# Patient Record
Sex: MALE | Race: WHITE | Employment: OTHER | URBAN - METROPOLITAN AREA
[De-identification: names, ages, dates, MRNs, and addresses within clinical notes are randomized per-mention and may not be internally consistent; named-entity substitution may affect disease eponyms.]

---

## 2017-01-03 ENCOUNTER — TELEPHONE (OUTPATIENT)
Dept: INTERNAL MEDICINE CLINIC | Age: 82
End: 2017-01-03

## 2017-01-03 PROBLEM — F41.9 ANXIETY DISORDER: Status: ACTIVE | Noted: 2017-01-03

## 2017-01-04 ENCOUNTER — TELEPHONE (OUTPATIENT)
Dept: PSYCHIATRY | Age: 82
End: 2017-01-04

## 2017-01-06 ENCOUNTER — TELEPHONE (OUTPATIENT)
Dept: INTERNAL MEDICINE CLINIC | Age: 82
End: 2017-01-06

## 2017-01-06 RX ORDER — QUETIAPINE FUMARATE 25 MG/1
12.5 TABLET, FILM COATED ORAL NIGHTLY
Qty: 15 TABLET | Refills: 0 | Status: ON HOLD | OUTPATIENT
Start: 2017-01-06 | End: 2017-01-18 | Stop reason: HOSPADM

## 2017-01-16 ENCOUNTER — TELEPHONE (OUTPATIENT)
Dept: INTERNAL MEDICINE CLINIC | Age: 82
End: 2017-01-16

## 2017-01-17 ENCOUNTER — TELEPHONE (OUTPATIENT)
Dept: INTERNAL MEDICINE CLINIC | Age: 82
End: 2017-01-17

## 2017-01-18 PROBLEM — R12 HEART BURN: Status: ACTIVE | Noted: 2017-01-18

## 2017-01-20 ENCOUNTER — CARE COORDINATION (OUTPATIENT)
Dept: CASE MANAGEMENT | Age: 82
End: 2017-01-20

## 2017-01-21 ENCOUNTER — CARE COORDINATION (OUTPATIENT)
Dept: CASE MANAGEMENT | Age: 82
End: 2017-01-21

## 2017-01-22 ENCOUNTER — CARE COORDINATION (OUTPATIENT)
Dept: CASE MANAGEMENT | Age: 82
End: 2017-01-22

## 2017-01-27 ENCOUNTER — OFFICE VISIT (OUTPATIENT)
Dept: INTERNAL MEDICINE CLINIC | Age: 82
End: 2017-01-27

## 2017-01-27 ENCOUNTER — CARE COORDINATION (OUTPATIENT)
Dept: CASE MANAGEMENT | Age: 82
End: 2017-01-27

## 2017-01-27 VITALS
DIASTOLIC BLOOD PRESSURE: 70 MMHG | SYSTOLIC BLOOD PRESSURE: 122 MMHG | BODY MASS INDEX: 18.66 KG/M2 | HEIGHT: 72 IN | WEIGHT: 137.8 LBS | HEART RATE: 64 BPM

## 2017-01-27 DIAGNOSIS — R53.1 GENERAL WEAKNESS: ICD-10-CM

## 2017-01-27 DIAGNOSIS — Z09 HOSPITAL DISCHARGE FOLLOW-UP: Primary | ICD-10-CM

## 2017-01-27 DIAGNOSIS — I25.10 CORONARY ARTERY DISEASE INVOLVING NATIVE CORONARY ARTERY OF NATIVE HEART WITHOUT ANGINA PECTORIS: Chronic | ICD-10-CM

## 2017-01-27 DIAGNOSIS — R07.89 CHEST PAIN, NON-CARDIAC: ICD-10-CM

## 2017-01-27 DIAGNOSIS — F31.81 BIPOLAR 2 DISORDER (HCC): ICD-10-CM

## 2017-01-27 RX ORDER — CALCIUM CARBONATE 300MG(750)
1 TABLET,CHEWABLE ORAL DAILY
COMMUNITY
End: 2022-10-24

## 2017-01-27 RX ORDER — SENNOSIDES 8.6 MG
650 CAPSULE ORAL EVERY 8 HOURS PRN
Status: ON HOLD | COMMUNITY
End: 2019-04-09 | Stop reason: HOSPADM

## 2017-01-27 RX ORDER — AMOXICILLIN 500 MG/1
CAPSULE ORAL
COMMUNITY
Start: 2017-01-25 | End: 2017-02-01

## 2017-02-03 ENCOUNTER — OFFICE VISIT (OUTPATIENT)
Dept: PSYCHIATRY | Age: 82
End: 2017-02-03

## 2017-02-03 VITALS
HEIGHT: 66 IN | SYSTOLIC BLOOD PRESSURE: 138 MMHG | HEART RATE: 58 BPM | BODY MASS INDEX: 22.28 KG/M2 | DIASTOLIC BLOOD PRESSURE: 64 MMHG | WEIGHT: 138.6 LBS

## 2017-02-03 DIAGNOSIS — F31.70 BIPOLAR AFFECTIVE DISORDER IN REMISSION (HCC): Primary | ICD-10-CM

## 2017-02-03 PROCEDURE — G8598 ASA/ANTIPLAT THER USED: HCPCS | Performed by: PSYCHIATRY & NEUROLOGY

## 2017-02-03 PROCEDURE — 99214 OFFICE O/P EST MOD 30 MIN: CPT | Performed by: PSYCHIATRY & NEUROLOGY

## 2017-02-03 PROCEDURE — G8419 CALC BMI OUT NRM PARAM NOF/U: HCPCS | Performed by: PSYCHIATRY & NEUROLOGY

## 2017-02-03 PROCEDURE — 1123F ACP DISCUSS/DSCN MKR DOCD: CPT | Performed by: PSYCHIATRY & NEUROLOGY

## 2017-02-03 PROCEDURE — G8427 DOCREV CUR MEDS BY ELIG CLIN: HCPCS | Performed by: PSYCHIATRY & NEUROLOGY

## 2017-02-03 PROCEDURE — 1036F TOBACCO NON-USER: CPT | Performed by: PSYCHIATRY & NEUROLOGY

## 2017-02-03 PROCEDURE — G8484 FLU IMMUNIZE NO ADMIN: HCPCS | Performed by: PSYCHIATRY & NEUROLOGY

## 2017-02-03 PROCEDURE — 4040F PNEUMOC VAC/ADMIN/RCVD: CPT | Performed by: PSYCHIATRY & NEUROLOGY

## 2017-02-03 RX ORDER — LORAZEPAM 0.5 MG/1
0.25 TABLET ORAL 2 TIMES DAILY
Qty: 30 TABLET | Refills: 1 | Status: SHIPPED | OUTPATIENT
Start: 2017-02-03 | End: 2017-03-21 | Stop reason: SDUPTHER

## 2017-02-06 ENCOUNTER — TELEPHONE (OUTPATIENT)
Dept: INTERNAL MEDICINE CLINIC | Age: 82
End: 2017-02-06

## 2017-02-07 ENCOUNTER — TELEPHONE (OUTPATIENT)
Dept: INTERNAL MEDICINE CLINIC | Age: 82
End: 2017-02-07

## 2017-02-09 ENCOUNTER — TELEPHONE (OUTPATIENT)
Dept: INTERNAL MEDICINE CLINIC | Age: 82
End: 2017-02-09

## 2017-02-14 ENCOUNTER — TELEPHONE (OUTPATIENT)
Dept: INTERNAL MEDICINE CLINIC | Age: 82
End: 2017-02-14

## 2017-02-17 PROCEDURE — 99495 TRANSJ CARE MGMT MOD F2F 14D: CPT | Performed by: NURSE PRACTITIONER

## 2017-02-21 RX ORDER — ISOSORBIDE MONONITRATE 30 MG/1
30 TABLET, EXTENDED RELEASE ORAL DAILY
Qty: 90 TABLET | Refills: 1 | Status: SHIPPED | OUTPATIENT
Start: 2017-02-21 | End: 2017-05-17 | Stop reason: DRUGHIGH

## 2017-02-27 ENCOUNTER — TELEPHONE (OUTPATIENT)
Dept: INTERNAL MEDICINE CLINIC | Age: 82
End: 2017-02-27

## 2017-03-10 ENCOUNTER — TELEPHONE (OUTPATIENT)
Dept: INTERNAL MEDICINE CLINIC | Age: 82
End: 2017-03-10

## 2017-03-14 ENCOUNTER — OFFICE VISIT (OUTPATIENT)
Dept: INTERNAL MEDICINE CLINIC | Age: 82
End: 2017-03-14

## 2017-03-14 VITALS
HEIGHT: 63 IN | BODY MASS INDEX: 23.92 KG/M2 | WEIGHT: 135 LBS | DIASTOLIC BLOOD PRESSURE: 78 MMHG | SYSTOLIC BLOOD PRESSURE: 132 MMHG | HEART RATE: 64 BPM

## 2017-03-14 DIAGNOSIS — G24.01 TARDIVE DYSKINESIA: ICD-10-CM

## 2017-03-14 DIAGNOSIS — F31.10 BIPOLAR AFFECTIVE DISORDER, CURRENT EPISODE MANIC, CURRENT EPISODE SEVERITY UNSPECIFIED (HCC): ICD-10-CM

## 2017-03-14 DIAGNOSIS — I10 ESSENTIAL HYPERTENSION: Chronic | ICD-10-CM

## 2017-03-14 DIAGNOSIS — K59.04 CHRONIC IDIOPATHIC CONSTIPATION: Primary | ICD-10-CM

## 2017-03-14 DIAGNOSIS — G25.81 RLS (RESTLESS LEGS SYNDROME): ICD-10-CM

## 2017-03-14 PROCEDURE — 1123F ACP DISCUSS/DSCN MKR DOCD: CPT | Performed by: NURSE PRACTITIONER

## 2017-03-14 PROCEDURE — G8420 CALC BMI NORM PARAMETERS: HCPCS | Performed by: NURSE PRACTITIONER

## 2017-03-14 PROCEDURE — G8484 FLU IMMUNIZE NO ADMIN: HCPCS | Performed by: NURSE PRACTITIONER

## 2017-03-14 PROCEDURE — G8427 DOCREV CUR MEDS BY ELIG CLIN: HCPCS | Performed by: NURSE PRACTITIONER

## 2017-03-14 PROCEDURE — 1036F TOBACCO NON-USER: CPT | Performed by: NURSE PRACTITIONER

## 2017-03-14 PROCEDURE — G8598 ASA/ANTIPLAT THER USED: HCPCS | Performed by: NURSE PRACTITIONER

## 2017-03-14 PROCEDURE — 4040F PNEUMOC VAC/ADMIN/RCVD: CPT | Performed by: NURSE PRACTITIONER

## 2017-03-14 PROCEDURE — 99214 OFFICE O/P EST MOD 30 MIN: CPT | Performed by: NURSE PRACTITIONER

## 2017-03-17 ENCOUNTER — TELEPHONE (OUTPATIENT)
Dept: INTERNAL MEDICINE CLINIC | Age: 82
End: 2017-03-17

## 2017-03-21 ENCOUNTER — TELEPHONE (OUTPATIENT)
Dept: INTERNAL MEDICINE CLINIC | Age: 82
End: 2017-03-21

## 2017-03-21 DIAGNOSIS — K21.9 GASTROESOPHAGEAL REFLUX DISEASE WITHOUT ESOPHAGITIS: ICD-10-CM

## 2017-03-21 RX ORDER — LORAZEPAM 0.5 MG/1
TABLET ORAL
Qty: 45 TABLET | Refills: 1 | Status: SHIPPED | OUTPATIENT
Start: 2017-03-21 | End: 2017-04-17 | Stop reason: SDUPTHER

## 2017-03-21 RX ORDER — PANTOPRAZOLE SODIUM 40 MG/1
40 TABLET, DELAYED RELEASE ORAL DAILY
Qty: 30 TABLET | Refills: 3 | Status: SHIPPED | OUTPATIENT
Start: 2017-03-21 | End: 2017-05-23 | Stop reason: SDUPTHER

## 2017-04-07 RX ORDER — CLOPIDOGREL BISULFATE 75 MG/1
75 TABLET ORAL DAILY
Qty: 90 TABLET | Refills: 1 | Status: SHIPPED | OUTPATIENT
Start: 2017-04-07 | End: 2017-10-23 | Stop reason: SDUPTHER

## 2017-04-10 RX ORDER — PRAVASTATIN SODIUM 20 MG
TABLET ORAL
Qty: 90 TABLET | Refills: 0 | Status: SHIPPED | OUTPATIENT
Start: 2017-04-10 | End: 2017-04-12 | Stop reason: SDUPTHER

## 2017-04-12 ENCOUNTER — TELEPHONE (OUTPATIENT)
Dept: CARDIOLOGY CLINIC | Age: 82
End: 2017-04-12

## 2017-04-12 RX ORDER — PRAVASTATIN SODIUM 20 MG
TABLET ORAL
Qty: 90 TABLET | Refills: 3 | Status: SHIPPED | OUTPATIENT
Start: 2017-04-12 | End: 2017-11-13 | Stop reason: SDUPTHER

## 2017-04-17 ENCOUNTER — OFFICE VISIT (OUTPATIENT)
Dept: PSYCHIATRY | Age: 82
End: 2017-04-17

## 2017-04-17 VITALS
WEIGHT: 134.6 LBS | HEART RATE: 58 BPM | SYSTOLIC BLOOD PRESSURE: 140 MMHG | DIASTOLIC BLOOD PRESSURE: 64 MMHG | HEIGHT: 63 IN | BODY MASS INDEX: 23.85 KG/M2

## 2017-04-17 DIAGNOSIS — G24.01 TARDIVE DYSKINESIA: ICD-10-CM

## 2017-04-17 DIAGNOSIS — F31.74 BIPOLAR DISORDER, IN FULL REMISSION, MOST RECENT EPISODE MANIC (HCC): Primary | ICD-10-CM

## 2017-04-17 DIAGNOSIS — F19.980 SUBSTANCE OR MEDICATION-INDUCED ANXIETY DISORDER (HCC): ICD-10-CM

## 2017-04-17 PROBLEM — F31.70 BIPOLAR DISORDER IN FULL REMISSION (HCC): Status: ACTIVE | Noted: 2017-03-14

## 2017-04-17 PROCEDURE — 1036F TOBACCO NON-USER: CPT | Performed by: PSYCHIATRY & NEUROLOGY

## 2017-04-17 PROCEDURE — G8420 CALC BMI NORM PARAMETERS: HCPCS | Performed by: PSYCHIATRY & NEUROLOGY

## 2017-04-17 PROCEDURE — 1123F ACP DISCUSS/DSCN MKR DOCD: CPT | Performed by: PSYCHIATRY & NEUROLOGY

## 2017-04-17 PROCEDURE — G8427 DOCREV CUR MEDS BY ELIG CLIN: HCPCS | Performed by: PSYCHIATRY & NEUROLOGY

## 2017-04-17 PROCEDURE — 99213 OFFICE O/P EST LOW 20 MIN: CPT | Performed by: PSYCHIATRY & NEUROLOGY

## 2017-04-17 PROCEDURE — 4040F PNEUMOC VAC/ADMIN/RCVD: CPT | Performed by: PSYCHIATRY & NEUROLOGY

## 2017-04-17 PROCEDURE — G8598 ASA/ANTIPLAT THER USED: HCPCS | Performed by: PSYCHIATRY & NEUROLOGY

## 2017-04-17 RX ORDER — LORAZEPAM 0.5 MG/1
TABLET ORAL
Qty: 90 TABLET | Refills: 0 | Status: SHIPPED | OUTPATIENT
Start: 2017-04-17 | End: 2017-06-23 | Stop reason: SDUPTHER

## 2017-05-17 ENCOUNTER — OFFICE VISIT (OUTPATIENT)
Dept: CARDIOLOGY CLINIC | Age: 82
End: 2017-05-17

## 2017-05-17 VITALS
HEIGHT: 66 IN | HEART RATE: 68 BPM | DIASTOLIC BLOOD PRESSURE: 70 MMHG | BODY MASS INDEX: 21.5 KG/M2 | SYSTOLIC BLOOD PRESSURE: 120 MMHG | WEIGHT: 133.75 LBS

## 2017-05-17 DIAGNOSIS — I77.9 CAROTID ARTERY DISEASE, UNSPECIFIED LATERALITY (HCC): Chronic | ICD-10-CM

## 2017-05-17 DIAGNOSIS — I25.10 CORONARY ARTERY DISEASE INVOLVING NATIVE CORONARY ARTERY OF NATIVE HEART WITHOUT ANGINA PECTORIS: Chronic | ICD-10-CM

## 2017-05-17 DIAGNOSIS — I10 ESSENTIAL HYPERTENSION: Primary | Chronic | ICD-10-CM

## 2017-05-17 PROCEDURE — 1123F ACP DISCUSS/DSCN MKR DOCD: CPT | Performed by: INTERNAL MEDICINE

## 2017-05-17 PROCEDURE — G8598 ASA/ANTIPLAT THER USED: HCPCS | Performed by: INTERNAL MEDICINE

## 2017-05-17 PROCEDURE — 99214 OFFICE O/P EST MOD 30 MIN: CPT | Performed by: INTERNAL MEDICINE

## 2017-05-17 PROCEDURE — G8419 CALC BMI OUT NRM PARAM NOF/U: HCPCS | Performed by: INTERNAL MEDICINE

## 2017-05-17 PROCEDURE — G8427 DOCREV CUR MEDS BY ELIG CLIN: HCPCS | Performed by: INTERNAL MEDICINE

## 2017-05-17 PROCEDURE — 4040F PNEUMOC VAC/ADMIN/RCVD: CPT | Performed by: INTERNAL MEDICINE

## 2017-05-17 PROCEDURE — 1036F TOBACCO NON-USER: CPT | Performed by: INTERNAL MEDICINE

## 2017-05-17 RX ORDER — ISOSORBIDE DINITRATE 10 MG/1
10 TABLET ORAL DAILY
Qty: 90 TABLET | Refills: 3
Start: 2017-05-17 | End: 2017-09-11 | Stop reason: SDUPTHER

## 2017-05-22 DIAGNOSIS — K21.9 GASTROESOPHAGEAL REFLUX DISEASE WITHOUT ESOPHAGITIS: ICD-10-CM

## 2017-05-23 RX ORDER — PANTOPRAZOLE SODIUM 40 MG/1
TABLET, DELAYED RELEASE ORAL
Qty: 90 TABLET | Refills: 3 | OUTPATIENT
Start: 2017-05-23

## 2017-05-23 RX ORDER — PANTOPRAZOLE SODIUM 40 MG/1
40 TABLET, DELAYED RELEASE ORAL DAILY
Qty: 90 TABLET | Refills: 0 | Status: SHIPPED | OUTPATIENT
Start: 2017-05-23 | End: 2017-07-26 | Stop reason: SDUPTHER

## 2017-05-30 ENCOUNTER — TELEPHONE (OUTPATIENT)
Dept: INTERNAL MEDICINE CLINIC | Age: 82
End: 2017-05-30

## 2017-06-13 ENCOUNTER — OFFICE VISIT (OUTPATIENT)
Dept: INTERNAL MEDICINE CLINIC | Age: 82
End: 2017-06-13

## 2017-06-13 VITALS
HEIGHT: 66 IN | DIASTOLIC BLOOD PRESSURE: 62 MMHG | HEART RATE: 60 BPM | WEIGHT: 135 LBS | BODY MASS INDEX: 21.69 KG/M2 | SYSTOLIC BLOOD PRESSURE: 128 MMHG

## 2017-06-13 DIAGNOSIS — I10 ESSENTIAL HYPERTENSION: Chronic | ICD-10-CM

## 2017-06-13 DIAGNOSIS — G24.01 TARDIVE DYSKINESIA: ICD-10-CM

## 2017-06-13 DIAGNOSIS — G25.81 RLS (RESTLESS LEGS SYNDROME): ICD-10-CM

## 2017-06-13 DIAGNOSIS — F31.70 BIPOLAR DISORDER IN FULL REMISSION, MOST RECENT EPISODE UNSPECIFIED TYPE (HCC): ICD-10-CM

## 2017-06-13 DIAGNOSIS — R35.1 NOCTURIA ASSOCIATED WITH BENIGN PROSTATIC HYPERTROPHY: Primary | ICD-10-CM

## 2017-06-13 DIAGNOSIS — K59.04 CHRONIC IDIOPATHIC CONSTIPATION: ICD-10-CM

## 2017-06-13 DIAGNOSIS — N40.1 NOCTURIA ASSOCIATED WITH BENIGN PROSTATIC HYPERTROPHY: Primary | ICD-10-CM

## 2017-06-13 PROCEDURE — G8427 DOCREV CUR MEDS BY ELIG CLIN: HCPCS | Performed by: NURSE PRACTITIONER

## 2017-06-13 PROCEDURE — 1036F TOBACCO NON-USER: CPT | Performed by: NURSE PRACTITIONER

## 2017-06-13 PROCEDURE — G8420 CALC BMI NORM PARAMETERS: HCPCS | Performed by: NURSE PRACTITIONER

## 2017-06-13 PROCEDURE — G8598 ASA/ANTIPLAT THER USED: HCPCS | Performed by: NURSE PRACTITIONER

## 2017-06-13 PROCEDURE — 4040F PNEUMOC VAC/ADMIN/RCVD: CPT | Performed by: NURSE PRACTITIONER

## 2017-06-13 PROCEDURE — 1123F ACP DISCUSS/DSCN MKR DOCD: CPT | Performed by: NURSE PRACTITIONER

## 2017-06-13 PROCEDURE — 99214 OFFICE O/P EST MOD 30 MIN: CPT | Performed by: NURSE PRACTITIONER

## 2017-06-13 ASSESSMENT — ENCOUNTER SYMPTOMS
DIARRHEA: 1
ABDOMINAL PAIN: 0
CONSTIPATION: 1
RESPIRATORY NEGATIVE: 1

## 2017-06-14 LAB
BILIRUBIN URINE: NEGATIVE
BLOOD, URINE: NEGATIVE
CLARITY: CLEAR
COLOR: YELLOW
GLUCOSE URINE: NEGATIVE MG/DL
KETONES, URINE: NEGATIVE MG/DL
LEUKOCYTE ESTERASE, URINE: NEGATIVE
MICROSCOPIC EXAMINATION: NORMAL
NITRITE, URINE: NEGATIVE
PH UA: 5.5
PROTEIN UA: NEGATIVE MG/DL
SPECIFIC GRAVITY UA: 1.03
URINE REFLEX TO CULTURE: NORMAL
URINE TYPE: NORMAL
UROBILINOGEN, URINE: 0.2 E.U./DL

## 2017-06-19 ENCOUNTER — TELEPHONE (OUTPATIENT)
Dept: INTERNAL MEDICINE CLINIC | Age: 82
End: 2017-06-19

## 2017-06-23 ENCOUNTER — OFFICE VISIT (OUTPATIENT)
Dept: PSYCHIATRY | Age: 82
End: 2017-06-23

## 2017-06-23 VITALS
HEART RATE: 60 BPM | DIASTOLIC BLOOD PRESSURE: 70 MMHG | BODY MASS INDEX: 21.38 KG/M2 | SYSTOLIC BLOOD PRESSURE: 118 MMHG | HEIGHT: 66 IN | WEIGHT: 133 LBS

## 2017-06-23 DIAGNOSIS — F31.70 BIPOLAR DISORDER IN FULL REMISSION, MOST RECENT EPISODE UNSPECIFIED TYPE (HCC): Primary | ICD-10-CM

## 2017-06-23 DIAGNOSIS — G31.84 MINOR NEUROCOGNITIVE DISORDER: ICD-10-CM

## 2017-06-23 DIAGNOSIS — G24.01 TARDIVE DYSKINESIA: ICD-10-CM

## 2017-06-23 PROCEDURE — 4040F PNEUMOC VAC/ADMIN/RCVD: CPT | Performed by: PSYCHIATRY & NEUROLOGY

## 2017-06-23 PROCEDURE — 99214 OFFICE O/P EST MOD 30 MIN: CPT | Performed by: PSYCHIATRY & NEUROLOGY

## 2017-06-23 PROCEDURE — G8420 CALC BMI NORM PARAMETERS: HCPCS | Performed by: PSYCHIATRY & NEUROLOGY

## 2017-06-23 PROCEDURE — G8427 DOCREV CUR MEDS BY ELIG CLIN: HCPCS | Performed by: PSYCHIATRY & NEUROLOGY

## 2017-06-23 PROCEDURE — G8598 ASA/ANTIPLAT THER USED: HCPCS | Performed by: PSYCHIATRY & NEUROLOGY

## 2017-06-23 PROCEDURE — 1036F TOBACCO NON-USER: CPT | Performed by: PSYCHIATRY & NEUROLOGY

## 2017-06-23 PROCEDURE — 1123F ACP DISCUSS/DSCN MKR DOCD: CPT | Performed by: PSYCHIATRY & NEUROLOGY

## 2017-06-23 RX ORDER — LORAZEPAM 0.5 MG/1
TABLET ORAL
Qty: 60 TABLET | Refills: 0 | Status: SHIPPED | OUTPATIENT
Start: 2017-06-23 | End: 2017-09-06 | Stop reason: SDUPTHER

## 2017-07-17 ENCOUNTER — TELEPHONE (OUTPATIENT)
Dept: INTERNAL MEDICINE CLINIC | Age: 82
End: 2017-07-17

## 2017-07-26 DIAGNOSIS — K21.9 GASTROESOPHAGEAL REFLUX DISEASE WITHOUT ESOPHAGITIS: ICD-10-CM

## 2017-07-26 RX ORDER — PANTOPRAZOLE SODIUM 40 MG/1
TABLET, DELAYED RELEASE ORAL
Qty: 90 TABLET | Refills: 0 | Status: SHIPPED | OUTPATIENT
Start: 2017-07-26 | End: 2017-09-27 | Stop reason: SDUPTHER

## 2017-08-16 RX ORDER — LEVOTHYROXINE SODIUM 0.03 MG/1
TABLET ORAL
Qty: 90 TABLET | Refills: 3 | Status: SHIPPED | OUTPATIENT
Start: 2017-08-16 | End: 2018-12-03 | Stop reason: SDUPTHER

## 2017-08-24 DIAGNOSIS — E78.5 HYPERLIPIDEMIA, UNSPECIFIED HYPERLIPIDEMIA TYPE: ICD-10-CM

## 2017-08-24 DIAGNOSIS — I10 ESSENTIAL HYPERTENSION: ICD-10-CM

## 2017-08-24 DIAGNOSIS — I77.9 CAROTID ARTERY DISEASE, UNSPECIFIED LATERALITY (HCC): ICD-10-CM

## 2017-08-29 RX ORDER — AMLODIPINE BESYLATE 5 MG/1
TABLET ORAL
Qty: 90 TABLET | Refills: 3 | Status: SHIPPED | OUTPATIENT
Start: 2017-08-29 | End: 2017-11-13 | Stop reason: SDUPTHER

## 2017-09-06 ENCOUNTER — TELEPHONE (OUTPATIENT)
Dept: INTERNAL MEDICINE CLINIC | Age: 82
End: 2017-09-06

## 2017-09-06 ENCOUNTER — OFFICE VISIT (OUTPATIENT)
Dept: PSYCHIATRY | Age: 82
End: 2017-09-06

## 2017-09-06 VITALS
WEIGHT: 136.2 LBS | SYSTOLIC BLOOD PRESSURE: 110 MMHG | BODY MASS INDEX: 21.98 KG/M2 | DIASTOLIC BLOOD PRESSURE: 60 MMHG | HEART RATE: 60 BPM

## 2017-09-06 DIAGNOSIS — F31.70 BIPOLAR DISORDER IN FULL REMISSION, MOST RECENT EPISODE UNSPECIFIED TYPE (HCC): Primary | ICD-10-CM

## 2017-09-06 DIAGNOSIS — G24.01 TARDIVE DYSKINESIA: ICD-10-CM

## 2017-09-06 DIAGNOSIS — F41.9 ANXIETY DISORDER, UNSPECIFIED TYPE: ICD-10-CM

## 2017-09-06 PROCEDURE — G8420 CALC BMI NORM PARAMETERS: HCPCS | Performed by: PSYCHIATRY & NEUROLOGY

## 2017-09-06 PROCEDURE — 1123F ACP DISCUSS/DSCN MKR DOCD: CPT | Performed by: PSYCHIATRY & NEUROLOGY

## 2017-09-06 PROCEDURE — G8598 ASA/ANTIPLAT THER USED: HCPCS | Performed by: PSYCHIATRY & NEUROLOGY

## 2017-09-06 PROCEDURE — 99214 OFFICE O/P EST MOD 30 MIN: CPT | Performed by: PSYCHIATRY & NEUROLOGY

## 2017-09-06 PROCEDURE — 4040F PNEUMOC VAC/ADMIN/RCVD: CPT | Performed by: PSYCHIATRY & NEUROLOGY

## 2017-09-06 PROCEDURE — 1036F TOBACCO NON-USER: CPT | Performed by: PSYCHIATRY & NEUROLOGY

## 2017-09-06 PROCEDURE — G8427 DOCREV CUR MEDS BY ELIG CLIN: HCPCS | Performed by: PSYCHIATRY & NEUROLOGY

## 2017-09-06 RX ORDER — LORAZEPAM 0.5 MG/1
TABLET ORAL
Qty: 135 TABLET | Refills: 0 | Status: SHIPPED | OUTPATIENT
Start: 2017-09-06 | End: 2017-10-19 | Stop reason: DRUGHIGH

## 2017-09-06 RX ORDER — LORAZEPAM 0.5 MG/1
TABLET ORAL
Qty: 45 TABLET | Refills: 0 | Status: SHIPPED | OUTPATIENT
Start: 2017-09-06 | End: 2017-10-19 | Stop reason: ALTCHOICE

## 2017-09-11 ENCOUNTER — OFFICE VISIT (OUTPATIENT)
Dept: INTERNAL MEDICINE CLINIC | Age: 82
End: 2017-09-11

## 2017-09-11 ENCOUNTER — TELEPHONE (OUTPATIENT)
Dept: INTERNAL MEDICINE CLINIC | Age: 82
End: 2017-09-11

## 2017-09-11 VITALS
HEART RATE: 80 BPM | SYSTOLIC BLOOD PRESSURE: 114 MMHG | HEIGHT: 66 IN | DIASTOLIC BLOOD PRESSURE: 66 MMHG | WEIGHT: 135 LBS | BODY MASS INDEX: 21.69 KG/M2

## 2017-09-11 DIAGNOSIS — G25.81 RLS (RESTLESS LEGS SYNDROME): ICD-10-CM

## 2017-09-11 DIAGNOSIS — I10 ESSENTIAL HYPERTENSION: Chronic | ICD-10-CM

## 2017-09-11 DIAGNOSIS — I25.10 CORONARY ARTERY DISEASE INVOLVING NATIVE CORONARY ARTERY OF NATIVE HEART WITHOUT ANGINA PECTORIS: Chronic | ICD-10-CM

## 2017-09-11 DIAGNOSIS — K59.04 CHRONIC IDIOPATHIC CONSTIPATION: Primary | ICD-10-CM

## 2017-09-11 DIAGNOSIS — Z23 NEED FOR INFLUENZA VACCINATION: ICD-10-CM

## 2017-09-11 PROCEDURE — 1123F ACP DISCUSS/DSCN MKR DOCD: CPT | Performed by: NURSE PRACTITIONER

## 2017-09-11 PROCEDURE — G8420 CALC BMI NORM PARAMETERS: HCPCS | Performed by: NURSE PRACTITIONER

## 2017-09-11 PROCEDURE — 99214 OFFICE O/P EST MOD 30 MIN: CPT | Performed by: NURSE PRACTITIONER

## 2017-09-11 PROCEDURE — G0008 ADMIN INFLUENZA VIRUS VAC: HCPCS | Performed by: NURSE PRACTITIONER

## 2017-09-11 PROCEDURE — 4040F PNEUMOC VAC/ADMIN/RCVD: CPT | Performed by: NURSE PRACTITIONER

## 2017-09-11 PROCEDURE — G8598 ASA/ANTIPLAT THER USED: HCPCS | Performed by: NURSE PRACTITIONER

## 2017-09-11 PROCEDURE — 90662 IIV NO PRSV INCREASED AG IM: CPT | Performed by: NURSE PRACTITIONER

## 2017-09-11 PROCEDURE — G8427 DOCREV CUR MEDS BY ELIG CLIN: HCPCS | Performed by: NURSE PRACTITIONER

## 2017-09-11 PROCEDURE — 1036F TOBACCO NON-USER: CPT | Performed by: NURSE PRACTITIONER

## 2017-09-11 RX ORDER — ISOSORBIDE DINITRATE 10 MG/1
10 TABLET ORAL DAILY
Qty: 14 TABLET | Refills: 0 | Status: SHIPPED | OUTPATIENT
Start: 2017-09-11 | End: 2017-11-13

## 2017-09-11 RX ORDER — ISOSORBIDE DINITRATE 10 MG/1
10 TABLET ORAL DAILY
Qty: 90 TABLET | Refills: 3 | Status: SHIPPED | OUTPATIENT
Start: 2017-09-11 | End: 2017-11-13

## 2017-09-27 ENCOUNTER — TELEPHONE (OUTPATIENT)
Dept: INTERNAL MEDICINE CLINIC | Age: 82
End: 2017-09-27

## 2017-09-27 DIAGNOSIS — K21.9 GASTROESOPHAGEAL REFLUX DISEASE WITHOUT ESOPHAGITIS: ICD-10-CM

## 2017-09-28 RX ORDER — PANTOPRAZOLE SODIUM 40 MG/1
TABLET, DELAYED RELEASE ORAL
Qty: 90 TABLET | Refills: 1 | Status: SHIPPED | OUTPATIENT
Start: 2017-09-28 | End: 2018-04-18 | Stop reason: SDUPTHER

## 2017-10-05 ENCOUNTER — TELEPHONE (OUTPATIENT)
Dept: INTERNAL MEDICINE CLINIC | Age: 82
End: 2017-10-05

## 2017-10-05 RX ORDER — LORAZEPAM 0.5 MG/1
TABLET ORAL
Qty: 6 TABLET | Refills: 0 | Status: SHIPPED | OUTPATIENT
Start: 2017-10-05 | End: 2017-10-10 | Stop reason: SDUPTHER

## 2017-10-09 ENCOUNTER — TELEPHONE (OUTPATIENT)
Dept: CARDIOLOGY CLINIC | Age: 82
End: 2017-10-09

## 2017-10-09 NOTE — TELEPHONE ENCOUNTER
Called and would like to speak to EMMA Larson RN regarding   isosorbide dinitrate (ISORDIL) 10 MG tablet. Feels it is causing upset stomach and other issues. Please call to adv.   Thank you [facility]

## 2017-10-10 ENCOUNTER — OFFICE VISIT (OUTPATIENT)
Dept: INTERNAL MEDICINE CLINIC | Age: 82
End: 2017-10-10

## 2017-10-10 VITALS
DIASTOLIC BLOOD PRESSURE: 74 MMHG | HEIGHT: 66 IN | WEIGHT: 134 LBS | SYSTOLIC BLOOD PRESSURE: 122 MMHG | HEART RATE: 80 BPM | BODY MASS INDEX: 21.53 KG/M2

## 2017-10-10 DIAGNOSIS — R35.1 NOCTURIA: Primary | ICD-10-CM

## 2017-10-10 DIAGNOSIS — R10.13 DYSPEPSIA: ICD-10-CM

## 2017-10-10 DIAGNOSIS — R34 DECREASED URINATION: ICD-10-CM

## 2017-10-10 DIAGNOSIS — F41.9 ANXIETY DISORDER, UNSPECIFIED TYPE: ICD-10-CM

## 2017-10-10 LAB
ALBUMIN SERPL-MCNC: 4.2 G/DL (ref 3.4–5)
ANION GAP SERPL CALCULATED.3IONS-SCNC: 10 MMOL/L (ref 3–16)
BILIRUBIN URINE: NEGATIVE
BLOOD, URINE: NEGATIVE
BUN BLDV-MCNC: 14 MG/DL (ref 7–20)
CALCIUM SERPL-MCNC: 9.2 MG/DL (ref 8.3–10.6)
CHLORIDE BLD-SCNC: 97 MMOL/L (ref 99–110)
CLARITY: CLEAR
CO2: 31 MMOL/L (ref 21–32)
COLOR: NORMAL
CREAT SERPL-MCNC: 0.9 MG/DL (ref 0.8–1.3)
GFR AFRICAN AMERICAN: >60
GFR NON-AFRICAN AMERICAN: >60
GLUCOSE BLD-MCNC: 95 MG/DL (ref 70–99)
GLUCOSE URINE: NEGATIVE MG/DL
KETONES, URINE: NEGATIVE MG/DL
LEUKOCYTE ESTERASE, URINE: NEGATIVE
MICROSCOPIC EXAMINATION: NORMAL
NITRITE, URINE: NEGATIVE
PH UA: 6
PHOSPHORUS: 4.1 MG/DL (ref 2.5–4.9)
POTASSIUM SERPL-SCNC: 4.7 MMOL/L (ref 3.5–5.1)
PROTEIN UA: NEGATIVE MG/DL
SODIUM BLD-SCNC: 138 MMOL/L (ref 136–145)
SPECIFIC GRAVITY UA: 1.02
URINE REFLEX TO CULTURE: NORMAL
URINE TYPE: NORMAL
UROBILINOGEN, URINE: 0.2 E.U./DL

## 2017-10-10 PROCEDURE — 4040F PNEUMOC VAC/ADMIN/RCVD: CPT | Performed by: NURSE PRACTITIONER

## 2017-10-10 PROCEDURE — 99214 OFFICE O/P EST MOD 30 MIN: CPT | Performed by: NURSE PRACTITIONER

## 2017-10-10 PROCEDURE — G8420 CALC BMI NORM PARAMETERS: HCPCS | Performed by: NURSE PRACTITIONER

## 2017-10-10 PROCEDURE — G8598 ASA/ANTIPLAT THER USED: HCPCS | Performed by: NURSE PRACTITIONER

## 2017-10-10 PROCEDURE — G8484 FLU IMMUNIZE NO ADMIN: HCPCS | Performed by: NURSE PRACTITIONER

## 2017-10-10 PROCEDURE — 1123F ACP DISCUSS/DSCN MKR DOCD: CPT | Performed by: NURSE PRACTITIONER

## 2017-10-10 PROCEDURE — 1036F TOBACCO NON-USER: CPT | Performed by: NURSE PRACTITIONER

## 2017-10-10 PROCEDURE — G8427 DOCREV CUR MEDS BY ELIG CLIN: HCPCS | Performed by: NURSE PRACTITIONER

## 2017-10-10 RX ORDER — LORAZEPAM 0.5 MG/1
TABLET ORAL
Qty: 6 TABLET | Refills: 0 | Status: SHIPPED | OUTPATIENT
Start: 2017-10-10 | End: 2017-10-19 | Stop reason: ALTCHOICE

## 2017-10-10 RX ORDER — DOXAZOSIN 2 MG/1
4 TABLET ORAL NIGHTLY
Qty: 180 TABLET | Refills: 1 | Status: SHIPPED | OUTPATIENT
Start: 2017-10-10 | End: 2017-10-16 | Stop reason: SDUPTHER

## 2017-10-11 ASSESSMENT — ENCOUNTER SYMPTOMS
SHORTNESS OF BREATH: 0
NAUSEA: 1
CONSTIPATION: 1
DIARRHEA: 1
ABDOMINAL PAIN: 1
BLOOD IN STOOL: 0
ABDOMINAL DISTENTION: 1
VOMITING: 0

## 2017-10-11 NOTE — PROGRESS NOTES
Subjective:      Patient ID: Kacey Singletary is a 80 y.o. male. CC: abdominal concerns, urinary frequency at night    HPI: The patient returns to the office today with ongoing medical concerns. He is accompanied by his daughter. He continues to have a lot of GI upset and concerned about his bowels. He has been seen on numerous occasions for alternating constipation and/or diarrhea. He has been on a number of medications which always seem to cause issues. He is also seeing gastroenterology. A colonoscopy was offered to the patient but he declined which is reasonable given his age. The patient's daughter wonders if isosorbide could be contributing to the patient's GI upset. She has gotten approval from cardiology to discontinue this and I think this is a reasonable thing to try. He also continues to complain of nocturia. We previously discussed this and I offered to increase one of his medications. At that time, he declined. A urinalysis was negative. He is seen urology for the same issues but not recently. Today, he is agreeable with trying a higher dose of the medication though he has many concerns about side effects to medication. He remains a patient of psychiatry and takes a benzodiazepine for anxiety. Unfortunately, there has been an issue with him receiving this via mail order. The medication has shipped but has not yet been received and he will be out of medication today. I have agreed to a short refill to cover until the mail-order arrives.       Current Outpatient Prescriptions   Medication Sig Dispense Refill    doxazosin (CARDURA) 2 MG tablet Take 2 tablets by mouth nightly 180 tablet 1    LORazepam (ATIVAN) 0.5 MG tablet Take one tab po QAM, and 1/2 tab PO QHS 6 tablet 0    pantoprazole (PROTONIX) 40 MG tablet TAKE 1 TABLET EVERY DAY 90 tablet 1    isosorbide dinitrate (ISORDIL) 10 MG tablet Take 1 tablet by mouth daily 90 tablet 3    isosorbide dinitrate (ISORDIL) 10 MG tablet Take 1 tablet by mouth daily 14 tablet 0    LORazepam (ATIVAN) 0.5 MG tablet Take one tab in the morning, and 1/2 tab at night 45 tablet 0    LORazepam (ATIVAN) 0.5 MG tablet Take one tab in the morning and 1/2 tab at night. Do not fill before 10/6/2017. 135 tablet 0    amLODIPine (NORVASC) 5 MG tablet TAKE 1 TABLET EVERY NIGHT 90 tablet 3    levothyroxine (SYNTHROID) 25 MCG tablet TAKE 1 TABLET EVERY DAY 90 tablet 3    Polyethylene Glycol 3350 (MIRALAX PO) Take by mouth      pravastatin (PRAVACHOL) 20 MG tablet TAKE 1 TABLET EVERY DAY  (  NEED  BLOOD  WORK) 90 tablet 3    clopidogrel (PLAVIX) 75 MG tablet Take 1 tablet by mouth daily 90 tablet 1    docusate sodium (COLACE) 100 MG capsule Take 1 capsule by mouth 2 times daily 20 capsule 0    Magnesium 400 MG TABS Take by mouth      Multiple Vitamins-Minerals (EYE VITAMINS PO) Take by mouth      acetaminophen (TYLENOL) 650 MG extended release tablet Take 650 mg by mouth every 8 hours as needed for Pain      gabapentin (NEURONTIN) 300 MG capsule Take 1 capsule by mouth nightly 90 capsule 3    aspirin 81 MG chewable tablet Take 1 tablet by mouth daily 30 tablet 3    finasteride (PROSCAR) 5 MG tablet Take 1 tablet by mouth every evening TAKE 1 TABLET DAILY 90 tablet 3    benazepril (LOTENSIN) 40 MG tablet Take 1 tablet by mouth daily 90 tablet 3    nitroGLYCERIN (NITROSTAT) 0.4 MG SL tablet Place 1 tablet under the tongue every 5 minutes as needed for Chest pain 25 tablet 3     No current facility-administered medications for this visit. Review of Systems   Constitutional: Positive for fatigue. Negative for chills and fever. Respiratory: Negative for shortness of breath. Cardiovascular: Negative for chest pain. Gastrointestinal: Positive for abdominal distention, abdominal pain, constipation, diarrhea and nausea. Negative for blood in stool and vomiting. Genitourinary: Positive for decreased urine volume.  Negative for dysuria and hematuria. Nocturia   Psychiatric/Behavioral: Positive for sleep disturbance. The patient is nervous/anxious. All other systems reviewed and are negative. Objective:   Physical Exam   Constitutional: He is oriented to person, place, and time. He appears well-developed and well-nourished. No distress. HENT:   Head: Normocephalic and atraumatic. Eyes: Conjunctivae are normal. No scleral icterus. Neck: Neck supple. No JVD present. Cardiovascular: Normal rate and regular rhythm. Murmur heard. Pulmonary/Chest: Effort normal. No respiratory distress. Abdominal: Soft. He exhibits no distension. There is no tenderness. There is no guarding. Musculoskeletal: Normal range of motion. He exhibits no edema. Neurological: He is alert and oriented to person, place, and time. Skin: Skin is dry. He is not diaphoretic. Psychiatric: He has a normal mood and affect. His behavior is normal.     /74   Pulse 80   Ht 5' 6\" (1.676 m)   Wt 134 lb (60.8 kg)   BMI 21.63 kg/m²         Assessment/Plan:  rBittney Martinez was seen today for urinary frequency and nausea. Diagnoses and all orders for this visit:    Nocturia  - Chronic problem. He did not contact his urologist  - Increase Cardura. Continue finasteride. Contact urologist if not improving  -     doxazosin (CARDURA) 2 MG tablet; Take 2 tablets by mouth nightly  -     RENAL FUNCTION PANEL    Decreased urination  -     URINE RT REFLEX TO CULTURE  -     RENAL FUNCTION PANEL    Dyspepsia  - Chronic problem. - Family is going to try discontinuing isosorbide at the okay from cardiology. I think this is a reasonable thing to try    Anxiety disorder, unspecified type  - See HPI  -     LORazepam (ATIVAN) 0.5 MG tablet;  Take one tab po QAM, and 1/2 tab PO QHS      Nando Ingram, CNP

## 2017-10-16 ENCOUNTER — TELEPHONE (OUTPATIENT)
Dept: INTERNAL MEDICINE CLINIC | Age: 82
End: 2017-10-16

## 2017-10-16 DIAGNOSIS — R35.1 NOCTURIA: ICD-10-CM

## 2017-10-16 RX ORDER — DOXAZOSIN 2 MG/1
2 TABLET ORAL NIGHTLY
Qty: 90 TABLET | Refills: 1
Start: 2017-10-16 | End: 2018-02-23 | Stop reason: SDUPTHER

## 2017-10-16 NOTE — TELEPHONE ENCOUNTER
Phone call from [place], patient's daughter. She states patient went back to only taking Cardura 2 mg daily instead of 4 mg. Sharone Neighbor had increased this to help with getting up so much to go to the bathroom at night. He started getting dizzy with the extra dose so he stooped taking it.

## 2017-10-17 NOTE — TELEPHONE ENCOUNTER
I'm ok with that change for now. I left a message with Alie Solo indicating that and to notify me before he runs out of the medication.

## 2017-10-18 NOTE — TELEPHONE ENCOUNTER
The patient's daughter Fadumo Esparza called asking that you please send a new 90 day Rx for Ativan 0.5 mg bid to [facility] mail order pharmacy. The last time there was a dose change Nicki did not ship the medication in time. She is worried this will happen again.

## 2017-10-19 ENCOUNTER — TELEPHONE (OUTPATIENT)
Dept: INTERNAL MEDICINE CLINIC | Age: 82
End: 2017-10-19

## 2017-10-19 DIAGNOSIS — K58.2 IRRITABLE BOWEL SYNDROME WITH BOTH CONSTIPATION AND DIARRHEA: Primary | ICD-10-CM

## 2017-10-19 RX ORDER — LORAZEPAM 0.5 MG/1
0.5 TABLET ORAL 2 TIMES DAILY
Qty: 180 TABLET | Refills: 0 | Status: SHIPPED | OUTPATIENT
Start: 2017-10-19 | End: 2017-11-18

## 2017-10-19 NOTE — TELEPHONE ENCOUNTER
Phone call from Raul, patient's daughter. She says that patient calls her on a daily basis to complain of stomach pain and nausea. It subsides and then he can eat. Doesn't appear to be losing any weight. Raul states she hates calling the office all the time but she doesn't know what to do to help him. They say  and he wasn't very helpful. Told them he could do a colonoscopy but he would die on the table. Patient does have an appointment with Dr. Leandro Del Angel but that is not until 12/11/17. Asking for Andre's advice on the issue.

## 2017-10-24 RX ORDER — CLOPIDOGREL BISULFATE 75 MG/1
TABLET ORAL
Qty: 90 TABLET | Refills: 3 | Status: SHIPPED | OUTPATIENT
Start: 2017-10-24 | End: 2018-11-05 | Stop reason: SDUPTHER

## 2017-10-24 RX ORDER — FINASTERIDE 5 MG/1
TABLET, FILM COATED ORAL
Qty: 90 TABLET | Refills: 3 | Status: SHIPPED | OUTPATIENT
Start: 2017-10-24 | End: 2018-10-22 | Stop reason: SDUPTHER

## 2017-11-09 ENCOUNTER — HOSPITAL ENCOUNTER (OUTPATIENT)
Dept: GENERAL RADIOLOGY | Age: 82
Discharge: HOME | End: 2017-11-09
Attending: INTERNAL MEDICINE | Admitting: INTERNAL MEDICINE

## 2017-11-09 DIAGNOSIS — R11.0 NAUSEA: ICD-10-CM

## 2017-11-09 RX ORDER — BENAZEPRIL HYDROCHLORIDE 40 MG/1
TABLET, FILM COATED ORAL
Qty: 90 TABLET | Refills: 1 | Status: SHIPPED | OUTPATIENT
Start: 2017-11-09 | End: 2018-04-04 | Stop reason: SDUPTHER

## 2017-11-13 ENCOUNTER — OFFICE VISIT (OUTPATIENT)
Dept: CARDIOLOGY CLINIC | Age: 82
End: 2017-11-13

## 2017-11-13 VITALS
HEART RATE: 90 BPM | DIASTOLIC BLOOD PRESSURE: 72 MMHG | HEIGHT: 66 IN | SYSTOLIC BLOOD PRESSURE: 122 MMHG | WEIGHT: 135 LBS | BODY MASS INDEX: 21.69 KG/M2

## 2017-11-13 DIAGNOSIS — F51.04 PSYCHOPHYSIOLOGICAL INSOMNIA: ICD-10-CM

## 2017-11-13 DIAGNOSIS — K21.9 GASTROESOPHAGEAL REFLUX DISEASE WITHOUT ESOPHAGITIS: ICD-10-CM

## 2017-11-13 DIAGNOSIS — I25.10 CORONARY ARTERY DISEASE INVOLVING NATIVE CORONARY ARTERY OF NATIVE HEART WITHOUT ANGINA PECTORIS: Primary | Chronic | ICD-10-CM

## 2017-11-13 DIAGNOSIS — E78.5 HYPERLIPIDEMIA, UNSPECIFIED HYPERLIPIDEMIA TYPE: Chronic | ICD-10-CM

## 2017-11-13 DIAGNOSIS — I35.0 MILD AORTIC STENOSIS: ICD-10-CM

## 2017-11-13 DIAGNOSIS — I10 ESSENTIAL HYPERTENSION: Chronic | ICD-10-CM

## 2017-11-13 DIAGNOSIS — I77.9 CAROTID ARTERY DISEASE, UNSPECIFIED LATERALITY (HCC): Chronic | ICD-10-CM

## 2017-11-13 PROCEDURE — 99214 OFFICE O/P EST MOD 30 MIN: CPT | Performed by: INTERNAL MEDICINE

## 2017-11-13 PROCEDURE — 1123F ACP DISCUSS/DSCN MKR DOCD: CPT | Performed by: INTERNAL MEDICINE

## 2017-11-13 PROCEDURE — G8598 ASA/ANTIPLAT THER USED: HCPCS | Performed by: INTERNAL MEDICINE

## 2017-11-13 PROCEDURE — G8427 DOCREV CUR MEDS BY ELIG CLIN: HCPCS | Performed by: INTERNAL MEDICINE

## 2017-11-13 PROCEDURE — 1036F TOBACCO NON-USER: CPT | Performed by: INTERNAL MEDICINE

## 2017-11-13 PROCEDURE — G8484 FLU IMMUNIZE NO ADMIN: HCPCS | Performed by: INTERNAL MEDICINE

## 2017-11-13 PROCEDURE — 4040F PNEUMOC VAC/ADMIN/RCVD: CPT | Performed by: INTERNAL MEDICINE

## 2017-11-13 PROCEDURE — G8420 CALC BMI NORM PARAMETERS: HCPCS | Performed by: INTERNAL MEDICINE

## 2017-11-13 RX ORDER — AMLODIPINE BESYLATE 5 MG/1
5 TABLET ORAL DAILY
Qty: 90 TABLET | Refills: 3 | Status: SHIPPED | OUTPATIENT
Start: 2017-11-13 | End: 2019-01-24 | Stop reason: SDUPTHER

## 2017-11-13 RX ORDER — PRAVASTATIN SODIUM 20 MG
TABLET ORAL
Qty: 90 TABLET | Refills: 3 | Status: SHIPPED | OUTPATIENT
Start: 2017-11-13 | End: 2018-11-07 | Stop reason: SDUPTHER

## 2017-11-13 NOTE — PROGRESS NOTES
tablet 3    LORazepam (ATIVAN) 0.5 MG tablet Take 1 tablet by mouth 2 times daily 180 tablet 0    doxazosin (CARDURA) 2 MG tablet Take 1 tablet by mouth nightly 90 tablet 1    pantoprazole (PROTONIX) 40 MG tablet TAKE 1 TABLET EVERY DAY 90 tablet 1    amLODIPine (NORVASC) 5 MG tablet TAKE 1 TABLET EVERY NIGHT 90 tablet 3    levothyroxine (SYNTHROID) 25 MCG tablet TAKE 1 TABLET EVERY DAY 90 tablet 3    Polyethylene Glycol 3350 (MIRALAX PO) Take by mouth      pravastatin (PRAVACHOL) 20 MG tablet TAKE 1 TABLET EVERY DAY  (  NEED  BLOOD  WORK) 90 tablet 3    Magnesium 400 MG TABS Take by mouth      Multiple Vitamins-Minerals (EYE VITAMINS PO) Take by mouth      acetaminophen (TYLENOL) 650 MG extended release tablet Take 650 mg by mouth every 8 hours as needed for Pain      gabapentin (NEURONTIN) 300 MG capsule Take 1 capsule by mouth nightly 90 capsule 3    aspirin 81 MG chewable tablet Take 1 tablet by mouth daily 30 tablet 3    nitroGLYCERIN (NITROSTAT) 0.4 MG SL tablet Place 1 tablet under the tongue every 5 minutes as needed for Chest pain 25 tablet 3    Linaclotide (LINZESS) 72 MCG CAPS Take 72 mcg by mouth daily 30 capsule 5    isosorbide dinitrate (ISORDIL) 10 MG tablet Take 1 tablet by mouth daily 90 tablet 3    isosorbide dinitrate (ISORDIL) 10 MG tablet Take 1 tablet by mouth daily 14 tablet 0    docusate sodium (COLACE) 100 MG capsule Take 1 capsule by mouth 2 times daily 20 capsule 0     No facility-administered encounter medications on file as of 11/13/2017. [x] Medications and dosages reviewed.     ROS:  [x]Full ROS obtained and negative except as mentioned in HPI      Physical Examination:    Vitals:    11/13/17 1256   BP: 122/72   Pulse: 90        · GENERAL: Elderly male walking with walker in NAD  · NEUROLOGICAL: Alert and oriented  · PSYCH: Calm affect  · SKIN: Warm and dry  · HEENT: Normocephalic, Sclera non-icteric, mucus membranes moist  · NECK: supple, JVP normal  · CAROTID:

## 2017-11-16 ENCOUNTER — HOSPITAL ENCOUNTER (OUTPATIENT)
Dept: CT IMAGING | Age: 82
Discharge: HOME | End: 2017-11-16
Attending: INTERNAL MEDICINE | Admitting: INTERNAL MEDICINE

## 2017-11-16 DIAGNOSIS — R11.0 NAUSEA: ICD-10-CM

## 2017-12-06 ENCOUNTER — OFFICE VISIT (OUTPATIENT)
Dept: PSYCHIATRY | Age: 82
End: 2017-12-06

## 2017-12-06 VITALS
SYSTOLIC BLOOD PRESSURE: 130 MMHG | BODY MASS INDEX: 21.43 KG/M2 | WEIGHT: 132.8 LBS | DIASTOLIC BLOOD PRESSURE: 70 MMHG | HEART RATE: 80 BPM

## 2017-12-06 DIAGNOSIS — F41.9 ANXIETY DISORDER, UNSPECIFIED TYPE: ICD-10-CM

## 2017-12-06 DIAGNOSIS — F51.04 PSYCHOPHYSIOLOGICAL INSOMNIA: ICD-10-CM

## 2017-12-06 DIAGNOSIS — G25.81 RLS (RESTLESS LEGS SYNDROME): ICD-10-CM

## 2017-12-06 DIAGNOSIS — F31.74 BIPOLAR DISORDER, IN FULL REMISSION, MOST RECENT EPISODE MANIC (HCC): Primary | ICD-10-CM

## 2017-12-06 PROCEDURE — 99213 OFFICE O/P EST LOW 20 MIN: CPT | Performed by: PSYCHIATRY & NEUROLOGY

## 2017-12-06 RX ORDER — GABAPENTIN 300 MG/1
300 CAPSULE ORAL NIGHTLY
Qty: 90 CAPSULE | Refills: 2 | Status: SHIPPED | OUTPATIENT
Start: 2017-12-06 | End: 2017-12-21 | Stop reason: SDUPTHER

## 2017-12-06 RX ORDER — LORAZEPAM 0.5 MG/1
0.5 TABLET ORAL 2 TIMES DAILY
Qty: 180 TABLET | Refills: 0 | Status: SHIPPED | OUTPATIENT
Start: 2017-12-06 | End: 2018-05-10 | Stop reason: SDUPTHER

## 2017-12-06 ASSESSMENT — PATIENT HEALTH QUESTIONNAIRE - PHQ9
2. FEELING DOWN, DEPRESSED OR HOPELESS: 0
1. LITTLE INTEREST OR PLEASURE IN DOING THINGS: 0
3. TROUBLE FALLING OR STAYING ASLEEP: 2
7. TROUBLE CONCENTRATING ON THINGS, SUCH AS READING THE NEWSPAPER OR WATCHING TELEVISION: 0
8. MOVING OR SPEAKING SO SLOWLY THAT OTHER PEOPLE COULD HAVE NOTICED. OR THE OPPOSITE, BEING SO FIGETY OR RESTLESS THAT YOU HAVE BEEN MOVING AROUND A LOT MORE THAN USUAL: 0
SUM OF ALL RESPONSES TO PHQ QUESTIONS 1-9: 4
9. THOUGHTS THAT YOU WOULD BE BETTER OFF DEAD, OR OF HURTING YOURSELF: 0
SUM OF ALL RESPONSES TO PHQ9 QUESTIONS 1 & 2: 0
6. FEELING BAD ABOUT YOURSELF - OR THAT YOU ARE A FAILURE OR HAVE LET YOURSELF OR YOUR FAMILY DOWN: 0
5. POOR APPETITE OR OVEREATING: 1
4. FEELING TIRED OR HAVING LITTLE ENERGY: 1
10. IF YOU CHECKED OFF ANY PROBLEMS, HOW DIFFICULT HAVE THESE PROBLEMS MADE IT FOR YOU TO DO YOUR WORK, TAKE CARE OF THINGS AT HOME, OR GET ALONG WITH OTHER PEOPLE: 0

## 2017-12-06 NOTE — PROGRESS NOTES
PSYCHIATRY PROGRESS NOTE    Waqas Soria  10/31/1925  12/6/2017  Face to Face time: 20min  PCP: Rhoda Gaxiola CNP    A: 79 yo M with hx of 1 prior manic episode from citalopram. Had side effects from geodon include TDs on withdrawal. Appears to have some cognitive deficits as noted on his MOCA. Attempts to taper his lorazepam have failed and the benefit has outweighed the risk at this point. He continues to have anxiety surrounding his physical health issues that are excessive. We are limited in how we treat this - higher benzos poses too much risk, we can't use SSRIs or SNRI, and antipsychotics would be too high risk of side effects. 1. Bipolar disorder type I, unspecified, most recent episode manic, induced by citalopram, in remission   2. Tardive dyskinesia s/p discontinuation of ziprasidone, resolved  3. Unspecified anxiety disorder r/o illness anxiety disorder  4. mild neurocognitive disorder  5. CAD, HLD, GERD, mild aortic stenosis    P:   1. Continue ativan 0.5mg BID. 2. I consulted with Dr. Meme Yu regarding the utility of psychotherapy in the setting of some cognitive issues. We agreed that likely may not be of significant benefit at this point and his anxiety surround his physical health may naturally resolve as he continues to follow up with specialists and gets some of his issues resolved. 3. Continue gabapentin 300mg qhs  4. If depression becomes an issue in the future, lamictal may be a safe option. I discussed this with patient and the limitations we have with safe medication options at this point. Follow-up: RTC prn. Will transfer care back to PCP, Angelo Price, at this time. Safety: RF include hx of mood disorder, chronic medical illness, possible cognitive impairment, age, male, .  Pt is low risk for future dangerousness to self or others and safe for continued outpt care.   _______________________________________________________________    CC:   Chief Complaint   Patient presents with    Anxiety     S: Pt has remained on lorazepam 0.5mg BID, still taking gabapentin 300mg qhs. His mood has been ok, however daughter is concerned about some mild depression and ongoing anxiety. He frequently complains of abdominal discomfort, bowel issues like constipation, and urinary issues. There is some legitimacy to his concerns as he was diagnosed with gallstones and some anatomical stomach issue after having an UGI and CT abd. He is pending evaluation by a general surgeon. He is also seeing urology and has been tracking the frquency of his need to use the bathroom at night - will urinate up to 8 times in one night, disrupting his sleep. He appears to obsess over his physical issues. He had a couple episodes of restless legs at night despite taking gabapentin and is hesitant to go off of this as he feels he still needs it. TDs are not visible on exam today, and daughter reports noticing it rarely at this point. No panic attacks. Reports his energy is ok, maybe on the higher side. No manic symptoms. There has not been any changes in cognitive ability, memory, steadiness on feet. He denies any falls or near-falls. ROS: no headaches, vision problems, dysuria, abd pain, chest pain or SOB. Increased urinary frequency at night.      Current Outpatient Prescriptions   Medication Sig Dispense Refill    amLODIPine (NORVASC) 5 MG tablet Take 1 tablet by mouth daily 90 tablet 3    pravastatin (PRAVACHOL) 20 MG tablet TAKE 1 TABLET EVERY DAY 90 tablet 3    benazepril (LOTENSIN) 40 MG tablet TAKE 1 TABLET EVERY DAY 90 tablet 1    clopidogrel (PLAVIX) 75 MG tablet TAKE 1 TABLET EVERY DAY 90 tablet 3    finasteride (PROSCAR) 5 MG tablet TAKE 1 TABLET EVERY DAY IN THE EVENING 90 tablet 3    doxazosin (CARDURA) 2 MG tablet Take 1 tablet by mouth nightly 90 tablet 1    pantoprazole (PROTONIX) 40 MG tablet TAKE 1 TABLET EVERY DAY 90 tablet 1    levothyroxine (SYNTHROID) 25 MCG tablet TAKE 1 TABLET EVERY DAY 90 tablet 3    Polyethylene Glycol 3350 (MIRALAX PO) Take by mouth      Magnesium 400 MG TABS Take by mouth      Multiple Vitamins-Minerals (EYE VITAMINS PO) Take by mouth      acetaminophen (TYLENOL) 650 MG extended release tablet Take 650 mg by mouth every 8 hours as needed for Pain      gabapentin (NEURONTIN) 300 MG capsule Take 1 capsule by mouth nightly 90 capsule 3    aspirin 81 MG chewable tablet Take 1 tablet by mouth daily 30 tablet 3    nitroGLYCERIN (NITROSTAT) 0.4 MG SL tablet Place 1 tablet under the tongue every 5 minutes as needed for Chest pain 25 tablet 3    docusate sodium (COLACE) 100 MG capsule Take 1 capsule by mouth 2 times daily 20 capsule 0     No current facility-administered medications for this visit. O:  Wt Readings from Last 3 Encounters:   12/06/17 132 lb 12.8 oz (60.2 kg)   11/13/17 135 lb (61.2 kg)   10/10/17 134 lb (60.8 kg)     Temp Readings from Last 3 Encounters:   01/28/17 98.2 °F (36.8 °C) (Oral)   01/19/17 98.5 °F (36.9 °C) (Oral)   12/08/16 97.9 °F (36.6 °C) (Oral)     BP Readings from Last 3 Encounters:   12/06/17 130/70   11/13/17 122/72   10/10/17 122/74     Pulse Readings from Last 3 Encounters:   12/06/17 80   11/13/17 90   10/10/17 80       Mental Status Exam:   Appearance    alert, cooperative, well groomed  Motor: Normal strength and tone, No abnormal movements, tics or mannerisms.   Speech    Short responses, spontaneous, normal rate and normal volume  Mood/Affect   Fairly good / affect is mildly constricted, somewhat anxious in appearance  Thought Process    linear and goal directed, some poverty of thought  Thought Content    intact , no suicidal ideation  Associations    logical connections  Attention/Concentration    intact  Memory    recent and remote memory grossly intact  Insight/Judgement    good / Intact    Labs:   No new labs since last visit      Alex Yost MD  Psychiatry

## 2017-12-21 ENCOUNTER — TELEPHONE (OUTPATIENT)
Dept: INTERNAL MEDICINE CLINIC | Age: 82
End: 2017-12-21

## 2017-12-21 DIAGNOSIS — F51.04 PSYCHOPHYSIOLOGICAL INSOMNIA: ICD-10-CM

## 2017-12-21 DIAGNOSIS — G25.81 RLS (RESTLESS LEGS SYNDROME): ICD-10-CM

## 2017-12-21 RX ORDER — GABAPENTIN 300 MG/1
300 CAPSULE ORAL NIGHTLY
Qty: 90 CAPSULE | Refills: 3 | Status: SHIPPED | OUTPATIENT
Start: 2017-12-21 | End: 2019-02-27 | Stop reason: SDUPTHER

## 2017-12-21 NOTE — TELEPHONE ENCOUNTER
Patient's daughter is calling to request a refill on his Gabapentin. Please send 90 day supply to [facility].

## 2018-01-11 ENCOUNTER — OFFICE VISIT (OUTPATIENT)
Dept: INTERNAL MEDICINE CLINIC | Age: 83
End: 2018-01-11

## 2018-01-11 VITALS
BODY MASS INDEX: 21.05 KG/M2 | WEIGHT: 131 LBS | DIASTOLIC BLOOD PRESSURE: 72 MMHG | HEART RATE: 76 BPM | SYSTOLIC BLOOD PRESSURE: 114 MMHG | HEIGHT: 66 IN

## 2018-01-11 DIAGNOSIS — K59.04 CHRONIC IDIOPATHIC CONSTIPATION: ICD-10-CM

## 2018-01-11 DIAGNOSIS — G24.01 TARDIVE DYSKINESIA: ICD-10-CM

## 2018-01-11 DIAGNOSIS — R19.5 CHANGE IN STOOL: Primary | ICD-10-CM

## 2018-01-11 DIAGNOSIS — F41.9 ANXIETY DISORDER, UNSPECIFIED TYPE: ICD-10-CM

## 2018-01-11 DIAGNOSIS — F31.74 BIPOLAR DISORDER, IN FULL REMISSION, MOST RECENT EPISODE MANIC (HCC): ICD-10-CM

## 2018-01-11 DIAGNOSIS — I10 ESSENTIAL HYPERTENSION: Chronic | ICD-10-CM

## 2018-01-11 PROCEDURE — 99214 OFFICE O/P EST MOD 30 MIN: CPT | Performed by: NURSE PRACTITIONER

## 2018-01-11 PROCEDURE — G8427 DOCREV CUR MEDS BY ELIG CLIN: HCPCS | Performed by: NURSE PRACTITIONER

## 2018-01-11 PROCEDURE — G8598 ASA/ANTIPLAT THER USED: HCPCS | Performed by: NURSE PRACTITIONER

## 2018-01-11 PROCEDURE — 4040F PNEUMOC VAC/ADMIN/RCVD: CPT | Performed by: NURSE PRACTITIONER

## 2018-01-11 PROCEDURE — 1036F TOBACCO NON-USER: CPT | Performed by: NURSE PRACTITIONER

## 2018-01-11 PROCEDURE — G8420 CALC BMI NORM PARAMETERS: HCPCS | Performed by: NURSE PRACTITIONER

## 2018-01-11 PROCEDURE — G8484 FLU IMMUNIZE NO ADMIN: HCPCS | Performed by: NURSE PRACTITIONER

## 2018-01-11 PROCEDURE — 1123F ACP DISCUSS/DSCN MKR DOCD: CPT | Performed by: NURSE PRACTITIONER

## 2018-01-11 RX ORDER — TRIAMCINOLONE ACETONIDE 1 MG/G
CREAM TOPICAL
Qty: 45 G | Refills: 1 | Status: SHIPPED | OUTPATIENT
Start: 2018-01-11 | End: 2018-01-11 | Stop reason: SDUPTHER

## 2018-01-11 RX ORDER — TRIAMCINOLONE ACETONIDE 1 MG/G
CREAM TOPICAL
Qty: 45 G | Refills: 1 | Status: ON HOLD | OUTPATIENT
Start: 2018-01-11 | End: 2022-05-20

## 2018-01-14 ASSESSMENT — ENCOUNTER SYMPTOMS
CONSTIPATION: 1
NAUSEA: 1
BLOOD IN STOOL: 0
ABDOMINAL DISTENTION: 1

## 2018-01-14 NOTE — PROGRESS NOTES
capsule Take 1 capsule by mouth 2 times daily 20 capsule 0    Magnesium 400 MG TABS Take by mouth      Multiple Vitamins-Minerals (EYE VITAMINS PO) Take by mouth      acetaminophen (TYLENOL) 650 MG extended release tablet Take 650 mg by mouth every 8 hours as needed for Pain      aspirin 81 MG chewable tablet Take 1 tablet by mouth daily 30 tablet 3    nitroGLYCERIN (NITROSTAT) 0.4 MG SL tablet Place 1 tablet under the tongue every 5 minutes as needed for Chest pain 25 tablet 3     No current facility-administered medications for this visit. Review of Systems   Constitutional: Negative for chills. Cardiovascular: Negative for chest pain. Gastrointestinal: Positive for abdominal distention, constipation and nausea. Negative for blood in stool. Thin stools   Genitourinary: Negative for dysuria and hematuria. Nocturia   Psychiatric/Behavioral: Positive for sleep disturbance. The patient is nervous/anxious. All other systems reviewed and are negative. Objective:   Physical Exam   Constitutional: He is oriented to person, place, and time. He appears well-developed and well-nourished. No distress. HENT:   Head: Normocephalic and atraumatic. Eyes: Conjunctivae are normal. No scleral icterus. Neck: Neck supple. No JVD present. Cardiovascular: Normal rate and regular rhythm. Murmur heard. Pulmonary/Chest: Effort normal. No respiratory distress. Abdominal: Soft. He exhibits no distension. There is no tenderness. There is no guarding. Musculoskeletal: Normal range of motion. He exhibits no edema. Neurological: He is alert and oriented to person, place, and time. Skin: Skin is dry. He is not diaphoretic. Psychiatric: His behavior is normal.   Flat affect     /72   Pulse 76   Ht 5' 6\" (1.676 m)   Wt 131 lb (59.4 kg)   BMI 21.14 kg/m²         Assessment/Plan:  Mikayla Heath was seen today for follow-up and rash.   Diagnoses and all orders for this visit:    Change in

## 2018-02-23 DIAGNOSIS — R35.1 NOCTURIA: ICD-10-CM

## 2018-02-23 RX ORDER — DOXAZOSIN 2 MG/1
TABLET ORAL
Qty: 180 TABLET | Refills: 1 | Status: SHIPPED | OUTPATIENT
Start: 2018-02-23 | End: 2019-03-28

## 2018-03-15 ENCOUNTER — TELEPHONE (OUTPATIENT)
Dept: RHEUMATOLOGY | Age: 83
End: 2018-03-15

## 2018-03-15 NOTE — TELEPHONE ENCOUNTER
Risk of Advil would be that he is already taking aspirin and Plavix which are blood thinners. If he uses Advil I would make it very sparingly. Tylenol would be safer. He does not have to stop MiraLAX if he is taking Linzess. However, if he begins to develop loose bowels, he may want to cut back or stop the MiraLAX then.

## 2018-04-05 RX ORDER — BENAZEPRIL HYDROCHLORIDE 40 MG/1
TABLET, FILM COATED ORAL
Qty: 90 TABLET | Refills: 1 | Status: SHIPPED | OUTPATIENT
Start: 2018-04-05 | End: 2018-12-28 | Stop reason: SDUPTHER

## 2018-04-18 DIAGNOSIS — K21.9 GASTROESOPHAGEAL REFLUX DISEASE WITHOUT ESOPHAGITIS: ICD-10-CM

## 2018-04-19 RX ORDER — PANTOPRAZOLE SODIUM 40 MG/1
TABLET, DELAYED RELEASE ORAL
Qty: 90 TABLET | Refills: 1 | Status: SHIPPED | OUTPATIENT
Start: 2018-04-19 | End: 2018-12-28 | Stop reason: SDUPTHER

## 2018-05-10 ENCOUNTER — OFFICE VISIT (OUTPATIENT)
Dept: INTERNAL MEDICINE CLINIC | Age: 83
End: 2018-05-10

## 2018-05-10 VITALS
BODY MASS INDEX: 20.41 KG/M2 | WEIGHT: 127 LBS | DIASTOLIC BLOOD PRESSURE: 84 MMHG | HEIGHT: 66 IN | HEART RATE: 84 BPM | SYSTOLIC BLOOD PRESSURE: 122 MMHG

## 2018-05-10 DIAGNOSIS — G24.01 TARDIVE DYSKINESIA: ICD-10-CM

## 2018-05-10 DIAGNOSIS — R19.4 CHANGE IN STOOL HABITS: Primary | ICD-10-CM

## 2018-05-10 DIAGNOSIS — F41.9 ANXIETY DISORDER, UNSPECIFIED TYPE: ICD-10-CM

## 2018-05-10 DIAGNOSIS — K59.04 CHRONIC IDIOPATHIC CONSTIPATION: ICD-10-CM

## 2018-05-10 DIAGNOSIS — I10 ESSENTIAL HYPERTENSION: Chronic | ICD-10-CM

## 2018-05-10 DIAGNOSIS — F51.04 PSYCHOPHYSIOLOGICAL INSOMNIA: ICD-10-CM

## 2018-05-10 DIAGNOSIS — F31.9 BIPOLAR 1 DISORDER (HCC): ICD-10-CM

## 2018-05-10 DIAGNOSIS — I77.9 BILATERAL CAROTID ARTERY DISEASE (HCC): Chronic | ICD-10-CM

## 2018-05-10 PROCEDURE — 1036F TOBACCO NON-USER: CPT | Performed by: NURSE PRACTITIONER

## 2018-05-10 PROCEDURE — 4040F PNEUMOC VAC/ADMIN/RCVD: CPT | Performed by: NURSE PRACTITIONER

## 2018-05-10 PROCEDURE — G8598 ASA/ANTIPLAT THER USED: HCPCS | Performed by: NURSE PRACTITIONER

## 2018-05-10 PROCEDURE — G8427 DOCREV CUR MEDS BY ELIG CLIN: HCPCS | Performed by: NURSE PRACTITIONER

## 2018-05-10 PROCEDURE — 99214 OFFICE O/P EST MOD 30 MIN: CPT | Performed by: NURSE PRACTITIONER

## 2018-05-10 PROCEDURE — 1123F ACP DISCUSS/DSCN MKR DOCD: CPT | Performed by: NURSE PRACTITIONER

## 2018-05-10 PROCEDURE — G8420 CALC BMI NORM PARAMETERS: HCPCS | Performed by: NURSE PRACTITIONER

## 2018-05-10 RX ORDER — LAMOTRIGINE 25 MG/1
25 TABLET ORAL DAILY
Qty: 30 TABLET | Refills: 5 | Status: SHIPPED | OUTPATIENT
Start: 2018-05-10 | End: 2018-08-13 | Stop reason: SDUPTHER

## 2018-05-10 RX ORDER — LORAZEPAM 0.5 MG/1
0.5 TABLET ORAL 2 TIMES DAILY
Qty: 180 TABLET | Refills: 0 | Status: SHIPPED | OUTPATIENT
Start: 2018-05-10 | End: 2018-08-30 | Stop reason: SDUPTHER

## 2018-05-13 ASSESSMENT — ENCOUNTER SYMPTOMS
NAUSEA: 1
CONSTIPATION: 1
ABDOMINAL PAIN: 1
ABDOMINAL DISTENTION: 1
BLOOD IN STOOL: 0
DIARRHEA: 1
SHORTNESS OF BREATH: 0

## 2018-05-14 LAB
6-ACETYLMORPHINE: NOT DETECTED
7-AMINOCLONAZEPAM: NOT DETECTED
ALPHA-OH-ALPRAZOLAM: NOT DETECTED
ALPRAZOLAM: NOT DETECTED
AMPHETAMINE: NOT DETECTED
BARBITURATES: NOT DETECTED
BENZOYLECGONINE: NOT DETECTED
BUPRENORPHINE: NOT DETECTED
CARISOPRODOL: NOT DETECTED
CLONAZEPAM: NOT DETECTED
CODEINE: NOT DETECTED
CREATININE URINE: 134.4 MG/DL (ref 20–400)
DIAZEPAM: NOT DETECTED
DRUGS EXPECTED: NORMAL
EER PAIN MGT DRUG PANEL, HIGH RES/EMIT U: NORMAL
ETHYL GLUCURONIDE: NOT DETECTED
FENTANYL: NOT DETECTED
HYDROCODONE: NOT DETECTED
HYDROMORPHONE: NOT DETECTED
LORAZEPAM: PRESENT
MARIJUANA METABOLITE: NOT DETECTED
MDA: NOT DETECTED
MDEA: NOT DETECTED
MDMA URINE: NOT DETECTED
MEPERIDINE: NOT DETECTED
METHADONE: NOT DETECTED
METHAMPHETAMINE: NOT DETECTED
METHYLPHENIDATE: NOT DETECTED
MIDAZOLAM: NOT DETECTED
MORPHINE: NOT DETECTED
NORBUPRENORPHINE, FREE: NOT DETECTED
NORDIAZEPAM: NOT DETECTED
NORFENTANYL: NOT DETECTED
NORHYDROCODONE, URINE: NOT DETECTED
NOROXYCODONE: NOT DETECTED
NOROXYMORPHONE, URINE: NOT DETECTED
OXAZEPAM: NOT DETECTED
OXYCODONE: NOT DETECTED
OXYMORPHONE: NOT DETECTED
PAIN MANAGEMENT DRUG PANEL: NORMAL
PAIN MANAGEMENT DRUG PANEL: NORMAL
PCP: NOT DETECTED
PHENTERMINE: NOT DETECTED
PROPOXYPHENE: NOT DETECTED
TAPENTADOL, URINE: NOT DETECTED
TAPENTADOL-O-SULFATE, URINE: NOT DETECTED
TEMAZEPAM: NOT DETECTED
TRAMADOL: NOT DETECTED
ZOLPIDEM: NOT DETECTED

## 2018-05-16 ENCOUNTER — TELEPHONE (OUTPATIENT)
Dept: RHEUMATOLOGY | Age: 83
End: 2018-05-16

## 2018-06-15 ENCOUNTER — TELEPHONE (OUTPATIENT)
Dept: INTERNAL MEDICINE CLINIC | Age: 83
End: 2018-06-15

## 2018-07-10 ENCOUNTER — OFFICE VISIT (OUTPATIENT)
Dept: INTERNAL MEDICINE CLINIC | Age: 83
End: 2018-07-10

## 2018-07-10 VITALS
DIASTOLIC BLOOD PRESSURE: 76 MMHG | WEIGHT: 125 LBS | SYSTOLIC BLOOD PRESSURE: 122 MMHG | HEART RATE: 76 BPM | BODY MASS INDEX: 20.09 KG/M2 | HEIGHT: 66 IN

## 2018-07-10 DIAGNOSIS — F41.9 ANXIETY DISORDER, UNSPECIFIED TYPE: ICD-10-CM

## 2018-07-10 DIAGNOSIS — F31.9 BIPOLAR 1 DISORDER (HCC): ICD-10-CM

## 2018-07-10 DIAGNOSIS — K59.04 CHRONIC IDIOPATHIC CONSTIPATION: Primary | ICD-10-CM

## 2018-07-10 DIAGNOSIS — F51.04 PSYCHOPHYSIOLOGICAL INSOMNIA: ICD-10-CM

## 2018-07-10 DIAGNOSIS — I10 ESSENTIAL HYPERTENSION: Chronic | ICD-10-CM

## 2018-07-10 PROCEDURE — 99213 OFFICE O/P EST LOW 20 MIN: CPT | Performed by: NURSE PRACTITIONER

## 2018-07-10 PROCEDURE — 1123F ACP DISCUSS/DSCN MKR DOCD: CPT | Performed by: NURSE PRACTITIONER

## 2018-07-10 PROCEDURE — G8598 ASA/ANTIPLAT THER USED: HCPCS | Performed by: NURSE PRACTITIONER

## 2018-07-10 PROCEDURE — 1101F PT FALLS ASSESS-DOCD LE1/YR: CPT | Performed by: NURSE PRACTITIONER

## 2018-07-10 PROCEDURE — 1036F TOBACCO NON-USER: CPT | Performed by: NURSE PRACTITIONER

## 2018-07-10 PROCEDURE — 4040F PNEUMOC VAC/ADMIN/RCVD: CPT | Performed by: NURSE PRACTITIONER

## 2018-07-10 PROCEDURE — G8420 CALC BMI NORM PARAMETERS: HCPCS | Performed by: NURSE PRACTITIONER

## 2018-07-10 PROCEDURE — G8427 DOCREV CUR MEDS BY ELIG CLIN: HCPCS | Performed by: NURSE PRACTITIONER

## 2018-07-10 RX ORDER — AMOXICILLIN 500 MG/1
CAPSULE ORAL
Refills: 0 | COMMUNITY
Start: 2018-06-26 | End: 2018-10-18

## 2018-07-13 ENCOUNTER — TELEPHONE (OUTPATIENT)
Dept: CARDIOLOGY CLINIC | Age: 83
End: 2018-07-13

## 2018-07-13 ASSESSMENT — ENCOUNTER SYMPTOMS
DIARRHEA: 1
NAUSEA: 1
SHORTNESS OF BREATH: 0
CONSTIPATION: 1
ABDOMINAL PAIN: 1
BLOOD IN STOOL: 0
ABDOMINAL DISTENTION: 1

## 2018-07-13 NOTE — PROGRESS NOTES
MG tablet TAKE 2 TABLETS BY MOUTH NIGHTLY 180 tablet 1    triamcinolone (KENALOG) 0.1 % cream Apply to affected areas twice daily 45 g 1    gabapentin (NEURONTIN) 300 MG capsule Take 1 capsule by mouth nightly 90 capsule 3    amLODIPine (NORVASC) 5 MG tablet Take 1 tablet by mouth daily 90 tablet 3    pravastatin (PRAVACHOL) 20 MG tablet TAKE 1 TABLET EVERY DAY 90 tablet 3    clopidogrel (PLAVIX) 75 MG tablet TAKE 1 TABLET EVERY DAY 90 tablet 3    finasteride (PROSCAR) 5 MG tablet TAKE 1 TABLET EVERY DAY IN THE EVENING 90 tablet 3    levothyroxine (SYNTHROID) 25 MCG tablet TAKE 1 TABLET EVERY DAY 90 tablet 3    Polyethylene Glycol 3350 (MIRALAX PO) Take by mouth      docusate sodium (COLACE) 100 MG capsule Take 1 capsule by mouth 2 times daily 20 capsule 0    Magnesium 400 MG TABS Take by mouth      Multiple Vitamins-Minerals (EYE VITAMINS PO) Take by mouth      acetaminophen (TYLENOL) 650 MG extended release tablet Take 650 mg by mouth every 8 hours as needed for Pain      aspirin 81 MG chewable tablet Take 1 tablet by mouth daily 30 tablet 3    nitroGLYCERIN (NITROSTAT) 0.4 MG SL tablet Place 1 tablet under the tongue every 5 minutes as needed for Chest pain 25 tablet 3    amoxicillin (AMOXIL) 500 MG capsule TAKE ONE CAPSULE BY MOUTH 4 TIMES A DAY  0     No current facility-administered medications for this visit. Review of Systems   Constitutional: Negative for chills and unexpected weight change. Respiratory: Negative for shortness of breath. Cardiovascular: Negative for chest pain. Gastrointestinal: Positive for abdominal distention, abdominal pain, constipation, diarrhea and nausea. Negative for blood in stool. Genitourinary: Negative for dysuria and hematuria. Psychiatric/Behavioral: Positive for sleep disturbance. The patient is nervous/anxious. All other systems reviewed and are negative.       Objective:   Physical Exam   Constitutional: He is oriented to person, place,

## 2018-07-13 NOTE — TELEPHONE ENCOUNTER
Needs to have some teeth pulled and needs to know how long he needs to be off his blood thinner please call next week

## 2018-08-03 ENCOUNTER — TELEPHONE (OUTPATIENT)
Dept: INTERNAL MEDICINE CLINIC | Age: 83
End: 2018-08-03

## 2018-08-13 ENCOUNTER — TELEPHONE (OUTPATIENT)
Dept: INTERNAL MEDICINE CLINIC | Age: 83
End: 2018-08-13

## 2018-08-13 DIAGNOSIS — F41.9 ANXIETY DISORDER, UNSPECIFIED TYPE: ICD-10-CM

## 2018-08-13 DIAGNOSIS — F31.9 BIPOLAR 1 DISORDER (HCC): ICD-10-CM

## 2018-08-13 RX ORDER — LAMOTRIGINE 25 MG/1
25 TABLET ORAL DAILY
Qty: 90 TABLET | Refills: 3 | Status: SHIPPED | OUTPATIENT
Start: 2018-08-13 | End: 2018-10-18 | Stop reason: SDUPTHER

## 2018-08-30 ENCOUNTER — TELEPHONE (OUTPATIENT)
Dept: INTERNAL MEDICINE CLINIC | Age: 83
End: 2018-08-30

## 2018-08-30 DIAGNOSIS — F41.9 ANXIETY DISORDER, UNSPECIFIED TYPE: ICD-10-CM

## 2018-08-30 RX ORDER — LORAZEPAM 0.5 MG/1
0.5 TABLET ORAL 2 TIMES DAILY
Qty: 180 TABLET | Refills: 0 | Status: SHIPPED | OUTPATIENT
Start: 2018-08-30 | End: 2018-11-28

## 2018-08-30 NOTE — TELEPHONE ENCOUNTER
Pt Daughter call to get refill on LORazepam (ATIVAN) 0.5 MG tablet    RX pended !! She request call back if any question.

## 2018-10-18 ENCOUNTER — OFFICE VISIT (OUTPATIENT)
Dept: INTERNAL MEDICINE CLINIC | Age: 83
End: 2018-10-18
Payer: MEDICARE

## 2018-10-18 VITALS
BODY MASS INDEX: 19.77 KG/M2 | WEIGHT: 123 LBS | HEIGHT: 66 IN | SYSTOLIC BLOOD PRESSURE: 134 MMHG | DIASTOLIC BLOOD PRESSURE: 80 MMHG | HEART RATE: 88 BPM

## 2018-10-18 DIAGNOSIS — I10 ESSENTIAL HYPERTENSION: Chronic | ICD-10-CM

## 2018-10-18 DIAGNOSIS — R63.4 WEIGHT LOSS: ICD-10-CM

## 2018-10-18 DIAGNOSIS — F31.9 BIPOLAR 1 DISORDER (HCC): ICD-10-CM

## 2018-10-18 DIAGNOSIS — F41.9 ANXIETY DISORDER, UNSPECIFIED TYPE: ICD-10-CM

## 2018-10-18 DIAGNOSIS — E78.5 HYPERLIPIDEMIA, UNSPECIFIED HYPERLIPIDEMIA TYPE: Chronic | ICD-10-CM

## 2018-10-18 DIAGNOSIS — R10.13 DYSPEPSIA AND DISORDER OF FUNCTION OF STOMACH: Primary | ICD-10-CM

## 2018-10-18 DIAGNOSIS — R63.0 DECREASED APPETITE: ICD-10-CM

## 2018-10-18 DIAGNOSIS — K31.9 DYSPEPSIA AND DISORDER OF FUNCTION OF STOMACH: Primary | ICD-10-CM

## 2018-10-18 DIAGNOSIS — Z23 NEED FOR INFLUENZA VACCINATION: ICD-10-CM

## 2018-10-18 PROCEDURE — G0008 ADMIN INFLUENZA VIRUS VAC: HCPCS | Performed by: NURSE PRACTITIONER

## 2018-10-18 PROCEDURE — 1101F PT FALLS ASSESS-DOCD LE1/YR: CPT | Performed by: NURSE PRACTITIONER

## 2018-10-18 PROCEDURE — 4040F PNEUMOC VAC/ADMIN/RCVD: CPT | Performed by: NURSE PRACTITIONER

## 2018-10-18 PROCEDURE — 99214 OFFICE O/P EST MOD 30 MIN: CPT | Performed by: NURSE PRACTITIONER

## 2018-10-18 PROCEDURE — G8482 FLU IMMUNIZE ORDER/ADMIN: HCPCS | Performed by: NURSE PRACTITIONER

## 2018-10-18 PROCEDURE — 1123F ACP DISCUSS/DSCN MKR DOCD: CPT | Performed by: NURSE PRACTITIONER

## 2018-10-18 PROCEDURE — G8427 DOCREV CUR MEDS BY ELIG CLIN: HCPCS | Performed by: NURSE PRACTITIONER

## 2018-10-18 PROCEDURE — G8420 CALC BMI NORM PARAMETERS: HCPCS | Performed by: NURSE PRACTITIONER

## 2018-10-18 PROCEDURE — 1036F TOBACCO NON-USER: CPT | Performed by: NURSE PRACTITIONER

## 2018-10-18 PROCEDURE — G8598 ASA/ANTIPLAT THER USED: HCPCS | Performed by: NURSE PRACTITIONER

## 2018-10-18 PROCEDURE — 90662 IIV NO PRSV INCREASED AG IM: CPT | Performed by: NURSE PRACTITIONER

## 2018-10-18 RX ORDER — LAMOTRIGINE 25 MG/1
50 TABLET ORAL DAILY
Qty: 180 TABLET | Refills: 3 | Status: SHIPPED | OUTPATIENT
Start: 2018-10-18 | End: 2018-12-13 | Stop reason: SDUPTHER

## 2018-10-18 NOTE — PROGRESS NOTES
unspecified type  -     lamoTRIgine (LAMICTAL) 25 MG tablet; Take 2 tablets by mouth daily  -     TSH without Reflex    Essential hypertension  - Normotensive  - he has met JNC standards.  - Continue current regimen.   -     Comprehensive Metabolic Panel    Hyperlipidemia, unspecified hyperlipidemia type  -     Comprehensive Metabolic Panel    Decreased appetite  -     CBC WITH AUTO DIFFERENTIAL    Weight loss  -     CBC WITH AUTO DIFFERENTIAL    Need for influenza vaccination  -     INFLUENZA, HIGH DOSE, 65 YRS +, IM, PF, PREFILL SYR, 0.5ML (FLUZONE HD)      Frank Code, APRN - CNP

## 2018-10-19 ASSESSMENT — ENCOUNTER SYMPTOMS
BLOOD IN STOOL: 0
NAUSEA: 1
DIARRHEA: 1
ABDOMINAL DISTENTION: 1
ABDOMINAL PAIN: 1
VOMITING: 0
CONSTIPATION: 1
RESPIRATORY NEGATIVE: 1

## 2018-10-22 ENCOUNTER — TELEPHONE (OUTPATIENT)
Dept: INTERNAL MEDICINE CLINIC | Age: 83
End: 2018-10-22

## 2018-10-22 RX ORDER — FINASTERIDE 5 MG/1
TABLET, FILM COATED ORAL
Qty: 90 TABLET | Refills: 3 | Status: CANCELLED | OUTPATIENT
Start: 2018-10-22

## 2018-10-22 RX ORDER — FINASTERIDE 5 MG/1
5 TABLET, FILM COATED ORAL NIGHTLY
Qty: 90 TABLET | Refills: 3 | Status: SHIPPED | OUTPATIENT
Start: 2018-10-22 | End: 2019-11-07 | Stop reason: SDUPTHER

## 2018-10-22 NOTE — TELEPHONE ENCOUNTER
Peri Drew call for refill on finasteride (PROSCAR) 5 MG tablet. RX PENDED !!    she request call back if any question.

## 2018-11-01 ENCOUNTER — TELEPHONE (OUTPATIENT)
Dept: INTERNAL MEDICINE CLINIC | Age: 83
End: 2018-11-01

## 2018-11-05 ENCOUNTER — TELEPHONE (OUTPATIENT)
Dept: INTERNAL MEDICINE CLINIC | Age: 83
End: 2018-11-05

## 2018-11-05 DIAGNOSIS — I77.9 BILATERAL CAROTID ARTERY DISEASE, UNSPECIFIED TYPE (HCC): Chronic | ICD-10-CM

## 2018-11-05 DIAGNOSIS — I25.10 CORONARY ARTERY DISEASE INVOLVING NATIVE CORONARY ARTERY OF NATIVE HEART WITHOUT ANGINA PECTORIS: Primary | Chronic | ICD-10-CM

## 2018-11-05 RX ORDER — CLOPIDOGREL BISULFATE 75 MG/1
75 TABLET ORAL DAILY
Qty: 90 TABLET | Refills: 3 | Status: ON HOLD | OUTPATIENT
Start: 2018-11-05 | End: 2019-04-09 | Stop reason: HOSPADM

## 2018-11-07 ENCOUNTER — OFFICE VISIT (OUTPATIENT)
Dept: CARDIOLOGY CLINIC | Age: 83
End: 2018-11-07
Payer: MEDICARE

## 2018-11-07 VITALS
HEART RATE: 68 BPM | SYSTOLIC BLOOD PRESSURE: 104 MMHG | WEIGHT: 124 LBS | HEIGHT: 66 IN | DIASTOLIC BLOOD PRESSURE: 60 MMHG | BODY MASS INDEX: 19.93 KG/M2

## 2018-11-07 DIAGNOSIS — I10 ESSENTIAL HYPERTENSION: ICD-10-CM

## 2018-11-07 DIAGNOSIS — R00.1 BRADYCARDIA: ICD-10-CM

## 2018-11-07 DIAGNOSIS — I25.10 CORONARY ARTERY DISEASE INVOLVING NATIVE CORONARY ARTERY OF NATIVE HEART WITHOUT ANGINA PECTORIS: Primary | ICD-10-CM

## 2018-11-07 DIAGNOSIS — I77.9 BILATERAL CAROTID ARTERY DISEASE, UNSPECIFIED TYPE (HCC): Chronic | ICD-10-CM

## 2018-11-07 DIAGNOSIS — E78.5 HYPERLIPIDEMIA, UNSPECIFIED HYPERLIPIDEMIA TYPE: ICD-10-CM

## 2018-11-07 PROCEDURE — G8598 ASA/ANTIPLAT THER USED: HCPCS | Performed by: INTERNAL MEDICINE

## 2018-11-07 PROCEDURE — 1101F PT FALLS ASSESS-DOCD LE1/YR: CPT | Performed by: INTERNAL MEDICINE

## 2018-11-07 PROCEDURE — G8482 FLU IMMUNIZE ORDER/ADMIN: HCPCS | Performed by: INTERNAL MEDICINE

## 2018-11-07 PROCEDURE — 99214 OFFICE O/P EST MOD 30 MIN: CPT | Performed by: INTERNAL MEDICINE

## 2018-11-07 PROCEDURE — 1123F ACP DISCUSS/DSCN MKR DOCD: CPT | Performed by: INTERNAL MEDICINE

## 2018-11-07 PROCEDURE — 1036F TOBACCO NON-USER: CPT | Performed by: INTERNAL MEDICINE

## 2018-11-07 PROCEDURE — 4040F PNEUMOC VAC/ADMIN/RCVD: CPT | Performed by: INTERNAL MEDICINE

## 2018-11-07 PROCEDURE — G8427 DOCREV CUR MEDS BY ELIG CLIN: HCPCS | Performed by: INTERNAL MEDICINE

## 2018-11-07 PROCEDURE — G8420 CALC BMI NORM PARAMETERS: HCPCS | Performed by: INTERNAL MEDICINE

## 2018-11-07 RX ORDER — PRAVASTATIN SODIUM 20 MG
TABLET ORAL
Qty: 90 TABLET | Refills: 3 | Status: SHIPPED | OUTPATIENT
Start: 2018-11-07 | End: 2019-08-08 | Stop reason: SDUPTHER

## 2018-11-07 NOTE — COMMUNICATION BODY
[address]  Cardiac Consult     Referring Provider:  MARIS Morgan - CNP     Chief Complaint   Patient presents with    Coronary Artery Disease     No cardiac complaints    Hypertension    Hyperlipidemia        History of Present Illness:  79 y/o male with bipolar formally followed by Dr. Carlos A Aguilar seen in f/u for CAD, HTN, hyperlipidemia and bradycardia. He underwent stenting of the LCX and RCA in 2005. Last stress myoview 12/2016 was normal. He has had bradycardia resulting in discontinuation of his beta blocker. He is doing well. Lives at [facility]. Walks there but does not go out much. No issues with dyspnea or chest pain. No lightheadedness or dizziness. SBP usually in 120-130 range. Past Medical History:   has a past medical history of Arthritis; Blepharitis of both eyes; CAD (coronary artery disease); GERD (gastroesophageal reflux disease); Gout; Hyperlipidemia; Hypertension; Macular degeneration; NSTEMI (non-ST elevated myocardial infarction) ([address] Prostate enlargement; Psychiatric problem; and Tachycardia. Surgical History:   has a past surgical history that includes Coronary angioplasty with stent (2005); skin biopsy; Kidney stone surgery (1980); Coronary artery bypass graft; Cataract removal (1993); TURP (2003); Skin cancer destruction (2006); Colonoscopy; eye surgery; and Cardiac surgery. Social History:   reports that he has never smoked. He has never used smokeless tobacco. He reports that he does not drink alcohol or use drugs. Family History:  family history includes Heart Disease in his brother and mother. Allergies:  Zyprexa [olanzapine];  Celexa [citalopram hydrobromide]; and Citalopram     Home Medications:  Outpatient Encounter Prescriptions as of 11/7/2018   Medication Sig Dispense Refill    clopidogrel (PLAVIX) 75 MG tablet Take 1 tablet by mouth daily 90 tablet 3    finasteride (PROSCAR) 5 MG tablet Take 1 tablet by mouth nightly 90 tablet 3   

## 2018-11-28 ENCOUNTER — TELEPHONE (OUTPATIENT)
Dept: INTERNAL MEDICINE CLINIC | Age: 83
End: 2018-11-28

## 2018-11-29 DIAGNOSIS — F41.9 ANXIETY DISORDER, UNSPECIFIED TYPE: ICD-10-CM

## 2018-11-29 RX ORDER — LORAZEPAM 0.5 MG/1
0.5 TABLET ORAL 2 TIMES DAILY
Qty: 180 TABLET | Refills: 0 | Status: SHIPPED | OUTPATIENT
Start: 2018-12-04 | End: 2019-02-14 | Stop reason: SDUPTHER

## 2018-12-03 ENCOUNTER — TELEPHONE (OUTPATIENT)
Dept: INTERNAL MEDICINE CLINIC | Age: 83
End: 2018-12-03

## 2018-12-03 DIAGNOSIS — E03.9 HYPOTHYROIDISM, UNSPECIFIED TYPE: Primary | ICD-10-CM

## 2018-12-03 RX ORDER — LEVOTHYROXINE SODIUM 0.03 MG/1
25 TABLET ORAL DAILY
Qty: 90 TABLET | Refills: 3 | Status: SHIPPED | OUTPATIENT
Start: 2018-12-03 | End: 2019-09-24 | Stop reason: SDUPTHER

## 2018-12-13 ENCOUNTER — TELEPHONE (OUTPATIENT)
Dept: INTERNAL MEDICINE CLINIC | Age: 83
End: 2018-12-13

## 2018-12-13 DIAGNOSIS — F41.9 ANXIETY DISORDER, UNSPECIFIED TYPE: ICD-10-CM

## 2018-12-13 DIAGNOSIS — F31.9 BIPOLAR 1 DISORDER (HCC): ICD-10-CM

## 2018-12-13 RX ORDER — LAMOTRIGINE 25 MG/1
TABLET ORAL
Qty: 270 TABLET | Refills: 3 | Status: SHIPPED | OUTPATIENT
Start: 2018-12-13 | End: 2019-02-21 | Stop reason: SDUPTHER

## 2018-12-28 DIAGNOSIS — K21.9 GASTROESOPHAGEAL REFLUX DISEASE WITHOUT ESOPHAGITIS: ICD-10-CM

## 2018-12-28 RX ORDER — PANTOPRAZOLE SODIUM 40 MG/1
40 TABLET, DELAYED RELEASE ORAL DAILY
Qty: 90 TABLET | Refills: 1 | Status: SHIPPED | OUTPATIENT
Start: 2018-12-28 | End: 2019-05-01 | Stop reason: SDUPTHER

## 2018-12-28 RX ORDER — BENAZEPRIL HYDROCHLORIDE 40 MG/1
40 TABLET, FILM COATED ORAL DAILY
Qty: 90 TABLET | Refills: 1 | Status: SHIPPED | OUTPATIENT
Start: 2018-12-28 | End: 2019-03-28

## 2019-01-24 ENCOUNTER — TELEPHONE (OUTPATIENT)
Dept: CARDIOLOGY CLINIC | Age: 84
End: 2019-01-24

## 2019-01-24 DIAGNOSIS — E78.5 HYPERLIPIDEMIA, UNSPECIFIED HYPERLIPIDEMIA TYPE: Chronic | ICD-10-CM

## 2019-01-24 DIAGNOSIS — I77.9 CAROTID ARTERY DISEASE, UNSPECIFIED LATERALITY (HCC): Chronic | ICD-10-CM

## 2019-01-24 DIAGNOSIS — I10 ESSENTIAL HYPERTENSION: Chronic | ICD-10-CM

## 2019-01-24 RX ORDER — AMLODIPINE BESYLATE 5 MG/1
5 TABLET ORAL DAILY
Qty: 90 TABLET | Refills: 3 | Status: SHIPPED | OUTPATIENT
Start: 2019-01-24 | End: 2019-08-08 | Stop reason: SDUPTHER

## 2019-02-04 ENCOUNTER — TELEPHONE (OUTPATIENT)
Dept: INTERNAL MEDICINE CLINIC | Age: 84
End: 2019-02-04

## 2019-02-04 DIAGNOSIS — R10.13 DYSPEPSIA AND DISORDER OF FUNCTION OF STOMACH: Primary | ICD-10-CM

## 2019-02-04 DIAGNOSIS — K31.9 DYSPEPSIA AND DISORDER OF FUNCTION OF STOMACH: Primary | ICD-10-CM

## 2019-02-04 DIAGNOSIS — R63.4 WEIGHT LOSS: ICD-10-CM

## 2019-02-04 DIAGNOSIS — G89.29 CHRONIC ABDOMINAL PAIN: ICD-10-CM

## 2019-02-04 DIAGNOSIS — K52.9 CHRONIC DIARRHEA: ICD-10-CM

## 2019-02-04 DIAGNOSIS — R63.0 DECREASED APPETITE: ICD-10-CM

## 2019-02-04 DIAGNOSIS — R10.9 CHRONIC ABDOMINAL PAIN: ICD-10-CM

## 2019-02-04 DIAGNOSIS — K59.04 CHRONIC IDIOPATHIC CONSTIPATION: ICD-10-CM

## 2019-02-14 ENCOUNTER — OFFICE VISIT (OUTPATIENT)
Dept: INTERNAL MEDICINE CLINIC | Age: 84
End: 2019-02-14
Payer: MEDICARE

## 2019-02-14 VITALS
HEART RATE: 76 BPM | WEIGHT: 125 LBS | HEIGHT: 66 IN | SYSTOLIC BLOOD PRESSURE: 122 MMHG | DIASTOLIC BLOOD PRESSURE: 72 MMHG | BODY MASS INDEX: 20.09 KG/M2

## 2019-02-14 DIAGNOSIS — I77.9 BILATERAL CAROTID ARTERY DISEASE, UNSPECIFIED TYPE (HCC): Chronic | ICD-10-CM

## 2019-02-14 DIAGNOSIS — F41.9 ANXIETY DISORDER, UNSPECIFIED TYPE: ICD-10-CM

## 2019-02-14 DIAGNOSIS — G24.01 TARDIVE DYSKINESIA: ICD-10-CM

## 2019-02-14 DIAGNOSIS — F31.9 BIPOLAR 1 DISORDER (HCC): Primary | ICD-10-CM

## 2019-02-14 DIAGNOSIS — F51.04 PSYCHOPHYSIOLOGICAL INSOMNIA: ICD-10-CM

## 2019-02-14 DIAGNOSIS — K59.09 CHRONIC CONSTIPATION: ICD-10-CM

## 2019-02-14 DIAGNOSIS — I10 ESSENTIAL HYPERTENSION: Chronic | ICD-10-CM

## 2019-02-14 PROCEDURE — 1036F TOBACCO NON-USER: CPT | Performed by: NURSE PRACTITIONER

## 2019-02-14 PROCEDURE — 1123F ACP DISCUSS/DSCN MKR DOCD: CPT | Performed by: NURSE PRACTITIONER

## 2019-02-14 PROCEDURE — G8598 ASA/ANTIPLAT THER USED: HCPCS | Performed by: NURSE PRACTITIONER

## 2019-02-14 PROCEDURE — G8427 DOCREV CUR MEDS BY ELIG CLIN: HCPCS | Performed by: NURSE PRACTITIONER

## 2019-02-14 PROCEDURE — G8482 FLU IMMUNIZE ORDER/ADMIN: HCPCS | Performed by: NURSE PRACTITIONER

## 2019-02-14 PROCEDURE — 99214 OFFICE O/P EST MOD 30 MIN: CPT | Performed by: NURSE PRACTITIONER

## 2019-02-14 PROCEDURE — 1101F PT FALLS ASSESS-DOCD LE1/YR: CPT | Performed by: NURSE PRACTITIONER

## 2019-02-14 PROCEDURE — G8420 CALC BMI NORM PARAMETERS: HCPCS | Performed by: NURSE PRACTITIONER

## 2019-02-14 PROCEDURE — 4040F PNEUMOC VAC/ADMIN/RCVD: CPT | Performed by: NURSE PRACTITIONER

## 2019-02-14 RX ORDER — LORAZEPAM 0.5 MG/1
0.5 TABLET ORAL 2 TIMES DAILY
Qty: 180 TABLET | Refills: 0 | Status: SHIPPED | OUTPATIENT
Start: 2019-03-04 | End: 2019-02-21 | Stop reason: SDUPTHER

## 2019-02-14 RX ORDER — CHOLECALCIFEROL (VITAMIN D3) 125 MCG
5 CAPSULE ORAL DAILY PRN
COMMUNITY

## 2019-02-17 ASSESSMENT — ENCOUNTER SYMPTOMS
CONSTIPATION: 1
ABDOMINAL DISTENTION: 1
DIARRHEA: 1
SHORTNESS OF BREATH: 0
ABDOMINAL PAIN: 1
BLOOD IN STOOL: 0

## 2019-02-21 DIAGNOSIS — F31.9 BIPOLAR 1 DISORDER (HCC): ICD-10-CM

## 2019-02-21 DIAGNOSIS — F41.9 ANXIETY DISORDER, UNSPECIFIED TYPE: ICD-10-CM

## 2019-02-21 RX ORDER — LAMOTRIGINE 25 MG/1
TABLET ORAL
Qty: 450 TABLET | Refills: 3 | Status: SHIPPED | OUTPATIENT
Start: 2019-02-21 | End: 2019-04-08 | Stop reason: DRUGHIGH

## 2019-02-21 RX ORDER — LORAZEPAM 0.5 MG/1
0.5 TABLET ORAL 2 TIMES DAILY
Qty: 180 TABLET | Refills: 0 | Status: SHIPPED | OUTPATIENT
Start: 2019-03-04 | End: 2019-04-29 | Stop reason: DRUGHIGH

## 2019-02-27 DIAGNOSIS — G25.81 RLS (RESTLESS LEGS SYNDROME): ICD-10-CM

## 2019-02-27 DIAGNOSIS — F51.04 PSYCHOPHYSIOLOGICAL INSOMNIA: ICD-10-CM

## 2019-02-28 RX ORDER — GABAPENTIN 300 MG/1
300 CAPSULE ORAL NIGHTLY
Qty: 90 CAPSULE | Refills: 3 | Status: SHIPPED | OUTPATIENT
Start: 2019-02-28 | End: 2020-02-20

## 2019-03-15 ENCOUNTER — OFFICE VISIT (OUTPATIENT)
Dept: INTERNAL MEDICINE CLINIC | Age: 84
End: 2019-03-15
Payer: MEDICARE

## 2019-03-15 ENCOUNTER — TELEPHONE (OUTPATIENT)
Dept: INTERNAL MEDICINE CLINIC | Age: 84
End: 2019-03-15

## 2019-03-15 VITALS
WEIGHT: 127 LBS | SYSTOLIC BLOOD PRESSURE: 124 MMHG | HEART RATE: 84 BPM | DIASTOLIC BLOOD PRESSURE: 78 MMHG | TEMPERATURE: 98 F | BODY MASS INDEX: 20.5 KG/M2

## 2019-03-15 DIAGNOSIS — R10.32 LLQ PAIN: Primary | ICD-10-CM

## 2019-03-15 PROCEDURE — 99213 OFFICE O/P EST LOW 20 MIN: CPT | Performed by: NURSE PRACTITIONER

## 2019-03-15 PROCEDURE — G8482 FLU IMMUNIZE ORDER/ADMIN: HCPCS | Performed by: NURSE PRACTITIONER

## 2019-03-15 PROCEDURE — G8420 CALC BMI NORM PARAMETERS: HCPCS | Performed by: NURSE PRACTITIONER

## 2019-03-15 PROCEDURE — G8598 ASA/ANTIPLAT THER USED: HCPCS | Performed by: NURSE PRACTITIONER

## 2019-03-15 PROCEDURE — 1123F ACP DISCUSS/DSCN MKR DOCD: CPT | Performed by: NURSE PRACTITIONER

## 2019-03-15 PROCEDURE — 1036F TOBACCO NON-USER: CPT | Performed by: NURSE PRACTITIONER

## 2019-03-15 PROCEDURE — G8428 CUR MEDS NOT DOCUMENT: HCPCS | Performed by: NURSE PRACTITIONER

## 2019-03-15 PROCEDURE — 4040F PNEUMOC VAC/ADMIN/RCVD: CPT | Performed by: NURSE PRACTITIONER

## 2019-03-15 PROCEDURE — 1101F PT FALLS ASSESS-DOCD LE1/YR: CPT | Performed by: NURSE PRACTITIONER

## 2019-03-15 ASSESSMENT — ENCOUNTER SYMPTOMS
SHORTNESS OF BREATH: 0
SORE THROAT: 0
ABDOMINAL PAIN: 1
CONSTIPATION: 1
SINUS PAIN: 0
COUGH: 0
NAUSEA: 0
BLOOD IN STOOL: 0
WHEEZING: 0
DIARRHEA: 0
VOMITING: 0

## 2019-03-26 ENCOUNTER — TELEPHONE (OUTPATIENT)
Dept: CARDIOLOGY CLINIC | Age: 84
End: 2019-03-26

## 2019-03-27 ENCOUNTER — TELEPHONE (OUTPATIENT)
Dept: INTERNAL MEDICINE CLINIC | Age: 84
End: 2019-03-27

## 2019-03-27 ENCOUNTER — TELEPHONE (OUTPATIENT)
Dept: CARDIOLOGY CLINIC | Age: 84
End: 2019-03-27

## 2019-03-28 ENCOUNTER — TELEPHONE (OUTPATIENT)
Dept: INTERNAL MEDICINE CLINIC | Age: 84
End: 2019-03-28

## 2019-03-28 ENCOUNTER — OFFICE VISIT (OUTPATIENT)
Dept: CARDIOLOGY CLINIC | Age: 84
End: 2019-03-28
Payer: MEDICARE

## 2019-03-28 VITALS
BODY MASS INDEX: 20.09 KG/M2 | WEIGHT: 125 LBS | OXYGEN SATURATION: 96 % | SYSTOLIC BLOOD PRESSURE: 122 MMHG | HEART RATE: 70 BPM | HEIGHT: 66 IN | DIASTOLIC BLOOD PRESSURE: 72 MMHG

## 2019-03-28 DIAGNOSIS — I25.10 CORONARY ARTERY DISEASE INVOLVING NATIVE CORONARY ARTERY OF NATIVE HEART WITHOUT ANGINA PECTORIS: Primary | ICD-10-CM

## 2019-03-28 DIAGNOSIS — I10 ESSENTIAL HYPERTENSION: ICD-10-CM

## 2019-03-28 DIAGNOSIS — I48.0 PAROXYSMAL ATRIAL FIBRILLATION (HCC): ICD-10-CM

## 2019-03-28 PROCEDURE — G8420 CALC BMI NORM PARAMETERS: HCPCS | Performed by: NURSE PRACTITIONER

## 2019-03-28 PROCEDURE — G8482 FLU IMMUNIZE ORDER/ADMIN: HCPCS | Performed by: NURSE PRACTITIONER

## 2019-03-28 PROCEDURE — 93000 ELECTROCARDIOGRAM COMPLETE: CPT | Performed by: NURSE PRACTITIONER

## 2019-03-28 PROCEDURE — G8427 DOCREV CUR MEDS BY ELIG CLIN: HCPCS | Performed by: NURSE PRACTITIONER

## 2019-03-28 PROCEDURE — 1123F ACP DISCUSS/DSCN MKR DOCD: CPT | Performed by: NURSE PRACTITIONER

## 2019-03-28 PROCEDURE — G8598 ASA/ANTIPLAT THER USED: HCPCS | Performed by: NURSE PRACTITIONER

## 2019-03-28 PROCEDURE — 4040F PNEUMOC VAC/ADMIN/RCVD: CPT | Performed by: NURSE PRACTITIONER

## 2019-03-28 PROCEDURE — 99214 OFFICE O/P EST MOD 30 MIN: CPT | Performed by: NURSE PRACTITIONER

## 2019-03-28 PROCEDURE — 1036F TOBACCO NON-USER: CPT | Performed by: NURSE PRACTITIONER

## 2019-03-28 RX ORDER — BENAZEPRIL HYDROCHLORIDE 40 MG/1
20 TABLET, FILM COATED ORAL DAILY
Qty: 90 TABLET | Refills: 1
Start: 2019-03-28 | End: 2019-04-05

## 2019-03-28 RX ORDER — DOXAZOSIN 2 MG/1
2 TABLET ORAL NIGHTLY
COMMUNITY
End: 2019-07-01 | Stop reason: CLARIF

## 2019-03-28 RX ORDER — NITROGLYCERIN 0.4 MG/1
0.4 TABLET SUBLINGUAL EVERY 5 MIN PRN
Qty: 25 TABLET | Refills: 3 | Status: SHIPPED | OUTPATIENT
Start: 2019-03-28

## 2019-03-29 ENCOUNTER — TELEPHONE (OUTPATIENT)
Dept: CARDIOLOGY CLINIC | Age: 84
End: 2019-03-29

## 2019-03-29 NOTE — TELEPHONE ENCOUNTER
Daughter has questions about pt's lopressor and EKG and asking to speak to NPTS. Please call to discuss.

## 2019-04-02 ENCOUNTER — TELEPHONE (OUTPATIENT)
Dept: CARDIOLOGY CLINIC | Age: 84
End: 2019-04-02

## 2019-04-02 NOTE — TELEPHONE ENCOUNTER
Spoke to [employer]. She knew about the PAC\"s but not the a-fib. He had to use the NTG once, while on the monitor. It was mailed back yesterday. He is still having on and off chest pain, not sure if it may be GI related. Has gall stones, and a twisted stomach. Will see the GI doctor this week. He is too old for surgery. [employer] may need a call.

## 2019-04-05 ENCOUNTER — TELEPHONE (OUTPATIENT)
Dept: CARDIOLOGY CLINIC | Age: 84
End: 2019-04-05

## 2019-04-05 RX ORDER — BENAZEPRIL HYDROCHLORIDE 40 MG/1
40 TABLET, FILM COATED ORAL DAILY
Qty: 90 TABLET | Refills: 1 | Status: SHIPPED | OUTPATIENT
Start: 2019-04-05 | End: 2019-05-01 | Stop reason: SDUPTHER

## 2019-04-05 NOTE — TELEPHONE ENCOUNTER
Results of  reviewed, no dizziness or lightheadedness, but did not stop BB, will stop BB and go back on benazapril 40 mg and OV with EP next week, to ER if any symptoms

## 2019-04-08 ENCOUNTER — TELEPHONE (OUTPATIENT)
Dept: CARDIOLOGY CLINIC | Age: 84
End: 2019-04-08

## 2019-04-08 ENCOUNTER — APPOINTMENT (OUTPATIENT)
Dept: GENERAL RADIOLOGY | Age: 84
End: 2019-04-08
Payer: MEDICARE

## 2019-04-08 ENCOUNTER — HOSPITAL ENCOUNTER (OUTPATIENT)
Age: 84
Setting detail: OBSERVATION
Discharge: OTHER INSTITUTION | End: 2019-04-10
Attending: EMERGENCY MEDICINE | Admitting: INTERNAL MEDICINE
Payer: MEDICARE

## 2019-04-08 DIAGNOSIS — R07.9 CHEST PAIN, UNSPECIFIED TYPE: Primary | ICD-10-CM

## 2019-04-08 PROBLEM — E44.0 MODERATE PROTEIN-CALORIE MALNUTRITION (HCC): Status: ACTIVE | Noted: 2019-04-08

## 2019-04-08 PROBLEM — R13.10 DYSPHAGIA: Status: ACTIVE | Noted: 2019-04-08

## 2019-04-08 LAB
A/G RATIO: 1.6 (ref 1.1–2.2)
ALBUMIN SERPL-MCNC: 4.4 G/DL (ref 3.4–5)
ALP BLD-CCNC: 94 U/L (ref 40–129)
ALT SERPL-CCNC: 12 U/L (ref 10–40)
ANION GAP SERPL CALCULATED.3IONS-SCNC: 12 MMOL/L (ref 3–16)
APTT: 30.5 SEC (ref 26–36)
AST SERPL-CCNC: 20 U/L (ref 15–37)
BASOPHILS ABSOLUTE: 0.1 K/UL (ref 0–0.2)
BASOPHILS RELATIVE PERCENT: 1.3 %
BILIRUB SERPL-MCNC: 0.8 MG/DL (ref 0–1)
BILIRUBIN URINE: NEGATIVE
BLOOD, URINE: NEGATIVE
BUN BLDV-MCNC: 13 MG/DL (ref 7–20)
CALCIUM SERPL-MCNC: 9.3 MG/DL (ref 8.3–10.6)
CHLORIDE BLD-SCNC: 104 MMOL/L (ref 99–110)
CLARITY: ABNORMAL
CO2: 29 MMOL/L (ref 21–32)
COLOR: YELLOW
CREAT SERPL-MCNC: 0.9 MG/DL (ref 0.8–1.3)
EKG ATRIAL RATE: 500 BPM
EKG DIAGNOSIS: NORMAL
EKG Q-T INTERVAL: 348 MS
EKG QRS DURATION: 88 MS
EKG QTC CALCULATION (BAZETT): 432 MS
EKG R AXIS: 3 DEGREES
EKG T AXIS: 109 DEGREES
EKG VENTRICULAR RATE: 93 BPM
EOSINOPHILS ABSOLUTE: 0.3 K/UL (ref 0–0.6)
EOSINOPHILS RELATIVE PERCENT: 5.7 %
EPITHELIAL CELLS, UA: 0 /HPF (ref 0–5)
GFR AFRICAN AMERICAN: >60
GFR NON-AFRICAN AMERICAN: >60
GLOBULIN: 2.7 G/DL
GLUCOSE BLD-MCNC: 91 MG/DL (ref 70–99)
GLUCOSE URINE: NEGATIVE MG/DL
HCT VFR BLD CALC: 38.5 % (ref 40.5–52.5)
HEMOGLOBIN: 13 G/DL (ref 13.5–17.5)
HYALINE CASTS: 0 /LPF (ref 0–8)
INR BLD: 0.98 (ref 0.86–1.14)
KETONES, URINE: NEGATIVE MG/DL
LEUKOCYTE ESTERASE, URINE: NEGATIVE
LYMPHOCYTES ABSOLUTE: 2.1 K/UL (ref 1–5.1)
LYMPHOCYTES RELATIVE PERCENT: 44.9 %
MCH RBC QN AUTO: 32.9 PG (ref 26–34)
MCHC RBC AUTO-ENTMCNC: 33.8 G/DL (ref 31–36)
MCV RBC AUTO: 97.3 FL (ref 80–100)
MICROSCOPIC EXAMINATION: YES
MONOCYTES ABSOLUTE: 0.3 K/UL (ref 0–1.3)
MONOCYTES RELATIVE PERCENT: 6.5 %
NEUTROPHILS ABSOLUTE: 2 K/UL (ref 1.7–7.7)
NEUTROPHILS RELATIVE PERCENT: 41.6 %
NITRITE, URINE: NEGATIVE
PDW BLD-RTO: 13.2 % (ref 12.4–15.4)
PH UA: 8.5 (ref 5–8)
PLATELET # BLD: 136 K/UL (ref 135–450)
PMV BLD AUTO: 8.6 FL (ref 5–10.5)
POTASSIUM SERPL-SCNC: 4 MMOL/L (ref 3.5–5.1)
PROTEIN UA: NEGATIVE MG/DL
PROTHROMBIN TIME: 11.2 SEC (ref 9.8–13)
RBC # BLD: 3.96 M/UL (ref 4.2–5.9)
RBC UA: 1 /HPF (ref 0–4)
SODIUM BLD-SCNC: 145 MMOL/L (ref 136–145)
SPECIFIC GRAVITY UA: 1.01 (ref 1–1.03)
TOTAL PROTEIN: 7.1 G/DL (ref 6.4–8.2)
TROPONIN: 0.02 NG/ML
URINE REFLEX TO CULTURE: ABNORMAL
URINE TYPE: ABNORMAL
UROBILINOGEN, URINE: 0.2 E.U./DL
WBC # BLD: 4.8 K/UL (ref 4–11)
WBC UA: 0 /HPF (ref 0–5)

## 2019-04-08 PROCEDURE — G0378 HOSPITAL OBSERVATION PER HR: HCPCS

## 2019-04-08 PROCEDURE — 84484 ASSAY OF TROPONIN QUANT: CPT

## 2019-04-08 PROCEDURE — 96374 THER/PROPH/DIAG INJ IV PUSH: CPT

## 2019-04-08 PROCEDURE — 93010 ELECTROCARDIOGRAM REPORT: CPT | Performed by: INTERNAL MEDICINE

## 2019-04-08 PROCEDURE — 92610 EVALUATE SWALLOWING FUNCTION: CPT

## 2019-04-08 PROCEDURE — 97530 THERAPEUTIC ACTIVITIES: CPT

## 2019-04-08 PROCEDURE — 85610 PROTHROMBIN TIME: CPT

## 2019-04-08 PROCEDURE — 92526 ORAL FUNCTION THERAPY: CPT

## 2019-04-08 PROCEDURE — 6370000000 HC RX 637 (ALT 250 FOR IP): Performed by: INTERNAL MEDICINE

## 2019-04-08 PROCEDURE — 6370000000 HC RX 637 (ALT 250 FOR IP): Performed by: NURSE PRACTITIONER

## 2019-04-08 PROCEDURE — 71045 X-RAY EXAM CHEST 1 VIEW: CPT

## 2019-04-08 PROCEDURE — 85025 COMPLETE CBC W/AUTO DIFF WBC: CPT

## 2019-04-08 PROCEDURE — 99223 1ST HOSP IP/OBS HIGH 75: CPT | Performed by: INTERNAL MEDICINE

## 2019-04-08 PROCEDURE — 36415 COLL VENOUS BLD VENIPUNCTURE: CPT

## 2019-04-08 PROCEDURE — 99285 EMERGENCY DEPT VISIT HI MDM: CPT

## 2019-04-08 PROCEDURE — 93005 ELECTROCARDIOGRAM TRACING: CPT | Performed by: EMERGENCY MEDICINE

## 2019-04-08 PROCEDURE — 81001 URINALYSIS AUTO W/SCOPE: CPT

## 2019-04-08 PROCEDURE — 6370000000 HC RX 637 (ALT 250 FOR IP): Performed by: EMERGENCY MEDICINE

## 2019-04-08 PROCEDURE — 2580000003 HC RX 258: Performed by: INTERNAL MEDICINE

## 2019-04-08 PROCEDURE — 93005 ELECTROCARDIOGRAM TRACING: CPT | Performed by: INTERNAL MEDICINE

## 2019-04-08 PROCEDURE — 97161 PT EVAL LOW COMPLEX 20 MIN: CPT

## 2019-04-08 PROCEDURE — 85730 THROMBOPLASTIN TIME PARTIAL: CPT

## 2019-04-08 PROCEDURE — 80053 COMPREHEN METABOLIC PANEL: CPT

## 2019-04-08 PROCEDURE — 2500000003 HC RX 250 WO HCPCS: Performed by: EMERGENCY MEDICINE

## 2019-04-08 RX ORDER — GABAPENTIN 300 MG/1
300 CAPSULE ORAL NIGHTLY
Status: DISCONTINUED | OUTPATIENT
Start: 2019-04-08 | End: 2019-04-10 | Stop reason: HOSPADM

## 2019-04-08 RX ORDER — ASPIRIN 81 MG/1
81 TABLET, CHEWABLE ORAL DAILY
Status: DISCONTINUED | OUTPATIENT
Start: 2019-04-08 | End: 2019-04-10 | Stop reason: HOSPADM

## 2019-04-08 RX ORDER — CLOPIDOGREL BISULFATE 75 MG/1
75 TABLET ORAL DAILY
Status: DISCONTINUED | OUTPATIENT
Start: 2019-04-08 | End: 2019-04-08

## 2019-04-08 RX ORDER — NITROGLYCERIN 0.4 MG/1
0.4 TABLET SUBLINGUAL EVERY 5 MIN PRN
Status: DISCONTINUED | OUTPATIENT
Start: 2019-04-08 | End: 2019-04-10 | Stop reason: HOSPADM

## 2019-04-08 RX ORDER — LAMOTRIGINE 25 MG/1
50 TABLET ORAL 2 TIMES DAILY
COMMUNITY
End: 2019-04-23

## 2019-04-08 RX ORDER — NITROGLYCERIN 0.4 MG/1
0.4 TABLET SUBLINGUAL EVERY 5 MIN PRN
Status: DISCONTINUED | OUTPATIENT
Start: 2019-04-08 | End: 2019-04-08

## 2019-04-08 RX ORDER — OMEGA-3S/DHA/EPA/FISH OIL/D3 1150-1000
1 LIQUID (ML) ORAL DAILY
Status: DISCONTINUED | OUTPATIENT
Start: 2019-04-08 | End: 2019-04-08

## 2019-04-08 RX ORDER — LEVOTHYROXINE SODIUM 0.03 MG/1
25 TABLET ORAL DAILY
Status: DISCONTINUED | OUTPATIENT
Start: 2019-04-09 | End: 2019-04-10 | Stop reason: HOSPADM

## 2019-04-08 RX ORDER — POLYETHYLENE GLYCOL 3350 17 G/17G
17 POWDER, FOR SOLUTION ORAL DAILY
Status: DISCONTINUED | OUTPATIENT
Start: 2019-04-08 | End: 2019-04-10 | Stop reason: HOSPADM

## 2019-04-08 RX ORDER — ACETAMINOPHEN 325 MG/1
650 TABLET ORAL EVERY 4 HOURS PRN
Status: DISCONTINUED | OUTPATIENT
Start: 2019-04-08 | End: 2019-04-08 | Stop reason: SDUPTHER

## 2019-04-08 RX ORDER — PRAVASTATIN SODIUM 20 MG
20 TABLET ORAL DAILY
Status: DISCONTINUED | OUTPATIENT
Start: 2019-04-08 | End: 2019-04-10 | Stop reason: HOSPADM

## 2019-04-08 RX ORDER — AMLODIPINE BESYLATE 5 MG/1
5 TABLET ORAL DAILY
Status: DISCONTINUED | OUTPATIENT
Start: 2019-04-08 | End: 2019-04-10 | Stop reason: HOSPADM

## 2019-04-08 RX ORDER — LISINOPRIL 20 MG/1
40 TABLET ORAL DAILY
Status: DISCONTINUED | OUTPATIENT
Start: 2019-04-08 | End: 2019-04-10 | Stop reason: HOSPADM

## 2019-04-08 RX ORDER — SODIUM CHLORIDE 9 MG/ML
INJECTION, SOLUTION INTRAVENOUS CONTINUOUS
Status: DISCONTINUED | OUTPATIENT
Start: 2019-04-08 | End: 2019-04-08

## 2019-04-08 RX ORDER — FINASTERIDE 5 MG/1
5 TABLET, FILM COATED ORAL NIGHTLY
Status: DISCONTINUED | OUTPATIENT
Start: 2019-04-08 | End: 2019-04-10 | Stop reason: HOSPADM

## 2019-04-08 RX ORDER — ACETAMINOPHEN 325 MG/1
650 TABLET ORAL EVERY 8 HOURS PRN
Status: DISCONTINUED | OUTPATIENT
Start: 2019-04-08 | End: 2019-04-10 | Stop reason: HOSPADM

## 2019-04-08 RX ORDER — LORAZEPAM 0.5 MG/1
0.5 TABLET ORAL 2 TIMES DAILY
Status: DISCONTINUED | OUTPATIENT
Start: 2019-04-08 | End: 2019-04-10 | Stop reason: HOSPADM

## 2019-04-08 RX ORDER — ONDANSETRON 2 MG/ML
4 INJECTION INTRAMUSCULAR; INTRAVENOUS EVERY 6 HOURS PRN
Status: DISCONTINUED | OUTPATIENT
Start: 2019-04-08 | End: 2019-04-10 | Stop reason: HOSPADM

## 2019-04-08 RX ORDER — SODIUM CHLORIDE 0.9 % (FLUSH) 0.9 %
10 SYRINGE (ML) INJECTION PRN
Status: DISCONTINUED | OUTPATIENT
Start: 2019-04-08 | End: 2019-04-10 | Stop reason: HOSPADM

## 2019-04-08 RX ORDER — DOXAZOSIN MESYLATE 1 MG/1
2 TABLET ORAL NIGHTLY
Status: DISCONTINUED | OUTPATIENT
Start: 2019-04-08 | End: 2019-04-10 | Stop reason: HOSPADM

## 2019-04-08 RX ORDER — LAMOTRIGINE 25 MG/1
50 TABLET ORAL 2 TIMES DAILY
Status: DISCONTINUED | OUTPATIENT
Start: 2019-04-08 | End: 2019-04-10 | Stop reason: HOSPADM

## 2019-04-08 RX ORDER — POLYETHYLENE GLYCOL 3350 17 G/17G
17 POWDER, FOR SOLUTION ORAL DAILY PRN
COMMUNITY

## 2019-04-08 RX ORDER — OMEGA-3S/DHA/EPA/FISH OIL/D3 1150-1000
1 LIQUID (ML) ORAL DAILY
COMMUNITY
End: 2020-09-22

## 2019-04-08 RX ORDER — PANTOPRAZOLE SODIUM 40 MG/1
40 TABLET, DELAYED RELEASE ORAL DAILY
Status: DISCONTINUED | OUTPATIENT
Start: 2019-04-08 | End: 2019-04-10 | Stop reason: HOSPADM

## 2019-04-08 RX ORDER — SODIUM CHLORIDE 0.9 % (FLUSH) 0.9 %
10 SYRINGE (ML) INJECTION EVERY 12 HOURS SCHEDULED
Status: DISCONTINUED | OUTPATIENT
Start: 2019-04-08 | End: 2019-04-10 | Stop reason: HOSPADM

## 2019-04-08 RX ORDER — LANOLIN ALCOHOL/MO/W.PET/CERES
3 CREAM (GRAM) TOPICAL DAILY PRN
Status: DISCONTINUED | OUTPATIENT
Start: 2019-04-08 | End: 2019-04-10 | Stop reason: HOSPADM

## 2019-04-08 RX ADMIN — APIXABAN 2.5 MG: 5 TABLET, FILM COATED ORAL at 12:17

## 2019-04-08 RX ADMIN — DOXAZOSIN 2 MG: 1 TABLET ORAL at 20:36

## 2019-04-08 RX ADMIN — LORAZEPAM 0.5 MG: 0.5 TABLET ORAL at 09:37

## 2019-04-08 RX ADMIN — GABAPENTIN 300 MG: 300 CAPSULE ORAL at 20:35

## 2019-04-08 RX ADMIN — APIXABAN 2.5 MG: 5 TABLET, FILM COATED ORAL at 20:36

## 2019-04-08 RX ADMIN — Medication 10 ML: at 12:09

## 2019-04-08 RX ADMIN — Medication 400 MG: at 12:21

## 2019-04-08 RX ADMIN — SODIUM CHLORIDE: 9 INJECTION, SOLUTION INTRAVENOUS at 12:09

## 2019-04-08 RX ADMIN — NITROGLYCERIN 0.4 MG: 0.4 TABLET, ORALLY DISINTEGRATING SUBLINGUAL at 09:34

## 2019-04-08 RX ADMIN — PANTOPRAZOLE SODIUM 40 MG: 40 TABLET, DELAYED RELEASE ORAL at 12:18

## 2019-04-08 RX ADMIN — LAMOTRIGINE 50 MG: 25 TABLET ORAL at 20:35

## 2019-04-08 RX ADMIN — ASPIRIN 81 MG 81 MG: 81 TABLET ORAL at 12:17

## 2019-04-08 RX ADMIN — FAMOTIDINE 20 MG: 10 INJECTION, SOLUTION INTRAVENOUS at 05:14

## 2019-04-08 RX ADMIN — FINASTERIDE 5 MG: 5 TABLET, FILM COATED ORAL at 20:36

## 2019-04-08 RX ADMIN — LISINOPRIL 40 MG: 20 TABLET ORAL at 12:18

## 2019-04-08 RX ADMIN — Medication 10 ML: at 20:35

## 2019-04-08 RX ADMIN — PRAVASTATIN SODIUM 20 MG: 20 TABLET ORAL at 20:41

## 2019-04-08 RX ADMIN — AMLODIPINE BESYLATE 5 MG: 5 TABLET ORAL at 20:39

## 2019-04-08 RX ADMIN — MELATONIN TAB 3 MG 3 MG: 3 TAB at 20:36

## 2019-04-08 RX ADMIN — LORAZEPAM 0.5 MG: 0.5 TABLET ORAL at 20:36

## 2019-04-08 RX ADMIN — NITROGLYCERIN 0.4 MG: 0.4 TABLET, ORALLY DISINTEGRATING SUBLINGUAL at 05:14

## 2019-04-08 ASSESSMENT — PAIN SCALES - GENERAL
PAINLEVEL_OUTOF10: 3
PAINLEVEL_OUTOF10: 0
PAINLEVEL_OUTOF10: 0
PAINLEVEL_OUTOF10: 6
PAINLEVEL_OUTOF10: 0

## 2019-04-08 ASSESSMENT — PAIN DESCRIPTION - ONSET: ONSET: GRADUAL

## 2019-04-08 ASSESSMENT — PAIN DESCRIPTION - ORIENTATION: ORIENTATION: MID

## 2019-04-08 ASSESSMENT — PAIN DESCRIPTION - FREQUENCY: FREQUENCY: CONTINUOUS

## 2019-04-08 ASSESSMENT — PAIN DESCRIPTION - PAIN TYPE: TYPE: ACUTE PAIN

## 2019-04-08 ASSESSMENT — PAIN - FUNCTIONAL ASSESSMENT: PAIN_FUNCTIONAL_ASSESSMENT: ACTIVITIES ARE NOT PREVENTED

## 2019-04-08 ASSESSMENT — PAIN DESCRIPTION - DESCRIPTORS: DESCRIPTORS: BURNING

## 2019-04-08 ASSESSMENT — PAIN DESCRIPTION - LOCATION: LOCATION: CHEST

## 2019-04-08 NOTE — PROGRESS NOTES
Physical Therapy    Facility/Department: [address]  Initial Assessment/Discharge    NAME: Owen Aguiar  : 10/31/1925  MRN: [id number]    Date of Service: 2019    Discharge Recommendations:   Owen Aguiar scored a 23/24 on the AM-PAC short mobility form. At this time, no further PT is recommended upon discharge due to proximity to baseline status. Recommend patient returns to prior setting with prior services. Assessment   Assessment: Patient presenting close to baseline status. No PT needs at this time. Prognosis: Good  Decision Making: Low Complexity  Patient Education: Patient educated on utilization of AD when getting up to use the restroom at night; PT eval and POC  Barriers to Learning: None identified. REQUIRES PT FOLLOW UP: No  Activity Tolerance  Activity Tolerance: Patient Tolerated treatment well       Patient Diagnosis(es): The encounter diagnosis was Chest pain, unspecified type. has a past medical history of Arthritis, Blepharitis of both eyes, CAD (coronary artery disease), GERD (gastroesophageal reflux disease), Gout, Hyperlipidemia, Hypertension, Macular degeneration, NSTEMI (non-ST elevated myocardial infarction) [facility]), Prostate enlargement, Psychiatric problem, and Tachycardia. has a past surgical history that includes Coronary angioplasty with stent (); skin biopsy; Kidney stone surgery (); Coronary artery bypass graft; Cataract removal (); TURP (); Skin cancer destruction (); Colonoscopy; eye surgery; and Cardiac surgery.     Restrictions     Vision/Hearing  Vision: Impaired(macular degeneration )  Vision Exceptions: Wears glasses for reading  Hearing: Exceptions to [facility]     Subjective  General  Chart Reviewed: Yes  Patient assessed for rehabilitation services?: Yes  Response To Previous Treatment: Not applicable  Family / Caregiver Present: Yes  Follows Commands: Within Functional Limits  General Comment  Comments: Patient found upright in bed with two daughters in room   Subjective  Subjective: Patient agreeable to PT eval   Pain Screening  Patient Currently in Pain: No  Vital Signs  Patient Currently in Pain: No       Orientation  Orientation  Overall Orientation Status: Within Functional Limits  Social/Functional History  Social/Functional History  Lives With: Alone(Patient lives in a one bedroom apartment at [facility]. Patient lives in assisted living and has a kithenette. )  Type of Home: Apartment  Home Layout: One level  Home Access: Level entry  Bathroom Shower/Tub: Walk-in shower  Bathroom Toilet: Handicap height  Bathroom Equipment: Grab bars in shower, Grab bars around toilet, Shower chair  Bathroom Accessibility: Accessible  Home Equipment: Roll About  ADL Assistance: Needs assistance(Patient has help to wash his back but does most of bathing with supervision)  [address]: Needs assistance(Patient has laundry service and has cleaning once per week )  Homemaking Responsibilities: No  Ambulation Assistance: Independent(Patient usually walks to the cafe and back - patient estimates this is a distance of 300 ft. Patient does not use his walker when he gets up to go to the bathroom at night. )  Transfer Assistance: Independent  Active : No  Occupation: Retired  Type of occupation:  and    Additional Comments: No falls in the past six months. Patient stays in his room often because he does not feel well due to GERD. Patient is able to dine in cafeteria but has been choosing to dine in his room. Per daughter, patient walks better with his shoes but does not have his shoes in the hospital.    Objective     Observation/Palpation  Posture: Good  Observation: patient presented with flat affect.      AROM RLE (degrees)  RLE AROM: WFL  AROM LLE (degrees)  LLE AROM : WFL  Strength RLE  Strength RLE: WNL(globally 5/5)  Strength LLE  Strength LLE: WNL(globally 5/5)     Sensation  Overall Sensation

## 2019-04-08 NOTE — PROGRESS NOTES
Patient admitted to room 3366 from ED. Patient oriented to room, call light, bed rails, phone, lights and bathroom. Patient instructed about the schedule of the day including: vital sign frequency, lab draws, possible tests, frequency of MD and staff rounds, including RN/MD rounding together at bedside, daily weights, and I &O's. Patient instructed about prescribed diet, how to use 8MENU, and television. bed alarm in place, patient aware of placement and reason. Telemetry box 3366 in place, patient aware of placement and reason. Bed locked, in lowest position, side rails up 2/4, call light within reach. Will continue to monitor.

## 2019-04-08 NOTE — PROGRESS NOTES
Speech Language Pathology  CLINICAL BEDSIDE SWALLOWING EVALUATION  Speech Therapy Department    Patient Name:  Godfrey Guaman  :  10/31/1925  Pain level:Pt denies pain at this time  Medical Diagnosis:   Chest pain [R07.9]  Chest pain [R07.9]     HPI:Bartolome Rocha is a 80 y.o. male who presented with history of CAD, pAfib (new onset recently), GERD, HTN, hyperlipidemia, insomnia, chronic constipation, bipolar 1 disorder, PAD who came to ER with complaints of CP. CP described as substernal, began last night at 11 PM.  Associated with SOB. Describing burning sensation, no pressure. No improvement after NTG. Spontaneously abated. No radiation to arm or neck. Patient was seen by Cardiology NP last week and started on Metoprolol. Daughter was told if patient had CP, dizziness or any other issues to be brought to ER. In ED, patient notes dizziness with standing. Has ongoing dysphagia and post prandial abdominal pain being investigated by GI as outpatient. No fevers, chills, NS, cough, hemoptysis, melena or hematochezia. No nausea or vomiting. Things sometimes get stuck swallowing. Otherwise complete ROS is negative unless listed above. Speech Therapy/Clinical Swallow Evaluation order received. Pt is currently alert and verbally responsive. Pt reports no overt swallowing dysfunction but does report a longstanding history of esophageal dysmotility     Treatment Diagnosis: Minimal Oropharyngeal Dysphagia    Impressions: Pt demonstrates adequate oral motor strength and ROM. Clinical symptoms of premature bolus loss to pharynx with mild delayed swallow initiation, mild reduced laryngeal excursion and no overt signs of aspiration noted. Progressive symptoms of delayed pharyngeal clearing noted over successive po trials. Education regarding aspiration risk and precautions as well as reflux precautions due to noted esophageal dysphagia explained to the Pt and his family who verbalized understanding.   No further dysphagia treatment intervention is indicated at this time. Dietary Recommendations: continue regular texture diet with thin liquids/meds with water or with applesauce as tolerated    Strategies: 90 degree positioning with all p.o. Intake and remain upright for 30 min following meals; Reflux precautions; small bites/sips; reduce rate of intake; Pt advised to consume liquids prior to solid textures in order to minimize reflux to pharyngeal space with liquid backflow at end of meal    Oral motor Exam:  Dentition: adequate  Labial/Facial: mild reduced ROM  Lingual: mild reduced ROM  Voice:hoarse; raspy    Oral Phase:   Apparent premature bolus loss to pharynx    Pharyngeal Phase:  Mild delayed swallow initiation  Mild decreased laryngeal elevation via palpation   Throat clearing (delayed) at end of study only     Patient/Family Education:Education, results and recommendations given to the Pt and nurse, who verbalized understanding    Timed Code Treatment:0 min    Total Treatment Time:25 min    Discharge Recommendations: No further Speech Therapy for Speech/Dysphagia treatment at discharge.     [facility] [id number]

## 2019-04-08 NOTE — ED NOTES
Bedside report given to [facility]. Pt. transported to floor.      Blue Triplett, KARL  04/08/19 0894

## 2019-04-08 NOTE — CONSULTS
[facility]   Electrophysiology Consultation   Date: 4/8/2019  Reason for Consultation: Chest pain, Atrial fibrillation   Consult Requesting Physician: Marcine Fleischer, MD     Chief Complaint   Patient presents with    Chest Pain     pt in by [facility] from [facility], complaints of CP since approx 11pm, cardiac hx, also hx of acid reflux, ems gave 1 nitro, no relief     CC: Chest pain   HPI: Srinivasa Salguero is a 80 y.o.  past medical history significant for coronary artery disease status post CABG years ago, status post PTCA in 2005. Hypertension, hyperlipidemia who has presented to the hospital with chest pressure. Patient has long history of GERD and is on PPI. However he has noticed intermittent chest discomfort, substernal, no radiation, and not associated with shortness of breath or activity. His chest discomfort is mostly at night. He was seen in cardiology office recently and ECG found atrial fibrillation with controlled ventricular rate. He did not have an ECG since January 2017. Therefore the duration of A. fib was not known. Patient denies having any palpitation, or dizziness. He did not feel that his heart rate was regular. He was given metoprolol in office and a Holter was placed. He was supposed to be seen in office today but continued having intermittent chest discomfort and came to the hospital.     He has extensive history of coronary artery disease in the past.  His last Myoview in 2016 reported with no ischemia.   He has history of bradycardia prior to diagnosis of atrial fibrillation and his beta blocker had been stopped in the past.      Past Medical History:   Diagnosis Date    Arthritis     Blepharitis of both eyes     CAD (coronary artery disease)     stents 2005, x3    GERD (gastroesophageal reflux disease)     Gout     Hyperlipidemia     Hypertension     Macular degeneration     NSTEMI (non-ST elevated myocardial infarction) (HCC)     Prostate enlargement  Psychiatric problem     anxiety; bipolar disorder    Tachycardia         Past Surgical History:   Procedure Laterality Date    CARDIAC SURGERY      CATARACT REMOVAL  1993    X5    COLONOSCOPY      CORONARY ANGIOPLASTY WITH STENT PLACEMENT  2005      X3    CORONARY ARTERY BYPASS GRAFT      1986, x4, deajung (Elías)    EYE SURGERY      KIDNEY STONE SURGERY  1980    SKIN BIOPSY      melanoma, basal    SKIN CANCER DESTRUCTION  2006    Melanoma  ()    TURP         Allergies   Allergen Reactions    Zyprexa [Olanzapine]      Pacing.  Citalopram Anxiety     сергей       Social History:  Reviewed. reports that he has never smoked. He has never used smokeless tobacco. He reports that he does not drink alcohol or use drugs. Family History:  Reviewed. family history includes Heart Disease in his brother and mother. Review of System:  All other systems reviewed except for that noted above. Pertinent negatives and positives are:       · General: negative for fever, chills   · Ophthalmic ROS: negative for - eye pain or loss of vision  · ENT ROS: negative for - headaches, sore throat   · Respiratory: negative for - cough, sputum  · Cardiovascular: Reviewed in HPI  · Gastrointestinal: negative for - abdominal pain, diarrhea, N/V  · Hematology: negative for - bleeding, blood clots, bruising or jaundice  · Genito-Urinary:  negative for - Dysuria or incontinence  · Musculoskeletal: negative for - Joint swelling, muscle pain  · Neurological: negative for - confusion, dizziness, headaches   · Psychiatric: No anxiety, no depression. · Dermatological: negative for - rash    Physical Examination:  Vitals:    19 1115   BP: (!) 153/76   Pulse: 90   Resp: 18   Temp: 97.6 °F (36.4 °C)   SpO2: 95%      No intake/output data recorded.    Wt Readings from Last 3 Encounters:   19 120 lb (54.4 kg)   19 125 lb (56.7 kg)   03/15/19 127 lb (57.6 kg)     Temp  Av.4 °F (36.3 °C) Min: 97.1 °F (36.2 °C)  Max: 97.6 °F (36.4 °C)  Pulse  Av.8  Min: 70  Max: 100  BP  Min: 90/54  Max: 154/80  SpO2  Av.5 %  Min: 95 %  Max: 98 %    Intake/Output Summary (Last 24 hours) at 2019 1215  Last data filed at 2019 1131  Gross per 24 hour   Intake 0 ml   Output --   Net 0 ml       · Telemetry: Atrial fibrillation   · Constitutional: Oriented. No distress. · Head: Normocephalic and atraumatic. · Mouth/Throat: Oropharynx is clear and moist.   · Eyes: Conjunctivae normal. EOM are normal.   · Neck: Neck supple. No rigidity. · Cardiovascular: Normal rate, Irregular rhythm, C4&W7.  + systolic murmur   · Pulmonary/Chest: Bilateral respiratory sounds. No rhonchi. · Abdominal: Soft. Bowel sounds present. No distension, No tenderness. · Musculoskeletal: No tenderness. No edema    · Lymphadenopathy: Has no cervical adenopathy. · Neurological: Alert and oriented. Cranial nerve appears intact, No Gross deficit   · Skin: Skin is warm and dry. No rash noted. · Psychiatric: Has a normal behavior     Labs, diagnostic and imaging results reviewed. Reviewed. Recent Labs     19  0419      K 4.0      CO2 29   BUN 13   CREATININE 0.9     Recent Labs     19  0419   WBC 4.8   HGB 13.0*   HCT 38.5*   MCV 97.3        Lab Results   Component Value Date    TROPONINI 0.02 2019     Lab Results   Component Value Date     2014     Lab Results   Component Value Date    PROTIME 11.2 2019    PROTIME 10.9 2017    PROTIME 11.5 2016    INR 0.98 2019    INR 0.96 2017    INR 1.01 2016     Lab Results   Component Value Date    CHOL 85 2016    HDL 39 2016    HDL 32 2012    TRIG 38 2016       ECG: Atrial fibrillation, PVCs. Last Echo:Dec '15:  Summary   --Normal left ventricle size and systolic function with an estimated   ejection fraction of 55%.  No regional wall motion abnormalities are seen.  Santo Laundry is mild concentric left ventricular hypertrophy.  Santo Laundry is reversal of E/A inflow velocities across the mitral valve   suggesting impaired left ventricular relaxation.   --The aortic valve is severely thickened/calcified with decreased leaflet   mobility consistent with aortic stenosis. The aortic valve has a maximum   pressure gradient of 28 mmHg and a mean pressure gradient of 16 mmHg. This   is c/w mild aortic stenosis. No evidence of aortic valve regurgitation.   LA 5 cm     Last Stress Test: Dec '16:  Summary   There is normal isotope uptake at stress and rest. There is no evidence of   myocardial ischemia or scar.   Minimal fixed diaphragm attenuation.   Normal LV function.   Overall findings represent a low risk scan.     Scheduled Meds:   amLODIPine  5 mg Oral Daily    aspirin  81 mg Oral Daily    lisinopril  40 mg Oral Daily    doxazosin  2 mg Oral Nightly    finasteride  5 mg Oral Nightly    gabapentin  300 mg Oral Nightly    [START ON 4/9/2019] levothyroxine  25 mcg Oral Daily    LORazepam  0.5 mg Oral BID    magnesium oxide  400 mg Oral Daily    pantoprazole  40 mg Oral Daily    polyethylene glycol  17 g Oral Daily    pravastatin  20 mg Oral Daily    sodium chloride flush  10 mL Intravenous 2 times per day    apixaban  2.5 mg Oral BID     Continuous Infusions:   sodium chloride 50 mL/hr at 04/08/19 1209     PRN Meds:.acetaminophen, melatonin, nitroGLYCERIN, sodium chloride flush, magnesium hydroxide, ondansetron     Patient Active Problem List    Diagnosis Date Noted    Bilateral carotid artery disease ([facility] [address] 07/09/2013     Priority: High    Hyperlipidemia 08/11/2011     Priority: High    Essential hypertension 06/10/2011     Priority: High    Coronary artery disease 06/10/2011     Priority: High    Bradycardia 12/10/2011     Priority: Medium    Dysphagia 04/08/2019    Moderate protein-calorie malnutrition ([facility] [address] 04/08/2019    Persistent atrial fibrillation ([facility] [address]     Paroxysmal atrial fibrillation (HCC) 03/28/2019    Bipolar 1 disorder ([facility] [address] 05/10/2018    Chronic idiopathic constipation 09/11/2017    Tardive dyskinesia 04/17/2017    RLS (restless legs syndrome) 03/14/2017    Heart burn 01/18/2017    Anxiety disorder 01/03/2017    Psychophysiological insomnia 11/02/2016    Mild aortic stenosis 12/27/2015    Chest pain 06/22/2012    GERD (gastroesophageal reflux disease) 06/22/2012      Active Hospital Problems    Diagnosis Date Noted    Bilateral carotid artery disease ([facility] [address] [I77.9] 07/09/2013     Priority: High    Hyperlipidemia [E78.5] 08/11/2011     Priority: High    Essential hypertension [I10] 06/10/2011     Priority: High    Coronary artery disease [I25.10] 06/10/2011     Priority: High    Dysphagia [R13.10] 04/08/2019    Moderate protein-calorie malnutrition ([facility] [address] [E44.0] 04/08/2019    Persistent atrial fibrillation (HCC) [I48.1]     Paroxysmal atrial fibrillation ([facility] [address] [I48.0] 03/28/2019    Bipolar 1 disorder ([facility] [address] [F31.9] 05/10/2018    Chronic idiopathic constipation [K59.04] 09/11/2017    Anxiety disorder [F41.9] 01/03/2017    Psychophysiological insomnia [F51.04] 11/02/2016    Mild aortic stenosis [I35.0] 12/27/2015    GERD (gastroesophageal reflux disease) [K21.9] 06/22/2012    Chest pain [R07.9] 06/22/2012       Assessment:   - Persistent atrial fibrillation, new diagnosis  - Coronary artery artery disease  - Bradycardia seen by Holter monitoring   - Aortic stenosis  - HTN  - HLD  - GERD     Plan:  - Atrial fibrillation has been diagnosed recently in the past few weeks. Duration is unknown. Last ECG prior to diagnosis was in January 2017. Patient was not aware of his irregular heart rhythm. - Rates are acceptable. - He received beta blocker therapy for 3 days during Holter monitoring chest been stopped since Friday.   - Patient has high [id number] score (HTN, Age x2, Vascular disease) of at least 4 and requires anticoagulation to prevent thromboembolic events. Discussed anticoagulation to decrease the risk of thromboembolic events including stroke, benefits and alternatives was discussed with patient. Risk of bleeding was discussed. Different anticoagulants including warfarin, Xarelto, Eliquis was discussed. Annual risk of stroke:   Score 4 = 4% per year    D/c plavix. Eliquis 2.5 bid. Treatment options including cardioversion, antiarrhythmic medications, rhythm control vs rate control were discussed with patient and his two daghters. He is not a candidate for ablation due to multiple comorbidities and advanced age. Recommend MOE/cardioversion. Discussed in details with him and his daughters. Patient is hesitant to proceed with it at this time. We'll start anticoagulation and if he decides to proceed with MOE cardioversion will be arranged during this hospitalization. Otherwise cardioversion can be done as an outpatient. - Holter monitoring reviewed. Patient has been in atrial fibrillation with heart rate as high as 120s. He's had multiple episodes of short positives up to 3 seconds, appears asymptomatic. No syncope, dizziness or lightheadedness. He was on beta blocker therapy at the time which has been held since Friday. Discussed the need for pacemaker implantation if he develops symptomatic bradycardia or has tachybradycardia syndrome. - Echo ordered. - Chest pain is atypical.  Patient has extensive history of coronary artery disease. Discussed stress tests with patient and family members however they would like to wait till they decide about cardioversion. Last interventional cardiology to assess for evaluation of coronary artery disease. He is had a stress test in 2016 which was normal.     s/p CABG ; s/p PTCA Rt PDA and PLB '05   ASA and stain. BB stopped d/t bradycardia      Aortic stenosis:    severely calcified on echo     Echo ordered.      Thank you for allowing me to participate in the care of Black Rinku     NOTE: This report was transcribed using voice recognition software. Every effort was made to ensure accuracy, however, inadvertent computerized transcription errors may be present.      Ashutosh Henry MD, MPH  [place] [id number]   Office: [phone]

## 2019-04-08 NOTE — TELEPHONE ENCOUNTER
Pt is in the ER and will be admitted today. Pt had an appt for today to see RMM. Will pt be seen by RMM? Please call to advise.

## 2019-04-08 NOTE — ED PROVIDER NOTES
[address] [facility]  EMERGENCY DEPARTMENTENCOUNTER      Pt Name: Fantasma Aguiar  MRN: [id number]  Mairagfjasmina 10/31/1925  Date ofevaluation: 4/8/2019  Provider: Albaro Robertson,     CHIEF COMPLAINT       Chief Complaint   Patient presents with    Chest Pain     pt in by [place] ems from [facility], complaints of CP since approx 11pm, cardiac hx, also hx of acid reflux, ems gave 1 nitro, no relief         HISTORY OF PRESENT ILLNESS   (Location/Symptom, Timing/Onset,Context/Setting, Quality, Duration, Modifying Factors, Severity)  Note limiting factors. HPI    Fantasma Aguiar is a 80 y.o. male who presents to the emergency department  With a chief complaint of chest pain that started around 11 PM tonight. It has been constantly present with associated mild shortness of breath . he denies any nausea or vomiting. no diaphoresis. he describes the chest pain as a burning sensation in his chest.   6 out of 10 in intensity. He was given 1 nitro by EMS on route here with no improvement. He takes an aspirin a day at home. He's had a recent history  Of new onset atrial fibrillation on 3/28/19. he's been having some intermittent chest pains for the last 2 weeks. Per family he had a recent  heart monitor placed with abnormal pauses in his heart  Beat.  he is actually scheduled to see  Cardiac electrophysiologist Dr. Reid Mojica  today at 12:15.      review old records show his last stress test was on 12/ 8 / 16 was normal.    Nursing Notes were reviewed. REVIEW OF SYSTEMS    (2-9 systems for level 4, 10 or more for level 5)     Review of Systems  General:Denies fever, headache  ENT: no runny nose, sore throat   Respiratory:no cough, chest congestion   Cardiovascular: no  palpitations  GI: no abdominal pain,  nausea, vomiting, constipation or diarrhea  Neurological:  No weakness, numbness no speech or memory problems. Remainder of systems reviewed and negative.   Nursing notes reviewed and Attends meetings of clubs or organizations: None     Relationship status: None    Intimate partner violence:     Fear of current or ex partner: None     Emotionally abused: None     Physically abused: None     Forced sexual activity: None   Other Topics Concern    None   Social History Narrative    Lived in [facility] before eventually moving to [place] in [facility] [place]. Lives with his 62 yo son in [facility]. Has two daughters who are supportive. Bev Armas [phone] and Heidi Lopesert [phone])     Two prior marriages. Flying in airplanes used to be a hobby, has given this up. SCREENINGS             PHYSICAL EXAM    (up to 7 for level 4, 8 or more for level 5)     ED Triage Vitals [04/08/19 0416]   BP Temp Temp src Pulse Resp SpO2 Height Weight   (!) 151/74 97.1 °F (36.2 °C) -- 96 16 95 % 5' 6\" (1.676 m) 125 lb (56.7 kg)       Physical Exam  GENERAL: Patient appeared well-developed, well-nourished, vital signs reviewed. MENTAL STATUS: Patient is awake and alert   HEAD AND FACE :Head is normal cephalic. Face normal appearance  EYES: eyes lids and conjunctiva appear normal. Pupils reactive  ENT :ears and nose appear normal externally. CV: Heart ir regular rate and rhythm with II/IV  systolic murmur,No JVD . No carotid bruits. No pedal edema. Pulses equal bilaterally. LUNGS: Lungs  Moderate decreased breath sounds without wheezing, rales, or rhonchi. Normal respiratory effort. CHEST WALL: normal appearance. normal motion  ABDOMEN: Abdomen is soft to palpation. epigastric tenderness to palpation. No guarding rigidity or rebound. Bowel sounds are present in all 4 quadrants No masses palpable. No CV tenderness present. No Bruits present.   EXTREMITIES: Extremities moves all 4 Extremities without any weakness  There is no calf tenderness or lower extremity edema  PSYCHIATRIC:mood and affect normal    NEUROLOGIC: no weakness or numbness noted,  no meningeal signs  This is a

## 2019-04-08 NOTE — H&P
HOSPITALISTS HISTORY AND PHYSICAL    4/8/2019 9:27 AM    Patient Information:  Malorie Ray is a 80 y.o. male [id number]  PCP:  MARIS Plasencia CNP (Tel: [phone] )    Chief complaint:    Chief Complaint   Patient presents with    Chest Pain     pt in by [place] ems from [facility], complaints of CP since approx 11pm, cardiac hx, also hx of acid reflux, ems gave 1 nitro, no relief       History of Present Illness:  Crystal Us is a 80 y.o. male who presented with history of CAD, pAfib (new onset recently), GERD, HTN, hyperlipidemia, insomnia, chronic constipation, bipolar 1 disorder, PAD who came to ER with complaints of CP. CP described as substernal, began last night at 11 PM.  Associated with SOB. Describing burning sensation, no pressure. No improvement after NTG. Spontaneously abated. No radiation to arm or neck. Patient was seen by Cardiology NP last week and started on Metoprolol. Daughter was told if patient had CP, dizziness or any other issues to be brought to ER. In ED, patient notes dizziness with standing. Has ongoing dysphagia and post prandial abdominal pain being investigated by GI as outpatient. No fevers, chills, NS, cough, hemoptysis, melena or hematochezia. No nausea or vomiting. Things sometimes get stuck swallowing. Otherwise complete ROS is negative unless listed above. REVIEW OF SYSTEMS:   Pertinent positives as noted in HPI. All other systems were reviewed and are negative. Past Medical History:   has a past medical history of Arthritis, Blepharitis of both eyes, CAD (coronary artery disease), GERD (gastroesophageal reflux disease), Gout, Hyperlipidemia, Hypertension, Macular degeneration, NSTEMI (non-ST elevated myocardial infarction) [facility]), Prostate enlargement, Psychiatric problem, and Tachycardia.      Past Surgical History:   has a past surgical history that includes Coronary angioplasty with stent (2005); skin biopsy; Kidney stone surgery (1980); Coronary artery bypass graft; Cataract removal (1993); TURP (2003); Skin cancer destruction (2006); Colonoscopy; eye surgery; and Cardiac surgery. Medications:  No current facility-administered medications on file prior to encounter. Current Outpatient Medications on File Prior to Encounter   Medication Sig Dispense Refill    lamoTRIgine (LAMICTAL) 25 MG tablet Take 50 mg by mouth 2 times daily      Omega-3 Fat Ac-Cholecalciferol (DRY EYE OMEGA BENEFITS/VIT D-3) 452-393 MG-UNIT CAPS Take 1 capsule by mouth daily      polyethylene glycol (GLYCOLAX) powder Take 17 g by mouth daily as needed      benazepril (LOTENSIN) 40 MG tablet Take 1 tablet by mouth daily 90 tablet 1    doxazosin (CARDURA) 2 MG tablet Take 2 mg by mouth nightly      nitroGLYCERIN (NITROSTAT) 0.4 MG SL tablet Place 1 tablet under the tongue every 5 minutes as needed for Chest pain 25 tablet 3    gabapentin (NEURONTIN) 300 MG capsule Take 1 capsule by mouth nightly for 360 days. . 90 capsule 3    LORazepam (ATIVAN) 0.5 MG tablet Take 1 tablet by mouth 2 times daily for 90 days. . 180 tablet 0    melatonin 5 MG TABS tablet Take 5 mg by mouth daily as needed (Insomnia)      amLODIPine (NORVASC) 5 MG tablet Take 1 tablet by mouth daily 90 tablet 3    pantoprazole (PROTONIX) 40 MG tablet Take 1 tablet by mouth daily 90 tablet 1    levothyroxine (SYNTHROID) 25 MCG tablet Take 1 tablet by mouth Daily 90 tablet 3    pravastatin (PRAVACHOL) 20 MG tablet TAKE 1 TABLET EVERY DAY 90 tablet 3    clopidogrel (PLAVIX) 75 MG tablet Take 1 tablet by mouth daily 90 tablet 3    finasteride (PROSCAR) 5 MG tablet Take 1 tablet by mouth nightly 90 tablet 3    triamcinolone (KENALOG) 0.1 % cream Apply to affected areas twice daily 45 g 1    Magnesium 400 MG TABS Take 1 tablet by mouth daily       acetaminophen (TYLENOL) 650 MG extended release tablet Take 650 mg by mouth every 8 hours as needed for Pain      aspirin 81 MG chewable tablet Take 1 tablet by mouth daily 30 tablet 3       Allergies: Allergies   Allergen Reactions    Zyprexa [Olanzapine]      Pacing.  Citalopram Anxiety     сергей        Social History:  Patient Lives alone, uses cane and walker   reports that he has never smoked. He has never used smokeless tobacco. He reports that he does not drink alcohol or use drugs. Family History:  family history includes Heart Disease in his brother and mother. Physical Exam:  BP (!) 151/90   Pulse 87   Temp 97.1 °F (36.2 °C)   Resp 18   Ht 5' 6\" (1.676 m)   Wt 125 lb (56.7 kg)   SpO2 97%   BMI 20.18 kg/m²     General appearance:  Appears chronically ill, emaciated, pleasant, awake, NAD, anxious  Eyes: Sclera clear, pupils equal  ENT: Moist mucus membranes, no thrush. Trachea midline. Bitemporal wasting  Cardiovascular: Regular rhythm, normal S1, S2. No murmur, gallop, rub. No edema in lower extremities  Respiratory: Clear to auscultation bilaterally, no wheeze, good inspiratory effort  Gastrointestinal: Abdomen soft, non-tender, not distended, normal bowel sounds  Musculoskeletal: No cyanosis in digits, neck supple  Neurology: Grossly intact. Alert and oriented in time, place and person. No speech or motor deficits  Psychiatry: Anxious affect.  Not agitated  Skin: Warm, dry, normal turgor, no rash  Brisk capillary refill, peripheral pulses palpable   Labs:  CBC:   Lab Results   Component Value Date    WBC 4.8 04/08/2019    RBC 3.96 04/08/2019    HGB 13.0 04/08/2019    HCT 38.5 04/08/2019    MCV 97.3 04/08/2019    MCH 32.9 04/08/2019    MCHC 33.8 04/08/2019    RDW 13.2 04/08/2019     04/08/2019    MPV 8.6 04/08/2019     BMP:    Lab Results   Component Value Date     04/08/2019    K 4.0 04/08/2019     04/08/2019    CO2 29 04/08/2019    BUN 13 04/08/2019    CREATININE 0.9 04/08/2019    CALCIUM 9.3 04/08/2019 GFRAA >60 04/08/2019    GFRAA >60 08/10/2012    LABGLOM >60 04/08/2019    GLUCOSE 91 04/08/2019     XR CHEST PORTABLE   Final Result   Stable appearance of the chest without evidence of acute cardiopulmonary   disease. Problem List  Principal Problem:    Chest pain  Active Problems:    Essential hypertension    Coronary artery disease    Hyperlipidemia    Bilateral carotid artery disease (HCC)    GERD (gastroesophageal reflux disease)    Mild aortic stenosis    Psychophysiological insomnia    Anxiety disorder    Chronic idiopathic constipation    Bipolar 1 disorder (HCC)    Paroxysmal atrial fibrillation (HCC)    Dysphagia    Moderate protein-calorie malnutrition (HCC)  Resolved Problems:    * No resolved hospital problems. *        Assessment/Plan:   1. Serial troponin  2. Check Echo given CP and pAfib  3. Cardiology consult for CP and Afib  4. Telemetry  5. NTG PRN  6. Cont ASA, Plavix and Pravachol. Defer anticoagulation to Cardiology  7. Check TSH  8. PT/OT/SLP eval for dispo      DVT prophylaxis Lovenox  Code status Full  Diet NPO  IV access Peripheral  Nelson Catheter No    Place in Observatiom. I anticipate hospitalization spanning less than two midnights for investigation and treatment of the above medically necessary diagnoses. Discussed with patient and daughters. Home when ok with Cardiology.     Deborah Mcadams MD    4/8/2019 9:27 AM

## 2019-04-08 NOTE — PROGRESS NOTES
At shift change, Patient and family concerned that home medication lamictal has not been ordered. Requests message be sent to physician for lamictal request and to d/c ivf. Message sent to Night NP via perfect serve per patient/family request x3. Patient is alert/oriented, no signs/symptoms distress, call light in reach. Family at bedside.

## 2019-04-08 NOTE — ED NOTES
Pharmacy Medication History Note      List of current medications patient is taking is complete. Source of information: patient, family    Changes made to medication list:  Medications flagged for removal (include reason, ex. noncompliance):  N/A    Medications removed (include reason, ex. therapy complete or physician discontinued):  Lamotrigine- dose adjustment    Medications added/doses adjusted:  Lamotrigine 25mg- 2tab BID  Bro-eye- QD    Other notes (ex. Recent course of antibiotics, Coumadin dosing):  Denies use of other OTC or herbal medications. Last dose times updated.    Renetta Ching [facility]                                                    '

## 2019-04-08 NOTE — PLAN OF CARE
Problem: Falls - Risk of:  Goal: Will remain free from falls  Description  Will remain free from falls  Outcome: Ongoing  Bed alarm on and call light within reach. bed locked and in lowest position. Pt has nonslip socks on. Family at bedside for assistance. PT/OT eval- pt at baseline of long distance use of walker. Pt calls oput appropriately and dangles/sits up slow to prevent dizziness. Problem: Pain:  Goal: Pain level will decrease  Description  Pain level will decrease  Outcome: Ongoing  Pt c/o of CP- relieved with nitro although cause hypotension. Has not c/o CP since arrival to the floor.      Problem: Pain:  Goal: Control of acute pain  Description  Control of acute pain  Outcome: Ongoing

## 2019-04-08 NOTE — PROGRESS NOTES
Advanced Care Planning Note. Purpose of Encounter: Advanced care planning in light of CAD  Parties In Attendance: Patient, daughters  Decisional Capacity: Yes  Subjective: Patient with CP  Objective: Cr 0.9  Goals of Care Determination: Patient wants full support (CPR, vent, surgery, HD, no trach, no PEG)  Plan:  Echo, serial trop, tele, Cardio consult  Code Status: Full code   Time spent on Advanced care Plannin minutes  Advanced Care Planning Documents: Completed advanced directives on chart, daughter is the POA.     Magdi Nicholson MD  2019 9:33 AM

## 2019-04-08 NOTE — PROGRESS NOTES
This RN was called because pt was having CP. Pt was resting comfortably in bed, rating 3/10 mid sternal non radiation pain described as a burning pain. Pt refused nitro PRN, stating \"I think the ativan helped more. \" STAT EKG ordered-pt remains in afib. Will continue to monitor.

## 2019-04-08 NOTE — ED NOTES
Pt. Daughter calling RN to room, pt. States that he is having CP again. Given 1 nitro per W.O. See WALLACE Hsu RN  04/08/19 9816

## 2019-04-09 LAB
ANION GAP SERPL CALCULATED.3IONS-SCNC: 10 MMOL/L (ref 3–16)
BASOPHILS ABSOLUTE: 0 K/UL (ref 0–0.2)
BASOPHILS RELATIVE PERCENT: 1.2 %
BUN BLDV-MCNC: 14 MG/DL (ref 7–20)
CALCIUM SERPL-MCNC: 8.7 MG/DL (ref 8.3–10.6)
CHLORIDE BLD-SCNC: 106 MMOL/L (ref 99–110)
CO2: 28 MMOL/L (ref 21–32)
CREAT SERPL-MCNC: 0.8 MG/DL (ref 0.8–1.3)
EKG ATRIAL RATE: 79 BPM
EKG DIAGNOSIS: NORMAL
EKG Q-T INTERVAL: 340 MS
EKG QRS DURATION: 90 MS
EKG QTC CALCULATION (BAZETT): 420 MS
EKG R AXIS: 0 DEGREES
EKG T AXIS: 122 DEGREES
EKG VENTRICULAR RATE: 92 BPM
EOSINOPHILS ABSOLUTE: 0.2 K/UL (ref 0–0.6)
EOSINOPHILS RELATIVE PERCENT: 5.4 %
GFR AFRICAN AMERICAN: >60
GFR NON-AFRICAN AMERICAN: >60
GLUCOSE BLD-MCNC: 84 MG/DL (ref 70–99)
HCT VFR BLD CALC: 35 % (ref 40.5–52.5)
HEMOGLOBIN: 12 G/DL (ref 13.5–17.5)
LV EF: 55 %
LVEF MODALITY: NORMAL
LYMPHOCYTES ABSOLUTE: 1.1 K/UL (ref 1–5.1)
LYMPHOCYTES RELATIVE PERCENT: 25.6 %
MCH RBC QN AUTO: 33.4 PG (ref 26–34)
MCHC RBC AUTO-ENTMCNC: 34.4 G/DL (ref 31–36)
MCV RBC AUTO: 97.1 FL (ref 80–100)
MONOCYTES ABSOLUTE: 0.4 K/UL (ref 0–1.3)
MONOCYTES RELATIVE PERCENT: 10.5 %
NEUTROPHILS ABSOLUTE: 2.4 K/UL (ref 1.7–7.7)
NEUTROPHILS RELATIVE PERCENT: 57.3 %
PDW BLD-RTO: 13 % (ref 12.4–15.4)
PLATELET # BLD: 125 K/UL (ref 135–450)
PMV BLD AUTO: 8.4 FL (ref 5–10.5)
POTASSIUM REFLEX MAGNESIUM: 3.9 MMOL/L (ref 3.5–5.1)
RBC # BLD: 3.6 M/UL (ref 4.2–5.9)
SODIUM BLD-SCNC: 144 MMOL/L (ref 136–145)
WBC # BLD: 4.2 K/UL (ref 4–11)

## 2019-04-09 PROCEDURE — 6370000000 HC RX 637 (ALT 250 FOR IP): Performed by: NURSE PRACTITIONER

## 2019-04-09 PROCEDURE — 93010 ELECTROCARDIOGRAM REPORT: CPT | Performed by: INTERNAL MEDICINE

## 2019-04-09 PROCEDURE — 6370000000 HC RX 637 (ALT 250 FOR IP): Performed by: INTERNAL MEDICINE

## 2019-04-09 PROCEDURE — 36415 COLL VENOUS BLD VENIPUNCTURE: CPT

## 2019-04-09 PROCEDURE — 80048 BASIC METABOLIC PNL TOTAL CA: CPT

## 2019-04-09 PROCEDURE — 2580000003 HC RX 258: Performed by: INTERNAL MEDICINE

## 2019-04-09 PROCEDURE — 99223 1ST HOSP IP/OBS HIGH 75: CPT | Performed by: INTERNAL MEDICINE

## 2019-04-09 PROCEDURE — 99233 SBSQ HOSP IP/OBS HIGH 50: CPT | Performed by: INTERNAL MEDICINE

## 2019-04-09 PROCEDURE — G0378 HOSPITAL OBSERVATION PER HR: HCPCS

## 2019-04-09 PROCEDURE — 93306 TTE W/DOPPLER COMPLETE: CPT

## 2019-04-09 PROCEDURE — 85025 COMPLETE CBC W/AUTO DIFF WBC: CPT

## 2019-04-09 RX ORDER — ACETAMINOPHEN 80 MG
TABLET,CHEWABLE ORAL
Status: COMPLETED
Start: 2019-04-09 | End: 2019-04-09

## 2019-04-09 RX ADMIN — GABAPENTIN 300 MG: 300 CAPSULE ORAL at 20:56

## 2019-04-09 RX ADMIN — ASPIRIN 81 MG 81 MG: 81 TABLET ORAL at 09:28

## 2019-04-09 RX ADMIN — AMLODIPINE BESYLATE 5 MG: 5 TABLET ORAL at 11:53

## 2019-04-09 RX ADMIN — MELATONIN TAB 3 MG 3 MG: 3 TAB at 20:56

## 2019-04-09 RX ADMIN — PRAVASTATIN SODIUM 20 MG: 20 TABLET ORAL at 09:27

## 2019-04-09 RX ADMIN — APIXABAN 2.5 MG: 5 TABLET, FILM COATED ORAL at 20:57

## 2019-04-09 RX ADMIN — LAMOTRIGINE 50 MG: 25 TABLET ORAL at 09:29

## 2019-04-09 RX ADMIN — DOXAZOSIN 2 MG: 1 TABLET ORAL at 20:56

## 2019-04-09 RX ADMIN — Medication 10 ML: at 09:30

## 2019-04-09 RX ADMIN — LISINOPRIL 40 MG: 20 TABLET ORAL at 09:27

## 2019-04-09 RX ADMIN — PANTOPRAZOLE SODIUM 40 MG: 40 TABLET, DELAYED RELEASE ORAL at 09:28

## 2019-04-09 RX ADMIN — LEVOTHYROXINE SODIUM 25 MCG: 25 TABLET ORAL at 06:24

## 2019-04-09 RX ADMIN — LORAZEPAM 0.5 MG: 0.5 TABLET ORAL at 09:28

## 2019-04-09 RX ADMIN — LAMOTRIGINE 50 MG: 25 TABLET ORAL at 20:56

## 2019-04-09 RX ADMIN — LORAZEPAM 0.5 MG: 0.5 TABLET ORAL at 20:56

## 2019-04-09 RX ADMIN — Medication 10 ML: at 20:57

## 2019-04-09 RX ADMIN — Medication 400 MG: at 11:52

## 2019-04-09 RX ADMIN — FINASTERIDE 5 MG: 5 TABLET, FILM COATED ORAL at 20:56

## 2019-04-09 RX ADMIN — Medication: at 11:53

## 2019-04-09 RX ADMIN — APIXABAN 2.5 MG: 5 TABLET, FILM COATED ORAL at 09:28

## 2019-04-09 ASSESSMENT — PAIN SCALES - GENERAL
PAINLEVEL_OUTOF10: 0

## 2019-04-09 NOTE — PROGRESS NOTES
Occupational Therapy    Attempted to see pt this pm, pt's daughter present in room stating \"I don't him to have the evaluation because he's been admitted under observation and a  told me that it would not be covered\". This writer attempted to contact social work but was unable to. Will respect pt's family members at this time-pt has already been discharged from PT d/t functional independence baseline and no further needs were identified.      Thank you,   Kehinde Tang OTR/L, MOT #[id number]

## 2019-04-09 NOTE — PLAN OF CARE
Problem: Falls - Risk of:  Goal: Will remain free from falls  Description  Will remain free from falls  4/9/2019 0134 by Flaca Loera RN  Outcome: Met This Shift  Note:   Patient remains free from falls. Patient instructed to call nurse if assistance is needed. Bed in low position, wheels locked, call light in reach. Tyler Bahena RN    4/8/2019 1548 by Yandel Mojica RN  Outcome: Ongoing     Problem: Falls - Risk of:  Goal: Absence of physical injury  Description  Absence of physical injury  Outcome: Met This Shift     Problem: Falls - Risk of: Intervention: Assess risk factors for falls  Note:   Pt is medium fall risk     Problem: Falls - Risk of:   Intervention: Gait assessment  Note:   Patient able to walk independently with stedy gait

## 2019-04-09 NOTE — PROGRESS NOTES
Shift assessment completed. VSS, nonskids socks on, bed alarm on, call light within reach. Will keep monitoring pt.

## 2019-04-09 NOTE — PROGRESS NOTES
Assessment completed see doc flow sheets. VSS, Patient has no reports of pain or discomfort. Patient encouraged to call nurse if assistance is needed. Call light in reach .  Nimisha Hong, DAMARIN, RN

## 2019-04-09 NOTE — CONSULTS
Clinical pharmacy note:   Pharmacy consulted to run insurance benefits for Eliquis or Xarelto for Adi Foreman is a 80 y.o. male  . COST  · Since the patient chose a prescription plan with a high deductable, his copay for eliquis or xarelto will be $420/month. · The patient may use the free 30day trial coupon card BUT is not eligible to use the monthly co-pay card for subsequent prescriptions.     Reena Montana PharmD

## 2019-04-09 NOTE — CARE COORDINATION
Discharge Planning Assessment  RN/SW discharge planner met with patient/(and family member) to discuss reason for admission, current living situation, and potential needs at the time of discharge    Demographics/Insurance verified Yes    Current type of [address]in[address] [place] [address] Drive, [address] arrangements: Assisted livingAlone(Patient lives in a one bedroom apartment at [facility]. Patient lives in assisted living and has a kithenette. )  Type of Home: Apartment  Home Layout: One level  Home Access: Level entry  Bathroom Shower/Tub: Walk-in shower  Bathroom Toilet: Handicap height  Bathroom Equipment: Grab bars in shower, Grab bars around toilet, Shower chair  Bathroom Accessibility: Zhao Jhaveri: Roll About  PCP: Andrea Stewart  Transportation at the time of discharge: daughter    Tentative discharge plan:Return to [address] [facility]

## 2019-04-09 NOTE — CONSULTS
[address]  Inpatient Consultation / H+P      REASON FOR CONSULT/CHIEF COMPLAINT/HPI     TYPE Interventional Cardiology / Structural Heart Disease   RFC/CC Abdominal pain   HPI Crystal Us is a 80 y. o.presents with intermittent burning in upper abdominal, subxiphoid area. Hx of CAD s/p CABG. Hx of mild AS by last echo. Location Upper abdomen   Quality burning   Severity mild   Duration hours   Timing none   Modifiers Sometimes worse with food     HISTORY/ALLERGIES/ROS       MedHx:  has a past medical history of Arthritis, Blepharitis of both eyes, CAD (coronary artery disease), GERD (gastroesophageal reflux disease), Gout, Hyperlipidemia, Hypertension, Macular degeneration, NSTEMI (non-ST elevated myocardial infarction) [facility]), Prostate enlargement, Psychiatric problem, and Tachycardia. SurgHx:  has a past surgical history that includes Coronary angioplasty with stent (2005); skin biopsy; Kidney stone surgery (1980); Coronary artery bypass graft; Cataract removal (1993); TURP (2003); Skin cancer destruction (2006); Colonoscopy; eye surgery; and Cardiac surgery. SocHx:  reports that he has never smoked. He has never used smokeless tobacco. He reports that he does not drink alcohol or use drugs. FamHx: No evidence for sudden cardiac death or premature CAD. Allergies: Zyprexa [olanzapine] and Citalopram   ROS:   [x]Full ROS obtained and negative except as mentioned in HPI    MEDICATIONS      Prior to Admission medications    Medication Sig Start Date End Date Taking?  Authorizing Provider   apixaban (ELIQUIS) 2.5 MG TABS tablet Take 1 tablet by mouth 2 times daily 4/9/19  Yes Deborah Mcadams MD   lamoTRIgine (LAMICTAL) 25 MG tablet Take 50 mg by mouth 2 times daily   Yes Historical Provider, MD   Omega-3 Fat Ac-Cholecalciferol (DRY EYE OMEGA BENEFITS/VIT D-3) 924-435 MG-UNIT CAPS Take 1 capsule by mouth daily   Yes Historical Provider, MD   polyethylene glycol (GLYCOLAX) powder Take 17 g by mouth daily as needed   Yes Historical Provider, MD   benazepril (LOTENSIN) 40 MG tablet Take 1 tablet by mouth daily 4/5/19  Yes MARIS Cummins CNP   doxazosin (CARDURA) 2 MG tablet Take 2 mg by mouth nightly   Yes Historical Provider, MD   nitroGLYCERIN (NITROSTAT) 0.4 MG SL tablet Place 1 tablet under the tongue every 5 minutes as needed for Chest pain 3/28/19  Yes Synetta Caroga LakeMARIS CNP   gabapentin (NEURONTIN) 300 MG capsule Take 1 capsule by mouth nightly for 360 days. . 2/28/19 2/23/20 Yes MARIS Mcgrath CNP   LORazepam (ATIVAN) 0.5 MG tablet Take 1 tablet by mouth 2 times daily for 90 days. . 3/4/19 6/2/19 Yes MARIS Mcgrath CNP   melatonin 5 MG TABS tablet Take 5 mg by mouth daily as needed (Insomnia)   Yes Historical Provider, MD   amLODIPine (NORVASC) 5 MG tablet Take 1 tablet by mouth daily 1/24/19  Yes Sigrid Donahue MD   pantoprazole (PROTONIX) 40 MG tablet Take 1 tablet by mouth daily 12/28/18  Yes MARIS Mcgrath CNP   levothyroxine (SYNTHROID) 25 MCG tablet Take 1 tablet by mouth Daily 12/3/18  Yes MARIS Mcgrath CNP   pravastatin (PRAVACHOL) 20 MG tablet TAKE 1 TABLET EVERY DAY 11/7/18  Yes Sigrid Donahue MD   finasteride (PROSCAR) 5 MG tablet Take 1 tablet by mouth nightly 10/22/18  Yes MARIS Mcgrath CNP   triamcinolone (KENALOG) 0.1 % cream Apply to affected areas twice daily 1/11/18  Yes MARIS Mcgrath CNP   Magnesium 400 MG TABS Take 1 tablet by mouth daily    Yes Historical Provider, MD   aspirin 81 MG chewable tablet Take 1 tablet by mouth daily 11/3/16  Yes Lyla Do MD     PHYSICAL EXAM        Vitals:    04/09/19 1150   BP: 119/68   Pulse: 75   Resp: 18   Temp: 98 °F (36.7 °C)   SpO2: 97%    Weight: 126 lb 11.2 oz (57.5 kg)     Gen Alert, coop, no distress Heart  RRR, no MRG, nl apical impulse   Head NC, AT, no abnorm Abd  Soft, NT, +BS, no mass, no OM   Eyes PERRLA, conj/corn clear Ext  Ext nl, AT, no C/C/E Nose Nares nl, no drain, NT Pulse 2+ and symmetric   Throat Lips, mucosa, tongue nl Skin Color/text/turg nl, no rash/lesions   Neck S/S, TM, NT, no bruit/JVD Psych Nl mood and affect   Lung CTA-B, unlabored, no DTP Lymph   No cervical or axillary LA   Ch wall NT, no deform Neuro  Nl gross M/S exam     LABS     CBC:   Lab Results   Component Value Date    WBC 4.2 04/09/2019    RBC 3.60 04/09/2019    HGB 12.0 04/09/2019    HCT 35.0 04/09/2019    MCV 97.1 04/09/2019    RDW 13.0 04/09/2019     04/09/2019     CMP:  Lab Results   Component Value Date     04/09/2019    K 3.9 04/09/2019     04/09/2019    CO2 28 04/09/2019    BUN 14 04/09/2019    CREATININE 0.8 04/09/2019    GFRAA >60 04/09/2019    GFRAA >60 08/10/2012    AGRATIO 1.6 04/08/2019    LABGLOM >60 04/09/2019    GLUCOSE 84 04/09/2019    PROT 7.1 04/08/2019    PROT 7.5 08/10/2012    CALCIUM 8.7 04/09/2019    BILITOT 0.8 04/08/2019    ALKPHOS 94 04/08/2019    AST 20 04/08/2019    ALT 12 04/08/2019     PT/INR:  No results found for: PTINR  Lab Results   Component Value Date    TROPONINI 0.02 (H) 04/08/2019     ASSESSMENT AND PLAN     ~CAD   Date EF Results   Sx   Atypical   Hx   CABG   MPI 12/16 NL Normal perfusion   TTE 12/15 55% Mild AS MG 16   Plan   Atypical symptoms, trivial trop at 0.02, EKG without acute changes  Patient prefers conservative tx without stress or angiograms  Continue current meds   ~Afib   Date Detail   Hx  Recent diagnosis   Type  persistent   R/R  Afib, controlled   R/RM  Reviewed   CVS  >1   AC/AP  Eliquis   Plan  Continue current medical regimen at doses noted above  MOE/CV v rhythm control per EP   ~HTN  Today BP [x] Controlled [] Borderline [] Uncontrolled   Counseling [x] Diet/Salt [x] Exercise [x] Weight    Plan Continue current medications at doses detailed above  ~Hyperchol  Goal LDL [] <100 [x] <70     Counseling [x] Diet [x] Weight [x] Exercise   Plan Continue current doses of meds listed above

## 2019-04-09 NOTE — PROGRESS NOTES
(Temporal)   Resp 18   Ht 5' 6\" (1.676 m)   Wt 126 lb 11.2 oz (57.5 kg)   SpO2 94%   BMI 20.45 kg/m²     Intake/Output Summary (Last 24 hours) at 4/9/2019 1116  Last data filed at 4/8/2019 2024  Gross per 24 hour   Intake 640 ml   Output 875 ml   Net -235 ml    Wt Readings from Last 3 Encounters:   04/09/19 126 lb 11.2 oz (57.5 kg)   03/28/19 125 lb (56.7 kg)   03/15/19 127 lb (57.6 kg)       General appearance:  Appears chronically ill, emaciated, pleasant, awake, NAD, anxious, sitting in bed  Eyes: Sclera clear, pupils equal  ENT: Moist mucus membranes, no thrush. Trachea midline. Bitemporal wasting  Cardiovascular: Irregularly irregular rhythm, normal S1, S2. No murmur, gallop, rub. No edema in lower extremities  Respiratory: Clear to auscultation bilaterally, no wheeze, good inspiratory effort  Gastrointestinal: Abdomen soft, non-tender, not distended, normal bowel sounds  Musculoskeletal: No cyanosis in digits, neck supple  Neurology: Grossly intact. Alert and oriented in time, place and person. No speech or motor deficits  Psychiatry: Anxious affect. Not agitated  Skin: Warm, dry, normal turgor, no rash  Brisk capillary refill, peripheral pulses palpable         Labs and Tests:  CBC:   Recent Labs     04/08/19 0419 04/09/19 0432   WBC 4.8 4.2   HGB 13.0* 12.0*    125*     BMP:  Recent Labs     04/08/19 0419 04/09/19  0432    144   K 4.0 3.9    106   CO2 29 28   BUN 13 14   CREATININE 0.9 0.8   GLUCOSE 91 84     Hepatic: Recent Labs     04/08/19 0419   AST 20   ALT 12   BILITOT 0.8   ALKPHOS 94     XR CHEST PORTABLE   Final Result   Stable appearance of the chest without evidence of acute cardiopulmonary   disease.                Problem List  Principal Problem:    Chest pain  Active Problems:    Essential hypertension    Coronary artery disease    Hyperlipidemia    Bilateral carotid artery disease (HCC)    GERD (gastroesophageal reflux disease)    Mild aortic stenosis Psychophysiological insomnia    Anxiety disorder    Chronic idiopathic constipation    Bipolar 1 disorder (HCC)    Paroxysmal atrial fibrillation (HCC)    Dysphagia    Moderate protein-calorie malnutrition (HCC)    Persistent atrial fibrillation (HCC)  Resolved Problems:    * No resolved hospital problems. *       Assessment & Plan:   1. Cont Eliquis  2. NPO p MN for MOE/CV tomorrow  3. Off Plavix  4. Cardio consult for CP  5. Discharge delayed due to daughter and family choosing to proceed with intervention after eating breakfast    IV Access: Peripheral  Nelson: No  Diet: Diet NPO Effective Now  Diet NPO, After Midnight  Code:Full Code  DVT PPX Eliquis  Disposition Home    Discussed with patient, Dr Jammie Peralta (Cardio), nursing and CM. D/w daughter. Remains in observation. Home tomorrow.       Qian Dennis MD   4/9/2019 11:16 AM

## 2019-04-09 NOTE — PROGRESS NOTES
[facility]   Electrophysiology Progress Note     Admit Date: 4/8/2019     Reason for follow up: Atrial fibrillation     HPI and Interval History: 80 y.o.  past medical history significant for coronary artery disease status post CABG years ago, status post PTCA in 2005. Hypertension, hyperlipidemia who has presented to the hospital with chest pressure. Patient has long history of GERD and is on PPI. However he has noticed intermittent chest discomfort, substernal, no radiation, and not associated with shortness of breath or activity. His chest discomfort is mostly at night. He was seen in cardiology office recently and ECG found atrial fibrillation with controlled ventricular rate. He did not have an ECG since January 2017    Discussed MOE/cardioversion yesterday but patient and his daughter were not interested. He has been seen by IC today. Patient denies having any chest pain, SOB, palpitation. Started on anticoagulation yesterday. Patient seen and examined. Clinical notes reviewed. Telemetry reviewed. No new complaint today. No major events overnight. Denies having chest pain, shortness of breath, dyspnea on exertion, Orthopnea, PND at the time of this visit. Review of System:  All other systems reviewed except for that noted above. Pertinent negatives and positives are:     · General: negative for fever, chills   · Ophthalmic ROS: negative for - eye pain or loss of vision  · ENT ROS: negative for - headaches, sore throat   · Respiratory: negative for - cough, sputum  · Cardiovascular: Reviewed in HPI  · Gastrointestinal: negative for - abdominal pain, diarrhea, N/V  · Hematology: negative for - bleeding, blood clots, bruising or jaundice  · Genito-Urinary:  negative for - Dysuria or incontinence  · Musculoskeletal: negative for - Joint swelling, muscle pain  · Neurological: negative for - confusion, dizziness, headaches   · Psychiatric: No anxiety, no depression.   · Dermatological: negative for - rash      Physical Examination:  Vitals:    19 1150   BP: 119/68   Pulse: 75   Resp: 18   Temp: 98 °F (36.7 °C)   SpO2: 97%      In: 400 [I.V.:400]  Out: 100    Wt Readings from Last 3 Encounters:   19 126 lb 11.2 oz (57.5 kg)   19 125 lb (56.7 kg)   03/15/19 127 lb (57.6 kg)     Temp  Av.5 °F (36.4 °C)  Min: 97 °F (36.1 °C)  Max: 98.3 °F (36.8 °C)  Pulse  Av.7  Min: 75  Max: 95  BP  Min: 108/70  Max: 166/83  SpO2  Av.3 %  Min: 94 %  Max: 97 %    Intake/Output Summary (Last 24 hours) at 2019 1206  Last data filed at 2019  Gross per 24 hour   Intake 640 ml   Output 875 ml   Net -235 ml       · Telemetry: Atrial fibrillation   · Constitutional: Oriented. No distress. · Head: Normocephalic and atraumatic. · Mouth/Throat: Oropharynx is clear and moist.   · Eyes: Conjunctivae normal. EOM are normal.   · Neck: Neck supple. No rigidity. No JVD present. · Cardiovascular: Normal rate, Irregular rhythm, S1&S2. Systolic murmur   · Pulmonary/Chest: Bilateral respiratory sounds. No rhonchi. · Abdominal: Soft. Bowel sounds present. No tenderness. · Musculoskeletal: No tenderness. No edema    · Lymphadenopathy: Has no cervical adenopathy. · Neurological: Alert and oriented. Cranial nerve appears intact, No Gross deficit   · Skin: Skin is warm and dry. No rash noted. · Psychiatric: Has a normal behavior     Labs, diagnostic and imaging results reviewed. Reviewed. Recent Labs     19    144   K 4.0 3.9    106   CO2 29 28   BUN 13 14   CREATININE 0.9 0.8     Recent Labs     19  04119  043   WBC 4.8 4.2   HGB 13.0* 12.0*   HCT 38.5* 35.0*   MCV 97.3 97.1    125*     Lab Results   Component Value Date    TROPONINI 0.02 2019     Estimated Creatinine Clearance: 47 mL/min (based on SCr of 0.8 mg/dL).    Lab Results   Component Value Date     2014     Lab Results   Component Value Date    PROTIME 11.2 04/08/2019    PROTIME 10.9 01/17/2017    PROTIME 11.5 12/07/2016    INR 0.98 04/08/2019    INR 0.96 01/17/2017    INR 1.01 12/07/2016     Lab Results   Component Value Date    CHOL 85 11/03/2016    HDL 39 11/03/2016    HDL 32 02/09/2012    TRIG 38 11/03/2016       Scheduled Meds:   amLODIPine  5 mg Oral Daily    aspirin  81 mg Oral Daily    lisinopril  40 mg Oral Daily    doxazosin  2 mg Oral Nightly    finasteride  5 mg Oral Nightly    gabapentin  300 mg Oral Nightly    levothyroxine  25 mcg Oral Daily    LORazepam  0.5 mg Oral BID    magnesium oxide  400 mg Oral Daily    pantoprazole  40 mg Oral Daily    polyethylene glycol  17 g Oral Daily    pravastatin  20 mg Oral Daily    sodium chloride flush  10 mL Intravenous 2 times per day    apixaban  2.5 mg Oral BID    lamoTRIgine  50 mg Oral BID     Continuous Infusions:  PRN Meds:acetaminophen, melatonin, nitroGLYCERIN, sodium chloride flush, magnesium hydroxide, ondansetron     Patient Active Problem List    Diagnosis Date Noted    Bilateral carotid artery disease (HCC) 07/09/2013     Priority: High    Hyperlipidemia 08/11/2011     Priority: High    Essential hypertension 06/10/2011     Priority: High    Coronary artery disease 06/10/2011     Priority: High    Bradycardia 12/10/2011     Priority: Medium    Dysphagia 04/08/2019    Moderate protein-calorie malnutrition ([address] 04/08/2019    Persistent atrial fibrillation (HCC)     Paroxysmal atrial fibrillation ([facility] 03/28/2019    Bipolar 1 disorder ([facility] 05/10/2018    Chronic idiopathic constipation 09/11/2017    Tardive dyskinesia 04/17/2017    RLS (restless legs syndrome) 03/14/2017    Heart burn 01/18/2017    Anxiety disorder 01/03/2017    Psychophysiological insomnia 11/02/2016    Mild aortic stenosis 12/27/2015    Chest pain 06/22/2012    GERD (gastroesophageal reflux disease) 06/22/2012      Active Hospital Problems    Diagnosis Date Noted    Bilateral proceed with MOE cardioversion. Risks, benefits and alternative of procedure were explained. I have explained that if it alternatives and risks of tea cardioversion in detail with patient and his daughter. All questions answered. Patient understood and would like to proceed.      - Echo pending.      - Chest pain is atypical.  Patient has extensive history of coronary artery disease. s/p CABG ; s/p PTCA Rt PDA and PLB '05              ASA and stain. BB stopped d/t bradycardia        Seen by IC today. No stress test/intervention.      Aortic stenosis:               severely calcified on echo               Echo ordered. Keep NPO after midnight for MOE/Cardioversion tomorrow. NOTE: This report was transcribed using voice recognition software. Every effort was made to ensure accuracy, however, inadvertent computerized transcription errors may be present.      Reid Mojica MD, MPH  [place] [address]   Office: [phone]

## 2019-04-10 ENCOUNTER — TELEPHONE (OUTPATIENT)
Dept: CARDIOLOGY CLINIC | Age: 84
End: 2019-04-10

## 2019-04-10 VITALS
SYSTOLIC BLOOD PRESSURE: 115 MMHG | WEIGHT: 123 LBS | BODY MASS INDEX: 19.77 KG/M2 | RESPIRATION RATE: 16 BRPM | HEIGHT: 66 IN | TEMPERATURE: 98.1 F | HEART RATE: 71 BPM | OXYGEN SATURATION: 95 % | DIASTOLIC BLOOD PRESSURE: 62 MMHG

## 2019-04-10 LAB
LV EF: 55 %
LVEF MODALITY: NORMAL

## 2019-04-10 PROCEDURE — 2580000003 HC RX 258: Performed by: INTERNAL MEDICINE

## 2019-04-10 PROCEDURE — 6370000000 HC RX 637 (ALT 250 FOR IP): Performed by: NURSE PRACTITIONER

## 2019-04-10 PROCEDURE — 93325 DOPPLER ECHO COLOR FLOW MAPG: CPT

## 2019-04-10 PROCEDURE — 92960 CARDIOVERSION ELECTRIC EXT: CPT

## 2019-04-10 PROCEDURE — 6370000000 HC RX 637 (ALT 250 FOR IP): Performed by: INTERNAL MEDICINE

## 2019-04-10 PROCEDURE — 93320 DOPPLER ECHO COMPLETE: CPT

## 2019-04-10 PROCEDURE — G0378 HOSPITAL OBSERVATION PER HR: HCPCS

## 2019-04-10 PROCEDURE — 7100000010 HC PHASE II RECOVERY - FIRST 15 MIN

## 2019-04-10 PROCEDURE — 92960 CARDIOVERSION ELECTRIC EXT: CPT | Performed by: INTERNAL MEDICINE

## 2019-04-10 PROCEDURE — 93312 ECHO TRANSESOPHAGEAL: CPT

## 2019-04-10 PROCEDURE — 2500000003 HC RX 250 WO HCPCS

## 2019-04-10 RX ADMIN — Medication 400 MG: at 10:41

## 2019-04-10 RX ADMIN — PANTOPRAZOLE SODIUM 40 MG: 40 TABLET, DELAYED RELEASE ORAL at 10:41

## 2019-04-10 RX ADMIN — Medication 10 ML: at 10:42

## 2019-04-10 RX ADMIN — AMLODIPINE BESYLATE 5 MG: 5 TABLET ORAL at 10:41

## 2019-04-10 RX ADMIN — LISINOPRIL 40 MG: 20 TABLET ORAL at 10:41

## 2019-04-10 RX ADMIN — LAMOTRIGINE 50 MG: 25 TABLET ORAL at 10:40

## 2019-04-10 RX ADMIN — LEVOTHYROXINE SODIUM 25 MCG: 25 TABLET ORAL at 10:43

## 2019-04-10 RX ADMIN — PRAVASTATIN SODIUM 20 MG: 20 TABLET ORAL at 10:41

## 2019-04-10 RX ADMIN — APIXABAN 2.5 MG: 5 TABLET, FILM COATED ORAL at 10:41

## 2019-04-10 RX ADMIN — ASPIRIN 81 MG 81 MG: 81 TABLET ORAL at 10:41

## 2019-04-10 RX ADMIN — LORAZEPAM 0.5 MG: 0.5 TABLET ORAL at 10:41

## 2019-04-10 ASSESSMENT — PAIN SCALES - GENERAL
PAINLEVEL_OUTOF10: 0
PAINLEVEL_OUTOF10: 0

## 2019-04-10 NOTE — DISCHARGE SUMMARY
Hospital Medicine Discharge Summary    Patient: Fredrick Fregoso     Gender: male  : 10/31/1925   Age: 80 y.o. MRN: [id number]    Admitting Physician: Mónica Laws MD  Discharge Physician: Mónica Laws MD     Code Status: Full Code     Admit Date: 2019   Discharge Date:   4/10/19    Disposition:  Home    Discharge Diagnoses: Active Hospital Problems    Diagnosis Date Noted    Bilateral carotid artery disease ([facility] [I77.9] 2013     Priority: High    Hyperlipidemia [E78.5] 2011     Priority: High    Essential hypertension [I10] 06/10/2011     Priority: High    Coronary artery disease [I25.10] 06/10/2011     Priority: High    Dysphagia [R13.10] 2019    Moderate protein-calorie malnutrition (HCC) [E44.0] 2019    Persistent atrial fibrillation (HCC) [I48.1]     Paroxysmal atrial fibrillation (HCC) [I48.0] 2019    Bipolar 1 disorder ([facility] [F31.9] 05/10/2018    Chronic idiopathic constipation [K59.04] 2017    Anxiety disorder [F41.9] 2017    Psychophysiological insomnia [F51.04] 2016    Mild aortic stenosis [I35.0] 2015    GERD (gastroesophageal reflux disease) [K21.9] 2012    Chest pain [R07.9] 2012       Follow-up appointments:  one week    Outpatient to do list: F/U with PCP and Cardiology    Condition at Discharge:  [facility] [place] [facility]:    80 y.o. male who presented with history of CAD, pAfib (new onset recently), GERD, HTN, hyperlipidemia, insomnia, chronic constipation, bipolar 1 disorder, PAD who came to ER with complaints of CP. Placed in observation. Serial troponin negative. Seen by Cardiology, no need for ischemic work up. Seen by EP, changed Plavix to Eliquis. Underwent MOE/CV on 4/10/19. Will f/u with PCP and Cardio. PT felt did not need PT at home.         Discharge Medications:   Current Discharge Medication List      START taking these medications    Details   apixaban (ELIQUIS) 2.5 MG TABS tablet Take 1 tablet by mouth 2 times daily  Qty: 60 tablet, Refills: 0           Current Discharge Medication List        Current Discharge Medication List      CONTINUE these medications which have NOT CHANGED    Details   lamoTRIgine (LAMICTAL) 25 MG tablet Take 50 mg by mouth 2 times daily      Omega-3 Fat Ac-Cholecalciferol (DRY EYE OMEGA BENEFITS/VIT D-3) 858-938 MG-UNIT CAPS Take 1 capsule by mouth daily      polyethylene glycol (GLYCOLAX) powder Take 17 g by mouth daily as needed      benazepril (LOTENSIN) 40 MG tablet Take 1 tablet by mouth daily  Qty: 90 tablet, Refills: 1      doxazosin (CARDURA) 2 MG tablet Take 2 mg by mouth nightly      nitroGLYCERIN (NITROSTAT) 0.4 MG SL tablet Place 1 tablet under the tongue every 5 minutes as needed for Chest pain  Qty: 25 tablet, Refills: 3      gabapentin (NEURONTIN) 300 MG capsule Take 1 capsule by mouth nightly for 360 days. Eluterio Chute: 90 capsule, Refills: 3    Associated Diagnoses: RLS (restless legs syndrome); Psychophysiological insomnia      LORazepam (ATIVAN) 0.5 MG tablet Take 1 tablet by mouth 2 times daily for 90 days. Eluterio Chute: 180 tablet, Refills: 0    Comments: May be filled 3/4. Associated Diagnoses: Anxiety disorder, unspecified type      melatonin 5 MG TABS tablet Take 5 mg by mouth daily as needed (Insomnia)      amLODIPine (NORVASC) 5 MG tablet Take 1 tablet by mouth daily  Qty: 90 tablet, Refills: 3    Associated Diagnoses: Carotid artery disease, unspecified laterality ([facility]  Hyperlipidemia, unspecified hyperlipidemia type; Essential hypertension      pantoprazole (PROTONIX) 40 MG tablet Take 1 tablet by mouth daily  Qty: 90 tablet, Refills: 1    Associated Diagnoses: Gastroesophageal reflux disease without esophagitis      levothyroxine (SYNTHROID) 25 MCG tablet Take 1 tablet by mouth Daily  Qty: 90 tablet, Refills: 3    Associated Diagnoses: Hypothyroidism, unspecified type      pravastatin (PRAVACHOL) 20 MG tablet TAKE 1 TABLET EVERY DAY  Qty: 90 tablet, Refills: 3      finasteride (PROSCAR) 5 MG tablet Take 1 tablet by mouth nightly  Qty: 90 tablet, Refills: 3      triamcinolone (KENALOG) 0.1 % cream Apply to affected areas twice daily  Qty: 45 g, Refills: 1      Magnesium 400 MG TABS Take 1 tablet by mouth daily       aspirin 81 MG chewable tablet Take 1 tablet by mouth daily  Qty: 30 tablet, Refills: 3           Current Discharge Medication List      STOP taking these medications       clopidogrel (PLAVIX) 75 MG tablet Comments:   Reason for Stopping:         acetaminophen (TYLENOL) 650 MG extended release tablet Comments:   Reason for Stopping:               Discharge Exam:    /62   Pulse 71   Temp 98.1 °F (36.7 °C) (Temporal)   Resp 16   Ht 5' 6\" (1.676 m)   Wt 123 lb (55.8 kg)   SpO2 95%   BMI 19.85 kg/m²   General appearance:  Appears chronically ill, emaciated, pleasant, awake, NAD, anxious, sitting in bed  Eyes: Sclera clear, pupils equal  ENT: Moist mucus membranes, no thrush. Trachea midline.  Bitemporal wasting  Cardiovascular: Irregularly irregular rhythm, normal S1, S2. No murmur, gallop, rub. No edema in lower extremities  Respiratory: Clear to auscultation bilaterally, no wheeze, good inspiratory effort  Gastrointestinal: Abdomen soft, non-tender, not distended, normal bowel sounds  Musculoskeletal: No cyanosis in digits, neck supple  Neurology: Grossly intact. Alert and oriented in time, place and person. No speech or motor deficits  Psychiatry: Anxious affect. Not agitated  Skin: Warm, dry, normal turgor, no rash  Brisk capillary refill, peripheral pulses palpable         Labs:  For convenience and continuity at follow-up the following most recent labs are provided:    Lab Results   Component Value Date    WBC 4.2 04/09/2019    HGB 12.0 04/09/2019    HCT 35.0 04/09/2019    MCV 97.1 04/09/2019     04/09/2019     04/09/2019    K 3.9 04/09/2019     04/09/2019    CO2 28 04/09/2019    BUN 14 04/09/2019    CREATININE 0.8

## 2019-04-10 NOTE — PROGRESS NOTES
Patient discharged at this time. Transported back to assisted living at [facility] by daughter. Taken downstairs in wheelchair by BETTY whitmore. IV and tele removed. Patient and daughter educated on discharge instructions, medications, and follow up appointments. Patient and daughter verbalized understanding. No complications prior to discharge. Patient discharged with all belongings.

## 2019-04-10 NOTE — PROCEDURES
[address]     Electrophysiology Procedure Note       Date of Procedure: 4/10/2019  Patient's Name: Melissa Mcnamara  YOB: 1925   Medical Record Number: [id number]  Procedure Performed by: Eva Bowles MD    Procedures performed:  IV sedation. Trans-esophageal echocardiography  External Electrical cardioversion     Indication of the procedure: Persistent atrial fibrillation     Details of procedure: The patient was brought to the cath lab area in a fasting and non-sedated state. The risks, benefits and alternatives of the procedure were discussed with the patient. The patient opted to proceed with the procedure. Written informed consent was signed and placed in the chart. A timeout protocol was completed to identify the patient and the procedure being performed. IV sedation was provided with IV Versed, Fentanyl initially and MOE was performed which did not show any MASOOD/LA clot/thrombus. Full MOE reports will be dictated. Then we used brevital for sedation and electrical DC cardioversion was perfomred using 200J, synchronized shock. Patient was converted to sinus rhythm. The patient tolerated the procedure well and there were no complications. Conclusion:   Successful external DC cardioversion of atrial fibrillation. Plan:   The patient can be discharged if remains stable. Will continue with medical therapy.

## 2019-04-10 NOTE — PRE SEDATION
Sedation Pre-Procedure Note    Patient Name: Tracey Arreola   YOB: 1925  Room/Bed: J-4801/2613-44  Medical Record Number: [id number]  Date: 4/10/2019   Time: 7:58 AM       Indication:  Cardioversion    Consent: I have discussed with the patient and/or the patient representative the indication, alternatives, and the possible risks and/or complications of the planned procedure and the anesthesia methods. The patient and/or patient representative appear to understand and agree to proceed. Vital Signs:   Vitals:    04/10/19 0456   BP: 101/65   Pulse: 95   Resp: 16   Temp: 98.2 °F (36.8 °C)   SpO2: 93%       Past Medical History:   has a past medical history of Arthritis, Blepharitis of both eyes, CAD (coronary artery disease), GERD (gastroesophageal reflux disease), Gout, Hyperlipidemia, Hypertension, Macular degeneration, NSTEMI (non-ST elevated myocardial infarction) [facility]), Prostate enlargement, Psychiatric problem, and Tachycardia. Past Surgical History:   has a past surgical history that includes Coronary angioplasty with stent (2005); skin biopsy; Kidney stone surgery (1980); Coronary artery bypass graft; Cataract removal (1993); TURP (2003); Skin cancer destruction (2006); Colonoscopy; eye surgery; and Cardiac surgery.     Medications:   Scheduled Meds:    amLODIPine  5 mg Oral Daily    aspirin  81 mg Oral Daily    lisinopril  40 mg Oral Daily    doxazosin  2 mg Oral Nightly    finasteride  5 mg Oral Nightly    gabapentin  300 mg Oral Nightly    levothyroxine  25 mcg Oral Daily    LORazepam  0.5 mg Oral BID    magnesium oxide  400 mg Oral Daily    pantoprazole  40 mg Oral Daily    polyethylene glycol  17 g Oral Daily    pravastatin  20 mg Oral Daily    sodium chloride flush  10 mL Intravenous 2 times per day    apixaban  2.5 mg Oral BID    lamoTRIgine  50 mg Oral BID     Continuous Infusions:   PRN Meds: acetaminophen, melatonin, nitroGLYCERIN, sodium chloride flush, magnesium MARIS Espinoza - CNP   Magnesium 400 MG TABS Take 1 tablet by mouth daily    Yes Historical Provider, MD   aspirin 81 MG chewable tablet Take 1 tablet by mouth daily 11/3/16  Yes Kelsey Oliver MD     Coumadin Use Last 7 Days:  yes - Eliquis    Pre-Sedation Documentation and Exam:   I have personally completed a history, physical exam & review of systems for this patient (see notes).     Mallampati Airway Assessment:  normal    Prior History of Anesthesia Complications:   none    ASA Classification:  Class 3 - A patient with severe systemic disease that limits activity but is not incapacitating    Sedation/ Anesthesia Plan:   intravenous sedation    Medications Planned:   midazolam (Versed) intravenously    Patient is an appropriate candidate for plan of sedation: yes    Electronically signed by Vashti Castillo MD on 4/10/2019 at 7:58 AM

## 2019-04-10 NOTE — PROGRESS NOTES
CLINICAL PHARMACY NOTE: MEDS TO [address] [employer] Patient?: No  Total # of Prescriptions Filled: 1   The following medications were delivered to the patient:  · eliquis 2.5mg  Total # of Interventions Completed: 1  Time Spent (min): 30    Additional Documentation:  Added free trial eliquis card, and informed patient of the $420.66 copay for next fill. Medications delivered- daughter signed.   [employer] [facility]

## 2019-04-10 NOTE — TELEPHONE ENCOUNTER
Pt daughter calling,  Father was released from [facility] and discharge papers state to make an appt with RMM on 4-17-19. She stated she doesn't want him to see a NP. Pls call to advise. Thank you.

## 2019-04-17 ENCOUNTER — OFFICE VISIT (OUTPATIENT)
Dept: CARDIOLOGY CLINIC | Age: 84
End: 2019-04-17
Payer: MEDICARE

## 2019-04-17 VITALS
WEIGHT: 125.5 LBS | SYSTOLIC BLOOD PRESSURE: 100 MMHG | BODY MASS INDEX: 20.17 KG/M2 | HEART RATE: 65 BPM | OXYGEN SATURATION: 95 % | HEIGHT: 66 IN | DIASTOLIC BLOOD PRESSURE: 62 MMHG

## 2019-04-17 DIAGNOSIS — I10 ESSENTIAL HYPERTENSION: ICD-10-CM

## 2019-04-17 DIAGNOSIS — I48.0 PAROXYSMAL ATRIAL FIBRILLATION (HCC): Primary | ICD-10-CM

## 2019-04-17 DIAGNOSIS — I25.10 CORONARY ARTERY DISEASE INVOLVING NATIVE CORONARY ARTERY OF NATIVE HEART WITHOUT ANGINA PECTORIS: ICD-10-CM

## 2019-04-17 PROCEDURE — 93000 ELECTROCARDIOGRAM COMPLETE: CPT | Performed by: NURSE PRACTITIONER

## 2019-04-17 PROCEDURE — 4040F PNEUMOC VAC/ADMIN/RCVD: CPT | Performed by: NURSE PRACTITIONER

## 2019-04-17 PROCEDURE — 1123F ACP DISCUSS/DSCN MKR DOCD: CPT | Performed by: NURSE PRACTITIONER

## 2019-04-17 PROCEDURE — 1036F TOBACCO NON-USER: CPT | Performed by: NURSE PRACTITIONER

## 2019-04-17 PROCEDURE — 99214 OFFICE O/P EST MOD 30 MIN: CPT | Performed by: NURSE PRACTITIONER

## 2019-04-17 PROCEDURE — G8427 DOCREV CUR MEDS BY ELIG CLIN: HCPCS | Performed by: NURSE PRACTITIONER

## 2019-04-17 PROCEDURE — G8598 ASA/ANTIPLAT THER USED: HCPCS | Performed by: NURSE PRACTITIONER

## 2019-04-17 PROCEDURE — G8420 CALC BMI NORM PARAMETERS: HCPCS | Performed by: NURSE PRACTITIONER

## 2019-04-17 NOTE — PROGRESS NOTES
Dispense Refill    apixaban (ELIQUIS) 2.5 MG TABS tablet Take 1 tablet by mouth 2 times daily 60 tablet 0    lamoTRIgine (LAMICTAL) 25 MG tablet Take 50 mg by mouth 2 times daily      Omega-3 Fat Ac-Cholecalciferol (DRY EYE OMEGA BENEFITS/VIT D-3) 853-377 MG-UNIT CAPS Take 1 capsule by mouth daily      polyethylene glycol (GLYCOLAX) powder Take 17 g by mouth daily as needed      benazepril (LOTENSIN) 40 MG tablet Take 1 tablet by mouth daily 90 tablet 1    doxazosin (CARDURA) 2 MG tablet Take 2 mg by mouth nightly      nitroGLYCERIN (NITROSTAT) 0.4 MG SL tablet Place 1 tablet under the tongue every 5 minutes as needed for Chest pain 25 tablet 3    gabapentin (NEURONTIN) 300 MG capsule Take 1 capsule by mouth nightly for 360 days. . 90 capsule 3    LORazepam (ATIVAN) 0.5 MG tablet Take 1 tablet by mouth 2 times daily for 90 days. . 180 tablet 0    melatonin 5 MG TABS tablet Take 5 mg by mouth daily as needed (Insomnia)      amLODIPine (NORVASC) 5 MG tablet Take 1 tablet by mouth daily 90 tablet 3    pantoprazole (PROTONIX) 40 MG tablet Take 1 tablet by mouth daily 90 tablet 1    levothyroxine (SYNTHROID) 25 MCG tablet Take 1 tablet by mouth Daily 90 tablet 3    pravastatin (PRAVACHOL) 20 MG tablet TAKE 1 TABLET EVERY DAY 90 tablet 3    finasteride (PROSCAR) 5 MG tablet Take 1 tablet by mouth nightly 90 tablet 3    triamcinolone (KENALOG) 0.1 % cream Apply to affected areas twice daily 45 g 1    Magnesium 400 MG TABS Take 1 tablet by mouth daily       aspirin 81 MG chewable tablet Take 1 tablet by mouth daily 30 tablet 3     No current facility-administered medications for this visit. REVIEW OF SYSTEMS:   CONSTITUTIONAL: No major weight gain or loss, fatigue, weakness, night sweats or fever. There's been no change in energy level, sleep pattern, or activity level. HEENT: No new vision difficulties or ringing in the ears. RESPIRATORY: No new SOB, PND, orthopnea or cough.    CARDIOVASCULAR: See HPI  GI: No nausea, vomiting, diarrhea, constipation, abdominal pain or changes in bowel habits. : No urinary frequency, urgency, incontinence hematuria or dysuria. SKIN: No cyanosis or skin lesions. MUSCULOSKELETAL: No new muscle or joint pain. NEUROLOGICAL: No syncope or TIA-like symptoms. PSYCHIATRIC: No anxiety, pain, insomnia or depression    Objective:   PHYSICAL EXAM:       Vitals:    04/17/19 0919 04/17/19 0952   BP: (!) 98/36 100/62   Pulse: 65    SpO2: 95%    Weight: 125 lb 8 oz (56.9 kg)    Height: 5' 6\" (1.676 m)         VITALS:  BP (!) 98/36   Pulse 65   Ht 5' 6\" (1.676 m)   Wt 125 lb 8 oz (56.9 kg)   SpO2 95%   BMI 20.26 kg/m²     CONSTITUTIONAL: Cooperative, no apparent distress, and appears well nourished / developed  NEUROLOGIC:  Awake and orientated to person, place and time. PSYCH: Calm affect. SKIN: Warm and dry. HEENT: Sclera non-icteric, normocephalic, neck supple, no elevation of JVP, normal carotid pulses with no bruits and thyroid normal size. LUNGS:  No increased work of breathing and clear to auscultation, no crackles or wheezing. CARDIOVASCULAR:  Regular rate 60 and rhythm with + murmurs, gallops, rubs, or abnormal heart sounds, normal PMI. The apical impulses not displaced. Heart tones are crisp and normal                                                                                            Cervical veins are not engorged                 JVP less than 8 cm H2O                                                                              The carotid upstroke is normal in amplitude and contour without delay or bruit    ABDOMEN:  Normal bowel sounds, non-distended and non-tender to palpation   EXT: No edema, no calf tenderness. Pulses are present bilaterally.     DATA:    Lab Results   Component Value Date    ALT 12 04/08/2019    AST 20 04/08/2019    ALKPHOS 94 04/08/2019    BILITOT 0.8 04/08/2019     Lab Results   Component Value Date CREATININE 0.8 04/09/2019    BUN 14 04/09/2019     04/09/2019    K 3.9 04/09/2019     04/09/2019    CO2 28 04/09/2019     Lab Results   Component Value Date    WBC 4.2 04/09/2019    HGB 12.0 (L) 04/09/2019    HCT 35.0 (L) 04/09/2019    MCV 97.1 04/09/2019     (L) 04/09/2019     No components found for: CHLPL  Lab Results   Component Value Date    TRIG 38 11/03/2016    TRIG 74 07/22/2013    TRIG 63 08/10/2012     Lab Results   Component Value Date    HDL 39 (L) 11/03/2016    HDL 36 (L) 07/22/2013    HDL 38 (L) 08/10/2012     Lab Results   Component Value Date    LDLCALC 38 11/03/2016    LDLCALC 64 07/22/2013    LDLCALC 65 08/10/2012     Lab Results   Component Value Date    LABVLDL 8 11/03/2016    LABVLDL 15 07/22/2013    LABVLDL 13 08/10/2012     [id number] Score for Atrial Fibrillation Stroke Risk   Risk   Factors  Component Value   C CHF No 0   H HTN Yes 1   A2 Age >= 76 Yes,  (80 y.o.) 2   D DM No 0   S2 Prior Stroke/TIA No 0   V Vascular Disease No 1   A Age 74-69 No,  (80 y.o.) 0   Sc Sex male 0    [id number]  Score  4   Score last updated 4/22/19 11:36 AM    Radiology Review:  Pertinent images / reports were reviewed as a part of this visit and reveals the following:    Procedure: 4/10/19:  Successful external DC cardioversion of atrial fibrillation. Last Echo: 4/9/19  Summary  Left ventricle - normal size, thickness and function with EF of 55%   Mitral valve - annular calcification, moderate regurgitation  Biatrial enlargement  Aortic valve - thickened and calcified, mild stenosis with mean gradient of 13mmHg  Tricuspid valve - moderate regurgitation with PASP of 33mmHg    Holter: 3/28/19 : AF / occasional bradycardia   ~min HR 37 avg 65 max 128  ~ventricular ectopic burden 3.3%  ~sustained %  ~15 pauses occurred at > 3 sec, longest 3.94    Holter 1/2016 ventricular ectopic beats HR range  average HR 71      Assessment:      Diagnosis Orders   1.  Paroxysmal atrial fibrillation [facility])   ~s/p DCCV 4/10  ~regular to auscultation   ~LA dilated , mild AS by echo April '19  ~CHADsVASc 4 : DOAC EKG 12 lead   2. Coronary artery disease involving native coronary artery of native heart without angina pectoris   ~stable : s/p CABG and PTCA Rt PDA and PLB '05    ~atypical discomfort relieved by ativan : anxiety vs GERD  ~intolerant to isosorbide d/t low BP ; BB stopped d/t bradycardia    3. Essential hypertension  ~controlled : low normal          Patient  is stable since hospital discharge. Plan:  EKG: sinus rhythm, 0-3 PVCs / min   Consider Ranexa if suspect chest discomfort from angina    F/U in four months     I have addressed the patient's cardiac risk factors and adjusted pharmacologic treatment as needed. In addition, I have reinforced the need for patient directed risk factor modification. Further evaluation will be based upon the patient's clinical course and testing results. All questions and concerns were addressed to the patient/daughters. Alternatives to  treatment were discussed. The patient  currently  is not smoking. The risks related to smoking were reviewed with the patient. Recommend maintaining a smoke-free lifestyle. Antiplatelet therapy / anti-coagulation has been recommended / prescribed for this patient. Education conducted on adverse reactions including bleeding was discussed. The patient verbalizes understanding. Pt is on a BB  Pt is on an ace-i  Pt is on a statin      diet discussed  Exercise program differed    Thank you for allowing to us to participate in the care of Yeison Philip.       [facility], [place]  Documentation of today's visit sent to PCP

## 2019-04-23 ENCOUNTER — OFFICE VISIT (OUTPATIENT)
Dept: INTERNAL MEDICINE CLINIC | Age: 84
End: 2019-04-23
Payer: MEDICARE

## 2019-04-23 VITALS
HEIGHT: 66 IN | HEART RATE: 64 BPM | SYSTOLIC BLOOD PRESSURE: 112 MMHG | BODY MASS INDEX: 20.25 KG/M2 | WEIGHT: 126 LBS | DIASTOLIC BLOOD PRESSURE: 62 MMHG

## 2019-04-23 DIAGNOSIS — Z09 HOSPITAL DISCHARGE FOLLOW-UP: Primary | ICD-10-CM

## 2019-04-23 DIAGNOSIS — K52.9 CHRONIC DIARRHEA: ICD-10-CM

## 2019-04-23 DIAGNOSIS — I25.10 CORONARY ARTERY DISEASE INVOLVING NATIVE CORONARY ARTERY OF NATIVE HEART WITHOUT ANGINA PECTORIS: ICD-10-CM

## 2019-04-23 DIAGNOSIS — K31.9 DYSPEPSIA AND DISORDER OF FUNCTION OF STOMACH: ICD-10-CM

## 2019-04-23 DIAGNOSIS — F41.9 ANXIETY DISORDER, UNSPECIFIED TYPE: ICD-10-CM

## 2019-04-23 DIAGNOSIS — R10.13 DYSPEPSIA AND DISORDER OF FUNCTION OF STOMACH: ICD-10-CM

## 2019-04-23 DIAGNOSIS — F31.9 BIPOLAR 1 DISORDER (HCC): ICD-10-CM

## 2019-04-23 DIAGNOSIS — F51.04 PSYCHOPHYSIOLOGICAL INSOMNIA: ICD-10-CM

## 2019-04-23 DIAGNOSIS — K59.09 CHRONIC CONSTIPATION: ICD-10-CM

## 2019-04-23 PROCEDURE — G8598 ASA/ANTIPLAT THER USED: HCPCS | Performed by: NURSE PRACTITIONER

## 2019-04-23 PROCEDURE — G8428 CUR MEDS NOT DOCUMENT: HCPCS | Performed by: NURSE PRACTITIONER

## 2019-04-23 PROCEDURE — 99214 OFFICE O/P EST MOD 30 MIN: CPT | Performed by: NURSE PRACTITIONER

## 2019-04-23 PROCEDURE — 1036F TOBACCO NON-USER: CPT | Performed by: NURSE PRACTITIONER

## 2019-04-23 PROCEDURE — 1123F ACP DISCUSS/DSCN MKR DOCD: CPT | Performed by: NURSE PRACTITIONER

## 2019-04-23 PROCEDURE — G8420 CALC BMI NORM PARAMETERS: HCPCS | Performed by: NURSE PRACTITIONER

## 2019-04-23 PROCEDURE — 4040F PNEUMOC VAC/ADMIN/RCVD: CPT | Performed by: NURSE PRACTITIONER

## 2019-04-23 RX ORDER — LAMOTRIGINE 25 MG/1
TABLET ORAL
Qty: 450 TABLET | Refills: 1
Start: 2019-04-23 | End: 2019-04-29 | Stop reason: DRUGHIGH

## 2019-04-23 NOTE — PROGRESS NOTES
SUBJECTIVE:    Patient ID: Fredrick Fregoso is a 80 y.o. male. CC: Hospital follow-up, chest pain, CAD,    HPI: The patient present to the office today for hospital follow-up appointment. He is also here for follow-up of chronic medical conditions. He presented to [facility] emergency department with the complaint of chest pain. He was admitted on 4/8/19 and discharged on 4/10/19. Serial troponin studies were negative. He was evaluated by cardiology with no need for further ischemic workup. He was seen by electrophysiology physiology and change from Plavix to Eliquis. He underwent a MOE/cardioversion on 4/10/19. His concerns remain about his bowel and bladder function. Renal function is normal but he worries about amount of urine produced will vary. He has been reassured by medical and family individuals but continues to fixate on this. He continues to complain of abnormal bowel movement. He has intermittent constipation. He has intermittent diarrhea. These issues are usually because he will treat one condition and cause the other. He has been seen by GI on multiple occasions. He has been on a number of medications that he is either not tolerated or have exacerbated symptoms. He is also seen a surgeon about his gallbladder surgery was not recommended at this time. A colonoscopy was offered but he declined. Reglan was suggested though this was not used given his history of severe tardive dyskinesia. He saw another GI provider who suggested stopping fiber so he has done this. Anxiety remains problematic despite increasing Lamictal and continuing benzodiazapine twice daily. He is sometimes requiring 3rd dose of Ativan during the night and continues to contact his daughter during early morning hours with health concerns.       Past Medical History:   Diagnosis Date    Arthritis     Blepharitis of both eyes     CAD (coronary artery disease)     stents 2005, x3    GERD (gastroesophageal reflux disease)     Gout     Hyperlipidemia     Hypertension     Macular degeneration     NSTEMI (non-ST elevated myocardial infarction) (HCC)     Prostate enlargement     Psychiatric problem     anxiety; bipolar disorder    Tachycardia         Current Outpatient Medications   Medication Sig Dispense Refill    apixaban (ELIQUIS) 2.5 MG TABS tablet Take 1 tablet by mouth 2 times daily 60 tablet 0    lamoTRIgine (LAMICTAL) 25 MG tablet Take 50 mg by mouth 2 times daily      Omega-3 Fat Ac-Cholecalciferol (DRY EYE OMEGA BENEFITS/VIT D-3) 863-430 MG-UNIT CAPS Take 1 capsule by mouth daily      polyethylene glycol (GLYCOLAX) powder Take 17 g by mouth daily as needed      benazepril (LOTENSIN) 40 MG tablet Take 1 tablet by mouth daily 90 tablet 1    doxazosin (CARDURA) 2 MG tablet Take 2 mg by mouth nightly      nitroGLYCERIN (NITROSTAT) 0.4 MG SL tablet Place 1 tablet under the tongue every 5 minutes as needed for Chest pain 25 tablet 3    gabapentin (NEURONTIN) 300 MG capsule Take 1 capsule by mouth nightly for 360 days. . 90 capsule 3    LORazepam (ATIVAN) 0.5 MG tablet Take 1 tablet by mouth 2 times daily for 90 days. . 180 tablet 0    melatonin 5 MG TABS tablet Take 5 mg by mouth daily as needed (Insomnia)      amLODIPine (NORVASC) 5 MG tablet Take 1 tablet by mouth daily 90 tablet 3    pantoprazole (PROTONIX) 40 MG tablet Take 1 tablet by mouth daily 90 tablet 1    levothyroxine (SYNTHROID) 25 MCG tablet Take 1 tablet by mouth Daily 90 tablet 3    pravastatin (PRAVACHOL) 20 MG tablet TAKE 1 TABLET EVERY DAY 90 tablet 3    finasteride (PROSCAR) 5 MG tablet Take 1 tablet by mouth nightly 90 tablet 3    triamcinolone (KENALOG) 0.1 % cream Apply to affected areas twice daily 45 g 1    Magnesium 400 MG TABS Take 1 tablet by mouth daily       aspirin 81 MG chewable tablet Take 1 tablet by mouth daily 30 tablet 3     No current facility-administered medications for this visit. additional benzo dosing during the night  - Will increase Lamictal per daughter request but I feel strongly he needs to see psychiatry again  -     Ambulatory referral to Psychiatry    Psychophysiological insomnia  - Chronic, unchanged    Dyspepsia and disorder of function of stomach  Chronic constipation  Chronic diarrhea  - Has seen another GI provider, see HPI  - Advised to quit fiber which he did.     Other orders  -     lamoTRIgine (LAMICTAL) 25 MG tablet; 50 mg in AM and 75 mg in PM        MARIS Jacob - CNP

## 2019-04-24 ENCOUNTER — TELEPHONE (OUTPATIENT)
Dept: CARDIOLOGY CLINIC | Age: 84
End: 2019-04-24

## 2019-04-24 NOTE — TELEPHONE ENCOUNTER
Pt daughter is calling. Pt was told that  could file an appeal for Eliquis to lower price of med. Please call to advise.

## 2019-04-25 ASSESSMENT — ENCOUNTER SYMPTOMS
DIARRHEA: 1
CONSTIPATION: 1
RESPIRATORY NEGATIVE: 1

## 2019-04-26 NOTE — TELEPHONE ENCOUNTER
PA was completed. Patient was denied for Eliquis. Patient's daughter states first fill will be $600 then patient will meet deductible and cost will still be  $279 for 3 months supply after that. Gave denial to Esthela for her to check to see if patient is eligible for the Eliquis 360 program.  Notified patient's daughter that Vera Thierry will be back in office Monday. Patient's daughter verbalized understanding.

## 2019-04-29 ENCOUNTER — OFFICE VISIT (OUTPATIENT)
Dept: PSYCHIATRY | Age: 84
End: 2019-04-29
Payer: MEDICARE

## 2019-04-29 VITALS
BODY MASS INDEX: 20.43 KG/M2 | SYSTOLIC BLOOD PRESSURE: 136 MMHG | HEART RATE: 60 BPM | WEIGHT: 126.6 LBS | DIASTOLIC BLOOD PRESSURE: 82 MMHG

## 2019-04-29 DIAGNOSIS — F31.9 BIPOLAR 1 DISORDER (HCC): Primary | ICD-10-CM

## 2019-04-29 DIAGNOSIS — F41.9 ANXIETY DISORDER, UNSPECIFIED TYPE: ICD-10-CM

## 2019-04-29 DIAGNOSIS — G24.01 TARDIVE DYSKINESIA: ICD-10-CM

## 2019-04-29 DIAGNOSIS — G31.84 MILD NEUROCOGNITIVE DISORDER: ICD-10-CM

## 2019-04-29 PROCEDURE — 99215 OFFICE O/P EST HI 40 MIN: CPT | Performed by: PSYCHIATRY & NEUROLOGY

## 2019-04-29 RX ORDER — LORAZEPAM 0.5 MG/1
0.5 TABLET ORAL 3 TIMES DAILY PRN
Qty: 270 TABLET | Refills: 0 | Status: SHIPPED
Start: 2019-04-29 | End: 2019-04-29 | Stop reason: SDUPTHER

## 2019-04-29 RX ORDER — LORAZEPAM 0.5 MG/1
0.5 TABLET ORAL 3 TIMES DAILY PRN
Qty: 90 TABLET | Refills: 2 | Status: SHIPPED | OUTPATIENT
Start: 2019-04-29 | End: 2019-05-29

## 2019-04-29 RX ORDER — LAMOTRIGINE 150 MG/1
150 TABLET ORAL DAILY
Qty: 90 TABLET | Refills: 0 | Status: SHIPPED | OUTPATIENT
Start: 2019-04-29 | End: 2019-07-01 | Stop reason: SDUPTHER

## 2019-04-29 RX ORDER — LAMOTRIGINE 25 MG/1
TABLET ORAL
Qty: 540 TABLET | Refills: 0 | Status: SHIPPED
Start: 2019-04-29 | End: 2019-04-29 | Stop reason: SDUPTHER

## 2019-04-29 ASSESSMENT — ANXIETY QUESTIONNAIRES
4. TROUBLE RELAXING: 0-NOT AT ALL SURE
6. BECOMING EASILY ANNOYED OR IRRITABLE: 0-NOT AT ALL SURE
2. NOT BEING ABLE TO STOP OR CONTROL WORRYING: 0-NOT AT ALL SURE
GAD7 TOTAL SCORE: 1
3. WORRYING TOO MUCH ABOUT DIFFERENT THINGS: 0-NOT AT ALL SURE
5. BEING SO RESTLESS THAT IT IS HARD TO SIT STILL: 0-NOT AT ALL SURE
7. FEELING AFRAID AS IF SOMETHING AWFUL MIGHT HAPPEN: 0-NOT AT ALL SURE
1. FEELING NERVOUS, ANXIOUS, OR ON EDGE: 1-SEVERAL DAYS

## 2019-04-29 ASSESSMENT — PATIENT HEALTH QUESTIONNAIRE - PHQ9
8. MOVING OR SPEAKING SO SLOWLY THAT OTHER PEOPLE COULD HAVE NOTICED. OR THE OPPOSITE, BEING SO FIGETY OR RESTLESS THAT YOU HAVE BEEN MOVING AROUND A LOT MORE THAN USUAL: 0
3. TROUBLE FALLING OR STAYING ASLEEP: 1
SUM OF ALL RESPONSES TO PHQ QUESTIONS 1-9: 3
9. THOUGHTS THAT YOU WOULD BE BETTER OFF DEAD, OR OF HURTING YOURSELF: 0
1. LITTLE INTEREST OR PLEASURE IN DOING THINGS: 0
6. FEELING BAD ABOUT YOURSELF - OR THAT YOU ARE A FAILURE OR HAVE LET YOURSELF OR YOUR FAMILY DOWN: 0
SUM OF ALL RESPONSES TO PHQ QUESTIONS 1-9: 3
SUM OF ALL RESPONSES TO PHQ9 QUESTIONS 1 & 2: 0
2. FEELING DOWN, DEPRESSED OR HOPELESS: 0
7. TROUBLE CONCENTRATING ON THINGS, SUCH AS READING THE NEWSPAPER OR WATCHING TELEVISION: 0
4. FEELING TIRED OR HAVING LITTLE ENERGY: 1
10. IF YOU CHECKED OFF ANY PROBLEMS, HOW DIFFICULT HAVE THESE PROBLEMS MADE IT FOR YOU TO DO YOUR WORK, TAKE CARE OF THINGS AT HOME, OR GET ALONG WITH OTHER PEOPLE: 0
5. POOR APPETITE OR OVEREATING: 1

## 2019-04-29 NOTE — PROGRESS NOTES
PSYCHIATRY PROGRESS NOTE    Erinn Greenberg  10/31/1925  4/29/2019  Face to Face time:45min  PCP: MARIS Preston - CNP    A: 81 yo M with hx of 1 prior manic episode triggered by citalopram. Had side effects from geodon include TDs on withdrawal. Some residual TDs still exist but mild. Appears to have some cognitive deficits as noted on his MOCA but this has not really changed over the last 1.5 yrs. I suspect the major issue here is anxiety, however, depression is possibility. 1. Bipolar disorder type I, depressed  2. Tardive dyskinesia, mild  3. Unspecified anxiety disorder r/o illness anxiety disorder  4. mild neurocognitive disorder      P:   1. Increase lorazepam to 0.5mg TID prn. Discussed r/b/se in detail    2. Increase lamictal to 75mg BID  3. Continue gabapentin 300mg qhs  4. I discussed lithium as an option to manage possible underlying depression however pt and daughter do do not want to pursue this at this time. >50% of the visit was spent on counseling, education, and care coordination    Follow-up: RTC 8 weeks. Safety: RF include hx of mood disorder, chronic medical illness, possible cognitive impairment, age, male, . Pt is low risk for future dangerousness to self or others and safe for continued outpt care.   _______________________________________________________________    CC:   Chief Complaint   Patient presents with    Anxiety     S: Pt was last seen 12/2017. He presents for follow up as recommendation of PCP d/t ongoing issues with anxiety. Although, as is typical, patient himself does not endorse these problems - he denies anxiety, depression, reports good energy, some problems with sleep but not every night. Reports normal appetite and denies anhedonia. Most of the complaints come from his daughter, Georgie Gaytan, and are corroborated by his PCP and chart notes.      He continues to fixate and complain about variations in his bowel patterns or urinary complaints which can vary between diarrhea and constipation. He seems to obsesses about these issues. Sometimes calls in the middle of the night. Seems these issues escalate when daughter is going out of town. He also has complained of chest pain and recently was admitted and required cardioversion for a.fib. Cardiologist, however, did not feel his chest pain complaints were 2/2 to a.fib. He has negative troponins. His chest pain is noted, per daughter, to improve with lorazepam. He has seen several gastroenterologists. Reglan was recommended at one point but avoided d/t his hx of TDs. There is concern about depression. He spends most of his time to himself watching TV. He rarely socializes with others. It seems he does improve and get benefit from lorazepam when he takes it. He takes 0.5mg BID, but occasionally has taken an extra tab. He has been titrated upward on lamictal up to 50mg qam, 75mg qhs. Per daughter she doesn't feels this has made an impact, however per my discussion with PCP, daughter has reported that with dose increases, she seems to hear less physical complaints from the patient. Assessment today also included geriatric depression scale (short form) screening which was negative for depression (+3), and MOCA testing. ROS: no headaches, vision problems, dysuria, abd pain, chest pain or SOB. Occasionally gets some epigastric discomfort and diarrhea/constipation issues. Current Outpatient Medications   Medication Sig Dispense Refill    lamoTRIgine (LAMICTAL) 25 MG tablet 75 mg in AM and 75 mg in  tablet 0    LORazepam (ATIVAN) 0.5 MG tablet Take 1 tablet by mouth 3 times daily as needed for up to 90 days.  For anxiety 270 tablet 0    apixaban (ELIQUIS) 2.5 MG TABS tablet Take 1 tablet by mouth 2 times daily 60 tablet 0    Omega-3 Fat Ac-Cholecalciferol (DRY EYE OMEGA BENEFITS/VIT D-3) 320-776 MG-UNIT CAPS Take 1 capsule by mouth daily      polyethylene glycol (GLYCOLAX) powder Take 17 g by mouth daily as needed      benazepril (LOTENSIN) 40 MG tablet Take 1 tablet by mouth daily 90 tablet 1    doxazosin (CARDURA) 2 MG tablet Take 2 mg by mouth nightly      nitroGLYCERIN (NITROSTAT) 0.4 MG SL tablet Place 1 tablet under the tongue every 5 minutes as needed for Chest pain 25 tablet 3    gabapentin (NEURONTIN) 300 MG capsule Take 1 capsule by mouth nightly for 360 days. . 90 capsule 3    melatonin 5 MG TABS tablet Take 5 mg by mouth daily as needed (Insomnia)      amLODIPine (NORVASC) 5 MG tablet Take 1 tablet by mouth daily 90 tablet 3    pantoprazole (PROTONIX) 40 MG tablet Take 1 tablet by mouth daily 90 tablet 1    levothyroxine (SYNTHROID) 25 MCG tablet Take 1 tablet by mouth Daily 90 tablet 3    pravastatin (PRAVACHOL) 20 MG tablet TAKE 1 TABLET EVERY DAY 90 tablet 3    finasteride (PROSCAR) 5 MG tablet Take 1 tablet by mouth nightly 90 tablet 3    triamcinolone (KENALOG) 0.1 % cream Apply to affected areas twice daily 45 g 1    Magnesium 400 MG TABS Take 1 tablet by mouth daily       aspirin 81 MG chewable tablet Take 1 tablet by mouth daily 30 tablet 3     No current facility-administered medications for this visit. O:  Wt Readings from Last 3 Encounters:   04/29/19 126 lb 9.6 oz (57.4 kg)   04/23/19 126 lb (57.2 kg)   04/17/19 125 lb 8 oz (56.9 kg)     Temp Readings from Last 3 Encounters:   04/10/19 98.1 °F (36.7 °C) (Temporal)   03/15/19 98 °F (36.7 °C)   01/28/17 98.2 °F (36.8 °C) (Oral)     BP Readings from Last 3 Encounters:   04/29/19 136/82   04/23/19 112/62   04/17/19 100/62     Pulse Readings from Last 3 Encounters:   04/29/19 60   04/23/19 64   04/17/19 65     MOCA 4/29/2019: 21/30 (although he missed 2 of the points for saying it was 5/1 rather than 4/29 - most of the exam was similar to how he did in 2016/2017). GDS 4/29/2019: +3 (negative depression screen)    Mental Status Exam:   Appearance    alert, cooperative, well groomed.  +PMR  Motor: Normal strength and tone, +abnormal movements in mouth/tongue  Speech    Short responses, spontaneous, normal rate and normal volume  Mood/Affect   Good / constricted  Thought Process    linear and goal directed, some poverty of thought  Thought Content    intact , no suicidal ideation  Associations    logical connections  Attention/Concentration    intact  Memory    recent and remote memory grossly intact  Insight/Judgement    good / Intact    Labs:   No new labs since last visit      Tawny Snell MD  Psychiatry

## 2019-04-29 NOTE — TELEPHONE ENCOUNTER
Discussed with Callie Akhtar and she gave me paperwork for the Eliquis 360 program. Notified patient's daughter, Kendrick Garcia,  that patient will need to sign paperwork. Kendrick Garcia states she will come to office and  paperwork, have patient sign and will bring it back to office. Placed paperwork up at  for Kendrick Garcia to .

## 2019-05-01 DIAGNOSIS — K21.9 GASTROESOPHAGEAL REFLUX DISEASE WITHOUT ESOPHAGITIS: ICD-10-CM

## 2019-05-02 RX ORDER — BENAZEPRIL HYDROCHLORIDE 40 MG/1
TABLET, FILM COATED ORAL
Qty: 90 TABLET | Refills: 1 | Status: SHIPPED | OUTPATIENT
Start: 2019-05-02 | End: 2019-09-24 | Stop reason: SDUPTHER

## 2019-05-02 RX ORDER — PANTOPRAZOLE SODIUM 40 MG/1
TABLET, DELAYED RELEASE ORAL
Qty: 90 TABLET | Refills: 1 | Status: SHIPPED | OUTPATIENT
Start: 2019-05-02 | End: 2019-09-24 | Stop reason: SDUPTHER

## 2019-05-10 ENCOUNTER — TELEPHONE (OUTPATIENT)
Dept: CARDIOLOGY CLINIC | Age: 84
End: 2019-05-10

## 2019-05-10 NOTE — TELEPHONE ENCOUNTER
Daughter calling to see if this patient was approved through the [employer] program. Patient only has about 2 weeks worth of med and may need to get samples .  jessica hill call daughter Shena Lindquist

## 2019-05-13 NOTE — TELEPHONE ENCOUNTER
Pt has [facility] so the eliquis 360 says the pt has a deductible. Daughter aware of what [facility] will pay for and needed a new rx sent to [facility].  Med sent

## 2019-05-15 ENCOUNTER — TELEPHONE (OUTPATIENT)
Dept: CARDIOLOGY CLINIC | Age: 84
End: 2019-05-15

## 2019-05-15 NOTE — TELEPHONE ENCOUNTER
Sample requested: eliquis     Strength: 2.5mg  Dosage: twice daily    Patient's call back number:  [phone]

## 2019-06-03 ENCOUNTER — TELEPHONE (OUTPATIENT)
Dept: INTERNAL MEDICINE CLINIC | Age: 84
End: 2019-06-03

## 2019-06-03 DIAGNOSIS — N40.1 BENIGN PROSTATIC HYPERPLASIA WITH LOWER URINARY TRACT SYMPTOMS, SYMPTOM DETAILS UNSPECIFIED: Primary | ICD-10-CM

## 2019-06-12 ENCOUNTER — TELEPHONE (OUTPATIENT)
Dept: INTERNAL MEDICINE CLINIC | Age: 84
End: 2019-06-12

## 2019-06-12 NOTE — TELEPHONE ENCOUNTER
Phone call from patient's daughter, Francisco Javier Martínez. Asking for a refill on Lamictal. Says want ot make sure that script is sent with the right dose. Was not clear in what she meant. Please call and let her know when filled.     [facility]

## 2019-06-13 NOTE — TELEPHONE ENCOUNTER
Discussed with Wilfrid Cordova. Rx is already with [facility]. She will call [facility] to get it filled. If there is an issue, she can call me back.

## 2019-07-01 ENCOUNTER — OFFICE VISIT (OUTPATIENT)
Dept: PSYCHIATRY | Age: 84
End: 2019-07-01
Payer: MEDICARE

## 2019-07-01 VITALS
SYSTOLIC BLOOD PRESSURE: 138 MMHG | WEIGHT: 125.6 LBS | HEIGHT: 66 IN | HEART RATE: 58 BPM | DIASTOLIC BLOOD PRESSURE: 60 MMHG | BODY MASS INDEX: 20.18 KG/M2

## 2019-07-01 DIAGNOSIS — G24.01 TARDIVE DYSKINESIA: ICD-10-CM

## 2019-07-01 DIAGNOSIS — F41.9 ANXIETY DISORDER, UNSPECIFIED TYPE: ICD-10-CM

## 2019-07-01 DIAGNOSIS — F31.9 BIPOLAR 1 DISORDER (HCC): Primary | ICD-10-CM

## 2019-07-01 PROCEDURE — 99214 OFFICE O/P EST MOD 30 MIN: CPT | Performed by: PSYCHIATRY & NEUROLOGY

## 2019-07-01 RX ORDER — LAMOTRIGINE 150 MG/1
150 TABLET ORAL DAILY
Qty: 90 TABLET | Refills: 0 | Status: SHIPPED | OUTPATIENT
Start: 2019-07-01 | End: 2019-10-16 | Stop reason: SDUPTHER

## 2019-07-01 RX ORDER — LORAZEPAM 0.5 MG/1
0.5 TABLET ORAL EVERY 6 HOURS PRN
COMMUNITY
End: 2019-08-07 | Stop reason: SDUPTHER

## 2019-07-01 RX ORDER — TAMSULOSIN HYDROCHLORIDE 0.4 MG/1
0.4 CAPSULE ORAL DAILY
COMMUNITY

## 2019-07-01 NOTE — PROGRESS NOTES
his throat so he refused to take more, went as far as calling the  to notify them. He has gone to [employer] and seems maybe socializing a little more with people around him. Daughter feels maybe a little improvement with lamictal with mood. However, pt denies any depression and doesn't really particularly notice any difference with his medications. Pt missed flying, he is considering contacting friends and maybe taking a ride in an airplane again and seem cheerful when thinking about this prospect. Generally using lorazepam 0.5mg BID. About once every 1-2 weeks may take an extra 0.5mg for anxiety which helps. Has not caused increase in confusion, behavioral changes. He doesn't feel unsteady on his feet with the medication or overly sedated. He takes one at night with gabapentin 300mg and melatonin and reports good sleep at night. Energy is good, doesn't take naps. Denies problems with forgetfulness. Just started the lamictal at 150mg one daily. daugther felt the first day he took the full tab in the AM he looked a little more confused. ROS: no headaches, vision problems, dysuria, abd pain, chest pain or SOB. Occasionally gets some epigastric discomfort and diarrhea d/t use of miralax. +decreased urination     Current Outpatient Medications   Medication Sig Dispense Refill    tamsulosin (FLOMAX) 0.4 MG capsule Take 0.4 mg by mouth daily      LORazepam (ATIVAN) 0.5 MG tablet Take 0.5 mg by mouth every 6 hours as needed for Anxiety. Indications: pt is taking 3 tabs some day as needed, other days 2 times a day.        apixaban (ELIQUIS) 2.5 MG TABS tablet Take 1 tablet by mouth 2 times daily 180 tablet 3    pantoprazole (PROTONIX) 40 MG tablet TAKE 1 TABLET EVERY DAY 90 tablet 1    benazepril (LOTENSIN) 40 MG tablet TAKE 1 TABLET EVERY DAY 90 tablet 1    lamoTRIgine (LAMICTAL) 150 MG tablet Take 1 tablet by mouth daily 90 tablet 0    Omega-3 Fat Ac-Cholecalciferol (DRY EYE OMEGA BENEFITS/VIT D-3) 990-095 MG-UNIT CAPS Take 1 capsule by mouth daily      polyethylene glycol (GLYCOLAX) powder Take 17 g by mouth daily as needed      nitroGLYCERIN (NITROSTAT) 0.4 MG SL tablet Place 1 tablet under the tongue every 5 minutes as needed for Chest pain 25 tablet 3    gabapentin (NEURONTIN) 300 MG capsule Take 1 capsule by mouth nightly for 360 days. . 90 capsule 3    melatonin 5 MG TABS tablet Take 5 mg by mouth daily as needed (Insomnia)      amLODIPine (NORVASC) 5 MG tablet Take 1 tablet by mouth daily 90 tablet 3    levothyroxine (SYNTHROID) 25 MCG tablet Take 1 tablet by mouth Daily 90 tablet 3    pravastatin (PRAVACHOL) 20 MG tablet TAKE 1 TABLET EVERY DAY 90 tablet 3    finasteride (PROSCAR) 5 MG tablet Take 1 tablet by mouth nightly 90 tablet 3    triamcinolone (KENALOG) 0.1 % cream Apply to affected areas twice daily 45 g 1    Magnesium 400 MG TABS Take 1 tablet by mouth daily       aspirin 81 MG chewable tablet Take 1 tablet by mouth daily 30 tablet 3     No current facility-administered medications for this visit. O:  Wt Readings from Last 3 Encounters:   07/01/19 125 lb 9.6 oz (57 kg)   04/29/19 126 lb 9.6 oz (57.4 kg)   04/23/19 126 lb (57.2 kg)     Temp Readings from Last 3 Encounters:   04/10/19 98.1 °F (36.7 °C) (Temporal)   03/15/19 98 °F (36.7 °C)   01/28/17 98.2 °F (36.8 °C) (Oral)     BP Readings from Last 3 Encounters:   07/01/19 138/60   04/29/19 136/82   04/23/19 112/62     Pulse Readings from Last 3 Encounters:   07/01/19 58   04/29/19 60   04/23/19 64     MOCA 4/29/2019: 21/30 (although he missed 2 of the points for saying it was 5/1 rather than 4/29 - most of the exam was similar to how he did in 2016/2017). GDS 4/29/2019: +3 (negative depression screen)    Mental Status Exam:   Appearance    alert, cooperative, well groomed.  +PMR  Motor: Normal strength and tone, +abnormal movements in mouth/tongue c/w tardive dyskinesia  Speech    Short responses, spontaneous, normal

## 2019-07-02 ENCOUNTER — HOSPITAL ENCOUNTER (OUTPATIENT)
Dept: ULTRASOUND IMAGING | Age: 84
Discharge: HOME | End: 2019-07-02
Payer: MEDICARE

## 2019-07-02 DIAGNOSIS — R33.9 BLADDER RETENTION: ICD-10-CM

## 2019-07-02 PROCEDURE — 76770 US EXAM ABDO BACK WALL COMP: CPT

## 2019-08-07 ENCOUNTER — TELEPHONE (OUTPATIENT)
Dept: INTERNAL MEDICINE CLINIC | Age: 84
End: 2019-08-07

## 2019-08-07 DIAGNOSIS — F41.9 ANXIETY DISORDER, UNSPECIFIED TYPE: Primary | ICD-10-CM

## 2019-08-07 RX ORDER — LORAZEPAM 0.5 MG/1
0.5 TABLET ORAL 3 TIMES DAILY PRN
Qty: 90 TABLET | Refills: 1 | Status: SHIPPED | OUTPATIENT
Start: 2019-08-07 | End: 2019-11-05

## 2019-08-08 ENCOUNTER — OFFICE VISIT (OUTPATIENT)
Dept: CARDIOLOGY CLINIC | Age: 84
End: 2019-08-08
Payer: MEDICARE

## 2019-08-08 VITALS
BODY MASS INDEX: 19.98 KG/M2 | DIASTOLIC BLOOD PRESSURE: 60 MMHG | HEART RATE: 64 BPM | HEIGHT: 66 IN | SYSTOLIC BLOOD PRESSURE: 120 MMHG | WEIGHT: 124.3 LBS

## 2019-08-08 DIAGNOSIS — R00.1 BRADYCARDIA: ICD-10-CM

## 2019-08-08 DIAGNOSIS — I25.119 CORONARY ARTERY DISEASE INVOLVING NATIVE CORONARY ARTERY OF NATIVE HEART WITH ANGINA PECTORIS (HCC): Primary | Chronic | ICD-10-CM

## 2019-08-08 DIAGNOSIS — E78.5 HYPERLIPIDEMIA, UNSPECIFIED HYPERLIPIDEMIA TYPE: Chronic | ICD-10-CM

## 2019-08-08 DIAGNOSIS — I10 ESSENTIAL HYPERTENSION: Chronic | ICD-10-CM

## 2019-08-08 PROCEDURE — G8427 DOCREV CUR MEDS BY ELIG CLIN: HCPCS | Performed by: INTERNAL MEDICINE

## 2019-08-08 PROCEDURE — G8598 ASA/ANTIPLAT THER USED: HCPCS | Performed by: INTERNAL MEDICINE

## 2019-08-08 PROCEDURE — 4040F PNEUMOC VAC/ADMIN/RCVD: CPT | Performed by: INTERNAL MEDICINE

## 2019-08-08 PROCEDURE — G8420 CALC BMI NORM PARAMETERS: HCPCS | Performed by: INTERNAL MEDICINE

## 2019-08-08 PROCEDURE — 1036F TOBACCO NON-USER: CPT | Performed by: INTERNAL MEDICINE

## 2019-08-08 PROCEDURE — 93000 ELECTROCARDIOGRAM COMPLETE: CPT | Performed by: INTERNAL MEDICINE

## 2019-08-08 PROCEDURE — 99214 OFFICE O/P EST MOD 30 MIN: CPT | Performed by: INTERNAL MEDICINE

## 2019-08-08 PROCEDURE — 1123F ACP DISCUSS/DSCN MKR DOCD: CPT | Performed by: INTERNAL MEDICINE

## 2019-08-08 RX ORDER — PRAVASTATIN SODIUM 20 MG
TABLET ORAL
Qty: 90 TABLET | Refills: 4 | Status: SHIPPED | OUTPATIENT
Start: 2019-08-08 | End: 2020-07-31

## 2019-08-08 RX ORDER — AMLODIPINE BESYLATE 5 MG/1
5 TABLET ORAL DAILY
Qty: 90 TABLET | Refills: 4 | Status: ON HOLD | OUTPATIENT
Start: 2019-08-08 | End: 2020-06-06 | Stop reason: HOSPADM

## 2019-08-08 NOTE — PROGRESS NOTES
[address]  Cardiac Consult     Referring Provider:  MARIS Toledo - KOTA     Chief Complaint   Patient presents with   Carlie Harris     c/o chest pains couple times a week        History of Present Illness:  80 y.o. male with bipolar formally followed by Dr. Tamera Pelayo seen in f/u for CAD, HTN, hyperlipidemia and bradycardia. He underwent stenting of the LCX and RCA in 2005. Last stress myoview 12/2016 was normal. He has had bradycardia resulting in discontinuation of his beta blocker. He was admitted 4/19 with afib and chest pain. He underwent MOE cardioversion and has not had further afib. His chest pain was felt to be non-cardiac    Today he complains of frequent chest pain. 2-3 times per day. He really cannot give a description. Daughter feels it occurs more at night. Improved by ativan. Sometimes takes antiacids. Past Medical History:   has a past medical history of Arthritis, Blepharitis of both eyes, CAD (coronary artery disease), GERD (gastroesophageal reflux disease), Gout, Hyperlipidemia, Hypertension, Macular degeneration, NSTEMI (non-ST elevated myocardial infarction) [facility]), Prostate enlargement, Psychiatric problem, and Tachycardia. Surgical History:   has a past surgical history that includes Coronary angioplasty with stent (2005); skin biopsy; Kidney stone surgery (1980); Coronary artery bypass graft; Cataract removal (1993); TURP (2003); Skin cancer destruction (2006); Colonoscopy; eye surgery; and Cardiac surgery. Social History:   reports that he has never smoked. He has never used smokeless tobacco. He reports that he does not drink alcohol or use drugs. Family History:  family history includes Heart Disease in his brother and mother.      Allergies:  Zyprexa [olanzapine] and Citalopram     Home Medications:  Outpatient Encounter Medications as of 8/8/2019   Medication Sig Dispense Refill    LORazepam (ATIVAN) 0.5 MG tablet Take 1 tablet by mouth 3 times daily as

## 2019-09-04 ENCOUNTER — OFFICE VISIT (OUTPATIENT)
Dept: CARDIOLOGY CLINIC | Age: 84
End: 2019-09-04
Payer: MEDICARE

## 2019-09-04 VITALS
SYSTOLIC BLOOD PRESSURE: 104 MMHG | HEART RATE: 56 BPM | WEIGHT: 123.8 LBS | DIASTOLIC BLOOD PRESSURE: 46 MMHG | BODY MASS INDEX: 19.89 KG/M2 | HEIGHT: 66 IN

## 2019-09-04 DIAGNOSIS — E78.5 HYPERLIPIDEMIA, UNSPECIFIED HYPERLIPIDEMIA TYPE: Chronic | ICD-10-CM

## 2019-09-04 DIAGNOSIS — I77.9 BILATERAL CAROTID ARTERY DISEASE, UNSPECIFIED TYPE (HCC): Primary | Chronic | ICD-10-CM

## 2019-09-04 DIAGNOSIS — I10 ESSENTIAL HYPERTENSION: Chronic | ICD-10-CM

## 2019-09-04 DIAGNOSIS — R00.1 BRADYCARDIA: ICD-10-CM

## 2019-09-04 PROCEDURE — 99214 OFFICE O/P EST MOD 30 MIN: CPT | Performed by: INTERNAL MEDICINE

## 2019-09-04 PROCEDURE — G8598 ASA/ANTIPLAT THER USED: HCPCS | Performed by: INTERNAL MEDICINE

## 2019-09-04 PROCEDURE — G8427 DOCREV CUR MEDS BY ELIG CLIN: HCPCS | Performed by: INTERNAL MEDICINE

## 2019-09-04 PROCEDURE — G8420 CALC BMI NORM PARAMETERS: HCPCS | Performed by: INTERNAL MEDICINE

## 2019-09-04 PROCEDURE — 1036F TOBACCO NON-USER: CPT | Performed by: INTERNAL MEDICINE

## 2019-09-04 PROCEDURE — 4040F PNEUMOC VAC/ADMIN/RCVD: CPT | Performed by: INTERNAL MEDICINE

## 2019-09-04 PROCEDURE — 1123F ACP DISCUSS/DSCN MKR DOCD: CPT | Performed by: INTERNAL MEDICINE

## 2019-09-04 NOTE — PATIENT INSTRUCTIONS
Trial Prilosec in place of Protonix for heartburn  Follow up is scheduled in November  Follow up in 1 year

## 2019-09-04 NOTE — LETTER
[address] [address]  Phone: [phone]  Fax: [phone]    Franck Sheppard MD        September 4, 2019     Sol Severs, APRN - CNP  [address]    Patient: Benedict Matthews  MR Number: [id number]  YOB: 1925  Date of Visit: 9/4/2019    Dear Dr. Sol Severs:    Azar Pena     Referring Provider:  Sol Severs, APRN - CNP     Chief Complaint   Patient presents with    Follow-up     c/o heart burn and chest pain at night        History of Present Illness:  80 y.o. male with bipolar formally followed by Dr. Umair Damon seen in f/u for CAD, HTN, hyperlipidemia and bradycardia. He underwent stenting of the LCX and RCA in 2005. Last stress myoview 12/2016 was normal. He has had bradycardia resulting in discontinuation of his beta blocker. He was admitted 4/19 with afib and chest pain. He underwent MOE cardioversion and has not had further afib. His chest pain was felt to be non-cardiac    He was seen a few weeks ago. Ranexa was added but he hasn't taken. We discussed stress testing but he and his family have decided against it. He seems better. \"Heart burn\" 2-3 days per week. Takes protonix. Past Medical History:   has a past medical history of Arthritis, Blepharitis of both eyes, CAD (coronary artery disease), GERD (gastroesophageal reflux disease), Gout, Hyperlipidemia, Hypertension, Macular degeneration, NSTEMI (non-ST elevated myocardial infarction) [facility]), Prostate enlargement, Psychiatric problem, and Tachycardia. Surgical History:   has a past surgical history that includes Coronary angioplasty with stent (2005); skin biopsy; Kidney stone surgery (1980); Coronary artery bypass graft; Cataract removal (1993); TURP (2003); Skin cancer destruction (2006); Colonoscopy; eye surgery; and Cardiac surgery.      Social History:

## 2019-09-04 NOTE — PROGRESS NOTES
mouth daily 90 tablet 4    pravastatin (PRAVACHOL) 20 MG tablet TAKE 1 TABLET EVERY DAY 90 tablet 4    LORazepam (ATIVAN) 0.5 MG tablet Take 1 tablet by mouth 3 times daily as needed for Anxiety for up to 90 days. 90 tablet 1    tamsulosin (FLOMAX) 0.4 MG capsule Take 0.4 mg by mouth daily      lamoTRIgine (LAMICTAL) 150 MG tablet Take 1 tablet by mouth daily 90 tablet 0    apixaban (ELIQUIS) 2.5 MG TABS tablet Take 1 tablet by mouth 2 times daily 180 tablet 3    pantoprazole (PROTONIX) 40 MG tablet TAKE 1 TABLET EVERY DAY 90 tablet 1    benazepril (LOTENSIN) 40 MG tablet TAKE 1 TABLET EVERY DAY 90 tablet 1    Omega-3 Fat Ac-Cholecalciferol (DRY EYE OMEGA BENEFITS/VIT D-3) 564-301 MG-UNIT CAPS Take 1 capsule by mouth daily      polyethylene glycol (GLYCOLAX) powder Take 17 g by mouth daily as needed      nitroGLYCERIN (NITROSTAT) 0.4 MG SL tablet Place 1 tablet under the tongue every 5 minutes as needed for Chest pain 25 tablet 3    gabapentin (NEURONTIN) 300 MG capsule Take 1 capsule by mouth nightly for 360 days. . 90 capsule 3    melatonin 5 MG TABS tablet Take 5 mg by mouth daily as needed (Insomnia)      levothyroxine (SYNTHROID) 25 MCG tablet Take 1 tablet by mouth Daily 90 tablet 3    finasteride (PROSCAR) 5 MG tablet Take 1 tablet by mouth nightly 90 tablet 3    triamcinolone (KENALOG) 0.1 % cream Apply to affected areas twice daily 45 g 1    Magnesium 400 MG TABS Take 1 tablet by mouth daily       aspirin 81 MG chewable tablet Take 1 tablet by mouth daily 30 tablet 3     No facility-administered encounter medications on file as of 9/4/2019. [x] Medications and dosages reviewed.     ROS:  [x]Full ROS obtained and negative except as mentioned in HPI      Physical Examination:    Vitals:    09/04/19 1347   BP: (!) 104/46   Pulse: 56      BP (!) 104/46   Pulse 56   Ht 5' 6\" (1.676 m)   Wt 123 lb 12.8 oz (56.2 kg)   BMI 19.98 kg/m²     · GENERAL: Elderly male walking with walker in

## 2019-09-24 DIAGNOSIS — E03.9 HYPOTHYROIDISM, UNSPECIFIED TYPE: ICD-10-CM

## 2019-09-24 DIAGNOSIS — K21.9 GASTROESOPHAGEAL REFLUX DISEASE WITHOUT ESOPHAGITIS: ICD-10-CM

## 2019-09-25 RX ORDER — BENAZEPRIL HYDROCHLORIDE 40 MG/1
TABLET, FILM COATED ORAL
Qty: 90 TABLET | Refills: 3 | Status: ON HOLD | OUTPATIENT
Start: 2019-09-25 | End: 2020-06-06 | Stop reason: HOSPADM

## 2019-09-25 RX ORDER — LEVOTHYROXINE SODIUM 0.03 MG/1
TABLET ORAL
Qty: 90 TABLET | Refills: 3 | Status: SHIPPED | OUTPATIENT
Start: 2019-09-25

## 2019-09-25 RX ORDER — PANTOPRAZOLE SODIUM 40 MG/1
TABLET, DELAYED RELEASE ORAL
Qty: 90 TABLET | Refills: 3 | Status: SHIPPED | OUTPATIENT
Start: 2019-09-25 | End: 2022-10-24

## 2019-10-01 ENCOUNTER — OFFICE VISIT (OUTPATIENT)
Dept: INTERNAL MEDICINE CLINIC | Age: 84
End: 2019-10-01
Payer: MEDICARE

## 2019-10-01 VITALS
BODY MASS INDEX: 19.77 KG/M2 | WEIGHT: 123 LBS | SYSTOLIC BLOOD PRESSURE: 124 MMHG | HEIGHT: 66 IN | HEART RATE: 72 BPM | DIASTOLIC BLOOD PRESSURE: 76 MMHG

## 2019-10-01 DIAGNOSIS — K21.9 GASTROESOPHAGEAL REFLUX DISEASE WITHOUT ESOPHAGITIS: ICD-10-CM

## 2019-10-01 DIAGNOSIS — Z23 NEED FOR INFLUENZA VACCINATION: ICD-10-CM

## 2019-10-01 DIAGNOSIS — Z00.00 ROUTINE GENERAL MEDICAL EXAMINATION AT A HEALTH CARE FACILITY: Primary | ICD-10-CM

## 2019-10-01 DIAGNOSIS — E44.0 MODERATE PROTEIN-CALORIE MALNUTRITION (HCC): ICD-10-CM

## 2019-10-01 DIAGNOSIS — F41.9 ANXIETY DISORDER, UNSPECIFIED TYPE: ICD-10-CM

## 2019-10-01 DIAGNOSIS — F31.9 BIPOLAR 1 DISORDER (HCC): ICD-10-CM

## 2019-10-01 PROCEDURE — G0438 PPPS, INITIAL VISIT: HCPCS | Performed by: NURSE PRACTITIONER

## 2019-10-01 PROCEDURE — 90653 IIV ADJUVANT VACCINE IM: CPT | Performed by: NURSE PRACTITIONER

## 2019-10-01 PROCEDURE — G8482 FLU IMMUNIZE ORDER/ADMIN: HCPCS | Performed by: NURSE PRACTITIONER

## 2019-10-01 PROCEDURE — 4040F PNEUMOC VAC/ADMIN/RCVD: CPT | Performed by: NURSE PRACTITIONER

## 2019-10-01 PROCEDURE — G0008 ADMIN INFLUENZA VIRUS VAC: HCPCS | Performed by: NURSE PRACTITIONER

## 2019-10-01 PROCEDURE — 1123F ACP DISCUSS/DSCN MKR DOCD: CPT | Performed by: NURSE PRACTITIONER

## 2019-10-01 PROCEDURE — G8598 ASA/ANTIPLAT THER USED: HCPCS | Performed by: NURSE PRACTITIONER

## 2019-10-01 ASSESSMENT — LIFESTYLE VARIABLES: HOW OFTEN DO YOU HAVE A DRINK CONTAINING ALCOHOL: 0

## 2019-10-01 ASSESSMENT — PATIENT HEALTH QUESTIONNAIRE - PHQ9
SUM OF ALL RESPONSES TO PHQ QUESTIONS 1-9: 0
SUM OF ALL RESPONSES TO PHQ QUESTIONS 1-9: 0

## 2019-10-16 DIAGNOSIS — F31.9 BIPOLAR 1 DISORDER (HCC): ICD-10-CM

## 2019-10-17 RX ORDER — LAMOTRIGINE 150 MG/1
TABLET ORAL
Qty: 90 TABLET | Refills: 0 | Status: SHIPPED | OUTPATIENT
Start: 2019-10-17 | End: 2019-12-30

## 2019-11-07 DIAGNOSIS — F41.9 ANXIETY DISORDER, UNSPECIFIED TYPE: Primary | ICD-10-CM

## 2019-11-08 RX ORDER — FINASTERIDE 5 MG/1
TABLET, FILM COATED ORAL
Qty: 90 TABLET | Refills: 0 | Status: SHIPPED | OUTPATIENT
Start: 2019-11-08 | End: 2020-01-15

## 2019-11-11 RX ORDER — LORAZEPAM 0.5 MG/1
TABLET ORAL
Qty: 90 TABLET | Refills: 0 | Status: SHIPPED | OUTPATIENT
Start: 2019-11-11 | End: 2019-12-17 | Stop reason: SDUPTHER

## 2019-12-17 DIAGNOSIS — F41.9 ANXIETY DISORDER, UNSPECIFIED TYPE: ICD-10-CM

## 2019-12-17 RX ORDER — LORAZEPAM 0.5 MG/1
0.5 TABLET ORAL 3 TIMES DAILY PRN
Qty: 90 TABLET | Refills: 0 | Status: SHIPPED | OUTPATIENT
Start: 2019-12-17 | End: 2020-02-10 | Stop reason: SDUPTHER

## 2020-01-15 RX ORDER — FINASTERIDE 5 MG/1
TABLET, FILM COATED ORAL
Qty: 90 TABLET | Refills: 3 | Status: SHIPPED | OUTPATIENT
Start: 2020-01-15

## 2020-02-10 ENCOUNTER — TELEPHONE (OUTPATIENT)
Dept: INTERNAL MEDICINE CLINIC | Age: 85
End: 2020-02-10

## 2020-02-10 ENCOUNTER — OFFICE VISIT (OUTPATIENT)
Dept: INTERNAL MEDICINE CLINIC | Age: 85
End: 2020-02-10
Payer: MEDICARE

## 2020-02-10 VITALS
SYSTOLIC BLOOD PRESSURE: 120 MMHG | BODY MASS INDEX: 20.5 KG/M2 | DIASTOLIC BLOOD PRESSURE: 62 MMHG | HEART RATE: 66 BPM | WEIGHT: 127 LBS

## 2020-02-10 PROCEDURE — 1123F ACP DISCUSS/DSCN MKR DOCD: CPT | Performed by: NURSE PRACTITIONER

## 2020-02-10 PROCEDURE — G8420 CALC BMI NORM PARAMETERS: HCPCS | Performed by: NURSE PRACTITIONER

## 2020-02-10 PROCEDURE — G8427 DOCREV CUR MEDS BY ELIG CLIN: HCPCS | Performed by: NURSE PRACTITIONER

## 2020-02-10 PROCEDURE — 99214 OFFICE O/P EST MOD 30 MIN: CPT | Performed by: NURSE PRACTITIONER

## 2020-02-10 PROCEDURE — 4040F PNEUMOC VAC/ADMIN/RCVD: CPT | Performed by: NURSE PRACTITIONER

## 2020-02-10 PROCEDURE — G8482 FLU IMMUNIZE ORDER/ADMIN: HCPCS | Performed by: NURSE PRACTITIONER

## 2020-02-10 PROCEDURE — 1036F TOBACCO NON-USER: CPT | Performed by: NURSE PRACTITIONER

## 2020-02-10 RX ORDER — LORAZEPAM 0.5 MG/1
0.5 TABLET ORAL 3 TIMES DAILY PRN
Qty: 90 TABLET | Refills: 2 | Status: SHIPPED | OUTPATIENT
Start: 2020-02-10 | End: 2020-05-28 | Stop reason: SDUPTHER

## 2020-02-10 ASSESSMENT — ENCOUNTER SYMPTOMS
CONSTIPATION: 1
RESPIRATORY NEGATIVE: 1
DIARRHEA: 1

## 2020-02-11 NOTE — PROGRESS NOTES
infarction) ([facility]     Prostate enlargement     Psychiatric problem     anxiety; bipolar disorder    Tachycardia         Current Outpatient Medications   Medication Sig Dispense Refill    LORazepam (ATIVAN) 0.5 MG tablet Take 1 tablet by mouth 3 times daily as needed for Anxiety for up to 30 days. 90 tablet 2    finasteride (PROSCAR) 5 MG tablet TAKE 1 TABLET EVERY NIGHT 90 tablet 3    lamoTRIgine (LAMICTAL) 150 MG tablet Take 1 tablet by mouth daily 90 tablet 1    pantoprazole (PROTONIX) 40 MG tablet TAKE 1 TABLET EVERY DAY 90 tablet 3    benazepril (LOTENSIN) 40 MG tablet TAKE 1 TABLET EVERY DAY 90 tablet 3    levothyroxine (SYNTHROID) 25 MCG tablet TAKE 1 TABLET EVERY DAY 90 tablet 3    amLODIPine (NORVASC) 5 MG tablet Take 1 tablet by mouth daily 90 tablet 4    pravastatin (PRAVACHOL) 20 MG tablet TAKE 1 TABLET EVERY DAY 90 tablet 4    tamsulosin (FLOMAX) 0.4 MG capsule Take 0.4 mg by mouth daily      apixaban (ELIQUIS) 2.5 MG TABS tablet Take 1 tablet by mouth 2 times daily 180 tablet 3    Omega-3 Fat Ac-Cholecalciferol (DRY EYE OMEGA BENEFITS/VIT D-3) 210-125 MG-UNIT CAPS Take 1 capsule by mouth daily      gabapentin (NEURONTIN) 300 MG capsule Take 1 capsule by mouth nightly for 360 days. . 90 capsule 3    melatonin 5 MG TABS tablet Take 5 mg by mouth daily as needed (Insomnia)      Magnesium 400 MG TABS Take 1 tablet by mouth daily       aspirin 81 MG chewable tablet Take 1 tablet by mouth daily 30 tablet 3    polyethylene glycol (GLYCOLAX) powder Take 17 g by mouth daily as needed      nitroGLYCERIN (NITROSTAT) 0.4 MG SL tablet Place 1 tablet under the tongue every 5 minutes as needed for Chest pain (Patient not taking: Reported on 2/10/2020) 25 tablet 3    triamcinolone (KENALOG) 0.1 % cream Apply to affected areas twice daily (Patient not taking: Reported on 2/10/2020) 45 g 1     No current facility-administered medications for this visit.             Review of Systems   Constitutional: Negative. Respiratory: Negative. Cardiovascular: Negative. Gastrointestinal: Positive for constipation and diarrhea. Genitourinary: Positive for frequency. Negative for dysuria and hematuria. Musculoskeletal: Negative. Psychiatric/Behavioral: Positive for sleep disturbance. The patient is nervous/anxious. OBJECTIVE:  Physical Exam  Constitutional:       General: He is not in acute distress. Appearance: He is well-developed. He is not diaphoretic. HENT:      Head: Normocephalic and atraumatic. Eyes:      General: No scleral icterus. Conjunctiva/sclera: Conjunctivae normal.   Neck:      Musculoskeletal: Neck supple. Vascular: No JVD. Cardiovascular:      Rate and Rhythm: Normal rate and regular rhythm. Heart sounds: Murmur present. Pulmonary:      Effort: Pulmonary effort is normal. No respiratory distress. Abdominal:      General: There is no distension. Palpations: Abdomen is soft. Tenderness: There is no abdominal tenderness. There is no guarding. Musculoskeletal: Normal range of motion. Skin:     General: Skin is dry. Neurological:      Mental Status: He is alert and oriented to person, place, and time. Psychiatric:         Mood and Affect: Mood is anxious. Behavior: Behavior normal.      Comments: Flat affect        /62   Pulse 66   Wt 127 lb (57.6 kg)   BMI 20.50 kg/m²      PHQ Scores 10/1/2019 4/29/2019 12/6/2017   PHQ2 Score 0 0 0   PHQ9 Score 0 3 4     Interpretation of Total Score Depression Severity: 1-4 = Minimal depression, 5-9 = Mild depression, 10-14 = Moderate depression, 15-19 = Moderately severe depression, 20-27 =Severe depression        ASSESSMENT/PLAN:  Anju Beckwith was seen today for check-up. Diagnoses and all orders for this visit:    Anxiety disorder, unspecified type  -     LORazepam (ATIVAN) 0.5 MG tablet; Take 1 tablet by mouth 3 times daily as needed for Anxiety for up to 30 days.     Encounter for drug

## 2020-02-13 LAB
6-ACETYLMORPHINE: NOT DETECTED
7-AMINOCLONAZEPAM: NOT DETECTED
ALPHA-OH-ALPRAZOLAM: NOT DETECTED
ALPRAZOLAM: NOT DETECTED
AMPHETAMINE: NOT DETECTED
BARBITURATES: NOT DETECTED
BENZOYLECGONINE: NOT DETECTED
BUPRENORPHINE: NOT DETECTED
CARISOPRODOL: NOT DETECTED
CLONAZEPAM: NOT DETECTED
CODEINE: NOT DETECTED
CREATININE URINE: 207.5 MG/DL (ref 20–400)
DIAZEPAM: NOT DETECTED
DRUGS EXPECTED: NORMAL
EER PAIN MGT DRUG PANEL, HIGH RES/EMIT U: NORMAL
ETHYL GLUCURONIDE: NOT DETECTED
FENTANYL: NOT DETECTED
HYDROCODONE: NOT DETECTED
HYDROMORPHONE: NOT DETECTED
LORAZEPAM: PRESENT
MARIJUANA METABOLITE: NOT DETECTED
MDA: NOT DETECTED
MDEA: NOT DETECTED
MDMA URINE: NOT DETECTED
MEPERIDINE: NOT DETECTED
METHADONE: NOT DETECTED
METHAMPHETAMINE: NOT DETECTED
METHYLPHENIDATE: NOT DETECTED
MIDAZOLAM: NOT DETECTED
MORPHINE: NOT DETECTED
NORBUPRENORPHINE, FREE: NOT DETECTED
NORDIAZEPAM: NOT DETECTED
NORFENTANYL: NOT DETECTED
NORHYDROCODONE, URINE: NOT DETECTED
NOROXYCODONE: NOT DETECTED
NOROXYMORPHONE, URINE: NOT DETECTED
OXAZEPAM: NOT DETECTED
OXYCODONE: NOT DETECTED
OXYMORPHONE: NOT DETECTED
PAIN MANAGEMENT DRUG PANEL: NORMAL
PAIN MANAGEMENT DRUG PANEL: NORMAL
PCP: NOT DETECTED
PHENTERMINE: NOT DETECTED
PROPOXYPHENE: NOT DETECTED
TAPENTADOL, URINE: NOT DETECTED
TAPENTADOL-O-SULFATE, URINE: NOT DETECTED
TEMAZEPAM: NOT DETECTED
TRAMADOL: NOT DETECTED
ZOLPIDEM: NOT DETECTED

## 2020-02-20 RX ORDER — GABAPENTIN 300 MG/1
CAPSULE ORAL
Qty: 90 CAPSULE | Refills: 3 | Status: SHIPPED | OUTPATIENT
Start: 2020-02-20 | End: 2020-03-02 | Stop reason: SDUPTHER

## 2020-02-24 ENCOUNTER — OFFICE VISIT (OUTPATIENT)
Dept: INTERNAL MEDICINE CLINIC | Age: 85
End: 2020-02-24
Payer: MEDICARE

## 2020-02-24 ENCOUNTER — NURSE TRIAGE (OUTPATIENT)
Dept: OTHER | Facility: CLINIC | Age: 85
End: 2020-02-24

## 2020-02-24 VITALS
HEIGHT: 66 IN | WEIGHT: 127 LBS | DIASTOLIC BLOOD PRESSURE: 68 MMHG | BODY MASS INDEX: 20.41 KG/M2 | HEART RATE: 68 BPM | SYSTOLIC BLOOD PRESSURE: 116 MMHG

## 2020-02-24 LAB
ALBUMIN SERPL-MCNC: 4.1 G/DL (ref 3.4–5)
ANION GAP SERPL CALCULATED.3IONS-SCNC: 11 MMOL/L (ref 3–16)
BUN BLDV-MCNC: 19 MG/DL (ref 7–20)
CALCIUM SERPL-MCNC: 9.2 MG/DL (ref 8.3–10.6)
CHLORIDE BLD-SCNC: 103 MMOL/L (ref 99–110)
CO2: 31 MMOL/L (ref 21–32)
CREAT SERPL-MCNC: 1 MG/DL (ref 0.8–1.3)
GFR AFRICAN AMERICAN: >60
GFR NON-AFRICAN AMERICAN: >60
GLUCOSE BLD-MCNC: 109 MG/DL (ref 70–99)
PHOSPHORUS: 3.8 MG/DL (ref 2.5–4.9)
POTASSIUM SERPL-SCNC: 4 MMOL/L (ref 3.5–5.1)
PRO-BNP: 248 PG/ML (ref 0–449)
SODIUM BLD-SCNC: 145 MMOL/L (ref 136–145)
TSH REFLEX FT4: 2.38 UIU/ML (ref 0.27–4.2)

## 2020-02-24 PROCEDURE — 1123F ACP DISCUSS/DSCN MKR DOCD: CPT | Performed by: NURSE PRACTITIONER

## 2020-02-24 PROCEDURE — G8427 DOCREV CUR MEDS BY ELIG CLIN: HCPCS | Performed by: NURSE PRACTITIONER

## 2020-02-24 PROCEDURE — G8420 CALC BMI NORM PARAMETERS: HCPCS | Performed by: NURSE PRACTITIONER

## 2020-02-24 PROCEDURE — 1036F TOBACCO NON-USER: CPT | Performed by: NURSE PRACTITIONER

## 2020-02-24 PROCEDURE — 99214 OFFICE O/P EST MOD 30 MIN: CPT | Performed by: NURSE PRACTITIONER

## 2020-02-24 PROCEDURE — G8482 FLU IMMUNIZE ORDER/ADMIN: HCPCS | Performed by: NURSE PRACTITIONER

## 2020-02-24 PROCEDURE — 4040F PNEUMOC VAC/ADMIN/RCVD: CPT | Performed by: NURSE PRACTITIONER

## 2020-02-24 RX ORDER — CEPHALEXIN 500 MG/1
500 CAPSULE ORAL 2 TIMES DAILY
Qty: 14 CAPSULE | Refills: 0 | Status: SHIPPED | OUTPATIENT
Start: 2020-02-24 | End: 2020-03-02

## 2020-02-24 ASSESSMENT — ENCOUNTER SYMPTOMS
SHORTNESS OF BREATH: 0
GASTROINTESTINAL NEGATIVE: 1
CHEST TIGHTNESS: 0
WHEEZING: 0
ALLERGIC/IMMUNOLOGIC NEGATIVE: 1
COLOR CHANGE: 1
COUGH: 0
EYES NEGATIVE: 1

## 2020-02-24 NOTE — PROGRESS NOTES
2/24/20     Chief Complaint   Patient presents with    Foot Swelling     Pt c/o both feet swelling transfer towards ankle  x2days      HPI     Patient noticed on Saturday he had bilateral lower leg, ankle and foot swelling. He lives in an assisted living facility  Told a nurse at the facility who states that she thought it looked like cellulitis and that he should see his PCP  He also states that he lower legs and ankles appear red and are warm  Denies numbness or tingling, fever, SOB, chest pain, palpitations  He has chronic small scabs to bilateral lower extremities    He has a history of A. Fib and is anticoagulated on Eliquis. Patient is also taking aspirin daily. Patient denies personal history of DVT. Denies changes in medications      Allergies   Allergen Reactions    Lasix [Furosemide] Other (See Comments)     Pt passed out     Zyprexa [Olanzapine]      Pacing.  Citalopram Anxiety     сергей     Current Outpatient Medications   Medication Sig Dispense Refill    cephALEXin (KEFLEX) 500 MG capsule Take 1 capsule by mouth 2 times daily for 7 days 14 capsule 0    gabapentin (NEURONTIN) 300 MG capsule TAKE 1 CAPSULE  NIGHTLY  FOR  360  DAYS 90 capsule 3    LORazepam (ATIVAN) 0.5 MG tablet Take 1 tablet by mouth 3 times daily as needed for Anxiety for up to 30 days.  90 tablet 2    finasteride (PROSCAR) 5 MG tablet TAKE 1 TABLET EVERY NIGHT 90 tablet 3    lamoTRIgine (LAMICTAL) 150 MG tablet Take 1 tablet by mouth daily 90 tablet 1    pantoprazole (PROTONIX) 40 MG tablet TAKE 1 TABLET EVERY DAY 90 tablet 3    benazepril (LOTENSIN) 40 MG tablet TAKE 1 TABLET EVERY DAY 90 tablet 3    levothyroxine (SYNTHROID) 25 MCG tablet TAKE 1 TABLET EVERY DAY 90 tablet 3    amLODIPine (NORVASC) 5 MG tablet Take 1 tablet by mouth daily 90 tablet 4    pravastatin (PRAVACHOL) 20 MG tablet TAKE 1 TABLET EVERY DAY 90 tablet 4    tamsulosin (FLOMAX) 0.4 MG capsule Take 0.4 mg by mouth daily      apixaban (ELIQUIS) 2.5 MG TABS tablet Take 1 tablet by mouth 2 times daily 180 tablet 3    Omega-3 Fat Ac-Cholecalciferol (DRY EYE OMEGA BENEFITS/VIT D-3) 162-819 MG-UNIT CAPS Take 1 capsule by mouth daily      polyethylene glycol (GLYCOLAX) powder Take 17 g by mouth daily as needed      melatonin 5 MG TABS tablet Take 5 mg by mouth daily as needed (Insomnia)      triamcinolone (KENALOG) 0.1 % cream Apply to affected areas twice daily 45 g 1    Magnesium 400 MG TABS Take 1 tablet by mouth daily       aspirin 81 MG chewable tablet Take 1 tablet by mouth daily 30 tablet 3    nitroGLYCERIN (NITROSTAT) 0.4 MG SL tablet Place 1 tablet under the tongue every 5 minutes as needed for Chest pain (Patient not taking: Reported on 2/10/2020) 25 tablet 3     No current facility-administered medications for this visit. Review of Systems   Constitutional: Negative for chills, diaphoresis and fever. HENT: Negative. Eyes: Negative. Respiratory: Negative for cough, chest tightness, shortness of breath and wheezing. Cardiovascular: Positive for leg swelling (bilateral). Negative for chest pain and palpitations. Gastrointestinal: Negative. Endocrine: Negative. Genitourinary: Negative. Musculoskeletal: Negative. Skin: Positive for color change and wound. Negative for rash. Allergic/Immunologic: Negative. Neurological: Negative for dizziness, syncope, weakness, light-headedness and headaches. Hematological: Negative. Psychiatric/Behavioral: Negative. Vitals:    02/24/20 1243   BP: 116/68   Site: Right Upper Arm   Position: Sitting   Cuff Size: Medium Adult   Pulse: 68   Weight: 127 lb (57.6 kg)   Height: 5' 6\" (1.676 m)      Physical Exam  Vitals signs reviewed. Constitutional:       General: He is not in acute distress. Appearance: Normal appearance. He is not ill-appearing. HENT:      Head: Normocephalic and atraumatic. Neck:      Musculoskeletal: Normal range of motion. Cardiovascular:      Rate and Rhythm: Normal rate and regular rhythm. Heart sounds: Normal heart sounds. No murmur. Pulmonary:      Effort: Pulmonary effort is normal. No respiratory distress. Breath sounds: Normal breath sounds. No wheezing or rhonchi. Abdominal:      General: Bowel sounds are normal.      Palpations: Abdomen is soft. Musculoskeletal: Normal range of motion. Right lower leg: Edema present. Left lower leg: Edema present. Skin:     General: Skin is warm and dry. Capillary Refill: Capillary refill takes less than 2 seconds. Findings: Erythema (bilat LEs with erythema, warmth, a few small scabs without drainage) present. Neurological:      General: No focal deficit present. Mental Status: He is alert and oriented to person, place, and time. Psychiatric:         Mood and Affect: Mood normal.         Behavior: Behavior normal.       Assessment/Plan     1. Bilateral lower extremity edema  Stable, uncontrolled and new  Discussed DD with patient and daughter-given no provoking factors, swelling is bilateral and patient is on anticoagulation risk of blood clot is low. Denies any shortness of breath we will check blood work. Exam consistent with cellulitis given redness and warmth. - Renal Function Panel; Future  - BRAIN NATRIURETIC PEPTIDE (BNP); Future  - TSH WITH REFLEX TO FT4; Future  - Use KAMI hose or ACE bandage on lower extremities -to help increase circulation  - Elevate legs   Patient does not tolerate Lasix    2. Cellulitis of lower extremity, unspecified laterality  Stable, uncontrolled  - Covering with antibiotics  - cephALEXin (KEFLEX) 500 MG capsule; Take 1 capsule by mouth 2 times daily for 7 days  Dispense: 14 capsule; Refill: 0    3. Hypothyroidism, unspecified type  Stable, checking labs  - TSH WITH REFLEX TO FT4; Future    Discussed medications with patient, who voiced understanding of their use and indications.  All questions

## 2020-02-26 ENCOUNTER — TELEPHONE (OUTPATIENT)
Dept: INTERNAL MEDICINE CLINIC | Age: 85
End: 2020-02-26

## 2020-02-27 RX ORDER — GREEN TEA/HOODIA GORDONII 315-12.5MG
1 CAPSULE ORAL 2 TIMES DAILY
Qty: 28 TABLET | Refills: 0 | Status: SHIPPED | OUTPATIENT
Start: 2020-02-27 | End: 2020-02-28 | Stop reason: SDUPTHER

## 2020-02-27 NOTE — TELEPHONE ENCOUNTER
Perhaps we can try to call them back during business hours? We could also reach out to the patient's daughter and see if she knows what this is about.

## 2020-02-27 NOTE — TELEPHONE ENCOUNTER
sameera from [facility] is requesting script for lactobacillus d/t abx use. Requesting script faxed to [phone].

## 2020-02-27 NOTE — TELEPHONE ENCOUNTER
I received a call (covering as an on-call provider) from pt's assisted living. I was unable to understand the message that was left on the VM. I was able to understand that they were questioning a fax number regarding a refill. I attempted to call the number back 3x with no answer. LVM. Will forward to PCP.     Electronically signed by: MARIS Kidd CNP 02/26/20

## 2020-02-27 NOTE — TELEPHONE ENCOUNTER
Spoke with Mark Rincon she states she thinks it might have been in regards to the dose of the Keflex. She spoke to the [facility] as well last night. She states she thinks they got it figured out but will call if not.

## 2020-02-28 RX ORDER — GREEN TEA/HOODIA GORDONII 315-12.5MG
1 CAPSULE ORAL 2 TIMES DAILY
Qty: 28 TABLET | Refills: 0 | Status: SHIPPED | OUTPATIENT
Start: 2020-02-28 | End: 2020-03-13

## 2020-03-02 ENCOUNTER — OFFICE VISIT (OUTPATIENT)
Dept: INTERNAL MEDICINE CLINIC | Age: 85
End: 2020-03-02
Payer: MEDICARE

## 2020-03-02 VITALS — HEART RATE: 66 BPM | DIASTOLIC BLOOD PRESSURE: 56 MMHG | SYSTOLIC BLOOD PRESSURE: 120 MMHG

## 2020-03-02 PROCEDURE — 99213 OFFICE O/P EST LOW 20 MIN: CPT | Performed by: NURSE PRACTITIONER

## 2020-03-02 PROCEDURE — G8427 DOCREV CUR MEDS BY ELIG CLIN: HCPCS | Performed by: NURSE PRACTITIONER

## 2020-03-02 PROCEDURE — 1036F TOBACCO NON-USER: CPT | Performed by: NURSE PRACTITIONER

## 2020-03-02 PROCEDURE — 1123F ACP DISCUSS/DSCN MKR DOCD: CPT | Performed by: NURSE PRACTITIONER

## 2020-03-02 PROCEDURE — 4040F PNEUMOC VAC/ADMIN/RCVD: CPT | Performed by: NURSE PRACTITIONER

## 2020-03-02 PROCEDURE — G8482 FLU IMMUNIZE ORDER/ADMIN: HCPCS | Performed by: NURSE PRACTITIONER

## 2020-03-02 PROCEDURE — G8420 CALC BMI NORM PARAMETERS: HCPCS | Performed by: NURSE PRACTITIONER

## 2020-03-02 RX ORDER — GABAPENTIN 300 MG/1
300 CAPSULE ORAL NIGHTLY
Qty: 90 CAPSULE | Refills: 3 | Status: ON HOLD | OUTPATIENT
Start: 2020-03-02 | End: 2020-06-06 | Stop reason: HOSPADM

## 2020-03-03 ASSESSMENT — ENCOUNTER SYMPTOMS: SHORTNESS OF BREATH: 0

## 2020-03-03 NOTE — PROGRESS NOTES
SUBJECTIVE:    Patient ID: Ronny Centeno is a 80 y.o. male. CC: Edema follow-up    HPI: The patient presents to the office today accompanied by his daughter for edema follow-up. He was seen last week by 1 of my partners for lower extremity edema. He has a relative intolerance to diuretics so compression was recommended. Blood work was also obtained which was largely unremarkable. Today, the patient reports compliance with compression hose. He reports improvement of his edema. Past Medical History:   Diagnosis Date    Arthritis     Blepharitis of both eyes     CAD (coronary artery disease)     stents 2005, x3    GERD (gastroesophageal reflux disease)     Gout     Hyperlipidemia     Hypertension     Macular degeneration     NSTEMI (non-ST elevated myocardial infarction) (HCC)     Prostate enlargement     Psychiatric problem     anxiety; bipolar disorder    Tachycardia         Current Outpatient Medications   Medication Sig Dispense Refill    gabapentin (NEURONTIN) 300 MG capsule Take 1 capsule by mouth nightly. 90 capsule 3    Lactobacillus (PROBIOTIC ACIDOPHILUS) TABS Take 1 tablet by mouth 2 times daily for 14 days 28 tablet 0    LORazepam (ATIVAN) 0.5 MG tablet Take 1 tablet by mouth 3 times daily as needed for Anxiety for up to 30 days.  90 tablet 2    finasteride (PROSCAR) 5 MG tablet TAKE 1 TABLET EVERY NIGHT 90 tablet 3    lamoTRIgine (LAMICTAL) 150 MG tablet Take 1 tablet by mouth daily 90 tablet 1    pantoprazole (PROTONIX) 40 MG tablet TAKE 1 TABLET EVERY DAY 90 tablet 3    benazepril (LOTENSIN) 40 MG tablet TAKE 1 TABLET EVERY DAY 90 tablet 3    levothyroxine (SYNTHROID) 25 MCG tablet TAKE 1 TABLET EVERY DAY 90 tablet 3    amLODIPine (NORVASC) 5 MG tablet Take 1 tablet by mouth daily 90 tablet 4    pravastatin (PRAVACHOL) 20 MG tablet TAKE 1 TABLET EVERY DAY 90 tablet 4    tamsulosin (FLOMAX) 0.4 MG capsule Take 0.4 mg by mouth daily      apixaban (ELIQUIS) 2.5 MG TABS tablet Take 1 tablet by mouth 2 times daily 180 tablet 3    Omega-3 Fat Ac-Cholecalciferol (DRY EYE OMEGA BENEFITS/VIT D-3) 616-791 MG-UNIT CAPS Take 1 capsule by mouth daily      polyethylene glycol (GLYCOLAX) powder Take 17 g by mouth daily as needed      nitroGLYCERIN (NITROSTAT) 0.4 MG SL tablet Place 1 tablet under the tongue every 5 minutes as needed for Chest pain 25 tablet 3    melatonin 5 MG TABS tablet Take 5 mg by mouth daily as needed (Insomnia)      triamcinolone (KENALOG) 0.1 % cream Apply to affected areas twice daily 45 g 1    Magnesium 400 MG TABS Take 1 tablet by mouth daily       aspirin 81 MG chewable tablet Take 1 tablet by mouth daily 30 tablet 3     No current facility-administered medications for this visit. Review of Systems   Respiratory: Negative for shortness of breath. Cardiovascular: Positive for leg swelling. Negative for chest pain. OBJECTIVE:  Physical Exam  Constitutional:       Appearance: Normal appearance. HENT:      Head: Normocephalic and atraumatic. Musculoskeletal:      Comments: 1+ right lower extremity edema, 2+ left lower extremity edema   Neurological:      General: No focal deficit present. Mental Status: He is alert. Psychiatric:         Mood and Affect: Affect is flat. Behavior: Behavior normal.        BP (!) 120/56   Pulse 66      PHQ Scores 10/1/2019 4/29/2019 12/6/2017   PHQ2 Score 0 0 0   PHQ9 Score 0 3 4     Interpretation of Total Score Depression Severity: 1-4 = Minimal depression, 5-9 = Mild depression, 10-14 = Moderate depression, 15-19 = Moderately severe depression, 20-27 =Severe depression        ASSESSMENT/PLAN:  Padmini Decker was seen today for follow-up. Diagnoses and all orders for this visit:    Bilateral lower extremity edema  -Labs stable  -Improved with compression, continue current regimen    RLS (restless legs syndrome)  -     gabapentin (NEURONTIN) 300 MG capsule;  Take 1 capsule by mouth nightly. Psychophysiological insomnia  -     gabapentin (NEURONTIN) 300 MG capsule; Take 1 capsule by mouth nightly.       MARIS Carlos - CNP

## 2020-05-01 ENCOUNTER — TELEPHONE (OUTPATIENT)
Dept: CARDIOLOGY CLINIC | Age: 85
End: 2020-05-01

## 2020-05-01 NOTE — TELEPHONE ENCOUNTER
Medication Refill    Medication needing refilled:  apixaban (ELIQUIS) 2.5 MG TABS- PT IS GETTING LOW       Doseage of the medication:    How are you taking this medication (QD, BID, TID, QID, PRN): 1 tablet by mouth 2 times daily    30 or 90 day supply called in: 90 day supply    Which Pharmacy are we sending the medication to?: [facility] [phone] - F [phone]

## 2020-05-11 ENCOUNTER — APPOINTMENT (OUTPATIENT)
Dept: CT IMAGING | Age: 85
End: 2020-05-11
Payer: MEDICARE

## 2020-05-11 ENCOUNTER — HOSPITAL ENCOUNTER (EMERGENCY)
Age: 85
Discharge: HOME | End: 2020-05-11
Payer: MEDICARE

## 2020-05-11 ENCOUNTER — TELEPHONE (OUTPATIENT)
Dept: INTERNAL MEDICINE CLINIC | Age: 85
End: 2020-05-11

## 2020-05-11 VITALS
DIASTOLIC BLOOD PRESSURE: 77 MMHG | HEART RATE: 55 BPM | RESPIRATION RATE: 16 BRPM | BODY MASS INDEX: 20.41 KG/M2 | HEIGHT: 66 IN | WEIGHT: 127 LBS | TEMPERATURE: 98 F | OXYGEN SATURATION: 96 % | SYSTOLIC BLOOD PRESSURE: 140 MMHG

## 2020-05-11 PROCEDURE — 70450 CT HEAD/BRAIN W/O DYE: CPT

## 2020-05-11 PROCEDURE — 99284 EMERGENCY DEPT VISIT MOD MDM: CPT

## 2020-05-11 PROCEDURE — 72125 CT NECK SPINE W/O DYE: CPT

## 2020-05-11 ASSESSMENT — ENCOUNTER SYMPTOMS
ABDOMINAL PAIN: 0
NAUSEA: 0
CHEST TIGHTNESS: 0
VOMITING: 0
DIARRHEA: 0
SHORTNESS OF BREATH: 0

## 2020-05-11 NOTE — ED PROVIDER NOTES
for wound. All other systems reviewed and are negative. Positives and Pertinent negatives as per HPI. Except as noted above in the ROS, all other systems were reviewed and negative.        PAST MEDICAL HISTORY     Past Medical History:   Diagnosis Date    Arthritis     Blepharitis of both eyes     CAD (coronary artery disease)     stents 2005, x3    GERD (gastroesophageal reflux disease)     Gout     Hyperlipidemia     Hypertension     Macular degeneration     NSTEMI (non-ST elevated myocardial infarction) ([facility] [address]     Prostate enlargement     Psychiatric problem     anxiety; bipolar disorder    Tachycardia          SURGICAL HISTORY     Past Surgical History:   Procedure Laterality Date    CARDIAC SURGERY      CATARACT REMOVAL  1993    X5    COLONOSCOPY      CORONARY ANGIOPLASTY WITH STENT PLACEMENT  2005      X3    [facility]      [facility]    EYE SURGERY      KIDNEY STONE SURGERY  1980    SKIN BIOPSY      melanoma, basal    SKIN CANCER DESTRUCTION  2006    Melanoma  ()    TURP  2003         [address]       Discharge Medication List as of 5/11/2020  8:48 PM      CONTINUE these medications which have NOT CHANGED    Details   apixaban (ELIQUIS) 2.5 MG TABS tablet Take 1 tablet by mouth 2 times daily, Disp-180 tablet, R-3Normal      gabapentin (NEURONTIN) 300 MG capsule Take 1 capsule by mouth nightly., Disp-90 capsule, R-3Normal      finasteride (PROSCAR) 5 MG tablet TAKE 1 TABLET EVERY NIGHT, Disp-90 tablet, R-3Normal      lamoTRIgine (LAMICTAL) 150 MG tablet Take 1 tablet by mouth daily, Disp-90 tablet, R-1Normal      pantoprazole (PROTONIX) 40 MG tablet TAKE 1 TABLET EVERY DAY, Disp-90 tablet, R-3Normal      benazepril (LOTENSIN) 40 MG tablet TAKE 1 TABLET EVERY DAY, Disp-90 tablet, R-3Normal      levothyroxine (SYNTHROID) 25 MCG tablet TAKE 1 TABLET EVERY DAY, Disp-90 tablet, R-3Normal      amLODIPine (NORVASC) 5 MG tablet Take 1 tablet by mouth daily, Disp-90 tablet, R-4Normal      pravastatin (PRAVACHOL) 20 MG tablet TAKE 1 TABLET EVERY DAY, Disp-90 tablet, R-4Normal      tamsulosin (FLOMAX) 0.4 MG capsule Take 0.4 mg by mouth dailyHistorical Med      Omega-3 Fat Ac-Cholecalciferol (DRY EYE OMEGA BENEFITS/VIT D-3) 972-240 MG-UNIT CAPS Take 1 capsule by mouth dailyHistorical Med      polyethylene glycol (GLYCOLAX) powder Take 17 g by mouth daily as neededHistorical Med      nitroGLYCERIN (NITROSTAT) 0.4 MG SL tablet Place 1 tablet under the tongue every 5 minutes as needed for Chest pain, Disp-25 tablet, R-3Normal      melatonin 5 MG TABS tablet Take 5 mg by mouth daily as needed (Insomnia)Historical Med      triamcinolone (KENALOG) 0.1 % cream Apply to affected areas twice daily, Disp-45 g, R-1, Normal      Magnesium 400 MG TABS Take 1 tablet by mouth daily Historical Med      aspirin 81 MG chewable tablet Take 1 tablet by mouth daily, Disp-30 tablet, R-3               ALLERGIES     Lasix [furosemide]; Zyprexa [olanzapine]; and Citalopram    FAMILYHISTORY       Family History   Problem Relation Age of Onset    Heart Disease Mother     Heart Disease Brother     High Blood Pressure Neg Hx     High Cholesterol Neg Hx     Mental Illness Neg Hx           SOCIAL HISTORY       Social History     Tobacco Use    Smoking status: Never Smoker    Smokeless tobacco: Never Used   Substance Use Topics    Alcohol use: No    Drug use: No       SCREENINGS             PHYSICAL EXAM    (up to 7 for level 4, 8 or more for level 5)     ED Triage Vitals [05/11/20 1806]   BP Temp Temp Source Pulse Resp SpO2 Height Weight   132/72 98 °F (36.7 °C) Oral 90 16 99 % 5' 6\" (1.676 m) 127 lb (57.6 kg)       Physical Exam  Vitals signs and nursing note reviewed. Constitutional:       Appearance: He is well-developed. He is not diaphoretic. HENT:      Head: Normocephalic and atraumatic.       Right Ear: External ear normal.      Left Ear: External ear normal.

## 2020-05-11 NOTE — ED NOTES
Spoke with patient's daughter and updated her on plan of care      Robbie Francisco RN  05/11/20 5847

## 2020-05-12 ENCOUNTER — TELEPHONE (OUTPATIENT)
Dept: INTERNAL MEDICINE CLINIC | Age: 85
End: 2020-05-12

## 2020-05-12 NOTE — ED NOTES
Spoke with patient's daughter and updated her on results and departure time for patient to go back to [facility]     Concepción Newby RN  05/11/20 2046 Spoke with the mother, baby eating 2 ounces or so of formula, then spitting up if mom offers her a bit more within 30-60 minutes of the first feeding.  Infant takes about 30-40 minutes to eat, mom burps her once after she is finished feeding.  She usually just spits up but occasionally has a projectile emesis.  She has been voiding and stooling well, weight today 4#15oz so about -4% from birth weight.    Discussed need for frequent burping and keeping upright after feeds.  Will try Enfamil AR, WIC form filled out and available for , mom will  tomorrow at the peds desk.  RTC or call sooner as needed, mom is aware of when to call.

## 2020-05-26 ENCOUNTER — TELEPHONE (OUTPATIENT)
Dept: INTERNAL MEDICINE CLINIC | Age: 85
End: 2020-05-26

## 2020-05-27 ENCOUNTER — TELEPHONE (OUTPATIENT)
Dept: INTERNAL MEDICINE CLINIC | Age: 85
End: 2020-05-27

## 2020-05-27 DIAGNOSIS — F41.9 ANXIETY DISORDER, UNSPECIFIED TYPE: ICD-10-CM

## 2020-05-28 RX ORDER — LORAZEPAM 0.5 MG/1
0.5 TABLET ORAL 3 TIMES DAILY PRN
Qty: 90 TABLET | Refills: 2 | Status: ON HOLD | OUTPATIENT
Start: 2020-05-28 | End: 2020-06-06 | Stop reason: HOSPADM

## 2020-05-28 RX ORDER — LORAZEPAM 0.5 MG/1
0.5 TABLET ORAL 3 TIMES DAILY PRN
Qty: 90 TABLET | Refills: 2 | Status: CANCELLED | OUTPATIENT
Start: 2020-05-28 | End: 2020-06-27

## 2020-05-31 ENCOUNTER — APPOINTMENT (OUTPATIENT)
Dept: CT IMAGING | Age: 85
DRG: 470 | End: 2020-05-31
Payer: MEDICARE

## 2020-05-31 ENCOUNTER — HOSPITAL ENCOUNTER (INPATIENT)
Age: 85
LOS: 8 days | Discharge: NURSING, REHAB OR LONG-TERM CARE | DRG: 470 | End: 2020-06-08
Attending: INTERNAL MEDICINE | Admitting: INTERNAL MEDICINE
Payer: MEDICARE

## 2020-05-31 ENCOUNTER — APPOINTMENT (OUTPATIENT)
Dept: GENERAL RADIOLOGY | Age: 85
DRG: 470 | End: 2020-05-31
Payer: MEDICARE

## 2020-05-31 PROBLEM — S72.012A SUBCAPITAL FRACTURE OF FEMUR, LEFT, CLOSED, INITIAL ENCOUNTER (HCC): Status: ACTIVE | Noted: 2020-05-31

## 2020-05-31 LAB
A/G RATIO: 1.5 (ref 1.1–2.2)
ALBUMIN SERPL-MCNC: 4 G/DL (ref 3.4–5)
ALP BLD-CCNC: 115 U/L (ref 40–129)
ALT SERPL-CCNC: 17 U/L (ref 10–40)
ANION GAP SERPL CALCULATED.3IONS-SCNC: 9 MMOL/L (ref 3–16)
AST SERPL-CCNC: 23 U/L (ref 15–37)
BASOPHILS ABSOLUTE: 0 K/UL (ref 0–0.2)
BASOPHILS RELATIVE PERCENT: 0.7 %
BILIRUB SERPL-MCNC: 0.5 MG/DL (ref 0–1)
BUN BLDV-MCNC: 16 MG/DL (ref 7–20)
CALCIUM SERPL-MCNC: 9.4 MG/DL (ref 8.3–10.6)
CHLORIDE BLD-SCNC: 101 MMOL/L (ref 99–110)
CO2: 29 MMOL/L (ref 21–32)
CREAT SERPL-MCNC: 0.9 MG/DL (ref 0.8–1.3)
EOSINOPHILS ABSOLUTE: 0.1 K/UL (ref 0–0.6)
EOSINOPHILS RELATIVE PERCENT: 2 %
GFR AFRICAN AMERICAN: >60
GFR NON-AFRICAN AMERICAN: >60
GLOBULIN: 2.6 G/DL
GLUCOSE BLD-MCNC: 97 MG/DL (ref 70–99)
HCT VFR BLD CALC: 29.9 % (ref 40.5–52.5)
HEMOGLOBIN: 10 G/DL (ref 13.5–17.5)
INR BLD: 1.33 (ref 0.86–1.14)
LYMPHOCYTES ABSOLUTE: 1 K/UL (ref 1–5.1)
LYMPHOCYTES RELATIVE PERCENT: 17.5 %
MCH RBC QN AUTO: 33.2 PG (ref 26–34)
MCHC RBC AUTO-ENTMCNC: 33.4 G/DL (ref 31–36)
MCV RBC AUTO: 99.2 FL (ref 80–100)
MONOCYTES ABSOLUTE: 0.5 K/UL (ref 0–1.3)
MONOCYTES RELATIVE PERCENT: 8.3 %
NEUTROPHILS ABSOLUTE: 4.1 K/UL (ref 1.7–7.7)
NEUTROPHILS RELATIVE PERCENT: 71.5 %
PDW BLD-RTO: 13.8 % (ref 12.4–15.4)
PLATELET # BLD: 194 K/UL (ref 135–450)
PMV BLD AUTO: 8.1 FL (ref 5–10.5)
POTASSIUM REFLEX MAGNESIUM: 4.3 MMOL/L (ref 3.5–5.1)
PROTHROMBIN TIME: 15.5 SEC (ref 10–13.2)
RBC # BLD: 3.01 M/UL (ref 4.2–5.9)
SODIUM BLD-SCNC: 139 MMOL/L (ref 136–145)
TOTAL PROTEIN: 6.6 G/DL (ref 6.4–8.2)
WBC # BLD: 5.7 K/UL (ref 4–11)

## 2020-05-31 PROCEDURE — 73610 X-RAY EXAM OF ANKLE: CPT

## 2020-05-31 PROCEDURE — 96375 TX/PRO/DX INJ NEW DRUG ADDON: CPT

## 2020-05-31 PROCEDURE — 85610 PROTHROMBIN TIME: CPT

## 2020-05-31 PROCEDURE — 70450 CT HEAD/BRAIN W/O DYE: CPT

## 2020-05-31 PROCEDURE — 85025 COMPLETE CBC W/AUTO DIFF WBC: CPT

## 2020-05-31 PROCEDURE — 96374 THER/PROPH/DIAG INJ IV PUSH: CPT

## 2020-05-31 PROCEDURE — 99285 EMERGENCY DEPT VISIT HI MDM: CPT

## 2020-05-31 PROCEDURE — 2580000003 HC RX 258: Performed by: INTERNAL MEDICINE

## 2020-05-31 PROCEDURE — 6360000002 HC RX W HCPCS: Performed by: PHYSICIAN ASSISTANT

## 2020-05-31 PROCEDURE — 72125 CT NECK SPINE W/O DYE: CPT

## 2020-05-31 PROCEDURE — U0002 COVID-19 LAB TEST NON-CDC: HCPCS

## 2020-05-31 PROCEDURE — 71045 X-RAY EXAM CHEST 1 VIEW: CPT

## 2020-05-31 PROCEDURE — 36415 COLL VENOUS BLD VENIPUNCTURE: CPT

## 2020-05-31 PROCEDURE — 96376 TX/PRO/DX INJ SAME DRUG ADON: CPT

## 2020-05-31 PROCEDURE — 80053 COMPREHEN METABOLIC PANEL: CPT

## 2020-05-31 PROCEDURE — 73502 X-RAY EXAM HIP UNI 2-3 VIEWS: CPT

## 2020-05-31 PROCEDURE — 1200000000 HC SEMI PRIVATE

## 2020-05-31 PROCEDURE — 51702 INSERT TEMP BLADDER CATH: CPT

## 2020-05-31 PROCEDURE — U0003 INFECTIOUS AGENT DETECTION BY NUCLEIC ACID (DNA OR RNA); SEVERE ACUTE RESPIRATORY SYNDROME CORONAVIRUS 2 (SARS-COV-2) (CORONAVIRUS DISEASE [COVID-19]), AMPLIFIED PROBE TECHNIQUE, MAKING USE OF HIGH THROUGHPUT TECHNOLOGIES AS DESCRIBED BY CMS-2020-01-R: HCPCS

## 2020-05-31 PROCEDURE — 6360000002 HC RX W HCPCS

## 2020-05-31 RX ORDER — M-VIT,TX,IRON,MINS/CALC/FOLIC 27MG-0.4MG
1 TABLET ORAL DAILY
Status: DISCONTINUED | OUTPATIENT
Start: 2020-06-01 | End: 2020-06-08 | Stop reason: HOSPADM

## 2020-05-31 RX ORDER — TAMSULOSIN HYDROCHLORIDE 0.4 MG/1
0.4 CAPSULE ORAL DAILY
Status: DISCONTINUED | OUTPATIENT
Start: 2020-06-01 | End: 2020-06-08

## 2020-05-31 RX ORDER — PANTOPRAZOLE SODIUM 40 MG/1
40 TABLET, DELAYED RELEASE ORAL
Status: DISCONTINUED | OUTPATIENT
Start: 2020-06-01 | End: 2020-06-08 | Stop reason: HOSPADM

## 2020-05-31 RX ORDER — HYDROMORPHONE HYDROCHLORIDE 1 MG/ML
0.5 INJECTION, SOLUTION INTRAMUSCULAR; INTRAVENOUS; SUBCUTANEOUS EVERY 4 HOURS PRN
Status: DISCONTINUED | OUTPATIENT
Start: 2020-05-31 | End: 2020-06-04

## 2020-05-31 RX ORDER — MORPHINE SULFATE 4 MG/ML
4 INJECTION, SOLUTION INTRAMUSCULAR; INTRAVENOUS ONCE
Status: COMPLETED | OUTPATIENT
Start: 2020-05-31 | End: 2020-05-31

## 2020-05-31 RX ORDER — ONDANSETRON 2 MG/ML
4 INJECTION INTRAMUSCULAR; INTRAVENOUS EVERY 6 HOURS PRN
Status: DISCONTINUED | OUTPATIENT
Start: 2020-05-31 | End: 2020-06-08 | Stop reason: HOSPADM

## 2020-05-31 RX ORDER — FINASTERIDE 5 MG/1
5 TABLET, FILM COATED ORAL NIGHTLY
Status: DISCONTINUED | OUTPATIENT
Start: 2020-05-31 | End: 2020-06-08 | Stop reason: HOSPADM

## 2020-05-31 RX ORDER — LAMOTRIGINE 100 MG/1
150 TABLET ORAL DAILY
Status: DISCONTINUED | OUTPATIENT
Start: 2020-06-01 | End: 2020-06-08 | Stop reason: HOSPADM

## 2020-05-31 RX ORDER — ASPIRIN 81 MG/1
81 TABLET, CHEWABLE ORAL DAILY
Status: DISCONTINUED | OUTPATIENT
Start: 2020-06-01 | End: 2020-06-08 | Stop reason: HOSPADM

## 2020-05-31 RX ORDER — LORAZEPAM 0.5 MG/1
0.5 TABLET ORAL 3 TIMES DAILY PRN
Status: DISCONTINUED | OUTPATIENT
Start: 2020-05-31 | End: 2020-06-03

## 2020-05-31 RX ORDER — LEVOTHYROXINE SODIUM 0.03 MG/1
1 TABLET ORAL DAILY
Status: DISCONTINUED | OUTPATIENT
Start: 2020-06-01 | End: 2020-05-31 | Stop reason: SDUPTHER

## 2020-05-31 RX ORDER — LANOLIN ALCOHOL/MO/W.PET/CERES
400 CREAM (GRAM) TOPICAL DAILY
Status: DISCONTINUED | OUTPATIENT
Start: 2020-06-01 | End: 2020-06-08 | Stop reason: HOSPADM

## 2020-05-31 RX ORDER — PRAVASTATIN SODIUM 20 MG
20 TABLET ORAL NIGHTLY
Status: DISCONTINUED | OUTPATIENT
Start: 2020-05-31 | End: 2020-06-08 | Stop reason: HOSPADM

## 2020-05-31 RX ORDER — ONDANSETRON 2 MG/ML
4 INJECTION INTRAMUSCULAR; INTRAVENOUS ONCE
Status: COMPLETED | OUTPATIENT
Start: 2020-05-31 | End: 2020-05-31

## 2020-05-31 RX ORDER — LEVOTHYROXINE SODIUM 0.03 MG/1
25 TABLET ORAL
Status: DISCONTINUED | OUTPATIENT
Start: 2020-06-01 | End: 2020-06-08 | Stop reason: HOSPADM

## 2020-05-31 RX ORDER — POLYETHYLENE GLYCOL 3350 17 G/17G
17 POWDER, FOR SOLUTION ORAL DAILY
Status: DISCONTINUED | OUTPATIENT
Start: 2020-06-01 | End: 2020-06-08 | Stop reason: HOSPADM

## 2020-05-31 RX ORDER — TRIAMCINOLONE ACETONIDE 1 MG/G
CREAM TOPICAL 2 TIMES DAILY
Status: DISCONTINUED | OUTPATIENT
Start: 2020-05-31 | End: 2020-06-08 | Stop reason: HOSPADM

## 2020-05-31 RX ORDER — SODIUM CHLORIDE 9 MG/ML
INJECTION, SOLUTION INTRAVENOUS CONTINUOUS
Status: DISCONTINUED | OUTPATIENT
Start: 2020-05-31 | End: 2020-06-03

## 2020-05-31 RX ORDER — MORPHINE SULFATE 4 MG/ML
INJECTION, SOLUTION INTRAMUSCULAR; INTRAVENOUS
Status: COMPLETED
Start: 2020-05-31 | End: 2020-05-31

## 2020-05-31 RX ORDER — GABAPENTIN 300 MG/1
300 CAPSULE ORAL NIGHTLY
Status: DISCONTINUED | OUTPATIENT
Start: 2020-05-31 | End: 2020-06-04

## 2020-05-31 RX ORDER — AMLODIPINE BESYLATE 5 MG/1
5 TABLET ORAL DAILY
Status: DISCONTINUED | OUTPATIENT
Start: 2020-06-01 | End: 2020-06-01

## 2020-05-31 RX ORDER — NITROGLYCERIN 0.4 MG/1
0.4 TABLET SUBLINGUAL EVERY 5 MIN PRN
Status: DISCONTINUED | OUTPATIENT
Start: 2020-05-31 | End: 2020-06-08 | Stop reason: HOSPADM

## 2020-05-31 RX ORDER — LISINOPRIL 20 MG/1
40 TABLET ORAL DAILY
Status: DISCONTINUED | OUTPATIENT
Start: 2020-06-01 | End: 2020-06-03

## 2020-05-31 RX ORDER — LANOLIN ALCOHOL/MO/W.PET/CERES
3 CREAM (GRAM) TOPICAL DAILY PRN
Status: DISCONTINUED | OUTPATIENT
Start: 2020-05-31 | End: 2020-06-08 | Stop reason: HOSPADM

## 2020-05-31 RX ADMIN — SODIUM CHLORIDE: 9 INJECTION, SOLUTION INTRAVENOUS at 23:24

## 2020-05-31 RX ADMIN — MORPHINE SULFATE 4 MG: 4 INJECTION INTRAVENOUS at 22:40

## 2020-05-31 RX ADMIN — MORPHINE SULFATE 4 MG: 4 INJECTION INTRAVENOUS at 19:34

## 2020-05-31 RX ADMIN — MORPHINE SULFATE 4 MG: 4 INJECTION, SOLUTION INTRAMUSCULAR; INTRAVENOUS at 22:40

## 2020-05-31 RX ADMIN — ONDANSETRON 4 MG: 2 INJECTION INTRAMUSCULAR; INTRAVENOUS at 19:34

## 2020-05-31 ASSESSMENT — ENCOUNTER SYMPTOMS
WHEEZING: 0
ABDOMINAL PAIN: 0
VOMITING: 0
SHORTNESS OF BREATH: 0
DIARRHEA: 0
NAUSEA: 0

## 2020-05-31 ASSESSMENT — PAIN DESCRIPTION - LOCATION
LOCATION: ANKLE;HIP
LOCATION: ANKLE;HIP

## 2020-05-31 ASSESSMENT — PAIN DESCRIPTION - DESCRIPTORS: DESCRIPTORS: DISCOMFORT

## 2020-05-31 ASSESSMENT — PAIN SCALES - GENERAL
PAINLEVEL_OUTOF10: 4
PAINLEVEL_OUTOF10: 5

## 2020-05-31 ASSESSMENT — PAIN DESCRIPTION - ORIENTATION
ORIENTATION: LEFT
ORIENTATION: LEFT

## 2020-05-31 ASSESSMENT — PAIN DESCRIPTION - FREQUENCY: FREQUENCY: CONTINUOUS

## 2020-05-31 ASSESSMENT — PAIN DESCRIPTION - ONSET: ONSET: ON-GOING

## 2020-06-01 ENCOUNTER — ANESTHESIA EVENT (OUTPATIENT)
Dept: OPERATING ROOM | Age: 85
DRG: 470 | End: 2020-06-01
Payer: MEDICARE

## 2020-06-01 PROBLEM — I50.32 CHRONIC DIASTOLIC HEART FAILURE (HCC): Status: ACTIVE | Noted: 2020-06-01

## 2020-06-01 PROBLEM — R13.10 DYSPHAGIA: Status: RESOLVED | Noted: 2019-04-08 | Resolved: 2020-06-01

## 2020-06-01 PROBLEM — G24.01 TARDIVE DYSKINESIA: Status: RESOLVED | Noted: 2017-04-17 | Resolved: 2020-06-01

## 2020-06-01 PROBLEM — R12 HEART BURN: Status: RESOLVED | Noted: 2017-01-18 | Resolved: 2020-06-01

## 2020-06-01 LAB
EKG ATRIAL RATE: 78 BPM
EKG DIAGNOSIS: NORMAL
EKG Q-T INTERVAL: 356 MS
EKG QRS DURATION: 106 MS
EKG QTC CALCULATION (BAZETT): 410 MS
EKG R AXIS: -10 DEGREES
EKG T AXIS: 119 DEGREES
EKG VENTRICULAR RATE: 80 BPM

## 2020-06-01 PROCEDURE — APPNB30 APP NON BILLABLE TIME 0-30 MINS: Performed by: NURSE PRACTITIONER

## 2020-06-01 PROCEDURE — 99223 1ST HOSP IP/OBS HIGH 75: CPT | Performed by: INTERNAL MEDICINE

## 2020-06-01 PROCEDURE — 93308 TTE F-UP OR LMTD: CPT

## 2020-06-01 PROCEDURE — 6360000002 HC RX W HCPCS: Performed by: INTERNAL MEDICINE

## 2020-06-01 PROCEDURE — 6370000000 HC RX 637 (ALT 250 FOR IP): Performed by: INTERNAL MEDICINE

## 2020-06-01 PROCEDURE — 6370000000 HC RX 637 (ALT 250 FOR IP): Performed by: NURSE PRACTITIONER

## 2020-06-01 PROCEDURE — 93005 ELECTROCARDIOGRAM TRACING: CPT | Performed by: INTERNAL MEDICINE

## 2020-06-01 PROCEDURE — 93010 ELECTROCARDIOGRAM REPORT: CPT | Performed by: INTERNAL MEDICINE

## 2020-06-01 PROCEDURE — 1200000000 HC SEMI PRIVATE

## 2020-06-01 RX ORDER — HYDROCODONE BITARTRATE AND ACETAMINOPHEN 5; 325 MG/1; MG/1
1 TABLET ORAL EVERY 4 HOURS PRN
Status: DISCONTINUED | OUTPATIENT
Start: 2020-06-01 | End: 2020-06-05

## 2020-06-01 RX ORDER — FUROSEMIDE 10 MG/ML
20 INJECTION INTRAMUSCULAR; INTRAVENOUS ONCE
Status: COMPLETED | OUTPATIENT
Start: 2020-06-01 | End: 2020-06-01

## 2020-06-01 RX ORDER — HYDROCODONE BITARTRATE AND ACETAMINOPHEN 5; 325 MG/1; MG/1
2 TABLET ORAL EVERY 4 HOURS PRN
Status: DISCONTINUED | OUTPATIENT
Start: 2020-06-01 | End: 2020-06-05

## 2020-06-01 RX ADMIN — HYDROCODONE BITARTRATE AND ACETAMINOPHEN 1 TABLET: 5; 325 TABLET ORAL at 23:18

## 2020-06-01 RX ADMIN — LORAZEPAM 0.5 MG: 0.5 TABLET ORAL at 00:26

## 2020-06-01 RX ADMIN — FUROSEMIDE 20 MG: 10 INJECTION, SOLUTION INTRAMUSCULAR; INTRAVENOUS at 15:20

## 2020-06-01 RX ADMIN — LAMOTRIGINE 150 MG: 100 TABLET ORAL at 10:21

## 2020-06-01 RX ADMIN — LORAZEPAM 0.5 MG: 0.5 TABLET ORAL at 21:15

## 2020-06-01 RX ADMIN — HYDROMORPHONE HYDROCHLORIDE 0.5 MG: 1 INJECTION, SOLUTION INTRAMUSCULAR; INTRAVENOUS; SUBCUTANEOUS at 05:37

## 2020-06-01 RX ADMIN — PRAVASTATIN SODIUM 20 MG: 20 TABLET ORAL at 21:15

## 2020-06-01 RX ADMIN — GABAPENTIN 300 MG: 300 CAPSULE ORAL at 21:15

## 2020-06-01 RX ADMIN — TAMSULOSIN HYDROCHLORIDE 0.4 MG: 0.4 CAPSULE ORAL at 21:15

## 2020-06-01 RX ADMIN — MELATONIN TAB 3 MG 3 MG: 3 TAB at 00:26

## 2020-06-01 RX ADMIN — FINASTERIDE 5 MG: 5 TABLET, FILM COATED ORAL at 21:15

## 2020-06-01 RX ADMIN — GABAPENTIN 300 MG: 300 CAPSULE ORAL at 00:27

## 2020-06-01 ASSESSMENT — PAIN DESCRIPTION - DESCRIPTORS: DESCRIPTORS: DISCOMFORT

## 2020-06-01 ASSESSMENT — PAIN SCALES - GENERAL
PAINLEVEL_OUTOF10: 0
PAINLEVEL_OUTOF10: 7
PAINLEVEL_OUTOF10: 0
PAINLEVEL_OUTOF10: 5
PAINLEVEL_OUTOF10: 5

## 2020-06-01 ASSESSMENT — PAIN DESCRIPTION - FREQUENCY: FREQUENCY: CONTINUOUS

## 2020-06-01 ASSESSMENT — PAIN DESCRIPTION - ONSET: ONSET: ON-GOING

## 2020-06-01 ASSESSMENT — PAIN DESCRIPTION - LOCATION: LOCATION: ANKLE;HIP

## 2020-06-01 ASSESSMENT — PAIN DESCRIPTION - ORIENTATION: ORIENTATION: LEFT

## 2020-06-01 NOTE — H&P
[facility] [address]                     [facility], [address]                              HISTORY AND PHYSICAL    PATIENT NAME: Gama Spencer                      :        10/31/1925  MED REC NO:   [id number]                          ROOM:       4462  ACCOUNT NO:   [de-identified]                           ADMIT DATE: 2020  PROVIDER:     Marilynn Cobb MD    HISTORY OF PRESENT ILLNESS:  The patient is a 80year-old white  gentleman, resident of [address] [place] [address] came to the emergency room  with history of having had an accidental fall with left-sided hip pain  and unable to ambulate with apparent shortening of the left lower  extremity. The patient denied any chest pain, denied any shortness of  breath, denied any dizziness. He clearly admits pain was because of  losing his balance transiently. There was no syncope. PAST MEDICAL HISTORY:  Pertinent for atrial fibrillation,  tachyarrhythmia, cardioversion, prostatic enlargement, psychiatric  problem, macular degeneration, hypertension, hyperlipidemia,  gastroesophageal reflux disease, chronic gout, atherosclerotic heart  disease, osteoarthritis, long-term oral anticoagulation. PAST SURGICAL HISTORY:  Pertinent for kidney stone surgery, cataract  removal, TURP, coronary angioplasty with stent placement, skin cancer  destruction, cardiac surgery, colonoscopy, coronary artery bypass graft,  eye surgery and skin biopsy. FAMILY HISTORY:  Both his parents are . Mother had  atherosclerotic heart disease. Father  of natural causes. SOCIAL HISTORY:  The patient is a  man. His wife  in . He has three grown children. He was never a smoker. He would have a  very rare occasional small alcoholic beverage, nothing of significance. He has not done that in years. He used to work as a   for [employer]. No history of substance abuse.     REVIEW OF SYSTEMS: centrally  and pulmonary vascular congestion, but no florid CHF evident. X-ray of  the left hip shows subcapital fracture of the left hip, femur  especially. ASSESSMENT:  Subcapital fracture of the left femur closed, initial  encounter needing open reduction and internal fixation or prosthetic  replacement with a bipolar prosthesis, hypertension, atherosclerotic  heart disease, chronic atrial fibrillation, chronic diastolic heart  failure. PLAN:  In my opinion, the patient is already fit for surgery, but family  would feel better if we get an additional Cardiology consult.   We will  also obtain a transthoracic echocardiogram.  His primary care physician  is Dr. Boris Graves.    As always, it is a pleasure to take care of [facility] patients at  [address] [place], [place] [address]way, MD    D: 06/01/2020 16:49:49       T: 06/01/2020 16:54:34     [id number]  Job#: [id number]     Doc#: [id number]    CC:  [facility] AT [facility]

## 2020-06-01 NOTE — PROGRESS NOTES
Pt. Brought to room via stretcher. Transferred to hospital bed w/ assist x3. Nelson cath emptied. Hanging to gravity. Introduced self. Admission and assessment completed. Pt. Oriented to room and call light system. Fresh water and box lunch & ice cream provided. Pt. Able to feed self after set-up. Reviewed POC and evening meds after speaking with daughter, Elba Wilson [facility]) who verified home meds. Pt. Only wanted his gabapentin, melatonin & ativan. Given per order, see MAR. Daughter states that her dad takes this regiment \"every evening\" and has a \"hard time sleeping. \" No other needs at this time. Call light and BST within reach. Fall precautions in place, will continue to monitor. The care plan and education has been reviewed and mutually agreed upon with the patient.

## 2020-06-01 NOTE — PROGRESS NOTES
Patient Active Problem List   Diagnosis    Essential hypertension    Coronary artery disease    Hyperlipidemia    Bradycardia    Chest pain    GERD (gastroesophageal reflux disease)    Bilateral carotid artery disease (HCC)    Mild aortic stenosis    Psychophysiological insomnia    Anxiety disorder    RLS (restless legs syndrome)    Chronic idiopathic constipation    Bipolar 1 disorder (HCC)    [facility]oxysmal atrial fibrillation ([facility])    Moderate protein-calorie malnutrition ([address]    Subcapital fracture of femur, left, closed, initial encounter ([facility] [facility]    Chronic diastolic heart failure ([facility] [address] [facility]   H&P dictated

## 2020-06-01 NOTE — CONSULTS
[address]  Cardiac Consult     Referring Provider:  MARIS Dominguez CNP         History of Present Illness:  81 y/o male who we were asked to see for cardiac risk evaluation for femoral head fracture. He has a h/o CAD, bradycardia, mild-moderate aortic stenosis and is followed by us in clinic. He has been doing fairly well but has notice some LE edema over the last few months. He denies chest pain or dyspnea. His daughter usually checks on him routinely but has not been allowed to see him at [facility] due to [place]. He was trying to fix the reception on his TV and \"tripped\" and fell fracturing his hip. He denies associated dizziness. No exacerbating or relieving factor. Past Medical History:   has a past medical history of A-fib ([address] Arthritis, Blepharitis of both eyes, CAD (coronary artery disease), GERD (gastroesophageal reflux disease), Gout, Hyperlipidemia, Hypertension, Macular degeneration, NSTEMI (non-ST elevated myocardial infarction) [facility]), Prostate enlargement, Psychiatric problem, and Tachycardia. Surgical History:   has a past surgical history that includes Coronary angioplasty with stent (2005); skin biopsy; Kidney stone surgery (1980); Coronary artery bypass graft; Cataract removal (1993); TURP (2003); Skin cancer destruction (2006); Colonoscopy; eye surgery; and Cardiac surgery. Social History:   reports that he has never smoked. He has never used smokeless tobacco. He reports that he does not drink alcohol or use drugs. Family History:  family history includes Heart Disease in his brother and mother.      Medications:   furosemide  20 mg Intravenous Once    aspirin  81 mg Oral Daily    lisinopril  40 mg Oral Daily    finasteride  5 mg Oral Nightly    gabapentin  300 mg Oral Nightly    lamoTRIgine  150 mg Oral Daily    magnesium oxide  400 mg Oral Daily    therapeutic multivitamin-minerals  1 tablet Oral Daily    pantoprazole  40 mg Oral QAM AC  polyethylene glycol  17 g Oral Daily    pravastatin  20 mg Oral Nightly    tamsulosin  0.4 mg Oral Daily    triamcinolone   Topical BID    levothyroxine  25 mcg Oral QAM AC         Allergies:  Lasix [furosemide]; Zyprexa [olanzapine]; and Citalopram     ? Medications and dosages reviewed. ROS:  ?Full ROS obtained and negative except as mentioned in HPI      Physical Examination:    Vitals:    06/01/20 1013   BP:    Pulse:    Resp:    Temp:    SpO2: 94%        · GENERAL: Elderly male in NAD   · NEUROLOGICAL: Alert and oriented  · PSYCH: Calm affect  · SKIN: Warm and dry, No visible rash,   · EYES: Pupils equal and round, Sclera non-icteric,   · HENT:  External ears and nose unremarkable, mucus membranes moist  · MUSCULOSKELETAL: Normal cephalic, neck supple  · CAROTID: Normal upstroke, no bruits  · CARDIAC: JVP normal, Normal PMI, regular rate and rhythm, normal S1S2, short YUKI   · RESPIRATORY: Normal respiratory effort, clear to auscultation bilaterally  · EXTREMITIES: 1-2+ bilateral edema edema  · GASTROINTESTINAL: normal bowel sounds, soft, non-tender, No hepatomegaly     All testing and labs listed below were personally reviewed. CXR  Cardiac silhouette is enlarged. No pneumothorax. Pulmonary vascular  congestion. No focal consolidation. Remote rib fractures, similar to  previous exam.  Mild bronchial wall thickening centrally. Impression:   Mild bronchial wall thickening centrally and pulmonary vascular congestion. XRAY  Impression:   Findings concerning for nondisplaced distal fibular fracture. Soft tissue  swelling. LABS  WBC 5.7 K/uL RBC 3.01Low M/uL Hemoglobin 10. 0Low g/dL Hematocrit 29.9Low % MCV 99.2 fL MCH 33.2 pg MCHC 33.4 g/dL RDW 13.8 % Platelets 821 K/   Calcium 9.4 mg/dL Total Protein 6.6 g/dL Alb 4.0 g/dL Albumin/Globulin Ratio 1.5 Total Bilirubin 0.5 mg/dL Alkaline Phosphatase 115 U/L ALT 17 U/L AST 23 U/   Sodium 139 mmol/L Potassium reflex Magnesium 4.3 mmol/L Chloride 101 mmol/L CO2 29 mmol/L Anion Gap 9 Glucose 97 mg/dL BUN 16 mg/dL CREATININE 0.9 mg/dL     EKG: afib, NSST changes    Assessment:     Preop:  Hip surgery  Several issues but stable for surgery  He may proceed at moderate risk    Edema:  Reason unclear. Repeat ECHO pending  May be exacerbated by norvasc  Give lasix 20 iv x 1  D/c norvasc  F/u ECHO    Afib:  H/o afib s/p cardioversion 4/2019. Back in afib, controlled and seems asymptomatic  Restart anticoagulation after surgery    Plan:   Moderate cardiac risk  Stop norvasc  Lasix 20 mg x 1  F/u ECHO  Restart eliquis post op when felt safe from cardiac standpoint    Thank you for allowing me to participate in the care of this individual.      Blessing Gallo M.D., [facility]

## 2020-06-01 NOTE — PROGRESS NOTES
Department of Internal Medicine  General Internal Medicine   Progress Note      SUBJECTIVE: history of accidental stumble , left hip pain unable to ambulate , denies chest pain or exertional SOB , no orthopne , occasional LE swelling   Past Medical History:   Diagnosis Date    [facility]fib ([facility]     Arthritis     Blepharitis of both eyes     CAD (coronary artery disease)     stents 2005, x3    GERD (gastroesophageal reflux disease)     Gout     Hyperlipidemia     Hypertension     Macular degeneration     NSTEMI (non-ST elevated myocardial infarction) ([facility] [address]     Prostate enlargement     Psychiatric problem     anxiety; bipolar disorder    Tachycardia      Past Surgical History:   Procedure Laterality Date    CARDIAC SURGERY      CATARACT REMOVAL  1993    X5    COLONOSCOPY      CORONARY ANGIOPLASTY WITH STENT PLACEMENT  2005      X3    CORON[facility]FT      [facility])    EYE SURGERY      KIDNEY STONE SURGERY  1980    SKIN BIOPSY      melanoma, basal    SKIN CANCER DESTRUCTION  2006    Melanoma  ()    SOL  2003         History obtained from chart review, the patient and family members   General ROS: positive for  - fatigue, malaise, sleep disturbance and weight loss  negative for - chills, fever or night sweats  Psychological ROS: positive for - memory difficulties  negative for - anxiety, behavioral disorder, hallucinations, hostility or mood swings  Ophthalmic ROS: negative  Respiratory ROS: positive for - shortness of breath  negative for - cough, hemoptysis, tachypnea or wheezing  Cardiovascular ROS: positive for - dyspnea on exertion  negative for - chest pain  Gastrointestinal ROS: no abdominal pain, change in bowel habits, or black or bloody stools  Genito-Urinary ROS: no dysuria, trouble voiding, or hematuria  Musculoskeletal ROS: left hip pain , unable to ambulate   Neurological ROS: no TIA or stroke symptoms  Dermatological ROS: negative    OBJECTIVE

## 2020-06-01 NOTE — CARE COORDINATION
CM spoke to patient's daughter Zeb Duvall, she is in agreement with patient going to [facility] for skilled care following his surgery.     DAMARI BarretoN, CCM, RN  [facility]  [phone]

## 2020-06-01 NOTE — ED PROVIDER NOTES
Fat Ac-Cholecalciferol (DRY EYE OMEGA BENEFITS/VIT D-3) 130-233 MG-UNIT CAPS Take 1 capsule by mouth dailyHistorical Med      polyethylene glycol (GLYCOLAX) powder Take 17 g by mouth daily as neededHistorical Med      melatonin 5 MG TABS tablet Take 5 mg by mouth daily as needed (Insomnia)Historical Med      Magnesium 400 MG TABS Take 1 tablet by mouth daily Historical Med      aspirin 81 MG chewable tablet Take 1 tablet by mouth daily, Disp-30 tablet, R-3      nitroGLYCERIN (NITROSTAT) 0.4 MG SL tablet Place 1 tablet under the tongue every 5 minutes as needed for Chest pain, Disp-25 tablet, R-3Normal      triamcinolone (KENALOG) 0.1 % cream Apply to affected areas twice daily, Disp-45 g, R-1, Normal               ALLERGIES     Lasix [furosemide]; Zyprexa [olanzapine]; and Citalopram    FAMILYHISTORY       Family History   Problem Relation Age of Onset    Heart Disease Mother     Heart Disease Brother     High Blood Pressure Neg Hx     High Cholesterol Neg Hx     Mental Illness Neg Hx           SOCIAL HISTORY       Social History     Tobacco Use    Smoking status: Never Smoker    Smokeless tobacco: Never Used   Substance Use Topics    Alcohol use: No    Drug use: No       SCREENINGS    L[place]on Coma Scale  Eye Opening: Spontaneous  Best Verbal Response: Oriented  Best Motor Response: Obeys commands  [place] Coma Scale Score: 15        PHYSICAL EXAM    (up to 7 for level 4, 8 or more for level 5)     ED Triage Vitals [05/31/20 1858]   BP Temp Temp Source Pulse Resp SpO2 Height Weight   132/74 98 °F (36.7 °C) Oral 106 18 96 % 5' 6\" (1.676 m) 135 lb (61.2 kg)       Physical Exam  Vitals signs and nursing note reviewed. Constitutional:       General: He is not in acute distress. Appearance: Normal appearance. He is well-developed. He is not ill-appearing, toxic-appearing or diaphoretic. HENT:      Head: Normocephalic and atraumatic.       Nose: Nose normal.      Mouth/Throat:      Mouth: Mucous membranes changes left hip arthroplasty. No acute process. XR CHEST PORTABLE   Final Result   Mild bronchial wall thickening centrally and pulmonary vascular congestion. CT Cervical Spine WO Contrast   Final Result   Head: No acute intracranial abnormality. Cervical spine: No acute abnormality of the cervical spine. CT Head WO Contrast   Final Result   Head: No acute intracranial abnormality. Cervical spine: No acute abnormality of the cervical spine. XR HIP LEFT (2-3 VIEWS)   Final Result   Left femur fracture. XR ANKLE LEFT (MIN 3 VIEWS)   Final Result   Findings concerning for nondisplaced distal fibular fracture. Soft tissue   swelling. Xr Hip Left (2-3 Views)    Result Date: 5/31/2020  EXAMINATION: TWO XRAY VIEWS OF THE LEFT HIP 5/31/2020 7:09 pm COMPARISON: None. HISTORY: ORDERING SYSTEM PROVIDED HISTORY: Injury TECHNOLOGIST PROVIDED HISTORY: Reason for exam:->Injury Reason for Exam: S/P FALL. LEFT HIP AND LEFT ANKLE PAIN. PATIENT STATES LEFT ANKLE HURTS MORE THAN LEFT HIP, BUT BOTH PAINFUL TO MOVE. Acuity: Acute Type of Exam: Initial FINDINGS: Comminuted slightly impacted subcapital left hip fracture. Vascular calcifications. Mild-to-moderate narrowing of the left hip joint. Moderate to severe disc height loss L3 through S1. Left femur fracture. Xr Ankle Left (min 3 Views)    Result Date: 5/31/2020  EXAMINATION: THREE XRAY VIEWS OF THE LEFT ANKLE 5/31/2020 7:09 pm COMPARISON: None. HISTORY: ORDERING SYSTEM PROVIDED HISTORY: injury TECHNOLOGIST PROVIDED HISTORY: Reason for exam:->injury Reason for Exam: S/P FALL. LEFT HIP AND LEFT ANKLE PAIN. PATIENT STATES LEFT ANKLE HURTS MORE THAN LEFT HIP, BUT BOTH PAINFUL TO MOVE. Acuity: Acute Type of Exam: Initial FINDINGS: Vascular calcifications. Soft tissue swelling about the ankle. Osteopenia. No dislocation. Subtle lucency noted in the distal fibula.      Findings concerning for nondisplaced distal DIAGNOSIS/MDM:   Vitals:    Vitals:    06/08/20 0503 06/08/20 0924 06/08/20 0941 06/08/20 1135   BP: 126/68  102/60 111/68   Pulse: 99  87 105   Resp: 14   16   Temp: 97.9 °F (36.6 °C)  98.2 °F (36.8 °C) 98.1 °F (36.7 °C)   TempSrc: Oral  Oral Oral   SpO2: 91% 91% 96% 92%   Weight:       Height:           Patient was given the following medications:  Medications   ortho mix injection ( Injection Canceled Entry 6/2/20 1045)   morphine injection 4 mg (4 mg Intravenous Given 5/1934)   ondansetron (ZOFRAN) injection 4 mg (4 mg Intravenous Given 5/1934)   morphine injection 4 mg (4 mg Intravenous Given 5/31/20 2240)   furosemide (LASIX) injection 20 mg (20 mg Intravenous Given 6/1/20 1520)   ceFAZolin (ANCEF) 2 g in dextrose 5 % 100 mL IVPB (0 g Intravenous Stopped 6/2/20 1046)   ceFAZolin (ANCEF) 2 g in dextrose 5 % 100 mL IVPB (0 g Intravenous Stopped 6/2/20 1747)   gentamicin (GARAMYCIN) IVPB (80 mg Intravenous New Bag 6/2/20 0811)   sodium chloride 0.9 % irrigation (2,000 mLs Irrigation New Bag 6/2/20 0812)   sodium chloride 0.9 % 30 mL with bupivacaine-EPINEPHrine (MARCAINE-w/EPINEPHRINE) 0.25% -1:011552 30 mL, cloNIDine 100 mcg, dexamethasone (DECADRON) 4 mg, ketorolac (TORADOL) 30 mg, ropivacaine (NAROPIN) 30 mL (87 mLs  Given 6/2/20 0812)   tranexamic acid (CYKLOKAPRON) 3,000 mg in dextrose 5 % 100 mL IVPB (3,000 mg Irrigation New Bag 6/2/20 0816)   bupivacaine (PF) (MARCAINE) 0.5 % injection (10 mLs Intradermal Given 6/2/20 0848)   torsemide (DEMADEX) tablet 10 mg (10 mg Oral Given 6/3/20 0758)     Patient presents emergency department for evaluation of mechanical fall at [address] [place] [address]. Patient is alert and oriented no acute distress. Patient has an externally rotated and shortened left leg. Patient has a deformity over the greater trochanter. No ecchymosis or laceration noted. Dorsalis pedis pulse 2+. Patient has no pain to plantar flexion or dorsiflexion of left foot.   X-ray

## 2020-06-01 NOTE — ANESTHESIA PRE PROCEDURE
Department of Anesthesiology  Preprocedure Note       Name:  Abilio Pitt   Age:  80 y.o.  :  10/31/1925                                          MRN:  [id number]         Date:  2020      Surgeon: Wong Guevara):  Judy Reyes MD    Procedure: Procedure(s):  LEFT BIPOLAR HIP HEMIARTHROPLASTY    Medications prior to admission:   Prior to Admission medications    Medication Sig Start Date End Date Taking? Authorizing Provider   LORazepam (ATIVAN) 0.5 MG tablet Take 1 tablet by mouth 3 times daily as needed for Anxiety for up to 30 days. 20 Yes Sinai Mean, APRN - CNP   apixaban (ELIQUIS) 2.5 MG TABS tablet Take 1 tablet by mouth 2 times daily 20  Yes April Caraballo MD   gabapentin (NEURONTIN) 300 MG capsule Take 1 capsule by mouth nightly.  3/2/20 3/2/21 Yes Sinai Mean, APRN - CNP   finasteride (PROSCAR) 5 MG tablet TAKE 1 TABLET EVERY NIGHT 1/15/20  Yes Sinai Mean, APRN - CNP   lamoTRIgine (LAMICTAL) 150 MG tablet Take 1 tablet by mouth daily 19  Yes Sinai Mean, APRN - CNP   pantoprazole (PROTONIX) 40 MG tablet TAKE 1 TABLET EVERY DAY 19  Yes Sinai Mean, APRN - CNP   benazepril (LOTENSIN) 40 MG tablet TAKE 1 TABLET EVERY DAY 19  Yes Sinai Mean, APRN - CNP   levothyroxine (SYNTHROID) 25 MCG tablet TAKE 1 TABLET EVERY DAY 19  Yes Sinai Mean, APRN - CNP   amLODIPine (NORVASC) 5 MG tablet Take 1 tablet by mouth daily 19  Yes Luis Enrique Perez MD   pravastatin (PRAVACHOL) 20 MG tablet TAKE 1 TABLET EVERY DAY 19  Yes Luis Enrique Perez MD   tamsulosin (FLOMAX) 0.4 MG capsule Take 0.4 mg by mouth daily   Yes Historical Provider, MD   Omega-3 Fat Ac-Cholecalciferol (DRY EYE OMEGA BENEFITS/VIT D-3) 176-992 MG-UNIT CAPS Take 1 capsule by mouth daily   Yes Historical Provider, MD   polyethylene glycol (GLYCOLAX) powder Take 17 g by mouth daily as needed   Yes Historical Provider, MD   melatonin 5 MG TABS tablet Take 5 mg by mouth daily as 05/31/2020    GFRAA >60 05/31/2020    GFRAA >60 08/10/2012    AGRATIO 1.5 05/31/2020    LABGLOM >60 05/31/2020    GLUCOSE 97 05/31/2020    PROT 6.6 05/31/2020    PROT 7.5 08/10/2012    CALCIUM 9.4 05/31/2020    BILITOT 0.5 05/31/2020    ALKPHOS 115 05/31/2020    AST 23 05/31/2020    ALT 17 05/31/2020       POC Tests: No results for input(s): POCGLU, POCNA, POCK, POCCL, POCBUN, POCHEMO, POCHCT in the last 72 hours. Coags:   Lab Results   Component Value Date    PROTIME 15.5 05/31/2020    INR 1.33 05/31/2020    APTT 30.5 04/08/2019       HCG (If Applicable): No results found for: PREGTESTUR, PREGSERUM, HCG, HCGQUANT     ABGs: No results found for: PHART, PO2ART, IBS7OUA, KSW4EPT, BEART, D4CLXSWQ     Type & Screen (If Applicable):  No results found for: LABABO, LABRH    Drug/Infectious Status (If Applicable):  No results found for: HIV, HEPCAB    COVID-19 Screening (If Applicable): No results found for: COVID19      Anesthesia Evaluation  Patient summary reviewed and Nursing notes reviewed  Airway: Mallampati: I       Mouth opening: > = 3 FB Dental:    (+) implants      Pulmonary:                              Cardiovascular:  Exercise tolerance: poor (<4 METS),           Rhythm: irregular    Echocardiogram reviewed                  Neuro/Psych:               GI/Hepatic/Renal:             Endo/Other:                     Abdominal:           Vascular:                                        Anesthesia Plan      general     ASA 4       Induction: intravenous. Anesthetic plan and risks discussed with patient and child/children (daughter at bedside). Use of blood products discussed with patient and child/children whom. Plan discussed with attending.     Attending anesthesiologist reviewed and agrees with [id number] MARIS Frank Se - CRNA   6/1/2020

## 2020-06-01 NOTE — PLAN OF CARE
Problem: Falls - Risk of:  Goal: Will remain free from falls  Description: Will remain free from falls  6/1/2020 0720 by Mary Jo Bose RN  Outcome: Ongoing  6/1/2020 0351 by Twin Meza RN  Outcome: Ongoing  Goal: Absence of physical injury  Description: Absence of physical injury  6/1/2020 0720 by Mary Jo Bose RN  Outcome: Ongoing  6/1/2020 0351 by Twin Meza RN  Outcome: Ongoing     Problem: Pain:  Goal: Pain level will decrease  Description: Pain level will decrease  6/1/2020 0720 by Mary Jo Bose RN  Outcome: Ongoing  6/1/2020 0351 by Twin Meza RN  Outcome: Ongoing  Goal: Control of acute pain  Description: Control of acute pain  6/1/2020 0720 by Mary Jo Bose RN  Outcome: Ongoing  6/1/2020 0351 by Twin Meza RN  Outcome: Ongoing  Goal: Control of chronic pain  Description: Control of chronic pain  6/1/2020 0720 by Mary Jo Bose RN  Outcome: Ongoing  6/1/2020 0351 by Twin Meza RN  Outcome: Ongoing     Problem: Cardiac:  Goal: Ability to maintain vital signs within normal range will improve  Description: Ability to maintain vital signs within normal range will improve  6/1/2020 0720 by Mary Jo Bose RN  Outcome: Ongoing  6/1/2020 0351 by Twin Meza RN  Outcome: Ongoing

## 2020-06-02 ENCOUNTER — APPOINTMENT (OUTPATIENT)
Dept: GENERAL RADIOLOGY | Age: 85
DRG: 470 | End: 2020-06-02
Payer: MEDICARE

## 2020-06-02 ENCOUNTER — ANESTHESIA (OUTPATIENT)
Dept: OPERATING ROOM | Age: 85
DRG: 470 | End: 2020-06-02
Payer: MEDICARE

## 2020-06-02 VITALS
DIASTOLIC BLOOD PRESSURE: 52 MMHG | OXYGEN SATURATION: 99 % | SYSTOLIC BLOOD PRESSURE: 93 MMHG | RESPIRATION RATE: 20 BRPM | TEMPERATURE: 99.3 F

## 2020-06-02 LAB
ABO/RH: NORMAL
ANTIBODY SCREEN: NORMAL
SARS-COV-2, PCR: NOT DETECTED

## 2020-06-02 PROCEDURE — 86900 BLOOD TYPING SEROLOGIC ABO: CPT

## 2020-06-02 PROCEDURE — 6370000000 HC RX 637 (ALT 250 FOR IP): Performed by: ORTHOPAEDIC SURGERY

## 2020-06-02 PROCEDURE — APPNB30 APP NON BILLABLE TIME 0-30 MINS: Performed by: NURSE PRACTITIONER

## 2020-06-02 PROCEDURE — 2580000003 HC RX 258: Performed by: ORTHOPAEDIC SURGERY

## 2020-06-02 PROCEDURE — 6360000002 HC RX W HCPCS: Performed by: ORTHOPAEDIC SURGERY

## 2020-06-02 PROCEDURE — 6360000002 HC RX W HCPCS: Performed by: NURSE ANESTHETIST, CERTIFIED REGISTERED

## 2020-06-02 PROCEDURE — 2709999900 HC NON-CHARGEABLE SUPPLY: Performed by: ORTHOPAEDIC SURGERY

## 2020-06-02 PROCEDURE — 1200000000 HC SEMI PRIVATE

## 2020-06-02 PROCEDURE — 3700000000 HC ANESTHESIA ATTENDED CARE: Performed by: ORTHOPAEDIC SURGERY

## 2020-06-02 PROCEDURE — C1776 JOINT DEVICE (IMPLANTABLE): HCPCS | Performed by: ORTHOPAEDIC SURGERY

## 2020-06-02 PROCEDURE — 0SRS0JA REPLACEMENT OF LEFT HIP JOINT, FEMORAL SURFACE WITH SYNTHETIC SUBSTITUTE, UNCEMENTED, OPEN APPROACH: ICD-10-PCS | Performed by: ORTHOPAEDIC SURGERY

## 2020-06-02 PROCEDURE — 36415 COLL VENOUS BLD VENIPUNCTURE: CPT

## 2020-06-02 PROCEDURE — 99024 POSTOP FOLLOW-UP VISIT: CPT | Performed by: NURSE PRACTITIONER

## 2020-06-02 PROCEDURE — 2500000003 HC RX 250 WO HCPCS: Performed by: ORTHOPAEDIC SURGERY

## 2020-06-02 PROCEDURE — 94150 VITAL CAPACITY TEST: CPT

## 2020-06-02 PROCEDURE — 7100000000 HC PACU RECOVERY - FIRST 15 MIN: Performed by: ORTHOPAEDIC SURGERY

## 2020-06-02 PROCEDURE — 86850 RBC ANTIBODY SCREEN: CPT

## 2020-06-02 PROCEDURE — 2500000003 HC RX 250 WO HCPCS: Performed by: NURSE ANESTHETIST, CERTIFIED REGISTERED

## 2020-06-02 PROCEDURE — 3600000014 HC SURGERY LEVEL 4 ADDTL 15MIN: Performed by: ORTHOPAEDIC SURGERY

## 2020-06-02 PROCEDURE — 3700000001 HC ADD 15 MINUTES (ANESTHESIA): Performed by: ORTHOPAEDIC SURGERY

## 2020-06-02 PROCEDURE — 27786 TREATMENT OF ANKLE FRACTURE: CPT | Performed by: ORTHOPAEDIC SURGERY

## 2020-06-02 PROCEDURE — 73502 X-RAY EXAM HIP UNI 2-3 VIEWS: CPT

## 2020-06-02 PROCEDURE — 2580000003 HC RX 258: Performed by: NURSE ANESTHETIST, CERTIFIED REGISTERED

## 2020-06-02 PROCEDURE — 3600000004 HC SURGERY LEVEL 4 BASE: Performed by: ORTHOPAEDIC SURGERY

## 2020-06-02 PROCEDURE — 2700000000 HC OXYGEN THERAPY PER DAY

## 2020-06-02 PROCEDURE — 86901 BLOOD TYPING SEROLOGIC RH(D): CPT

## 2020-06-02 PROCEDURE — 27236 TREAT THIGH FRACTURE: CPT | Performed by: NURSE PRACTITIONER

## 2020-06-02 PROCEDURE — 99222 1ST HOSP IP/OBS MODERATE 55: CPT | Performed by: ORTHOPAEDIC SURGERY

## 2020-06-02 PROCEDURE — 7100000001 HC PACU RECOVERY - ADDTL 15 MIN: Performed by: ORTHOPAEDIC SURGERY

## 2020-06-02 PROCEDURE — 27236 TREAT THIGH FRACTURE: CPT | Performed by: ORTHOPAEDIC SURGERY

## 2020-06-02 PROCEDURE — 94760 N-INVAS EAR/PLS OXIMETRY 1: CPT

## 2020-06-02 DEVICE — HEAD FEM OD53MM ID26MM HIP CO CHROM POLYETH BPLR CEMENTLESS: Type: IMPLANTABLE DEVICE | Site: HIP | Status: FUNCTIONAL

## 2020-06-02 DEVICE — HEAD FEM DIA26MM -3MM OFFSET HIP CO CHROM POLYETH TAPR LO: Type: IMPLANTABLE DEVICE | Site: HIP | Status: FUNCTIONAL

## 2020-06-02 DEVICE — STEM FEM SZ 6 L111MM NK L35MM 45MM OFFSET 127DEG HIP TI: Type: IMPLANTABLE DEVICE | Site: HIP | Status: FUNCTIONAL

## 2020-06-02 RX ORDER — ONDANSETRON 2 MG/ML
INJECTION INTRAMUSCULAR; INTRAVENOUS PRN
Status: DISCONTINUED | OUTPATIENT
Start: 2020-06-02 | End: 2020-06-02 | Stop reason: SDUPTHER

## 2020-06-02 RX ORDER — DEXAMETHASONE SODIUM PHOSPHATE 4 MG/ML
INJECTION, SOLUTION INTRA-ARTICULAR; INTRALESIONAL; INTRAMUSCULAR; INTRAVENOUS; SOFT TISSUE PRN
Status: DISCONTINUED | OUTPATIENT
Start: 2020-06-02 | End: 2020-06-02 | Stop reason: SDUPTHER

## 2020-06-02 RX ORDER — GENTAMICIN SULFATE 80 MG/100ML
INJECTION, SOLUTION INTRAVENOUS CONTINUOUS PRN
Status: COMPLETED | OUTPATIENT
Start: 2020-06-02 | End: 2020-06-02

## 2020-06-02 RX ORDER — SENNA AND DOCUSATE SODIUM 50; 8.6 MG/1; MG/1
1 TABLET, FILM COATED ORAL 2 TIMES DAILY
Status: DISCONTINUED | OUTPATIENT
Start: 2020-06-02 | End: 2020-06-08 | Stop reason: HOSPADM

## 2020-06-02 RX ORDER — OXYCODONE HYDROCHLORIDE AND ACETAMINOPHEN 5; 325 MG/1; MG/1
1 TABLET ORAL
Status: DISCONTINUED | OUTPATIENT
Start: 2020-06-02 | End: 2020-06-02 | Stop reason: HOSPADM

## 2020-06-02 RX ORDER — BUPIVACAINE HYDROCHLORIDE 5 MG/ML
INJECTION, SOLUTION EPIDURAL; INTRACAUDAL
Status: COMPLETED | OUTPATIENT
Start: 2020-06-02 | End: 2020-06-02

## 2020-06-02 RX ORDER — VECURONIUM BROMIDE 1 MG/ML
INJECTION, POWDER, LYOPHILIZED, FOR SOLUTION INTRAVENOUS PRN
Status: DISCONTINUED | OUTPATIENT
Start: 2020-06-02 | End: 2020-06-02 | Stop reason: SDUPTHER

## 2020-06-02 RX ORDER — SODIUM CHLORIDE 9 MG/ML
INJECTION, SOLUTION INTRAVENOUS CONTINUOUS PRN
Status: DISCONTINUED | OUTPATIENT
Start: 2020-06-02 | End: 2020-06-02 | Stop reason: SDUPTHER

## 2020-06-02 RX ORDER — MAGNESIUM HYDROXIDE 1200 MG/15ML
LIQUID ORAL CONTINUOUS PRN
Status: COMPLETED | OUTPATIENT
Start: 2020-06-02 | End: 2020-06-02

## 2020-06-02 RX ORDER — SODIUM CHLORIDE 0.9 % (FLUSH) 0.9 %
10 SYRINGE (ML) INJECTION EVERY 12 HOURS SCHEDULED
Status: DISCONTINUED | OUTPATIENT
Start: 2020-06-02 | End: 2020-06-08 | Stop reason: HOSPADM

## 2020-06-02 RX ORDER — LABETALOL HYDROCHLORIDE 5 MG/ML
5 INJECTION, SOLUTION INTRAVENOUS EVERY 10 MIN PRN
Status: DISCONTINUED | OUTPATIENT
Start: 2020-06-02 | End: 2020-06-02 | Stop reason: HOSPADM

## 2020-06-02 RX ORDER — SUCCINYLCHOLINE/SOD CL,ISO/PF 200MG/10ML
SYRINGE (ML) INTRAVENOUS PRN
Status: DISCONTINUED | OUTPATIENT
Start: 2020-06-02 | End: 2020-06-02 | Stop reason: SDUPTHER

## 2020-06-02 RX ORDER — ONDANSETRON 2 MG/ML
4 INJECTION INTRAMUSCULAR; INTRAVENOUS
Status: DISCONTINUED | OUTPATIENT
Start: 2020-06-02 | End: 2020-06-02 | Stop reason: HOSPADM

## 2020-06-02 RX ORDER — MEPERIDINE HYDROCHLORIDE 25 MG/ML
12.5 INJECTION INTRAMUSCULAR; INTRAVENOUS; SUBCUTANEOUS EVERY 5 MIN PRN
Status: DISCONTINUED | OUTPATIENT
Start: 2020-06-02 | End: 2020-06-02 | Stop reason: HOSPADM

## 2020-06-02 RX ORDER — FENTANYL CITRATE 50 UG/ML
INJECTION, SOLUTION INTRAMUSCULAR; INTRAVENOUS PRN
Status: DISCONTINUED | OUTPATIENT
Start: 2020-06-02 | End: 2020-06-02 | Stop reason: SDUPTHER

## 2020-06-02 RX ORDER — EPHEDRINE SULFATE/0.9% NACL/PF 50 MG/5 ML
SYRINGE (ML) INTRAVENOUS PRN
Status: DISCONTINUED | OUTPATIENT
Start: 2020-06-02 | End: 2020-06-02 | Stop reason: SDUPTHER

## 2020-06-02 RX ORDER — HYDROMORPHONE HCL 110MG/55ML
0.5 PATIENT CONTROLLED ANALGESIA SYRINGE INTRAVENOUS EVERY 5 MIN PRN
Status: DISCONTINUED | OUTPATIENT
Start: 2020-06-02 | End: 2020-06-02 | Stop reason: HOSPADM

## 2020-06-02 RX ORDER — SODIUM CHLORIDE 450 MG/100ML
INJECTION, SOLUTION INTRAVENOUS CONTINUOUS
Status: DISCONTINUED | OUTPATIENT
Start: 2020-06-02 | End: 2020-06-03

## 2020-06-02 RX ORDER — LIDOCAINE HYDROCHLORIDE 20 MG/ML
INJECTION, SOLUTION EPIDURAL; INFILTRATION; INTRACAUDAL; PERINEURAL PRN
Status: DISCONTINUED | OUTPATIENT
Start: 2020-06-02 | End: 2020-06-02 | Stop reason: SDUPTHER

## 2020-06-02 RX ORDER — SODIUM CHLORIDE 0.9 % (FLUSH) 0.9 %
10 SYRINGE (ML) INJECTION PRN
Status: DISCONTINUED | OUTPATIENT
Start: 2020-06-02 | End: 2020-06-08 | Stop reason: HOSPADM

## 2020-06-02 RX ORDER — HYDRALAZINE HYDROCHLORIDE 20 MG/ML
5 INJECTION INTRAMUSCULAR; INTRAVENOUS EVERY 10 MIN PRN
Status: DISCONTINUED | OUTPATIENT
Start: 2020-06-02 | End: 2020-06-02 | Stop reason: HOSPADM

## 2020-06-02 RX ORDER — PROPOFOL 10 MG/ML
INJECTION, EMULSION INTRAVENOUS PRN
Status: DISCONTINUED | OUTPATIENT
Start: 2020-06-02 | End: 2020-06-02 | Stop reason: SDUPTHER

## 2020-06-02 RX ADMIN — PHENYLEPHRINE HYDROCHLORIDE 100 MCG: 10 INJECTION INTRAVENOUS at 08:29

## 2020-06-02 RX ADMIN — LAMOTRIGINE 150 MG: 100 TABLET ORAL at 10:58

## 2020-06-02 RX ADMIN — VECURONIUM BROMIDE 5 MG: 1 INJECTION, POWDER, LYOPHILIZED, FOR SOLUTION INTRAVENOUS at 07:50

## 2020-06-02 RX ADMIN — Medication 100 MG: at 07:37

## 2020-06-02 RX ADMIN — ONDANSETRON 4 MG: 2 INJECTION INTRAMUSCULAR; INTRAVENOUS at 07:50

## 2020-06-02 RX ADMIN — SODIUM CHLORIDE: 9 INJECTION, SOLUTION INTRAVENOUS at 07:30

## 2020-06-02 RX ADMIN — SENNOSIDES AND DOCUSATE SODIUM 1 TABLET: 8.6; 5 TABLET ORAL at 20:11

## 2020-06-02 RX ADMIN — Medication 10 MG: at 08:54

## 2020-06-02 RX ADMIN — TAMSULOSIN HYDROCHLORIDE 0.4 MG: 0.4 CAPSULE ORAL at 20:10

## 2020-06-02 RX ADMIN — VECURONIUM BROMIDE 1 MG: 1 INJECTION, POWDER, LYOPHILIZED, FOR SOLUTION INTRAVENOUS at 08:19

## 2020-06-02 RX ADMIN — DEXAMETHASONE SODIUM PHOSPHATE 4 MG: 4 INJECTION, SOLUTION INTRAMUSCULAR; INTRAVENOUS at 07:50

## 2020-06-02 RX ADMIN — PHENYLEPHRINE HYDROCHLORIDE 100 MCG: 10 INJECTION INTRAVENOUS at 08:23

## 2020-06-02 RX ADMIN — SODIUM CHLORIDE: 9 INJECTION, SOLUTION INTRAVENOUS at 07:58

## 2020-06-02 RX ADMIN — FINASTERIDE 5 MG: 5 TABLET, FILM COATED ORAL at 20:11

## 2020-06-02 RX ADMIN — SODIUM CHLORIDE: 4.5 INJECTION, SOLUTION INTRAVENOUS at 10:53

## 2020-06-02 RX ADMIN — PROPOFOL 50 MG: 10 INJECTION, EMULSION INTRAVENOUS at 07:37

## 2020-06-02 RX ADMIN — CEFAZOLIN 2 G: 10 INJECTION, POWDER, FOR SOLUTION INTRAVENOUS at 07:24

## 2020-06-02 RX ADMIN — PHENYLEPHRINE HYDROCHLORIDE 100 MCG: 10 INJECTION INTRAVENOUS at 07:47

## 2020-06-02 RX ADMIN — SUGAMMADEX 200 MG: 100 INJECTION, SOLUTION INTRAVENOUS at 08:37

## 2020-06-02 RX ADMIN — PHENYLEPHRINE HYDROCHLORIDE 100 MCG: 10 INJECTION INTRAVENOUS at 07:43

## 2020-06-02 RX ADMIN — GABAPENTIN 300 MG: 300 CAPSULE ORAL at 20:10

## 2020-06-02 RX ADMIN — PHENYLEPHRINE HYDROCHLORIDE 100 MCG: 10 INJECTION INTRAVENOUS at 07:55

## 2020-06-02 RX ADMIN — FENTANYL CITRATE 25 MCG: 50 INJECTION, SOLUTION INTRAMUSCULAR; INTRAVENOUS at 08:02

## 2020-06-02 RX ADMIN — Medication 10 MG: at 07:58

## 2020-06-02 RX ADMIN — SENNOSIDES AND DOCUSATE SODIUM 1 TABLET: 8.6; 5 TABLET ORAL at 10:53

## 2020-06-02 RX ADMIN — CEFAZOLIN SODIUM 2 G: 10 INJECTION, POWDER, FOR SOLUTION INTRAVENOUS at 17:07

## 2020-06-02 RX ADMIN — Medication 10 MG: at 08:38

## 2020-06-02 RX ADMIN — HYDROCODONE BITARTRATE AND ACETAMINOPHEN 1 TABLET: 5; 325 TABLET ORAL at 14:21

## 2020-06-02 RX ADMIN — FENTANYL CITRATE 50 MCG: 50 INJECTION, SOLUTION INTRAMUSCULAR; INTRAVENOUS at 07:37

## 2020-06-02 RX ADMIN — CEFAZOLIN SODIUM 2 G: 10 INJECTION, POWDER, FOR SOLUTION INTRAVENOUS at 10:53

## 2020-06-02 RX ADMIN — PRAVASTATIN SODIUM 20 MG: 20 TABLET ORAL at 20:11

## 2020-06-02 RX ADMIN — LIDOCAINE HYDROCHLORIDE 100 MG: 20 INJECTION, SOLUTION EPIDURAL; INFILTRATION; INTRACAUDAL; PERINEURAL at 07:37

## 2020-06-02 ASSESSMENT — PULMONARY FUNCTION TESTS
PIF_VALUE: 17
PIF_VALUE: 2
PIF_VALUE: 2
PIF_VALUE: 15
PIF_VALUE: 15
PIF_VALUE: 2
PIF_VALUE: 17
PIF_VALUE: 1
PIF_VALUE: 2
PIF_VALUE: 17
PIF_VALUE: 0
PIF_VALUE: 2
PIF_VALUE: 3
PIF_VALUE: 3
PIF_VALUE: 15
PIF_VALUE: 1
PIF_VALUE: 17
PIF_VALUE: 17
PIF_VALUE: 2
PIF_VALUE: 5
PIF_VALUE: 16
PIF_VALUE: 27
PIF_VALUE: 17
PIF_VALUE: 15
PIF_VALUE: 1
PIF_VALUE: 2
PIF_VALUE: 17
PIF_VALUE: 15
PIF_VALUE: 17
PIF_VALUE: 16
PIF_VALUE: 17
PIF_VALUE: 16
PIF_VALUE: 17
PIF_VALUE: 17
PIF_VALUE: 16
PIF_VALUE: 17
PIF_VALUE: 17
PIF_VALUE: 0
PIF_VALUE: 17
PIF_VALUE: 2
PIF_VALUE: 3
PIF_VALUE: 17
PIF_VALUE: 17
PIF_VALUE: 15
PIF_VALUE: 16
PIF_VALUE: 3
PIF_VALUE: 15
PIF_VALUE: 14
PIF_VALUE: 2
PIF_VALUE: 16
PIF_VALUE: 3
PIF_VALUE: 26
PIF_VALUE: 17
PIF_VALUE: 15
PIF_VALUE: 17
PIF_VALUE: 2
PIF_VALUE: 3
PIF_VALUE: 14
PIF_VALUE: 15
PIF_VALUE: 2
PIF_VALUE: 15
PIF_VALUE: 1
PIF_VALUE: 16
PIF_VALUE: 2
PIF_VALUE: 16
PIF_VALUE: 1
PIF_VALUE: 17
PIF_VALUE: 16
PIF_VALUE: 4
PIF_VALUE: 17
PIF_VALUE: 2
PIF_VALUE: 17
PIF_VALUE: 16
PIF_VALUE: 2
PIF_VALUE: 16
PIF_VALUE: 17
PIF_VALUE: 17
PIF_VALUE: 2
PIF_VALUE: 17
PIF_VALUE: 17

## 2020-06-02 ASSESSMENT — PAIN SCALES - GENERAL
PAINLEVEL_OUTOF10: 0
PAINLEVEL_OUTOF10: 0
PAINLEVEL_OUTOF10: 4

## 2020-06-02 ASSESSMENT — PAIN - FUNCTIONAL ASSESSMENT: PAIN_FUNCTIONAL_ASSESSMENT: 0-10

## 2020-06-02 ASSESSMENT — PAIN DESCRIPTION - LOCATION: LOCATION: HIP

## 2020-06-02 ASSESSMENT — PAIN DESCRIPTION - ORIENTATION: ORIENTATION: LEFT

## 2020-06-02 ASSESSMENT — PAIN DESCRIPTION - PAIN TYPE: TYPE: SURGICAL PAIN

## 2020-06-02 NOTE — ANESTHESIA PRE PROCEDURE
needed for Chest pain 3/28/19   Seven NagelMARIS CNP   melatonin 5 MG TABS tablet Take 5 mg by mouth daily as needed (Insomnia)    Historical Provider, MD   triamcinolone (KENALOG) 0.1 % cream Apply to affected areas twice daily 1/11/18   Sinai HallMARIS CNP   Magnesium 400 MG TABS Take 1 tablet by mouth daily     Historical Provider, MD   aspirin 81 MG chewable tablet Take 1 tablet by mouth daily 11/3/16   Mallissa Dancer, MD       Current medications:    No current facility-administered medications for this visit. No current outpatient medications on file.      Facility-Administered Medications Ordered in Other Visits   Medication Dose Route Frequency Provider Last Rate Last Dose    ortho mix injection   Injection On Call Judy Reyes MD        tranexamic acid (CYKLOKAPRON) 3,000 mg in sodium chloride 0.9 % 50 mL irrigation   Irrigation Once Judy Reyes MD        ceFAZolin (ANCEF) 2 g in dextrose 5 % 100 mL IVPB  2 g Intravenous Once Judy Reyes MD        HYDROcodone-acetaminophen [facility]) 5-325 MG per tablet 1 tablet  1 tablet Oral Q4H PRN MARIS Holloway CNP   1 tablet at 06/01/20 2318    Or    HYDROcodone-acetaminophen (NORCO) 5-325 MG per tablet 2 tablet  2 tablet Oral Q4H PRN MARIS Holloway CNP        aspirin chewable tablet 81 mg  81 mg Oral Daily Carlos Barnett MD        lisinopril (PRINIVIL;ZESTRIL) tablet 40 mg  40 mg Oral Daily Carlos Barnett MD        finasteride (PROSCAR) tablet 5 mg  5 mg Oral Nightly Carlos Barnett MD   5 mg at 06/01/20 2115    gabapentin (NEURONTIN) capsule 300 mg  300 mg Oral Nightly Carlos Barnett MD   300 mg at 06/01/20 2115    lamoTRIgine (LAMICTAL) tablet 150 mg  150 mg Oral Daily Carlos Barnett MD   150 mg at 06/01/20 1021    LORazepam (ATIVAN) tablet 0.5 mg  0.5 mg Oral TID PRN Carlos Barnett MD   0.5 mg at 06/01/20 2115    magnesium oxide (MAG-OX) tablet 400 mg  400 mg Oral Daily Carlos Barnett MD       [facility] melatonin tablet 3 mg  3 mg Oral Daily PRN Herbert Aguiar MD   3 mg at 06/01/20 0026    nitroGLYCERIN (NITROSTAT) SL tablet 0.4 mg  0.4 mg Sublingual Q5 Min PRN Herbert Aguiar MD        therapeutic multivitamin-minerals 1 tablet  1 tablet Oral Daily Herbert Aguiar MD        pantoprazole (PROTONIX) tablet 40 mg  40 mg Oral QAM AC Herbert Aguiar MD   Stopped at 06/02/20 3015    polyethylene glycol (GLYCOLAX) packet 17 g  17 g Oral Daily Herbert Aguiar MD        pravastatin (PRAVACHOL) tablet 20 mg  20 mg Oral Nightly Herbert Aguiar MD   20 mg at 06/01/20 2115    tamsulosin (FLOMAX) capsule 0.4 mg  0.4 mg Oral Daily Herbert Aguiar MD   0.4 mg at 06/01/20 2115    triamcinolone (KENALOG) 0.1 % cream   Topical BID Herbert Aguiar MD   Stopped at 06/01/20 2118    HYDROmorphone HCl PF (DILAUDID) injection 0.5 mg  0.5 mg Intravenous Q4H PRN Herbert Aguiar MD   0.5 mg at 06/01/20 0537    ondansetron (ZOFRAN) injection 4 mg  4 mg Intravenous Q6H PRN Herbert Aguiar MD        0.9 % sodium chloride infusion   Intravenous Continuous Herbert Aguiar MD 50 mL/hr at 06/01/20 1014      levothyroxine (SYNTHROID) tablet 25 mcg  25 mcg Oral QAM Ninfa Barrios MD   Stopped at 06/01/20 0700       Allergies: Allergies   Allergen Reactions    Lasix [Furosemide] Other (See Comments)     Pt passed out     Zyprexa [Olanzapine]      Pacing.      Citalopram Anxiety     сергей       Problem List:    Patient Active Problem List   Diagnosis Code    Essential hypertension I10    Coronary artery disease I25.10    Hyperlipidemia E78.5    Bradycardia R00.1    Chest pain R07.9    GERD (gastroesophageal reflux disease) K21.9    Bilateral carotid artery disease (HCC) I73.9    Mild aortic stenosis I35.0    Psychophysiological insomnia F51.04    Anxiety disorder F41.9    RLS (restless legs syndrome) G25.81    Chronic idiopathic constipation K59.04    Bipolar 1 disorder (HCC) F31.9    Paroxysmal atrial fibrillation (HCC) I48.0    Moderate protein-calorie malnutrition (HCC) E44.0    Subcapital fracture of femur, left, closed, initial encounter ([facility] S72.012A    Chronic diastolic heart failure (HCC) I50.32       Past Medical History:        Diagnosis Date    A-fib ([facility]     Arthritis     Blepharitis of both eyes     CAD (coronary artery disease)     stents 2005, x3    GERD (gastroesophageal reflux disease)     Gout     Hyperlipidemia     Hypertension     Macular degeneration     NSTEMI (non-ST elevated myocardial infarction) ([facility]     Prostate enlargement     Psychiatric problem     anxiety; bipolar disorder    Tachycardia        Past Surgical History:        Procedure Laterality Date    CARDIAC SURGERY      CATARACT REMOVAL  1993    X5    COLONOSCOPY      CORONARY ANGIOPLASTY WITH STENT PLACEMENT  2005      X3    [facility]    EYE SURGERY      KIDNEY STONE SURGERY  1980    SKIN BIOPSY      melanoma, basal    SKIN CANCER DESTRUCTION  2006    Melanoma  ()    SOL  2003       Social History:    Social History     Tobacco Use    Smoking status: Never Smoker    Smokeless tobacco: Never Used   Substance Use Topics    Alcohol use: No                                Counseling given: Not Answered      Vital Signs (Current): There were no vitals filed for this visit.                                            BP Readings from Last 3 Encounters:   06/02/20 (!) 140/67   05/11/20 (!) 140/77   03/02/20 (!) 120/56       NPO Status:                                                                                 BMI:   Wt Readings from Last 3 Encounters:   06/02/20 135 lb (61.2 kg)   05/11/20 127 lb (57.6 kg)   02/24/20 127 lb (57.6 kg)     There is no height or weight on file to calculate BMI.    CBC:   Lab Results   Component Value Date    WBC 5.7 05/31/2020    RBC 3.01 05/31/2020    HGB 10.0 05/31/2020    HCT 29.9 05/31/2020    MCV

## 2020-06-02 NOTE — PROGRESS NOTES
Pt back to room from PACU in stable condition. Pt alert and oriented x4, VSS, on 5L O2 at this time. Encouraged to take deep breaths. Dressing to L hip clean dry and intact. Per Danielle Otero, NP leave splint off to L ankle due to pressure injuries. Pedal pulses palpable. Daughter at bedside. Pt denies pain. Ordered lunch tray for patient, given fresh water. Pt demonstrated how to reach nurse with call light. Call light in reach. Will continue to monitor. The care plan and education has been reviewed and mutually agreed upon with the patient. 1423 pt up to chair with Lian Krause, PCA and I. Tolerated fairly well. PRN pain medication administered afterwards. Ice applied to L hip.

## 2020-06-02 NOTE — CONSULTS
daily 1/11/18   Regenia Sample, APRN - CNP       Current Medications:   Current Facility-Administered Medications: ortho mix injection, , Injection, On Call  tranexamic acid (CYKLOKAPRON) 3,000 mg in sodium chloride 0.9 % 50 mL irrigation, , Irrigation, Once  ceFAZolin (ANCEF) 2 g in dextrose 5 % 100 mL IVPB, 2 g, Intravenous, Once  HYDROcodone-acetaminophen (NORCO) 5-325 MG per tablet 1 tablet, 1 tablet, Oral, Q4H PRN **OR** HYDROcodone-acetaminophen (NORCO) 5-325 MG per tablet 2 tablet, 2 tablet, Oral, Q4H PRN  aspirin chewable tablet 81 mg, 81 mg, Oral, Daily  lisinopril (PRINIVIL;ZESTRIL) tablet 40 mg, 40 mg, Oral, Daily  finasteride (PROSCAR) tablet 5 mg, 5 mg, Oral, Nightly  gabapentin (NEURONTIN) capsule 300 mg, 300 mg, Oral, Nightly  lamoTRIgine (LAMICTAL) tablet 150 mg, 150 mg, Oral, Daily  LORazepam (ATIVAN) tablet 0.5 mg, 0.5 mg, Oral, TID PRN  magnesium oxide (MAG-OX) tablet 400 mg, 400 mg, Oral, Daily  melatonin tablet 3 mg, 3 mg, Oral, Daily PRN  nitroGLYCERIN (NITROSTAT) SL tablet 0.4 mg, 0.4 mg, Sublingual, Q5 Min PRN  therapeutic multivitamin-minerals 1 tablet, 1 tablet, Oral, Daily  pantoprazole (PROTONIX) tablet 40 mg, 40 mg, Oral, QAM AC  polyethylene glycol (GLYCOLAX) packet 17 g, 17 g, Oral, Daily  pravastatin (PRAVACHOL) tablet 20 mg, 20 mg, Oral, Nightly  tamsulosin (FLOMAX) capsule 0.4 mg, 0.4 mg, Oral, Daily  triamcinolone (KENALOG) 0.1 % cream, , Topical, BID  HYDROmorphone HCl PF (DILAUDID) injection 0.5 mg, 0.5 mg, Intravenous, Q4H PRN  ondansetron (ZOFRAN) injection 4 mg, 4 mg, Intravenous, Q6H PRN  0.9 % sodium chloride infusion, , Intravenous, Continuous  levothyroxine (SYNTHROID) tablet 25 mcg, 25 mcg, Oral, QAM AC    Allergies:  Lasix [furosemide]; Zyprexa [olanzapine]; and Citalopram    Social History     Socioeconomic History    Marital status:       Spouse name: Not on file    Number of children: 3    Years of education: Not on file    Highest education level: Not on file   Occupational History    Occupation: Retired     Employer: [employer]     Comment: . Retired age 58. Social Needs    Financial resource strain: Not on file    Food insecurity     Worry: Not on file     Inability: Not on file    Transportation needs     Medical: Not on file     Non-medical: Not on file   Tobacco Use    Smoking status: Never Smoker    Smokeless tobacco: Never Used   Substance and Sexual Activity    Alcohol use: No    Drug use: No    Sexual activity: Not on file   Lifestyle    Physical activity     Days per week: Not on file     Minutes per session: Not on file    Stress: Not on file   Relationships    Social connections     Talks on phone: Not on file     Gets together: Not on file     Attends [employer] service: Not on file     Active member of club or organization: Not on file     Attends meetings of clubs or organizations: Not on file     Relationship status: Not on file    Intimate partner violence     Fear of current or ex partner: Not on file     Emotionally abused: Not on file     Physically abused: Not on file     Forced sexual activity: Not on file   Other Topics Concern    Not on file   Social History Narrative    L[facility] in [facility] before eventually moving to [place] in [address] [place] [address]. Lives with his 60 yo son in [place]. Has two daughters who are supportive. Arturo Aguilera [phone] and Ryan Pinedo [phone])     Two prior marriages. Flying in airplanes used to be a hobby, has given this up.         Family History:  Family History   Problem Relation Age of Onset    Heart Disease Mother     Heart Disease Brother     High Blood Pressure Neg Hx     High Cholesterol Neg Hx     Mental Illness Neg Hx        REVIEW OF SYSTEMS:   CONSTITUTIONAL: Denies unexplained weight loss, fevers, chills or fatigue  NEUROLOGICAL: Denies unsteady gait or progressive weakness    PSYCHOLOGICAL: Denies anxiety, depression   SKIN: Denies

## 2020-06-02 NOTE — PROGRESS NOTES
Called Dr. Марина Diaz regarding pt's NEG COVID test results. New order to take pt out of DROPLET PLUS precautions. Pre-op is notified. Will monitor pt.

## 2020-06-03 ENCOUNTER — APPOINTMENT (OUTPATIENT)
Dept: GENERAL RADIOLOGY | Age: 85
DRG: 470 | End: 2020-06-03
Payer: MEDICARE

## 2020-06-03 LAB
HCT VFR BLD CALC: 22.4 % (ref 40.5–52.5)
HEMOGLOBIN: 7.6 G/DL (ref 13.5–17.5)

## 2020-06-03 PROCEDURE — 85018 HEMOGLOBIN: CPT

## 2020-06-03 PROCEDURE — 97530 THERAPEUTIC ACTIVITIES: CPT

## 2020-06-03 PROCEDURE — 97116 GAIT TRAINING THERAPY: CPT

## 2020-06-03 PROCEDURE — 6370000000 HC RX 637 (ALT 250 FOR IP): Performed by: ORTHOPAEDIC SURGERY

## 2020-06-03 PROCEDURE — 97166 OT EVAL MOD COMPLEX 45 MIN: CPT

## 2020-06-03 PROCEDURE — 36415 COLL VENOUS BLD VENIPUNCTURE: CPT

## 2020-06-03 PROCEDURE — 99024 POSTOP FOLLOW-UP VISIT: CPT | Performed by: NURSE PRACTITIONER

## 2020-06-03 PROCEDURE — 71045 X-RAY EXAM CHEST 1 VIEW: CPT

## 2020-06-03 PROCEDURE — 2580000003 HC RX 258: Performed by: ORTHOPAEDIC SURGERY

## 2020-06-03 PROCEDURE — 97162 PT EVAL MOD COMPLEX 30 MIN: CPT

## 2020-06-03 PROCEDURE — 6370000000 HC RX 637 (ALT 250 FOR IP): Performed by: INTERNAL MEDICINE

## 2020-06-03 PROCEDURE — 99231 SBSQ HOSP IP/OBS SF/LOW 25: CPT | Performed by: INTERNAL MEDICINE

## 2020-06-03 PROCEDURE — 85014 HEMATOCRIT: CPT

## 2020-06-03 PROCEDURE — 6360000002 HC RX W HCPCS: Performed by: ORTHOPAEDIC SURGERY

## 2020-06-03 PROCEDURE — APPNB30 APP NON BILLABLE TIME 0-30 MINS: Performed by: NURSE PRACTITIONER

## 2020-06-03 PROCEDURE — 97535 SELF CARE MNGMENT TRAINING: CPT

## 2020-06-03 PROCEDURE — 1200000000 HC SEMI PRIVATE

## 2020-06-03 RX ORDER — LORAZEPAM 0.5 MG/1
0.5 TABLET ORAL 2 TIMES DAILY
Status: DISCONTINUED | OUTPATIENT
Start: 2020-06-03 | End: 2020-06-08 | Stop reason: HOSPADM

## 2020-06-03 RX ORDER — TORSEMIDE 20 MG/1
10 TABLET ORAL ONCE
Status: COMPLETED | OUTPATIENT
Start: 2020-06-03 | End: 2020-06-03

## 2020-06-03 RX ORDER — HYDROCODONE BITARTRATE AND ACETAMINOPHEN 5; 325 MG/1; MG/1
1 TABLET ORAL EVERY 4 HOURS PRN
Qty: 10 TABLET | Refills: 0 | Status: SHIPPED | OUTPATIENT
Start: 2020-06-03 | End: 2020-06-05 | Stop reason: HOSPADM

## 2020-06-03 RX ORDER — LISINOPRIL 20 MG/1
20 TABLET ORAL DAILY
Status: DISCONTINUED | OUTPATIENT
Start: 2020-06-04 | End: 2020-06-04

## 2020-06-03 RX ADMIN — SODIUM CHLORIDE: 4.5 INJECTION, SOLUTION INTRAVENOUS at 20:32

## 2020-06-03 RX ADMIN — HYDROCODONE BITARTRATE AND ACETAMINOPHEN 1 TABLET: 5; 325 TABLET ORAL at 20:32

## 2020-06-03 RX ADMIN — ASPIRIN 81 MG 81 MG: 81 TABLET ORAL at 08:20

## 2020-06-03 RX ADMIN — TORSEMIDE 10 MG: 20 TABLET ORAL at 23:52

## 2020-06-03 RX ADMIN — POLYETHYLENE GLYCOL 3350 17 G: 17 POWDER, FOR SOLUTION ORAL at 08:20

## 2020-06-03 RX ADMIN — PANTOPRAZOLE SODIUM 40 MG: 40 TABLET, DELAYED RELEASE ORAL at 05:30

## 2020-06-03 RX ADMIN — MULTIPLE VITAMINS W/ MINERALS TAB 1 TABLET: TAB at 08:20

## 2020-06-03 RX ADMIN — SENNOSIDES AND DOCUSATE SODIUM 1 TABLET: 8.6; 5 TABLET ORAL at 08:20

## 2020-06-03 RX ADMIN — Medication 400 MG: at 08:20

## 2020-06-03 RX ADMIN — PRAVASTATIN SODIUM 20 MG: 20 TABLET ORAL at 20:32

## 2020-06-03 RX ADMIN — FINASTERIDE 5 MG: 5 TABLET, FILM COATED ORAL at 20:31

## 2020-06-03 RX ADMIN — APIXABAN 2.5 MG: 2.5 TABLET, FILM COATED ORAL at 10:40

## 2020-06-03 RX ADMIN — SENNOSIDES AND DOCUSATE SODIUM 1 TABLET: 8.6; 5 TABLET ORAL at 20:32

## 2020-06-03 RX ADMIN — TAMSULOSIN HYDROCHLORIDE 0.4 MG: 0.4 CAPSULE ORAL at 20:31

## 2020-06-03 RX ADMIN — SODIUM CHLORIDE: 4.5 INJECTION, SOLUTION INTRAVENOUS at 03:13

## 2020-06-03 RX ADMIN — Medication 10 ML: at 20:33

## 2020-06-03 RX ADMIN — ENOXAPARIN SODIUM 40 MG: 40 INJECTION SUBCUTANEOUS at 08:19

## 2020-06-03 RX ADMIN — HYDROCODONE BITARTRATE AND ACETAMINOPHEN 1 TABLET: 5; 325 TABLET ORAL at 08:19

## 2020-06-03 RX ADMIN — LEVOTHYROXINE SODIUM 25 MCG: 0.03 TABLET ORAL at 05:31

## 2020-06-03 RX ADMIN — MELATONIN TAB 3 MG 3 MG: 3 TAB at 01:30

## 2020-06-03 RX ADMIN — GABAPENTIN 300 MG: 300 CAPSULE ORAL at 20:32

## 2020-06-03 RX ADMIN — APIXABAN 2.5 MG: 2.5 TABLET, FILM COATED ORAL at 20:35

## 2020-06-03 RX ADMIN — LORAZEPAM 0.5 MG: 0.5 TABLET ORAL at 20:32

## 2020-06-03 RX ADMIN — SODIUM CHLORIDE: 4.5 INJECTION, SOLUTION INTRAVENOUS at 16:00

## 2020-06-03 RX ADMIN — LORAZEPAM 0.5 MG: 0.5 TABLET ORAL at 01:30

## 2020-06-03 RX ADMIN — LAMOTRIGINE 150 MG: 100 TABLET ORAL at 08:20

## 2020-06-03 ASSESSMENT — PAIN DESCRIPTION - ORIENTATION
ORIENTATION: LEFT

## 2020-06-03 ASSESSMENT — PAIN DESCRIPTION - FREQUENCY
FREQUENCY: CONTINUOUS
FREQUENCY: CONTINUOUS

## 2020-06-03 ASSESSMENT — PAIN DESCRIPTION - PAIN TYPE
TYPE: ACUTE PAIN
TYPE: SURGICAL PAIN
TYPE: ACUTE PAIN

## 2020-06-03 ASSESSMENT — PAIN SCALES - GENERAL
PAINLEVEL_OUTOF10: 2
PAINLEVEL_OUTOF10: 4
PAINLEVEL_OUTOF10: 0
PAINLEVEL_OUTOF10: 5

## 2020-06-03 ASSESSMENT — PAIN - FUNCTIONAL ASSESSMENT: PAIN_FUNCTIONAL_ASSESSMENT: PREVENTS OR INTERFERES WITH MANY ACTIVE NOT PASSIVE ACTIVITIES

## 2020-06-03 ASSESSMENT — PAIN DESCRIPTION - ONSET
ONSET: ON-GOING
ONSET: ON-GOING

## 2020-06-03 ASSESSMENT — PAIN DESCRIPTION - LOCATION
LOCATION: HIP;ANKLE
LOCATION: HIP;ANKLE
LOCATION: HIP

## 2020-06-03 ASSESSMENT — PAIN DESCRIPTION - DESCRIPTORS
DESCRIPTORS: DISCOMFORT;ACHING
DESCRIPTORS: DISCOMFORT;ACHING

## 2020-06-03 NOTE — PROGRESS NOTES
[facility] Orthopedic Surgery   Progress Note    CHIEF COMPLAINT/DIAGNOSIS:    - 6/2/20 LEFT hip hemiarthroplasty  - Left distal fibular fracture, nondisplaced    SUBJECTIVE: Patient sitting up in chair eating breakfast.  Reports some hip pain and lateral ankle pain. OBJECTIVE  Physical    VITALS:  BP 99/60   Pulse 115   Temp 98 °F (36.7 °C) (Oral)   Resp 16   Ht 5' 6\" (1.676 m)   Wt 143 lb 2 oz (64.9 kg)   SpO2 92%   BMI 23.10 kg/m²     GENERAL: Alert, NAD   MUSCULOSKELETAL: LEFT LE  INCISION:  Dressing saturated with bloody drainage; RN to change after breakfast  ROM: intact DF/PF  Sensory:  Intact to light touch in peroneal and tibial distributions  Vascular:   Intact dors ped;  calf soft and nontender  ANKLE STIRRUP BRACE REMAINS OFF    Data    ALL MEDICATIONS HAVE BEEN REVIEWED    CBC:   Recent Labs     05/31/20 2023 06/03/20  0550   WBC 5.7  --    HGB 10.0* 7.6*   HCT 29.9* 22.4*     --      BMP:   Recent Labs     05/31/20 2023      K 4.3      CO2 29   BUN 16   CREATININE 0.9     INR:   Recent Labs     05/31/20 2023   INR 1.33*       ASSESSMENT:  - 6/2/20 LEFT hip hemiarthroplasty, POD#2  - Left distal fibular fracture, nondisplaced  - Atrial fibrillation on home Eliquis  CAD  HTN  BPH    PLAN:   - WB status:  WBAT; Anterolateral hip precautions; no adduction, hyperextension/external rotation; no flexion > 90 degrees;  - DO NOT APPLY ANKLE BRACE: distal fibular fracture is stable and non-displaced and brace may cause skin breakdown. Reviewed with RN, patient and daughter yesterday (6/2/20)  - DVT prophylaxis: Lovenox; Ok to resume home Eliquis tomorrow (6/3/20) from an orthopaedic standpoint if hemodynamically stable  - PT/OT  - D/C Plan: await PT recs; will likely need SNF  - Pain Control: current regimen. Due to orthopaedic surgical procedure/condition, patient may require pain medication for up to 6-8 weeks. - F U with Dr. Zahra Urias in 2 weeks; call [phone].       DEANNE GALLEGOS MARIS DENNY-CNP  6/3/2020  9:06 AM    .

## 2020-06-03 NOTE — PROGRESS NOTES
[address]   Progress Note  Cardiology    CC:  Afib, Hip fracture    HPI:  He is doing well. BP on low side. No chest pain or dyspnea. Medications/Labs all Reviewed    Lab Results   Component Value Date    WBC 5.7 05/31/2020    HGB 7.6 (L) 06/03/2020    HCT 22.4 (L) 06/03/2020    MCV 99.2 05/31/2020     05/31/2020     Lab Results   Component Value Date    CREATININE 0.9 05/31/2020    BUN 16 05/31/2020     05/31/2020    K 4.3 05/31/2020     05/31/2020    CO2 29 05/31/2020     Lab Results   Component Value Date    INR 1.33 (H) 05/31/2020    PROTIME 15.5 (H) 05/31/2020        Physical Examination:    BP (!) 88/48   Pulse 83   Temp 98 °F (36.7 °C) (Oral)   Resp 16   Ht 5' 6\" (1.676 m)   Wt 143 lb 2 oz (64.9 kg)   SpO2 96%   BMI 23.10 kg/m²      Respiratory:  · Resp Assessment: Normal respiratory effort  · Resp Auscultation: Clear to auscultation bilaterally   Cardiovascular:  · Auscultation: regular rate and rhythm, normal S1S2, no murmur, rub or gallop  · Palpation:  Nl PMI  · JVP:  normal  · Extremities: trace Edema  Abdomen:  · Soft, non-tender  · Normal bowel sounds  Extremities:  ·  No Cyanosis or Clubbing  Neurological/Psychiatric:  · Oriented to time, place, and person  · Non-anxious  Skin:   · Warm and dry      Assessment:      Essential hypertension  On low side. Decrease lisinopril dose from 40=>20    Afib:  Controlled. Back on eliquis    Stable cardiac status. Will sign off.  Call for questions    Marion Millard MD, 6/3/2020 1:09 PM

## 2020-06-03 NOTE — PROGRESS NOTES
Department of Internal Medicine  General Internal Medicine   Progress Note      SUBJECTIVE: s/p surgery , denies chest pain or sob   Past Medical History:   Diagnosis Date    A-fib ([address]     Arthritis     Blepharitis of both eyes     CAD (coronary artery disease)     stents 2005, x3    GERD (gastroesophageal reflux disease)     Gout     Hyperlipidemia     Hypertension     Macular degeneration     NSTEMI (non-ST elevated myocardial infarction) ([address]     Prostate enlargement     Psychiatric problem     anxiety; bipolar disorder    Tachycardia      Past Surgical History:   Procedure Laterality Date    CARDIAC SURGERY      CATARACT REMOVAL  1993    X5    COLONOSCOPY      CORONARY ANGIOPLASTY WITH STENT PLACEMENT  2005      X3    [facility]ARY [facility]PASS GRAFT      [facility]    EYE SURGERY      KIDNEY STONE SURGERY  1980    SKIN BIOPSY      melanoma, basal    SKIN CANCER DESTRUCTION  2006    Melanoma  ()    TURP  2003         History obtained from chart review, the patient and family members   General ROS: positive for  - fatigue, malaise, sleep disturbance and weight loss  negative for - chills, fever or night sweats  Psychological ROS: positive for - memory difficulties  negative for - anxiety, behavioral disorder, hallucinations, hostility or mood swings  Ophthalmic ROS: negative  Respiratory ROS: positive for - shortness of breath  negative for - cough, hemoptysis, tachypnea or wheezing  Cardiovascular ROS: positive for - dyspnea on exertion  negative for - chest pain  Gastrointestinal ROS: no abdominal pain, change in bowel habits, or black or bloody stools  Genito-Urinary ROS: no dysuria, trouble voiding, or hematuria  Musculoskeletal ROS: left hip pain , unable to ambulate   Neurological ROS: no TIA or stroke symptoms  Dermatological ROS: negative    OBJECTIVE      Medications      Current Facility-Administered Medications: LORazepam (ATIVAN) tablet 0.5 mg, 0.5 mg, Oral, BID  apixaban (ELIQUIS) tablet 2.5 mg, 2.5 mg, Oral, BID  [START ON 6/4/2020] lisinopril (PRINIVIL;ZESTRIL) tablet 20 mg, 20 mg, Oral, Daily  tranexamic acid (CYKLOKAPRON) 3,000 mg in sodium chloride 0.9 % 50 mL irrigation, , Irrigation, Once  0.45 % sodium chloride infusion, , Intravenous, Continuous  sodium chloride flush 0.9 % injection 10 mL, 10 mL, Intravenous, 2 times per day  sodium chloride flush 0.9 % injection 10 mL, 10 mL, Intravenous, PRN  sennosides-docusate sodium (SENOKOT-S) 8.6-50 MG tablet 1 tablet, 1 tablet, Oral, BID  magnesium hydroxide (MILK OF MAGNESIA) 400 MG/5ML suspension 30 mL, 30 mL, Oral, Daily PRN  HYDROcodone-acetaminophen (NORCO) 5-325 MG per tablet 1 tablet, 1 tablet, Oral, Q4H PRN **OR** HYDROcodone-acetaminophen (NORCO) 5-325 MG per tablet 2 tablet, 2 tablet, Oral, Q4H PRN  aspirin chewable tablet 81 mg, 81 mg, Oral, Daily  finasteride (PROSCAR) tablet 5 mg, 5 mg, Oral, Nightly  gabapentin (NEURONTIN) capsule 300 mg, 300 mg, Oral, Nightly  lamoTRIgine (LAMICTAL) tablet 150 mg, 150 mg, Oral, Daily  magnesium oxide (MAG-OX) tablet 400 mg, 400 mg, Oral, Daily  melatonin tablet 3 mg, 3 mg, Oral, Daily PRN  nitroGLYCERIN (NITROSTAT) SL tablet 0.4 mg, 0.4 mg, Sublingual, Q5 Min PRN  therapeutic multivitamin-minerals 1 tablet, 1 tablet, Oral, Daily  pantoprazole (PROTONIX) tablet 40 mg, 40 mg, Oral, QAM AC  polyethylene glycol (GLYCOLAX) packet 17 g, 17 g, Oral, Daily  pravastatin (PRAVACHOL) tablet 20 mg, 20 mg, Oral, Nightly  tamsulosin (FLOMAX) capsule 0.4 mg, 0.4 mg, Oral, Daily  triamcinolone (KENALOG) 0.1 % cream, , Topical, BID  HYDROmorphone HCl PF (DILAUDID) injection 0.5 mg, 0.5 mg, Intravenous, Q4H PRN  ondansetron (ZOFRAN) injection 4 mg, 4 mg, Intravenous, Q6H PRN  0.9 % sodium chloride infusion, , Intravenous, Continuous  levothyroxine (SYNTHROID) tablet 25 mcg, 25 mcg, Oral, QAM AC    Physical      Vitals: /63   Pulse 99   Temp 97.8 °F (36.6 °C) (Oral)

## 2020-06-03 NOTE — PROGRESS NOTES
Occupational Therapy   Occupational Therapy Initial Assessment  Date: 6/3/2020   Patient Name: Sheila Lisa  MRN: [id number]     : 10/31/1925    Date of Service: 6/3/2020    Discharge Recommendations:Bartolome Rudd scored a 13/24 on the AM-PAC ADL Inpatient form. Current research shows that an AM-PAC score of 17 or less is typically not associated with a discharge to the patient's home setting. Based on the patient's AM-PAC score and their current ADL deficits, it is recommended that the patient have 3-5 sessions per week of Occupational Therapy at d/c to increase the patient's independence. At this time, this patient lacks the endurance, and/or tolerance for 3 hours of therapy/day, 5-7x/wk and would benefit most from a follow up treatment frequency of 3-5x/wk. Please see assessment section for further patient specific details. If patient discharges prior to next session this note will serve as a discharge summary. Please see below for the latest assessment towards goals. OT Equipment Recommendations  Equipment Needed: No  Other: defer to next level    Assessment   Performance deficits / Impairments: Decreased functional mobility ; Decreased cognition;Decreased endurance;Decreased ADL status; Decreased balance;Decreased safe awareness  Assessment: Patient presents with the above deficits, which are below baseline, and would benefit from continued skilled OT to address  Treatment Diagnosis: Decreased ADLs, transfers, mobility associated with L femur fx, s/p hemiarthroplasty, Left distal fibular fracture, nondisplaced (WBAT, no ankle brace per Denise Masterson)  Prognosis: Fair;Good  Decision Making: Medium Complexity  [facility]ulatory, dressing I  Exam: as above  Assistance / Modification: maxA of 2, cognition/safety concerns  OT Education: OT Role;Plan of Care;Precautions;Orientation  Patient Education: Eval, discharge recommendations-patient verbalized understanding, will require stand step transfer maxA of 2 with RW (cues to sequence, hand placement, safety) Noted blood running down LLE from dressing during transfer--RN made aware  Wheelchair Bed Transfers  Wheelchair/Bed - Technique: Stand step  Equipment Used: Bed;Other  Level of Asssistance: Dependent/Total(maxA of 2 with RW ~2-3 stand steps to recliner)  ADL  Feeding: Setup  LE Dressing: Dependent/Total  Toileting: Dependent/Total(catheter removed just prior to beginning of session. Patient concerned about incontinence (depends donned))  Tone RUE  RUE Tone: Normotonic  Tone LUE  LUE Tone: Normotonic  Coordination  Movements Are Fluid And Coordinated: Yes     Bed mobility  Supine to Sit: Dependent/Total(maxA of 2, increased time)  Scooting: Dependent/Total(MaxA of 2)  Transfers  Sit to stand: 2 Person assistance(maxA of 2)  Stand to sit: 2 Person assistance(maxA of 2)  Vision - Basic Assessment  Prior Vision: Wears glasses only for reading  Patient Visual Report: No visual complaint reported. Cognition  Overall Cognitive Status: Exceptions  Arousal/Alertness: Delayed responses to stimuli  Following Commands: Follows one step commands with increased time; Follows one step commands with repetition  Memory: Decreased recall of precautions;Decreased recall of recent events;Decreased short term memory  Safety Judgement: Decreased awareness of need for assistance;Decreased awareness of need for safety  Problem Solving: Decreased awareness of errors  Insights: Decreased awareness of deficits  Initiation: Requires cues for some  Sequencing: Requires cues for some  Perception  Overall Perceptual Status: Impaired  Initiation: Cues to initiate tasks     Sensation  Overall Sensation Status: WFL        LUE AROM (degrees)  LUE AROM : WFL  RUE AROM (degrees)  RUE AROM : WFL  LUE Strength  L Hand General: 4/5  RUE Strength  R Hand General: 4/5                   Plan   Plan  Times per week: 7  Times per day: Daily  Specific instructions for Next

## 2020-06-03 NOTE — PROGRESS NOTES
Shift assessment completed. Medications given per MAR. Patient resting in bed and denying any needs. The care plan and education has been reviewed and mutually agreed upon with the patient.

## 2020-06-03 NOTE — PROGRESS NOTES
Physical Therapy    Facility/Department: [address] ORTHO/NEURO NURSING  Initial Assessment    NAME: Abilio Pitt  : 10/31/1925  MRN: [id number]    Date of Service: 6/3/2020    Discharge Recommendations: Abilio Pitt scored a 7/24 on the AM-PAC short mobility form. Current research shows that an AM-PAC score of 17 or less is typically not associated with a discharge to the patient's home setting. Based on the patient's AM-PAC score and their current functional mobility deficits, it is recommended that the patient have 3-5 sessions per week of Physical Therapy at d/c to increase the patient's independence. At this time, this patient lacks the endurance, and/or tolerance for 3 hours of therapy/day, 5-7x/wk and would benefit most from a follow up treatment frequency of 3-5x/wk. Please see assessment section for further patient specific details. If patient discharges prior to next session this note will serve as a discharge summary. Please see below for the latest assessment towards goals. PT Equipment Recommendations  Equipment Needed: No    Assessment   Body structures, Functions, Activity limitations: Decreased functional mobility ; Decreased strength;Decreased safe awareness;Decreased cognition;Decreased endurance;Decreased balance  Assessment: Pt presents with impaired functional strength and endurance and decreased standing balance impairing his ability to perform functional mobility safely and independently. Pt with impaired safety awareness and currently requiring Max Ax2 for all mobility at this time with PLOF of independence with walker. Pt would benefit from acute PT to address deficits and allow for return to PLOF.    Treatment Diagnosis: impaired gait, transfers, and balance  Prognosis: Good  Decision Making: Medium Complexity  Clinical Presentation: evolving  PT Education: Goals;PT Role;Plan of Care;Precautions;Transfer Training;Equipment;Orientation;General Safety;Weight-bearing responses/command following  General Comment  Comments: Pt supine in bed upon arrival.   Subjective  Subjective: Pt agreeable to PT/OT eval. Pt reporting no pain at this time. Pain Screening  Patient Currently in Pain: Denies  Vital Signs  Patient Currently in Pain: Denies       Orientation  Orientation  Overall Orientation Status: Impaired  Orientation Level: Oriented to situation;Oriented to person;Disoriented to place; Disoriented to time     Social/Functional History  Social/Functional History  Lives With: Alone  Type of Home: Facility([facility])  Home Layout: One level  Home Access: Level entry  Bathroom Shower/Tub: Walk-in shower  Bathroom Toilet: Handicap height  Bathroom Equipment: Grab bars in shower, Grab bars around toilet, Shower chair, Hand-held shower  Home Equipment: 4 wheeled walker  ADL Assistance: Needs assistance(pt reporting staff assists with bathing)  [address]: (staff performs)  Homemaking Responsibilities: No  Ambulation Assistance: Independent(4WW)  Transfer Assistance: Independent  Active : No  Occupation: Retired  Additional Comments: Pt questionable historian--verying answers to PLOF questions. Cognition   Cognition  Overall Cognitive Status: Exceptions  Arousal/Alertness: Delayed responses to stimuli  Following Commands: Follows one step commands with increased time; Follows one step commands with repetition  Memory: Decreased recall of precautions;Decreased recall of recent events;Decreased short term memory  Safety Judgement: Decreased awareness of need for assistance;Decreased awareness of need for safety  Problem Solving: Decreased awareness of errors  Insights: Decreased awareness of deficits  Initiation: Requires cues for some  Sequencing: Requires cues for some    Objective  AROM RLE (degrees)  RLE AROM: WFL  AROM LLE (degrees)  LLE AROM : WFL  Strength RLE  Strength RLE: Exception  Comment: grossly 3-/5  Strength LLE  Strength LLE: aware and educated to use STEDY for transfers)  Restraints  Initially in place: No    G-Code       OutComes Score                                                  AM-PAC Score  AM-PAC Inpatient Mobility Raw Score : 7 (06/03/20 0945)  AM-PAC Inpatient T-Scale Score : 26.42 (06/03/20 0945)  Mobility Inpatient CMS 0-100% Score: 92.36 (06/03/20 0945)  Mobility Inpatient CMS G-Code Modifier : CM (06/03/20 0945)          Goals  Short term goals  Time Frame for Short term goals: upon d/c  Short term goal 1: Pt will perform bed mobility with max Ax1  Short term goal 2: Pt will perform transfers with LRAD and max Ax1  Short term goal 3: Pt will ambulate 10' with LRAD and max Ax1  Patient Goals   Patient goals : pt did not state       Therapy Time   Individual Concurrent Group Co-treatment   Time In 0813         Time Out 0852         Minutes 39              Timed Code Treatment Minutes:   24    Total Treatment Minutes:  [address] [facility], [address] [place] [id number]

## 2020-06-04 ENCOUNTER — TELEPHONE (OUTPATIENT)
Dept: INTERNAL MEDICINE CLINIC | Age: 85
End: 2020-06-04

## 2020-06-04 LAB
HCT VFR BLD CALC: 23.5 % (ref 40.5–52.5)
HEMOGLOBIN: 8 G/DL (ref 13.5–17.5)

## 2020-06-04 PROCEDURE — 2580000003 HC RX 258: Performed by: ORTHOPAEDIC SURGERY

## 2020-06-04 PROCEDURE — 85014 HEMATOCRIT: CPT

## 2020-06-04 PROCEDURE — 6370000000 HC RX 637 (ALT 250 FOR IP): Performed by: ORTHOPAEDIC SURGERY

## 2020-06-04 PROCEDURE — 85018 HEMOGLOBIN: CPT

## 2020-06-04 PROCEDURE — 97530 THERAPEUTIC ACTIVITIES: CPT

## 2020-06-04 PROCEDURE — 1200000000 HC SEMI PRIVATE

## 2020-06-04 PROCEDURE — 2700000000 HC OXYGEN THERAPY PER DAY

## 2020-06-04 PROCEDURE — APPNB45 APP NON BILLABLE 31-45 MINUTES: Performed by: NURSE PRACTITIONER

## 2020-06-04 PROCEDURE — 36415 COLL VENOUS BLD VENIPUNCTURE: CPT

## 2020-06-04 PROCEDURE — 97535 SELF CARE MNGMENT TRAINING: CPT

## 2020-06-04 PROCEDURE — 6370000000 HC RX 637 (ALT 250 FOR IP): Performed by: INTERNAL MEDICINE

## 2020-06-04 PROCEDURE — 97116 GAIT TRAINING THERAPY: CPT

## 2020-06-04 PROCEDURE — 94760 N-INVAS EAR/PLS OXIMETRY 1: CPT

## 2020-06-04 PROCEDURE — 99024 POSTOP FOLLOW-UP VISIT: CPT | Performed by: NURSE PRACTITIONER

## 2020-06-04 RX ORDER — ACETAMINOPHEN 325 MG/1
650 TABLET ORAL EVERY 4 HOURS PRN
Status: DISCONTINUED | OUTPATIENT
Start: 2020-06-04 | End: 2020-06-08 | Stop reason: HOSPADM

## 2020-06-04 RX ORDER — GABAPENTIN 100 MG/1
100 CAPSULE ORAL NIGHTLY
Status: DISCONTINUED | OUTPATIENT
Start: 2020-06-05 | End: 2020-06-08 | Stop reason: HOSPADM

## 2020-06-04 RX ORDER — LISINOPRIL 10 MG/1
10 TABLET ORAL DAILY
Status: DISCONTINUED | OUTPATIENT
Start: 2020-06-05 | End: 2020-06-08 | Stop reason: HOSPADM

## 2020-06-04 RX ADMIN — SENNOSIDES AND DOCUSATE SODIUM 1 TABLET: 8.6; 5 TABLET ORAL at 22:01

## 2020-06-04 RX ADMIN — APIXABAN 2.5 MG: 2.5 TABLET, FILM COATED ORAL at 22:02

## 2020-06-04 RX ADMIN — Medication 400 MG: at 08:31

## 2020-06-04 RX ADMIN — SENNOSIDES AND DOCUSATE SODIUM 1 TABLET: 8.6; 5 TABLET ORAL at 08:31

## 2020-06-04 RX ADMIN — FINASTERIDE 5 MG: 5 TABLET, FILM COATED ORAL at 22:00

## 2020-06-04 RX ADMIN — PANTOPRAZOLE SODIUM 40 MG: 40 TABLET, DELAYED RELEASE ORAL at 05:49

## 2020-06-04 RX ADMIN — GABAPENTIN 300 MG: 300 CAPSULE ORAL at 22:01

## 2020-06-04 RX ADMIN — POLYETHYLENE GLYCOL 3350 17 G: 17 POWDER, FOR SOLUTION ORAL at 08:31

## 2020-06-04 RX ADMIN — MULTIPLE VITAMINS W/ MINERALS TAB 1 TABLET: TAB at 08:30

## 2020-06-04 RX ADMIN — TAMSULOSIN HYDROCHLORIDE 0.4 MG: 0.4 CAPSULE ORAL at 22:00

## 2020-06-04 RX ADMIN — LORAZEPAM 0.5 MG: 0.5 TABLET ORAL at 08:30

## 2020-06-04 RX ADMIN — LAMOTRIGINE 150 MG: 100 TABLET ORAL at 08:30

## 2020-06-04 RX ADMIN — LEVOTHYROXINE SODIUM 25 MCG: 0.03 TABLET ORAL at 05:49

## 2020-06-04 RX ADMIN — HYDROCODONE BITARTRATE AND ACETAMINOPHEN 1 TABLET: 5; 325 TABLET ORAL at 05:49

## 2020-06-04 RX ADMIN — ASPIRIN 81 MG 81 MG: 81 TABLET ORAL at 08:30

## 2020-06-04 RX ADMIN — PRAVASTATIN SODIUM 20 MG: 20 TABLET ORAL at 22:00

## 2020-06-04 RX ADMIN — Medication 10 ML: at 22:01

## 2020-06-04 RX ADMIN — LORAZEPAM 0.5 MG: 0.5 TABLET ORAL at 22:01

## 2020-06-04 RX ADMIN — APIXABAN 2.5 MG: 2.5 TABLET, FILM COATED ORAL at 08:31

## 2020-06-04 ASSESSMENT — PAIN DESCRIPTION - ORIENTATION: ORIENTATION: LEFT

## 2020-06-04 ASSESSMENT — PAIN DESCRIPTION - PAIN TYPE: TYPE: SURGICAL PAIN;ACUTE PAIN

## 2020-06-04 ASSESSMENT — PAIN DESCRIPTION - PROGRESSION: CLINICAL_PROGRESSION: GRADUALLY IMPROVING

## 2020-06-04 ASSESSMENT — PAIN SCALES - WONG BAKER: WONGBAKER_NUMERICALRESPONSE: 2

## 2020-06-04 ASSESSMENT — PAIN DESCRIPTION - FREQUENCY: FREQUENCY: CONTINUOUS

## 2020-06-04 ASSESSMENT — PAIN SCALES - GENERAL
PAINLEVEL_OUTOF10: 0
PAINLEVEL_OUTOF10: 4
PAINLEVEL_OUTOF10: 0
PAINLEVEL_OUTOF10: 0

## 2020-06-04 ASSESSMENT — PAIN DESCRIPTION - LOCATION: LOCATION: HIP;ANKLE

## 2020-06-04 ASSESSMENT — PAIN DESCRIPTION - ONSET: ONSET: ON-GOING

## 2020-06-04 ASSESSMENT — PAIN DESCRIPTION - DESCRIPTORS: DESCRIPTORS: ACHING

## 2020-06-04 NOTE — PROGRESS NOTES
Occupational Therapy  Facility/Department: [address] ORTHO/NEURO NURSING  Daily Treatment Note  NAME: Mitchel Malik  : 10/31/1925  MRN: [id number]    Date of Service: 2020    Discharge Recommendations:      Mitchel Malik scored a 13/24 on the AM-PAC ADL Inpatient form. Current research shows that an AM-PAC score of 17 or less is typically not associated with a discharge to the patient's home setting. Based on the patient's AM-PAC score and their current ADL deficits, it is recommended that the patient have 3-5 sessions per week of Occupational Therapy at d/c to increase the patient's independence. At this time, this patient lacks the endurance, and/or tolerance for 3 hours of therapy/day, 5-7x/wk and would benefit most from a follow up treatment frequency of 3-5x/wk. Please see assessment section for further patient specific details. If patient discharges prior to next session this note will serve as a discharge summary. Please see below for the latest assessment towards goals. OT Equipment Recommendations  Equipment Needed: No  Other: defer to next level    Assessment   Performance deficits / Impairments: Decreased functional mobility ; Decreased cognition;Decreased endurance;Decreased ADL status; Decreased balance;Decreased safe awareness  Assessment: Patient presents with the above deficits, which are below baseline, and would benefit from continued skilled OT to address  Treatment Diagnosis: Decreased ADLs, transfers, mobility associated with L femur fx, s/p hemiarthroplasty, Left distal fibular fracture, nondisplaced (WBAT, no ankle brace per Umair Nixon)  Prognosis: Fair;Good  Decision Making: Medium Complexity  OT Education: OT Role;Plan of Care;Precautions;Orientation;Transfer Training;ADL Adaptive Strategies  Patient Education: A/E to maintain posterior hip precautions-pt verbalized understanding however would benefit from continued reinforcement for carryover   REQUIRES OT FOLLOW UP: accordingly. Vital Signs  BP: 95/60(seated EOB-120/60, following ambulation- 99/61, following second bout of ambulation-96/56, 102/65)  BP Location: Left upper arm  BP Upper/Lower: Upper  Patient Position: Sitting   Orientation  Orientation  Overall Orientation Status: Impaired  Orientation Level: Disoriented to time;Disoriented to situation;Oriented to person;Oriented to place  Objective    ADL  Grooming: Contact guard assistance;Setup;Verbal cueing(in stance at sink to brush teeth/wash face-min verbal cueing for sequence)  UE Bathing: Setup; Moderate assistance;Verbal cueing(A for thoroughness)  LE Bathing: Moderate assistance;Setup(pt washed to thigh level)  UE Dressing: Setup; Moderate assistance(doff/don gown)  LE Dressing: Maximum assistance(pt doffed socks with use of reach/doned socks with sock aid-A for sequence and to thread B LEs into depends and to perform LB clothing management over hips once in stance)  Toileting: Dependent/Total(A for hygiene/LB clothing management)  Additional Comments: Pt performed functional mobility to/from restroom and completed grooming tasks in stance at sink.  UB/LB bathing and dressing tasks completed seated on BSC        Balance  Sitting Balance: Stand by assistance  Standing Balance: Minimal assistance  Standing Balance  Time: ~15 minutes  Activity: functional mobility to/from restroom, stance at sink for grooming tasks, stance for LB ADLs, functional transfers  Comment: with use of rw, 1 instance of posterior LOB in stance performing doffing depenends-mod a to correct   Functional Mobility  Functional - Mobility Device: Rolling Walker  Activity: To/from bathroom  Assist Level: Minimal assistance  Functional Mobility Comments: assist for rw management and sequence throughout  Bed mobility  Supine to Sit: Stand by assistance;Contact guard assistance  Scooting: Contact guard assistance  Transfers  Stand Step Transfers: Independent  Sit to stand: Minimal assistance  Stand to sit: Minimal assistance        Cognition  Overall Cognitive Status: Exceptions  Arousal/Alertness: Appropriate responses to stimuli  Following Commands: Follows one step commands with increased time; Follows one step commands with repetition  Attention Span: Attends with cues to redirect  Memory: Decreased recall of precautions;Decreased recall of recent events;Decreased short term memory  Safety Judgement: Decreased awareness of need for assistance;Decreased awareness of need for safety  Problem Solving: Decreased awareness of errors  Insights: Decreased awareness of deficits  Initiation: Requires cues for some  Sequencing: Requires cues for some     Perception  Overall Perceptual Status: Impaired  Initiation: Cues to initiate tasks     2nd session: [id number]    Pt's daughter requesting OT/PT come back to room to discuss pt's progress during therapy session/questions about future therapy. Pt supine in bed upon arrival with daughter present. Pt's daughter education on WBing status and posterior hip precautions along with A/E for LB dressing to maintain those precautions. Pt and daughter verbalized understanding of education-long handled sponge given to pt . All needs met at this time.         Plan   Plan  Times per week: 7  Times per day: Daily  Specific instructions for Next Treatment: cotx  Current Treatment Recommendations: Strengthening, Balance Training, Functional Mobility Training, Patient/Caregiver Education & Training, Equipment Evaluation, Education, & procurement, Self-Care / ADL, Safety Education & Training    AM-PAC Score        AM-PAC Inpatient Daily Activity Raw Score: 13 (06/04/20 1217)  AM-PAC Inpatient ADL T-Scale Score : 32.03 (06/04/20 1217)  ADL Inpatient CMS 0-100% Score: 63.03 (06/04/20 1217)  ADL Inpatient CMS G-Code Modifier : CL (06/04/20 1217)    Goals  Short term goals  Time Frame for Short term goals: Discharge  Short term goal 1: Bed mobility modA-goal met 6/4  Short term goal 2: Functional ADL transfers mod A-goal met 6/4  Short term goal 3: UB ADLs setup, LB modA-UB mod a, LB mod-max a 6/4  Short term goal 4: Functional stance x 5 minutes Swathi to assist with ADL completion-goal met 6/4  Long term goals  Time Frame for Long term goals : LTG=STG  Patient Goals   Patient goals : Walking, return to [facility] [address] Time   Individual Concurrent Group Co-treatment   Time In       0836   Time Out       0954   Minutes       78      Timed Code Treatment Minutes:   78 Minutes  Total Treatment Minutes:  78 Minutes    Second Session Therapy Time:   Individual Concurrent Group Co-treatment   Time In       1250   Time Out       1310   Minutes       20     Timed Code Treatment Minutes:  78 + 20 minutes  Total Treatment Minutes:  [address]

## 2020-06-04 NOTE — PROGRESS NOTES
Physical Therapy  Facility/Department: [address] ORTHO/NEURO NURSING  Daily Treatment Note  NAME: Rafael Harris  : 10/31/1925  MRN: [id number]    Date of Service: 2020    Discharge Recommendations:    Rafael Harris scored a 16/24 on the AM-PAC short mobility form. Current research shows that an AM-PAC score of 17 or less is typically not associated with a discharge to the patient's home setting. Based on the patient's AM-PAC score and their current functional mobility deficits, it is recommended that the patient have 3-5 sessions per week of Physical Therapy at d/c to increase the patient's independence. At this time, this patient lacks the endurance, and/or tolerance for 3 hours of therapy/day, 5-7x/wk and would benefit most from a follow up treatment frequency of 3-5x/wk. Please see assessment section for further patient specific details. If patient discharges prior to next session this note will serve as a discharge summary. Please see below for the latest assessment towards goals. PT Equipment Recommendations  Equipment Needed: No    Assessment   Body structures, Functions, Activity limitations: Decreased functional mobility ; Decreased strength;Decreased safe awareness;Decreased cognition;Decreased endurance;Decreased balance  Assessment: Pt with improved tolerance for activity. However, BP drops regularly, requiring monitoring throughout tx. Ambulation and balance during standing or sitting ADL's Min A or Mod A. Pt would benefit from acute PT to address deficits and allow for return to PLOF.    Treatment Diagnosis: impaired gait, transfers, and balance  Prognosis: Good  Decision Making: Medium Complexity  Clinical Presentation: evolving  PT Education: Goals;PT Role;Plan of Care;Precautions;Transfer Training;Equipment;Orientation;General Safety;Weight-bearing Education;Gait Training;Functional Mobility Training  Patient Education: d/c recommendations--pt verbalizing understanding, will require situation;Oriented to person;Oriented to place  Cognition      Objective   Bed mobility  Supine to Sit: Stand by assistance;Contact guard assistance  Scooting: Contact guard assistance  Comment: HOB elevated  Transfers  Sit to Stand: Minimal Assistance  Stand to sit: Minimal Assistance  Comment: 3 sit<>stands  Ambulation  Ambulation?: Yes  Ambulation 1  Surface: level tile  Device: Rolling Walker  Assistance: Minimal assistance  Quality of Gait: narrow Evie, decreased B foot clearance, decreased WBing on LLE, decreased B step lengths  Gait Deviations: Slow Candelaria;Decreased step height  Distance: 12' + 3'  Comments: Pt required SIGNIFICANTLY increasd time to perform with max VC for walker management and stepping sequencing. Max A for walker management/negotiation. Stairs/Curb  Stairs?: No     Balance  Posture: Fair  Sitting - Static: Good;-(SBA)  Sitting - Dynamic: Good;-(SBA)  Standing - Static: Fair(Min A)  Standing - Dynamic: Fair;-(Min A - Mod A )  Comments: Mod A to correct posterior LOB during ADL's. Stood ~20 minutes at sink for ADL's amd sat ~20 minutes for ADL's SBA. Comment: Pt's BP intially 112/60 in supine; 95/60 sitting EOB; 3 mins later 120/60 sitting EOB; after walking 99/61; After tx session, pt required to sit do to BP lowering to 95/56. Pt required STEDY to get back to recliner and BP settled at 102/65. 2nd session:  Pt supine in bed and alert with daughter present. Daughter states she has questions regarding her father's therapy and overall activity. Pt's daughter educated on precautions, this dates therapy session and results what he might expect. Daughter educated on use of abductor pillow. RN notified to order an abductor pillow.       AM-PAC Score  AM-PAC Inpatient Mobility Raw Score : 16 (06/04/20 1241)  AM-PAC Inpatient T-Scale Score : 40.78 (06/04/20 1241)  Mobility Inpatient CMS 0-100% Score: 54.16 (06/04/20 1241)  Mobility Inpatient CMS G-Code Modifier : TRICIA

## 2020-06-04 NOTE — PROGRESS NOTES
[id number] [facility] Orthopedic Surgery   Progress Note    CHIEF COMPLAINT/DIAGNOSIS:    - 6/2/20 LEFT hip hemiarthroplasty  - Left distal fibular fracture, nondisplaced    SUBJECTIVE:  The patient is sitting up in the chair with his daughter POA at bedside. He reports mild hip pain today which is better than pre-op. Denies numbness or tingling. Denies new issues. OBJECTIVE  Physical    VITALS:  /62   Pulse 106   Temp 98.1 °F (36.7 °C) (Oral)   Resp 16   Ht 5' 6\" (1.676 m)   Wt 143 lb 2 oz (64.9 kg)   SpO2 95%   BMI 23.10 kg/m²     GENERAL: Alert, NAD   MUSCULOSKELETAL: LEFT LE  INCISION:  Dressing has mild bloody drainage. Changed today. Incision intact without active drainage or erythema. ROM: intact DF/PF  Sensory:  Intact to light touch in peroneal and tibial distributions  Vascular:   Intact dors ped;  calf soft and nontender  ANKLE STIRRUP BRACE REMAINS OFF  Foam dressings in place medially and laterally. Data    ALL MEDICATIONS HAVE BEEN REVIEWED    CBC:   Recent Labs     06/03/20  0550   HGB 7.6*   HCT 22.4*     BMP:   No results for input(s): NA, K, CL, CO2, PHOS, BUN, CREATININE in the last 72 hours. Invalid input(s): CA  INR:   No results for input(s): INR in the last 72 hours. Post-op films shows stable left hip hemiarthroplasty. ASSESSMENT:  S/p LEFT hip hemiarthroplasty, (6/2/20) POD#3  Left distal fibular fracture, nondisplaced  Atrial fibrillation on home Eliquis  CAD  HTN  BPH  Acute post-op blood loss anemia as expected    PLAN:   - WB status:  WBAT; Anterolateral hip precautions; no adduction, hyperextension/external rotation; no flexion > 90 degrees. - DO NOT APPLY ANKLE BRACE: distal fibular fracture is stable and non-displaced and brace may cause skin breakdown.     - DVT prophylaxis: Eliquis  - Acute post-op blood loss anemia as expected Hg/dL 8.0 today, stable.   - PT/OT  - D/C Plan: therapy recommending SNF.  OK to d/c from ortho perspective when medically ready.   - Pain

## 2020-06-04 NOTE — PLAN OF CARE
Problem: Falls - Risk of:  Goal: Will remain free from falls  Description: Will remain free from falls  6/4/2020 0751 by Eliza Spencer RN  Outcome: Ongoing  6/4/2020 0128 by Mariaa Devi RN  Outcome: Met This Shift  Goal: Absence of physical injury  Description: Absence of physical injury  6/4/2020 0751 by Eliza Spencer RN  Outcome: Ongoing  6/4/2020 0128 by Mariaa Devi RN  Outcome: Met This Shift     Problem: Pain:  Goal: Pain level will decrease  Description: Pain level will decrease  6/4/2020 0751 by Eliza Spencer RN  Outcome: Ongoing  6/4/2020 0128 by Mariaa Devi RN  Outcome: Met This Shift  Goal: Control of acute pain  Description: Control of acute pain  6/4/2020 0751 by Eliza Spencer RN  Outcome: Ongoing  6/4/2020 0128 by Mariaa Devi RN  Outcome: Met This Shift  Goal: Control of chronic pain  Description: Control of chronic pain  6/4/2020 0751 by Eliza Spencer RN  Outcome: Ongoing  6/4/2020 0128 by Mariaa Devi RN  Outcome: Met This Shift     Problem: Cardiac:  Goal: Ability to maintain vital signs within normal range will improve  Description: Ability to maintain vital signs within normal range will improve  6/4/2020 0751 by Eliza Spencer RN  Outcome: Ongoing  6/4/2020 0128 by Mariaa Devi RN  Outcome: Met This Shift  Goal: Ability to maintain an adequate cardiac output will improve  Description: Ability to maintain an adequate cardiac output will improve  6/4/2020 0751 by Eliza Spencer RN  Outcome: Ongoing  6/4/2020 0128 by Mariaa Devi RN  Outcome: Met This Shift  Goal: Hemodynamic stability will improve  Description: Hemodynamic stability will improve  6/4/2020 0751 by Eliza Spencer RN  Outcome: Ongoing  6/4/2020 0128 by Mariaa Devi RN  Outcome: Met This Shift  Goal: Diagnostic test results will improve  Description: Diagnostic test results will improve  6/4/2020 0751 by Eliza Spencer RN  Outcome: Ongoing  6/4/2020 0128 by Mariaa Devi RN  Outcome: Met This Shift  Goal: Complications related to the disease process, condition or treatment will be avoided or minimized  Description: Complications related to the disease process, condition or treatment will be avoided or minimized  6/4/2020 0751 by Pat Rhodes RN  Outcome: Ongoing  6/4/2020 0128 by Abundio Mathews RN  Outcome: Met This Shift     Problem: Mobility - Impaired:  Goal: Mobility will improve  Description: Mobility will improve  6/4/2020 0751 by Pat Rhodes RN  Outcome: Ongoing  6/4/2020 0128 by Abundio Mathews RN  Outcome: Met This Shift     Problem: Coping:  Goal: Level of anxiety will decrease  Description: Level of anxiety will decrease  6/4/2020 0751 by Pat Rhodes RN  Outcome: Ongoing  6/4/2020 0128 by Abundio Mathews RN  Outcome: Met This Shift  Goal: General experience of comfort will improve  Description: General experience of comfort will improve  6/4/2020 0751 by Pat Rhodes RN  Outcome: Ongoing  6/4/2020 0128 by Abundio Mathews RN  Outcome: Met This Shift     Problem: Safety:  Goal: Ability to remain free from injury will improve  Description: Ability to remain free from injury will improve  6/4/2020 0751 by Pat Rhodes RN  Outcome: Ongoing  6/4/2020 0128 by Abundio Mathews RN  Outcome: Met This Shift  Goal: Will show no signs and symptoms of excessive bleeding  Description: Will show no signs and symptoms of excessive bleeding  6/4/2020 0751 by Pat Rhodes RN  Outcome: Ongoing  6/4/2020 0128 by Abundio Mathews RN  Outcome: Met This Shift     Problem:  Activity:  Goal: Ability to ambulate will improve  Description: Ability to ambulate will improve  6/4/2020 0751 by Pat Rhodes RN  Outcome: Ongoing  6/4/2020 0128 by Abundio Mathews RN  Outcome: Met This Shift  Goal: Ability to perform activities at highest level will improve  Description: Ability to perform activities at highest level will improve  6/4/2020 0751 by Pat Rhodes RN  Outcome: Ongoing  6/4/2020 0128 by Km Cordero Elicia Martínez RN  Outcome: Met This Shift     Problem: Health Behavior:  Goal: Identification of resources available to assist in meeting health care needs will improve  Description: Identification of resources available to assist in meeting health care needs will improve  6/4/2020 0751 by Hao Roach RN  Outcome: Ongoing  6/4/2020 0128 by Christian Leon RN  Outcome: Met This Shift     Problem: Nutritional:  Goal: Ability to attain and maintain optimal nutritional status will improve  Description: Ability to attain and maintain optimal nutritional status will improve  6/4/2020 0751 by Hao Roach RN  Outcome: Ongoing  6/4/2020 0128 by Christian Leon RN  Outcome: Met This Shift     Problem: Physical Regulation:  Goal: Will remain free from infection  Description: Will remain free from infection  6/4/2020 0751 by Hao Roach RN  Outcome: Ongoing  6/4/2020 0128 by Christian Leon RN  Outcome: Met This Shift  Goal: Postoperative complications will be avoided or minimized  Description: Postoperative complications will be avoided or minimized  6/4/2020 0751 by Hao Roach RN  Outcome: Ongoing  6/4/2020 0128 by Christian Leon RN  Outcome: Met This Shift

## 2020-06-04 NOTE — PROGRESS NOTES
Shift assessment completed. Patient alert, oriented x4. Patient resting in bed. Dressing to L hip is clean, dry and intact. Foam to L ankle remains intact. Denies pain stating that he just receiving pain medication earlier in the morning. Patient was found on room air with sats in the low 80's. Patient removed nasal canula stating he did not think it was working. Replaced oxygen and sats improved to mid 90's on 2L. Patient repositioned in bed for comfort and patient was provided with breakfast tray. All needs met at this time. POC discussed with patient. The care plan and education has been reviewed and mutually agreed upon with the patient. Bed alarm on. Call light in reach and patient demonstrated how to reach nursing staff. Will monitor.

## 2020-06-04 NOTE — PROGRESS NOTES
Shift assessment complete. Vital signs stable. A&O x4. Neuro assessment WNL (see doc. flowsheets for detailed assessment). Distal pulses intact and all extremities warm with adequate cap refill. Dressing to Left hip remains clean, dry, and intact. Foam dressings to left foot. Scheduled night medication administered along with prn norco for c/o hip and ankle pain. Repositioned in bed. Oral care provided. The care plan and education has been reviewed and mutually agreed upon with the patient. Call light within reach and all needs met at this time. Will continue to monitor.

## 2020-06-05 ENCOUNTER — TELEPHONE (OUTPATIENT)
Dept: INTERNAL MEDICINE CLINIC | Age: 85
End: 2020-06-05

## 2020-06-05 PROCEDURE — 2580000003 HC RX 258: Performed by: ORTHOPAEDIC SURGERY

## 2020-06-05 PROCEDURE — 99232 SBSQ HOSP IP/OBS MODERATE 35: CPT | Performed by: INTERNAL MEDICINE

## 2020-06-05 PROCEDURE — 97535 SELF CARE MNGMENT TRAINING: CPT

## 2020-06-05 PROCEDURE — 6370000000 HC RX 637 (ALT 250 FOR IP): Performed by: ORTHOPAEDIC SURGERY

## 2020-06-05 PROCEDURE — 6370000000 HC RX 637 (ALT 250 FOR IP): Performed by: INTERNAL MEDICINE

## 2020-06-05 PROCEDURE — 97530 THERAPEUTIC ACTIVITIES: CPT

## 2020-06-05 PROCEDURE — 2700000000 HC OXYGEN THERAPY PER DAY

## 2020-06-05 PROCEDURE — 99024 POSTOP FOLLOW-UP VISIT: CPT | Performed by: NURSE PRACTITIONER

## 2020-06-05 PROCEDURE — APPNB45 APP NON BILLABLE 31-45 MINUTES: Performed by: NURSE PRACTITIONER

## 2020-06-05 PROCEDURE — 1200000000 HC SEMI PRIVATE

## 2020-06-05 PROCEDURE — 97116 GAIT TRAINING THERAPY: CPT

## 2020-06-05 RX ADMIN — POLYETHYLENE GLYCOL 3350 17 G: 17 POWDER, FOR SOLUTION ORAL at 08:31

## 2020-06-05 RX ADMIN — APIXABAN 2.5 MG: 2.5 TABLET, FILM COATED ORAL at 21:52

## 2020-06-05 RX ADMIN — GABAPENTIN 100 MG: 100 CAPSULE ORAL at 21:52

## 2020-06-05 RX ADMIN — ACETAMINOPHEN 650 MG: 325 TABLET, FILM COATED ORAL at 08:32

## 2020-06-05 RX ADMIN — ACETAMINOPHEN 650 MG: 325 TABLET, FILM COATED ORAL at 21:52

## 2020-06-05 RX ADMIN — APIXABAN 2.5 MG: 2.5 TABLET, FILM COATED ORAL at 08:32

## 2020-06-05 RX ADMIN — Medication 400 MG: at 08:32

## 2020-06-05 RX ADMIN — PRAVASTATIN SODIUM 20 MG: 20 TABLET ORAL at 21:51

## 2020-06-05 RX ADMIN — TAMSULOSIN HYDROCHLORIDE 0.4 MG: 0.4 CAPSULE ORAL at 21:52

## 2020-06-05 RX ADMIN — LISINOPRIL 10 MG: 10 TABLET ORAL at 08:31

## 2020-06-05 RX ADMIN — LORAZEPAM 0.5 MG: 0.5 TABLET ORAL at 08:32

## 2020-06-05 RX ADMIN — FINASTERIDE 5 MG: 5 TABLET, FILM COATED ORAL at 21:52

## 2020-06-05 RX ADMIN — Medication 10 ML: at 21:53

## 2020-06-05 RX ADMIN — LORAZEPAM 0.5 MG: 0.5 TABLET ORAL at 21:52

## 2020-06-05 RX ADMIN — LAMOTRIGINE 150 MG: 100 TABLET ORAL at 08:32

## 2020-06-05 RX ADMIN — SENNOSIDES AND DOCUSATE SODIUM 1 TABLET: 8.6; 5 TABLET ORAL at 08:32

## 2020-06-05 RX ADMIN — LEVOTHYROXINE SODIUM 25 MCG: 0.03 TABLET ORAL at 05:50

## 2020-06-05 RX ADMIN — MULTIPLE VITAMINS W/ MINERALS TAB 1 TABLET: TAB at 08:32

## 2020-06-05 RX ADMIN — ASPIRIN 81 MG 81 MG: 81 TABLET ORAL at 08:32

## 2020-06-05 RX ADMIN — PANTOPRAZOLE SODIUM 40 MG: 40 TABLET, DELAYED RELEASE ORAL at 05:50

## 2020-06-05 ASSESSMENT — PAIN SCALES - GENERAL
PAINLEVEL_OUTOF10: 3
PAINLEVEL_OUTOF10: 0
PAINLEVEL_OUTOF10: 5

## 2020-06-05 ASSESSMENT — PAIN DESCRIPTION - LOCATION: LOCATION: HIP

## 2020-06-05 ASSESSMENT — PAIN DESCRIPTION - PAIN TYPE: TYPE: SURGICAL PAIN

## 2020-06-05 ASSESSMENT — PAIN DESCRIPTION - ORIENTATION: ORIENTATION: LEFT

## 2020-06-05 NOTE — PROGRESS NOTES
negative    OBJECTIVE      Medications      Current Facility-Administered Medications: [START ON 6/5/2020] lisinopril (PRINIVIL;ZESTRIL) tablet 10 mg, 10 mg, Oral, Daily  acetaminophen (TYLENOL) tablet 650 mg, 650 mg, Oral, Q4H PRN  [START ON 6/5/2020] gabapentin (NEURONTIN) capsule 100 mg, 100 mg, Oral, Nightly  LORazepam (ATIVAN) tablet 0.5 mg, 0.5 mg, Oral, BID  apixaban (ELIQUIS) tablet 2.5 mg, 2.5 mg, Oral, BID  tranexamic acid (CYKLOKAPRON) 3,000 mg in sodium chloride 0.9 % 50 mL irrigation, , Irrigation, Once  sodium chloride flush 0.9 % injection 10 mL, 10 mL, Intravenous, 2 times per day  sodium chloride flush 0.9 % injection 10 mL, 10 mL, Intravenous, PRN  sennosides-docusate sodium (SENOKOT-S) 8.6-50 MG tablet 1 tablet, 1 tablet, Oral, BID  magnesium hydroxide (MILK OF MAGNESIA) 400 MG/5ML suspension 30 mL, 30 mL, Oral, Daily PRN  HYDROcodone-acetaminophen (NORCO) 5-325 MG per tablet 1 tablet, 1 tablet, Oral, Q4H PRN **OR** HYDROcodone-acetaminophen (NORCO) 5-325 MG per tablet 2 tablet, 2 tablet, Oral, Q4H PRN  aspirin chewable tablet 81 mg, 81 mg, Oral, Daily  finasteride (PROSCAR) tablet 5 mg, 5 mg, Oral, Nightly  lamoTRIgine (LAMICTAL) tablet 150 mg, 150 mg, Oral, Daily  magnesium oxide (MAG-OX) tablet 400 mg, 400 mg, Oral, Daily  melatonin tablet 3 mg, 3 mg, Oral, Daily PRN  nitroGLYCERIN (NITROSTAT) SL tablet 0.4 mg, 0.4 mg, Sublingual, Q5 Min PRN  therapeutic multivitamin-minerals 1 tablet, 1 tablet, Oral, Daily  pantoprazole (PROTONIX) tablet 40 mg, 40 mg, Oral, QAM AC  polyethylene glycol (GLYCOLAX) packet 17 g, 17 g, Oral, Daily  pravastatin (PRAVACHOL) tablet 20 mg, 20 mg, Oral, Nightly  tamsulosin (FLOMAX) capsule 0.4 mg, 0.4 mg, Oral, Daily  triamcinolone (KENALOG) 0.1 % cream, , Topical, BID  ondansetron (ZOFRAN) injection 4 mg, 4 mg, Intravenous, Q6H PRN  levothyroxine (SYNTHROID) tablet 25 mcg, 25 mcg, Oral, QAM AC    Physical      Vitals: /67   Pulse 102   Temp 99 °F (37.2 °C) (Oral)   Resp 18   Ht 5' 6\" (1.676 m)   Wt 143 lb 2 oz (64.9 kg)   SpO2 97%   BMI 23.10 kg/m²   Temp: Temp: 99 °F (37.2 °C)  Max: Temp  Av.1 °F (36.7 °C)  Min: 97.6 °F (36.4 °C)  Max: 99 °F (37.2 °C)  Respiration range:  Resp  Avg: 15.7  Min: 14  Max: 18  Pulse Range:  Pulse  Av.2  Min: 93  Max: 109  Blood pressure range:  Systolic (55HDS), XTN:501 , Min:93 , OQZ:552   , Diastolic (09BBE), KFJ:97, Min:54, Max:71    SpO2  Av.1 %  Min: 92 %  Max: 97 %    Intake/Output Summary (Last 24 hours) at 2020 2336  Last data filed at 2020 2200  Gross per 24 hour   Intake 901 ml   Output 925 ml   Net -24 ml       Vent settings:  Pulse  Av.6  Min: 55  Max: 115  Resp  Av.2  Min: 1  Max: 20  SpO2  Av.5 %  Min: 83 %  Max: 100 %    CONSTITUTIONAL:  fatigued, alert, cooperative, mild distress, appears stated age and thin  EYES:  Unremarkable   NECK:  No JVD  and supple, symmetrical, trachea midline  BACK:  symmetric and no curvature  LUNGS:  No increased work of breathing, good air exchange, clear to auscultation bilaterally, no crackles or wheezing  CARDIOVASCULAR:  normal apical pulses, irregular rate and rhythm, normal S1 and S2 and no S3  ABDOMEN:  Soft scaphoid BS + non tender   MUSCULOSKELETAL:  Tenderness and restricted ROM left hip , trace edema kate Legs , left hip post op site looks ok no hemorrhage or infection   NEUROLOGIC:  Mental Status Exam:  Level of Alertness:   awake  Cranial Nerves:  cranial nerves II-XII are grossly intact  Motor Exam:  Motor grade 3/5   Sensory:  Sensory intact  SKIN:  Warm and moist  and no bruising or bleeding    Data      Recent Results (from the past 96 hour(s))   EKG 12 Lead    Collection Time: 20  9:57 AM   Result Value Ref Range    Ventricular Rate 80 BPM    Atrial Rate 78 BPM    QRS Duration 106 ms    Q-T Interval 356 ms    QTc Calculation (Bazett) 410 ms    R Axis -10 degrees    T Axis 119 degrees    Diagnosis       Atrial

## 2020-06-05 NOTE — PROGRESS NOTES
therapy session. Denies pain at rest       Orientation  Orientation  Overall Orientation Status: Impaired  Orientation Level: Oriented to person(oriented only to name this date due to change in mental status)  Objective    ADL  Grooming: Setup; Moderate assistance(A to don shampoo cap over head and comb hair )  Additional Comments: Further ADLs not addressed due to fatigue         Balance  Sitting Balance: Stand by assistance  Standing Balance: Minimal assistance  Standing Balance  Time: ~8 minutes  Activity: functional mobility in room, functional transfers  Comment: with use of rw  Functional Mobility  Functional - Mobility Device: Rolling Walker  Activity: Other  Assist Level: Minimal assistance  Functional Mobility Comments: assist for rw management and sequence throughout-MAX cueing for ambulation sequence-likely due to lethargy/agitation  Bed mobility  Supine to Sit: Stand by assistance;Contact guard assistance  Scooting: Stand by assistance  Transfers  Sit to stand: Minimal assistance; Moderate assistance  Stand to sit: Minimal assistance; Moderate assistance  Transfer Comments: mod a with fatigue     Cognition  Overall Cognitive Status: Exceptions  Arousal/Alertness: Delayed responses to stimuli  Following Commands: Follows one step commands with increased time; Follows one step commands with repetition  Attention Span: Attends with cues to redirect  Memory: Decreased recall of precautions;Decreased recall of recent events;Decreased short term memory  Safety Judgement: Decreased awareness of need for assistance;Decreased awareness of need for safety  Problem Solving: Decreased awareness of errors  Insights: Decreased awareness of deficits  Initiation: Requires cues for some  Sequencing: Requires cues for some     Perception  Overall Perceptual Status: Impaired  Initiation: Cues to initiate tasks        Plan   Plan  Times per week: 7  Times per day: Daily  Specific instructions for Next Treatment: cotx  Current

## 2020-06-05 NOTE — PROGRESS NOTES
[facility]   Progress Note    CHIEF COMPLAINT/DIAGNOSIS:    - 6/2/20 LEFT hip hemiarthroplasty  - Left distal fibular fracture, nondisplaced    SUBJECTIVE:  The patient is sitting up in the bed this AM.  He reports mild pain in the left hip. Carlton ankle pain. Denies new issues. Has some intermittent confusion, but has been easily reoriented. OBJECTIVE  Physical    VITALS:  /72   Pulse 105   Temp 98.7 °F (37.1 °C) (Oral)   Resp 16   Ht 5' 6\" (1.676 m)   Wt 143 lb 2 oz (64.9 kg)   SpO2 94%   BMI 23.10 kg/m²     GENERAL: Alert, NAD   MUSCULOSKELETAL: LEFT LE  INCISION:  Dressing is clean - changed again today has the patient has been picking at this regularly. There is evolving ecchymosis around the incision. The incision remains closed without active drainage. Mild erythema noted. ROM: intact DF/PF  Sensory:  Intact to light touch in peroneal and tibial distributions  Vascular:   Intact dors ped;  calf soft and nontender  ANKLE STIRRUP BRACE REMAINS OFF. Foam dressings in place medially and laterally. Data    ALL MEDICATIONS HAVE BEEN REVIEWED    CBC:   Recent Labs     06/03/20  0550 06/04/20  0959   HGB 7.6* 8.0*   HCT 22.4* 23.5*     BMP:   No results for input(s): NA, K, CL, CO2, PHOS, BUN, CREATININE in the last 72 hours. Invalid input(s): CA  INR:   No results for input(s): INR in the last 72 hours. Post-op films shows stable left hip hemiarthroplasty. ASSESSMENT:  S/p LEFT hip hemiarthroplasty, (6/2/20) POD#3  Left distal fibular fracture, nondisplaced  Atrial fibrillation on home Eliquis  CAD  HTN  BPH  Acute post-op blood loss anemia as expected    PLAN:   - WB status:  WBAT; Anterolateral hip precautions; no adduction, hyperextension/external rotation; no flexion > 90 degrees.   - DO NOT APPLY ANKLE BRACE: distal fibular fracture is stable and non-displaced and brace may cause skin breakdown.     - DVT prophylaxis: Eliquis  - Acute post-op blood loss anemia as expected Hg/dL 8.0 yesterday, stable.   - PT/OT  - D/C Plan: therapy recommending SNF. OK to d/c from ortho perspective when medically ready.   - Pain Control: Tylenol; Norco d/juaquin given confusion. Due to orthopaedic surgical procedure/condition, patient may require pain medication for up to 6-8 weeks. - COVID negative on 5/31/20    F U with Dr. Narayan Leon in 2 weeks; call [phone].       FERNANDO Perkins  6/5/2020  9:01 AM

## 2020-06-05 NOTE — PROGRESS NOTES
Training, Transfer Training, Endurance Training, Gait Training, Neuromuscular Re-education, Safety Education & Training, Equipment Evaluation, Education, & procurement, Patient/Caregiver Education & Training  Safety Devices  Type of devices: All fall risk precautions in place, Left in chair, Call light within reach, Chair alarm in place, Gait belt, Patient at risk for falls, Nurse notified  Restraints  Initially in place: No     Therapy Time   Individual Concurrent Group Co-treatment   Time In       1336   Time Out       1430   Minutes       54   Timed Code Treatment Minutes: [address], [address] [facility]  I agree with the above note and have addressed this patient's goals.   Noah Hensley, PT, DPT, [id number]

## 2020-06-05 NOTE — PROGRESS NOTES
negative    OBJECTIVE      Medications      Current Facility-Administered Medications: [START ON 6/5/2020] lisinopril (PRINIVIL;ZESTRIL) tablet 10 mg, 10 mg, Oral, Daily  acetaminophen (TYLENOL) tablet 650 mg, 650 mg, Oral, Q4H PRN  LORazepam (ATIVAN) tablet 0.5 mg, 0.5 mg, Oral, BID  apixaban (ELIQUIS) tablet 2.5 mg, 2.5 mg, Oral, BID  tranexamic acid (CYKLOKAPRON) 3,000 mg in sodium chloride 0.9 % 50 mL irrigation, , Irrigation, Once  sodium chloride flush 0.9 % injection 10 mL, 10 mL, Intravenous, 2 times per day  sodium chloride flush 0.9 % injection 10 mL, 10 mL, Intravenous, PRN  sennosides-docusate sodium (SENOKOT-S) 8.6-50 MG tablet 1 tablet, 1 tablet, Oral, BID  magnesium hydroxide (MILK OF MAGNESIA) 400 MG/5ML suspension 30 mL, 30 mL, Oral, Daily PRN  HYDROcodone-acetaminophen (NORCO) 5-325 MG per tablet 1 tablet, 1 tablet, Oral, Q4H PRN **OR** HYDROcodone-acetaminophen (NORCO) 5-325 MG per tablet 2 tablet, 2 tablet, Oral, Q4H PRN  aspirin chewable tablet 81 mg, 81 mg, Oral, Daily  finasteride (PROSCAR) tablet 5 mg, 5 mg, Oral, Nightly  gabapentin (NEURONTIN) capsule 300 mg, 300 mg, Oral, Nightly  lamoTRIgine (LAMICTAL) tablet 150 mg, 150 mg, Oral, Daily  magnesium oxide (MAG-OX) tablet 400 mg, 400 mg, Oral, Daily  melatonin tablet 3 mg, 3 mg, Oral, Daily PRN  nitroGLYCERIN (NITROSTAT) SL tablet 0.4 mg, 0.4 mg, Sublingual, Q5 Min PRN  therapeutic multivitamin-minerals 1 tablet, 1 tablet, Oral, Daily  pantoprazole (PROTONIX) tablet 40 mg, 40 mg, Oral, QAM AC  polyethylene glycol (GLYCOLAX) packet 17 g, 17 g, Oral, Daily  pravastatin (PRAVACHOL) tablet 20 mg, 20 mg, Oral, Nightly  tamsulosin (FLOMAX) capsule 0.4 mg, 0.4 mg, Oral, Daily  triamcinolone (KENALOG) 0.1 % cream, , Topical, BID  ondansetron (ZOFRAN) injection 4 mg, 4 mg, Intravenous, Q6H PRN  levothyroxine (SYNTHROID) tablet 25 mcg, 25 mcg, Oral, QAM AC    Physical      Vitals: /67   Pulse 102   Temp 99 °F (37.2 °C) (Oral)   Resp 18   Ht

## 2020-06-06 ENCOUNTER — APPOINTMENT (OUTPATIENT)
Dept: GENERAL RADIOLOGY | Age: 85
DRG: 470 | End: 2020-06-06
Payer: MEDICARE

## 2020-06-06 LAB
ANION GAP SERPL CALCULATED.3IONS-SCNC: 8 MMOL/L (ref 3–16)
BASOPHILS ABSOLUTE: 0 K/UL (ref 0–0.2)
BASOPHILS RELATIVE PERCENT: 0.6 %
BILIRUBIN URINE: NEGATIVE
BLOOD, URINE: NEGATIVE
BUN BLDV-MCNC: 17 MG/DL (ref 7–20)
CALCIUM SERPL-MCNC: 8.1 MG/DL (ref 8.3–10.6)
CHLORIDE BLD-SCNC: 102 MMOL/L (ref 99–110)
CLARITY: CLEAR
CO2: 24 MMOL/L (ref 21–32)
COLOR: YELLOW
CREAT SERPL-MCNC: 0.7 MG/DL (ref 0.8–1.3)
EOSINOPHILS ABSOLUTE: 0.1 K/UL (ref 0–0.6)
EOSINOPHILS RELATIVE PERCENT: 3.3 %
EPITHELIAL CELLS, UA: 0 /HPF (ref 0–5)
GFR AFRICAN AMERICAN: >60
GFR NON-AFRICAN AMERICAN: >60
GLUCOSE BLD-MCNC: 84 MG/DL (ref 70–99)
GLUCOSE URINE: NEGATIVE MG/DL
HCT VFR BLD CALC: 22.2 % (ref 40.5–52.5)
HEMOGLOBIN: 7.5 G/DL (ref 13.5–17.5)
HYALINE CASTS: 2 /LPF (ref 0–8)
KETONES, URINE: NEGATIVE MG/DL
LEUKOCYTE ESTERASE, URINE: NEGATIVE
LYMPHOCYTES ABSOLUTE: 0.6 K/UL (ref 1–5.1)
LYMPHOCYTES RELATIVE PERCENT: 17.7 %
MCH RBC QN AUTO: 33 PG (ref 26–34)
MCHC RBC AUTO-ENTMCNC: 34 G/DL (ref 31–36)
MCV RBC AUTO: 97 FL (ref 80–100)
MICROSCOPIC EXAMINATION: YES
MONOCYTES ABSOLUTE: 0.4 K/UL (ref 0–1.3)
MONOCYTES RELATIVE PERCENT: 12.3 %
NEUTROPHILS ABSOLUTE: 2.4 K/UL (ref 1.7–7.7)
NEUTROPHILS RELATIVE PERCENT: 66.1 %
NITRITE, URINE: NEGATIVE
PDW BLD-RTO: 13.7 % (ref 12.4–15.4)
PH UA: 6 (ref 5–8)
PLATELET # BLD: 206 K/UL (ref 135–450)
PMV BLD AUTO: 6.7 FL (ref 5–10.5)
POTASSIUM REFLEX MAGNESIUM: 4.3 MMOL/L (ref 3.5–5.1)
PROTEIN UA: 30 MG/DL
RBC # BLD: 2.29 M/UL (ref 4.2–5.9)
RBC UA: 5 /HPF (ref 0–4)
SODIUM BLD-SCNC: 134 MMOL/L (ref 136–145)
SPECIFIC GRAVITY UA: 1.02 (ref 1–1.03)
URINE REFLEX TO CULTURE: ABNORMAL
URINE TYPE: ABNORMAL
UROBILINOGEN, URINE: 0.2 E.U./DL
WBC # BLD: 3.6 K/UL (ref 4–11)
WBC UA: 1 /HPF (ref 0–5)

## 2020-06-06 PROCEDURE — 1200000000 HC SEMI PRIVATE

## 2020-06-06 PROCEDURE — 51798 US URINE CAPACITY MEASURE: CPT

## 2020-06-06 PROCEDURE — 94760 N-INVAS EAR/PLS OXIMETRY 1: CPT

## 2020-06-06 PROCEDURE — 80048 BASIC METABOLIC PNL TOTAL CA: CPT

## 2020-06-06 PROCEDURE — 97530 THERAPEUTIC ACTIVITIES: CPT

## 2020-06-06 PROCEDURE — 81001 URINALYSIS AUTO W/SCOPE: CPT

## 2020-06-06 PROCEDURE — 97116 GAIT TRAINING THERAPY: CPT

## 2020-06-06 PROCEDURE — 97110 THERAPEUTIC EXERCISES: CPT

## 2020-06-06 PROCEDURE — 97535 SELF CARE MNGMENT TRAINING: CPT

## 2020-06-06 PROCEDURE — 2580000003 HC RX 258: Performed by: ORTHOPAEDIC SURGERY

## 2020-06-06 PROCEDURE — 6370000000 HC RX 637 (ALT 250 FOR IP): Performed by: ORTHOPAEDIC SURGERY

## 2020-06-06 PROCEDURE — 6370000000 HC RX 637 (ALT 250 FOR IP): Performed by: INTERNAL MEDICINE

## 2020-06-06 PROCEDURE — 71045 X-RAY EXAM CHEST 1 VIEW: CPT

## 2020-06-06 PROCEDURE — 36415 COLL VENOUS BLD VENIPUNCTURE: CPT

## 2020-06-06 PROCEDURE — 2700000000 HC OXYGEN THERAPY PER DAY

## 2020-06-06 PROCEDURE — 99232 SBSQ HOSP IP/OBS MODERATE 35: CPT | Performed by: CLINICAL NURSE SPECIALIST

## 2020-06-06 PROCEDURE — 85025 COMPLETE CBC W/AUTO DIFF WBC: CPT

## 2020-06-06 RX ORDER — TORSEMIDE 20 MG/1
20 TABLET ORAL DAILY
Status: DISCONTINUED | OUTPATIENT
Start: 2020-06-06 | End: 2020-06-07

## 2020-06-06 RX ORDER — LISINOPRIL 10 MG/1
10 TABLET ORAL DAILY
Qty: 30 TABLET | Refills: 3 | Status: SHIPPED | OUTPATIENT
Start: 2020-06-07 | End: 2020-07-31

## 2020-06-06 RX ORDER — LORAZEPAM 0.5 MG/1
0.5 TABLET ORAL 2 TIMES DAILY
Qty: 14 TABLET | Refills: 0 | Status: SHIPPED | OUTPATIENT
Start: 2020-06-06 | End: 2020-06-13

## 2020-06-06 RX ORDER — SENNA AND DOCUSATE SODIUM 50; 8.6 MG/1; MG/1
1 TABLET, FILM COATED ORAL 2 TIMES DAILY
COMMUNITY
Start: 2020-06-06

## 2020-06-06 RX ORDER — GABAPENTIN 100 MG/1
100 CAPSULE ORAL NIGHTLY
Qty: 30 CAPSULE | Refills: 0 | Status: ON HOLD | OUTPATIENT
Start: 2020-06-06 | End: 2022-05-20

## 2020-06-06 RX ADMIN — LISINOPRIL 10 MG: 10 TABLET ORAL at 10:12

## 2020-06-06 RX ADMIN — Medication 10 ML: at 21:08

## 2020-06-06 RX ADMIN — PRAVASTATIN SODIUM 20 MG: 20 TABLET ORAL at 21:10

## 2020-06-06 RX ADMIN — SENNOSIDES AND DOCUSATE SODIUM 1 TABLET: 8.6; 5 TABLET ORAL at 10:11

## 2020-06-06 RX ADMIN — MULTIPLE VITAMINS W/ MINERALS TAB 1 TABLET: TAB at 10:11

## 2020-06-06 RX ADMIN — APIXABAN 2.5 MG: 2.5 TABLET, FILM COATED ORAL at 21:07

## 2020-06-06 RX ADMIN — TAMSULOSIN HYDROCHLORIDE 0.4 MG: 0.4 CAPSULE ORAL at 21:07

## 2020-06-06 RX ADMIN — TORSEMIDE 20 MG: 20 TABLET ORAL at 21:07

## 2020-06-06 RX ADMIN — Medication 10 ML: at 10:12

## 2020-06-06 RX ADMIN — APIXABAN 2.5 MG: 2.5 TABLET, FILM COATED ORAL at 10:12

## 2020-06-06 RX ADMIN — MELATONIN TAB 3 MG 3 MG: 3 TAB at 23:26

## 2020-06-06 RX ADMIN — SENNOSIDES AND DOCUSATE SODIUM 1 TABLET: 8.6; 5 TABLET ORAL at 21:07

## 2020-06-06 RX ADMIN — FINASTERIDE 5 MG: 5 TABLET, FILM COATED ORAL at 21:07

## 2020-06-06 RX ADMIN — ACETAMINOPHEN 650 MG: 325 TABLET, FILM COATED ORAL at 21:07

## 2020-06-06 RX ADMIN — LORAZEPAM 0.5 MG: 0.5 TABLET ORAL at 21:08

## 2020-06-06 RX ADMIN — Medication 400 MG: at 10:12

## 2020-06-06 RX ADMIN — LEVOTHYROXINE SODIUM 25 MCG: 0.03 TABLET ORAL at 06:15

## 2020-06-06 RX ADMIN — GABAPENTIN 100 MG: 100 CAPSULE ORAL at 21:08

## 2020-06-06 RX ADMIN — ASPIRIN 81 MG 81 MG: 81 TABLET ORAL at 10:11

## 2020-06-06 RX ADMIN — LAMOTRIGINE 150 MG: 100 TABLET ORAL at 10:11

## 2020-06-06 RX ADMIN — POLYETHYLENE GLYCOL 3350 17 G: 17 POWDER, FOR SOLUTION ORAL at 10:12

## 2020-06-06 RX ADMIN — PANTOPRAZOLE SODIUM 40 MG: 40 TABLET, DELAYED RELEASE ORAL at 06:15

## 2020-06-06 RX ADMIN — ACETAMINOPHEN 650 MG: 325 TABLET, FILM COATED ORAL at 10:11

## 2020-06-06 ASSESSMENT — PAIN DESCRIPTION - PROGRESSION: CLINICAL_PROGRESSION: GRADUALLY IMPROVING

## 2020-06-06 ASSESSMENT — PAIN SCALES - GENERAL
PAINLEVEL_OUTOF10: 0
PAINLEVEL_OUTOF10: 9
PAINLEVEL_OUTOF10: 0
PAINLEVEL_OUTOF10: 7

## 2020-06-06 ASSESSMENT — PAIN DESCRIPTION - DESCRIPTORS: DESCRIPTORS: ACHING

## 2020-06-06 ASSESSMENT — PAIN DESCRIPTION - LOCATION
LOCATION: HIP
LOCATION: GENERALIZED

## 2020-06-06 ASSESSMENT — PAIN DESCRIPTION - PAIN TYPE
TYPE: ACUTE PAIN
TYPE: SURGICAL PAIN
TYPE: SURGICAL PAIN

## 2020-06-06 ASSESSMENT — PAIN DESCRIPTION - ORIENTATION
ORIENTATION: LEFT

## 2020-06-06 ASSESSMENT — PAIN DESCRIPTION - ONSET: ONSET: ON-GOING

## 2020-06-06 ASSESSMENT — PAIN DESCRIPTION - FREQUENCY: FREQUENCY: CONTINUOUS

## 2020-06-06 NOTE — PROGRESS NOTES
Department of Internal Medicine  General Internal Medicine   Progress Note      SUBJECTIVE:appears  Comfortable but confused   Past Medical History:   Diagnosis Date    A-fib ([facility] [address]     Arthritis     Blepharitis of both eyes     CAD (coronary artery disease)     stents 2005, x3    GERD (gastroesophageal reflux disease)     Gout     Hyperlipidemia     Hypertension     Macular degeneration     NSTEMI (non-ST elevated myocardial infarction) ([facility] [address]     Prostate enlargement     Psychiatric problem     anxiety; bipolar disorder    Tachycardia      Past Surgical History:   Procedure Laterality Date    CARDIAC SURGERY      CATARACT REMOVAL  1993    X5    COLONOSCOPY      CORONARY ANGIOPLASTY WITH STENT PLACEMENT  2005      X3    [facility]    EYE SURGERY      HIP SURGERY Left 6/2/2020    LEFT BIPOLAR HIP HEMIARTHROPLASTY performed by Stephanie Banks MD at [address] [place] [address]    SKIN BIOPSY      melanoma, basal    SKIN CANCER DESTRUCTION  2006    Melanoma  ()    TUR  2003         History obtained from chart review, the patient and family members   General ROS: positive for  - fatigue, malaise, sleep disturbance and weight loss  negative for - chills, fever or night sweats  Psychological ROS: positive for - memory difficulties  negative for - anxiety, behavioral disorder, hallucinations, hostility or mood swings  Ophthalmic ROS: negative  Respiratory ROS: positive for - shortness of breath  negative for - cough, hemoptysis, tachypnea or wheezing  Cardiovascular ROS: positive for - dyspnea on exertion  negative for - chest pain  Gastrointestinal ROS: no abdominal pain, change in bowel habits, or black or bloody stools  Genito-Urinary ROS: no dysuria, trouble voiding, or hematuria  Musculoskeletal ROS: left hip pain , unable to ambulate   Neurological ROS: no TIA or stroke symptoms  Dermatological ROS: negative    OBJECTIVE Medications      Current Facility-Administered Medications: lisinopril (PRINIVIL;ZESTRIL) tablet 10 mg, 10 mg, Oral, Daily  acetaminophen (TYLENOL) tablet 650 mg, 650 mg, Oral, Q4H PRN  gabapentin (NEURONTIN) capsule 100 mg, 100 mg, Oral, Nightly  LORazepam (ATIVAN) tablet 0.5 mg, 0.5 mg, Oral, BID  apixaban (ELIQUIS) tablet 2.5 mg, 2.5 mg, Oral, BID  tranexamic acid (CYKLOKAPRON) 3,000 mg in sodium chloride 0.9 % 50 mL irrigation, , Irrigation, Once  sodium chloride flush 0.9 % injection 10 mL, 10 mL, Intravenous, 2 times per day  sodium chloride flush 0.9 % injection 10 mL, 10 mL, Intravenous, PRN  sennosides-docusate sodium (SENOKOT-S) 8.6-50 MG tablet 1 tablet, 1 tablet, Oral, BID  magnesium hydroxide (MILK OF MAGNESIA) 400 MG/5ML suspension 30 mL, 30 mL, Oral, Daily PRN  aspirin chewable tablet 81 mg, 81 mg, Oral, Daily  finasteride (PROSCAR) tablet 5 mg, 5 mg, Oral, Nightly  lamoTRIgine (LAMICTAL) tablet 150 mg, 150 mg, Oral, Daily  magnesium oxide (MAG-OX) tablet 400 mg, 400 mg, Oral, Daily  melatonin tablet 3 mg, 3 mg, Oral, Daily PRN  nitroGLYCERIN (NITROSTAT) SL tablet 0.4 mg, 0.4 mg, Sublingual, Q5 Min PRN  therapeutic multivitamin-minerals 1 tablet, 1 tablet, Oral, Daily  pantoprazole (PROTONIX) tablet 40 mg, 40 mg, Oral, QAM AC  polyethylene glycol (GLYCOLAX) packet 17 g, 17 g, Oral, Daily  pravastatin (PRAVACHOL) tablet 20 mg, 20 mg, Oral, Nightly  tamsulosin (FLOMAX) capsule 0.4 mg, 0.4 mg, Oral, Daily  triamcinolone (KENALOG) 0.1 % cream, , Topical, BID  ondansetron (ZOFRAN) injection 4 mg, 4 mg, Intravenous, Q6H PRN  levothyroxine (SYNTHROID) tablet 25 mcg, 25 mcg, Oral, QAM AC    Physical      Vitals: /82   Pulse 119   Temp 98.2 °F (36.8 °C) (Oral)   Resp 16   Ht 5' 6\" (1.676 m)   Wt 151 lb 11.2 oz (68.8 kg)   SpO2 94%   BMI 24.49 kg/m²   Temp: Temp: 98.2 °F (36.8 °C)  Max: Temp  Av.9 °F (36.6 °C)  Min: 97.3 °F (36.3 °C)  Max: 98.3 °F (36.8 °C)  Respiration range:  Resp  Avg: Bilirubin Urine Negative Negative    Ketones, Urine Negative Negative mg/dL    Specific Gravity, UA 1.021 1.005 - 1.030    Blood, Urine Negative Negative    pH, UA 6.0 5.0 - 8.0    Protein, UA 30 (A) Negative mg/dL    Urobilinogen, Urine 0.2 <2.0 E.U./dL    Nitrite, Urine Negative Negative    Leukocyte Esterase, Urine Negative Negative    Microscopic Examination YES     Urine Type Voided     Urine Reflex to Culture Not Indicated    Microscopic Urinalysis    Collection Time: 06/06/20  4:06 AM   Result Value Ref Range    Hyaline Casts, UA 2 0 - 8 /LPF    WBC, UA 1 0 - 5 /HPF    RBC, UA 5 (H) 0 - 4 /HPF    Epithelial Cells, UA 0 0 - 5 /HPF   CBC auto differential    Collection Time: 06/06/20  7:14 AM   Result Value Ref Range    WBC 3.6 (L) 4.0 - 11.0 K/uL    RBC 2.29 (L) 4.20 - 5.90 M/uL    Hemoglobin 7.5 (L) 13.5 - 17.5 g/dL    Hematocrit 22.2 (L) 40.5 - 52.5 %    MCV 97.0 80.0 - 100.0 fL    MCH 33.0 26.0 - 34.0 pg    MCHC 34.0 31.0 - 36.0 g/dL    RDW 13.7 12.4 - 15.4 %    Platelets 795 183 - 902 K/uL    MPV 6.7 5.0 - 10.5 fL    Neutrophils % 66.1 %    Lymphocytes % 17.7 %    Monocytes % 12.3 %    Eosinophils % 3.3 %    Basophils % 0.6 %    Neutrophils Absolute 2.4 1.7 - 7.7 K/uL    Lymphocytes Absolute 0.6 (L) 1.0 - 5.1 K/uL    Monocytes Absolute 0.4 0.0 - 1.3 K/uL    Eosinophils Absolute 0.1 0.0 - 0.6 K/uL    Basophils Absolute 0.0 0.0 - 0.2 K/uL   Basic Metabolic Panel w/ Reflex to MG    Collection Time: 06/06/20  7:14 AM   Result Value Ref Range    Sodium 134 (L) 136 - 145 mmol/L    Potassium reflex Magnesium 4.3 3.5 - 5.1 mmol/L    Chloride 102 99 - 110 mmol/L    CO2 24 21 - 32 mmol/L    Anion Gap 8 3 - 16    Glucose 84 70 - 99 mg/dL    BUN 17 7 - 20 mg/dL    CREATININE 0.7 (L) 0.8 - 1.3 mg/dL    GFR Non-African American >60 >60    GFR African American >60 >60    Calcium 8.1 (L) 8.3 - 10.6 mg/dL       ASSESSMENT AND PLAN     Active Problems:    Essential hypertension    Coronary artery disease    Hyperlipidemia

## 2020-06-06 NOTE — PROGRESS NOTES
Per PCA, patient has not voided throughout this shift. Patient unable to void. Bladder scan volume of 584ml. Intermittent straight cath per order and retrieved 600ml jon urine. Urine cx sent. New dressing applied to left hip surgical incision. Incision well approximated, ecchymosis at incision site. 4x4 andTegaderm applied. Repositioned in bed. Call light within reach. This patient's primary RN, Matthieu notified of all findings and interventions.

## 2020-06-06 NOTE — CARE COORDINATION
Aware of pt's d/c order for today. Called daughter to inform of transport time-daughter stated she had concerns about pt dc'ing today-pt is still on 2L of O2, still confused, has camera. Messaged MD to inform of daughter's concerns and provided ph # for him to contact her. Daughter stated she will likely appeal d/c.   Electronically signed by ESE Allison on 6/6/2020 at 1:20 PM

## 2020-06-06 NOTE — PROGRESS NOTES
Patient Tolerated treatment well;Patient limited by fatigue;Treatment limited secondary to decreased cognition  Safety Devices  Safety Devices in place: Yes  Type of devices: All fall risk precautions in place;Call light within reach; Chair alarm in place;Gait belt;Patient at risk for falls; Left in chair;Nurse notified         Patient Diagnosis(es): The encounter diagnosis was Subcapital fracture of femur, left, closed, initial encounter ([facility] [address] has a past medical history of A-fib ([facility] [address] Arthritis, Blepharitis of both eyes, CAD (coronary artery disease), GERD (gastroesophageal reflux disease), Gout, Hyperlipidemia, Hypertension, Macular degeneration, NSTEMI (non-ST elevated myocardial infarction) [facility]), Prostate enlargement, Psychiatric problem, and Tachycardia. has a past surgical history that includes Coronary angioplasty with stent (2005); skin biopsy; Kidney stone surgery (1980); Coronary artery bypass graft; Cataract removal (1993); TURP (2003); Skin cancer destruction (2006); Colonoscopy; eye surgery; Cardiac surgery; and hip surgery (Left, 6/2/2020). Restrictions  Restrictions/Precautions  Restrictions/Precautions: Fall Risk, Weight Bearing(high fall risk)  Lower Extremity Weight Bearing Restrictions  Left Lower Extremity Weight Bearing: Weight Bearing As Tolerated  Position Activity Restriction  Hip Precautions: No hip flexion > 90 degrees, No ADduction, No hip extension, No hip external rotation  Other position/activity restrictions: L hip hemiarthroplasty s/p fall at [facility]  Chart Reviewed: Yes  Response to previous treatment: Patient reporting fatigue but able to participate  Family / Caregiver Present: Yes(daughter entered at end of session)  Diagnosis: L subcapital fx, 6/2/20 LEFT hip hemiarthroplasty + Left distal fibular fracture, nondisplaced  Subjective  Subjective: Pt supine in bed upon arrival; slow to wake and fatigued, but agreeable to tx.   Pain errors  Insights: Decreased awareness of deficits  Initiation: Requires cues for some  Sequencing: Requires cues for some     Perception  Overall Perceptual Status: Impaired  Initiation: Cues to initiate tasks                                   Plan   Plan  Times per week: 7  Times per day: Daily  Specific instructions for Next Treatment: cotx  Current Treatment Recommendations: Strengthening, Balance Training, Functional Mobility Training, Patient/Caregiver Education & Training, Equipment Evaluation, Education, & procurement, Self-Care / ADL, Safety Education & Training  G-Code     OutComes Score                                                  AM-PAC Score        AM-PAC Inpatient Daily Activity Raw Score: 14 (06/06/20 1054)  AM-PAC Inpatient ADL T-Scale Score : 33.39 (06/06/20 1054)  ADL Inpatient CMS 0-100% Score: 59.67 (06/06/20 1054)  ADL Inpatient CMS G-Code Modifier : CK (06/06/20 1054)    Goals  Short term goals  Time Frame for Short term goals: Discharge  Short term goal 1: Bed mobility modA- continue goal 6/6  Short term goal 2: Functional ADL transfers mod A- goal met 6/4, continue goal for consistency 6/6  Short term goal 3: UB ADLs setup, LB modA- LB max A 6/6  Short term goal 4: Functional stance x 5 minutes Swathi to assist with ADL completion-goal met 6/4-continue goal for consistency 6/5- not addressed d/t fatigue 6/6  Long term goals  Time Frame for Long term goals : LTG=STG  Patient Goals   Patient goals : Walking, return to [address] Time   Individual Concurrent Group Co-treatment   Time In       [address]   Minutes       53   Timed Code Treatment Minutes: [address], [address]

## 2020-06-06 NOTE — PROGRESS NOTES
Learning: cognition, hearing  REQUIRES PT FOLLOW UP: Yes  Activity Tolerance  Activity Tolerance: Patient limited by cognitive status; Patient limited by endurance; Patient limited by fatigue  Activity Tolerance: decreased tolerance for activity and still limited by cognitive status; Patient Diagnosis(es): The encounter diagnosis was Subcapital fracture of femur, left, closed, initial encounter ([facility] [address] has a past medical history of A-fib ([facility] [address] Arthritis, Blepharitis of both eyes, CAD (coronary artery disease), GERD (gastroesophageal reflux disease), Gout, Hyperlipidemia, Hypertension, Macular degeneration, NSTEMI (non-ST elevated myocardial infarction) [facility]), Prostate enlargement, Psychiatric problem, and Tachycardia. has a past surgical history that includes Coronary angioplasty with stent (2005); skin biopsy; Kidney stone surgery (1980); Coronary artery bypass graft; Cataract removal (1993); TURP (2003); Skin cancer destruction (2006); Colonoscopy; eye surgery; Cardiac surgery; and hip surgery (Left, 6/2/2020). Restrictions  Restrictions/Precautions  Restrictions/Precautions: Fall Risk, Weight Bearing(high fall risk)  Lower Extremity Weight Bearing Restrictions  Left Lower Extremity Weight Bearing: Weight Bearing As Tolerated  Position Activity Restriction  Hip Precautions: No hip flexion > 90 degrees, No ADduction, No hip extension, No hip external rotation  Other position/activity restrictions: L hip hemiarthroplasty s/p fall at [facility]  Chart Reviewed: Yes  Response To Previous Treatment: Patient with no complaints from previous session. Family / Caregiver Present: Yes(nursing to administer meds, OT for cotx, dtr end of treatment)  Subjective  Subjective: Pt reporting 9.5/10 pain. Nursing called and in to give meds. General Comment  Comments: Pt asleep and  taking a long time to wake up, very drowsey. Late morning or PM treat time may be helpful.  , nursing reporting ok to see pt. /82. Pain Screening  Patient Currently in Pain: Yes  Pain Assessment  Pain Assessment: 0-10  Pain Level: 9  Pain Type: Surgical pain  Pain Location: Hip  Pain Orientation: Left  Vital Signs  Patient Currently in Pain: Yes       Orientation  Orientation  Overall Orientation Status: Impaired  Orientation Level: Disoriented to time;Disoriented to situation;Oriented to person;Disoriented to place  Cognition      Objective   Bed mobility  Supine to Sit: Dependent/Total  Scooting: Maximal assistance  Comment: Pt drowsy, not following VCs. Once seated able to sit with SBA. Sat EOB 5 min. Transfers  Sit to Stand: Minimal Assistance(from EOB and BSC.)  Stand to sit: Minimal Assistance  Comment: Pt reporting needing to have BM. Sat on toilet 10 min but unable. Brief changed. VCs/TCs for hand placement. Ambulation  Ambulation?: Yes  Ambulation 1  Surface: level tile  Device: Rolling Walker  Assistance: Minimal assistance  Gait Deviations: Slow Candelaria;Decreased step length;Decreased step height  Distance: Pt amb 3 ft x 2 to [facility] and recliner with min A. Comments: many VCs/TCs, assist to move RW during turns. Balance  Posture: Good  Sitting - Static: Good;-  Sitting - Dynamic: Good;-  Standing - Static: Fair;+(with RW)  Standing - Dynamic: Fair;+(with RW)  Exercises  Gluteal Sets: x 5 bilat  Knee Long Arc Quad: x 5 L LE  Comments: Daughter arriving at end of treatment. Pt and daughter encouraged to work on LE ther ex.                         G-Code     OutComes Score                                                     AM-PAC Score  AM-PAC Inpatient Mobility Raw Score : 10 (06/06/20 1050)  AM-PAC Inpatient T-Scale Score : 32.29 (06/06/20 1050)  Mobility Inpatient CMS 0-100% Score: 76.75 (06/06/20 1050)  Mobility Inpatient CMS G-Code Modifier : CL (06/06/20 1050)          Goals  Short term goals  Time Frame for Short term goals: upon d/c (no goals met in full this date)  Short term goal 1: Pt will perform bed mobility with max Ax1--MET 6/4/2020  Short term goal 2: Pt will perform transfers with LRAD and max Ax1--MET 6/4/2020  Short term goal 3: Pt will ambulate 10' with LRAD and max Ax1--MET 6/4/2020  Short term goal 4: Pt will perform bed mobility MOD I   Short term goal 5: Pt will perform transfers with LRAD and CGA  Short term goal 6: Pt will ambulate 22' with LRAD and CGA  Patient Goals   Patient goals : pt did not state    Plan    Plan  Times per week: 7x  Times per day: Daily  Current Treatment Recommendations: Strengthening, Balance Training, Functional Mobility Training, Transfer Training, Endurance Training, Gait Training, Neuromuscular Re-education, Safety Education & Training, Equipment Evaluation, Education, & procurement, Patient/Caregiver Education & Training  Safety Devices  Type of devices:  All fall risk precautions in place, Left in chair, Call light within reach, Chair alarm in place, Gait belt, Patient at risk for falls, Nurse notified  Restraints  Initially in place: No     Therapy Time   Individual Concurrent Group Co-treatment   Time In       0947   Time Out       1040   Minutes       53   Timed Code Treatment Minutes: [address], [id number]

## 2020-06-06 NOTE — PROGRESS NOTES
finasteride  5 mg Oral Nightly    lamoTRIgine  150 mg Oral Daily    magnesium oxide  400 mg Oral Daily    therapeutic multivitamin-minerals  1 tablet Oral Daily    pantoprazole  40 mg Oral QAM AC    polyethylene glycol  17 g Oral Daily    pravastatin  20 mg Oral Nightly    tamsulosin  0.4 mg Oral Daily    triamcinolone   Topical BID    levothyroxine  25 mcg Oral QAM AC         Lab Data:  CBC:   Recent Labs     06/04/20  0959 06/06/20  0714   WBC  --  3.6*   HGB 8.0* 7.5*   PLT  --  206     BMP:    Recent Labs     06/06/20  0714   *   K 4.3   CO2 24   BUN 17   CREATININE 0.7*     INR:  No results for input(s): INR in the last 72 hours. BNP:  No results for input(s): PROBNP in the last 72 hours. Diagnostics:  Echo 6/1/2020  Summary   -Limited exam per Dr. Felicita Duarte for chronic diastolic heart failure.   -Normal left ventricle size, wall thickness, and systolic function with an   estimated ejection fraction of 55%.   -No regional wall motion abnormalities are seen. -Moderate aortic stenosis with a peak velocity of 2.97 m/s and a mean   pressure gradient of 22mmHg. The aortic valve area is estimated at 0.65   cm^2. No significant regurgitation noted. -Right atrial enlargement noted based on volume index.   -Thickened mitral valve without evidence of stenosis. There is heavy mitral   annular calcification noted. There is mild-to-moderate mitral regurgitation   noted. -There is moderate tricuspid regurgitation with a RVSP estimation of 50   mmHg. This is suggestive of moderate pulmonary hypertension.   -Mild pulmonic regurgitation present. Assessment:    1. Hip fracture  2. Edema  3. Chronic atrial fib, CV 4/19, controlled  4. Essential hypertension    Plan:    1. Continue eliquis 2.5 mg bid  2. Continue lisinopril 10 mg po daily    Ok for discharge today. Recommend monitoring hemoglobin    Discussed with Dr Lu Gonzales    Discussed with patient who is agreeable with plan of care.      Thank you for allowing me to participate in the care of your patient.     Deepthi Morrell, CNS

## 2020-06-06 NOTE — PROGRESS NOTES
1530: Bladder scaned the patient for 400 mL.    1540: Placed patient on bedside commode. Patient wanted to try one more time before inserting the Nelson catheter. 1615: Patient unable to void. Patient returned to bed.     1645: 16 Fr. Nelson Catheter inserted with urine return.

## 2020-06-07 PROCEDURE — 6370000000 HC RX 637 (ALT 250 FOR IP): Performed by: INTERNAL MEDICINE

## 2020-06-07 PROCEDURE — 6370000000 HC RX 637 (ALT 250 FOR IP): Performed by: ORTHOPAEDIC SURGERY

## 2020-06-07 PROCEDURE — 1200000000 HC SEMI PRIVATE

## 2020-06-07 PROCEDURE — 2580000003 HC RX 258: Performed by: ORTHOPAEDIC SURGERY

## 2020-06-07 PROCEDURE — 94760 N-INVAS EAR/PLS OXIMETRY 1: CPT

## 2020-06-07 RX ORDER — TORSEMIDE 20 MG/1
20 TABLET ORAL
Status: DISCONTINUED | OUTPATIENT
Start: 2020-06-08 | End: 2020-06-08 | Stop reason: HOSPADM

## 2020-06-07 RX ADMIN — SENNOSIDES AND DOCUSATE SODIUM 1 TABLET: 8.6; 5 TABLET ORAL at 09:15

## 2020-06-07 RX ADMIN — SENNOSIDES AND DOCUSATE SODIUM 1 TABLET: 8.6; 5 TABLET ORAL at 20:25

## 2020-06-07 RX ADMIN — LORAZEPAM 0.5 MG: 0.5 TABLET ORAL at 20:25

## 2020-06-07 RX ADMIN — LORAZEPAM 0.5 MG: 0.5 TABLET ORAL at 09:15

## 2020-06-07 RX ADMIN — Medication 400 MG: at 09:15

## 2020-06-07 RX ADMIN — Medication 10 ML: at 09:16

## 2020-06-07 RX ADMIN — LISINOPRIL 10 MG: 10 TABLET ORAL at 09:15

## 2020-06-07 RX ADMIN — PRAVASTATIN SODIUM 20 MG: 20 TABLET ORAL at 20:25

## 2020-06-07 RX ADMIN — MELATONIN TAB 3 MG 3 MG: 3 TAB at 20:28

## 2020-06-07 RX ADMIN — TAMSULOSIN HYDROCHLORIDE 0.4 MG: 0.4 CAPSULE ORAL at 20:25

## 2020-06-07 RX ADMIN — LEVOTHYROXINE SODIUM 25 MCG: 0.03 TABLET ORAL at 06:23

## 2020-06-07 RX ADMIN — PANTOPRAZOLE SODIUM 40 MG: 40 TABLET, DELAYED RELEASE ORAL at 06:23

## 2020-06-07 RX ADMIN — ASPIRIN 81 MG 81 MG: 81 TABLET ORAL at 09:15

## 2020-06-07 RX ADMIN — POLYETHYLENE GLYCOL 3350 17 G: 17 POWDER, FOR SOLUTION ORAL at 09:14

## 2020-06-07 RX ADMIN — Medication 10 ML: at 20:26

## 2020-06-07 RX ADMIN — APIXABAN 2.5 MG: 2.5 TABLET, FILM COATED ORAL at 09:15

## 2020-06-07 RX ADMIN — GABAPENTIN 100 MG: 100 CAPSULE ORAL at 20:25

## 2020-06-07 RX ADMIN — FINASTERIDE 5 MG: 5 TABLET, FILM COATED ORAL at 20:25

## 2020-06-07 RX ADMIN — TORSEMIDE 20 MG: 20 TABLET ORAL at 09:15

## 2020-06-07 RX ADMIN — APIXABAN 2.5 MG: 2.5 TABLET, FILM COATED ORAL at 20:25

## 2020-06-07 RX ADMIN — MULTIPLE VITAMINS W/ MINERALS TAB 1 TABLET: TAB at 09:15

## 2020-06-07 RX ADMIN — LAMOTRIGINE 150 MG: 100 TABLET ORAL at 09:15

## 2020-06-07 RX ADMIN — ACETAMINOPHEN 650 MG: 325 TABLET, FILM COATED ORAL at 20:25

## 2020-06-07 ASSESSMENT — PAIN SCALES - GENERAL
PAINLEVEL_OUTOF10: 0
PAINLEVEL_OUTOF10: 2

## 2020-06-07 NOTE — PROGRESS NOTES
ROS: negative    OBJECTIVE      Medications      Current Facility-Administered Medications: torsemide (DEMADEX) tablet 20 mg, 20 mg, Oral, Daily  lisinopril (PRINIVIL;ZESTRIL) tablet 10 mg, 10 mg, Oral, Daily  acetaminophen (TYLENOL) tablet 650 mg, 650 mg, Oral, Q4H PRN  gabapentin (NEURONTIN) capsule 100 mg, 100 mg, Oral, Nightly  LORazepam (ATIVAN) tablet 0.5 mg, 0.5 mg, Oral, BID  apixaban (ELIQUIS) tablet 2.5 mg, 2.5 mg, Oral, BID  tranexamic acid (CYKLOKAPRON) 3,000 mg in sodium chloride 0.9 % 50 mL irrigation, , Irrigation, Once  sodium chloride flush 0.9 % injection 10 mL, 10 mL, Intravenous, 2 times per day  sodium chloride flush 0.9 % injection 10 mL, 10 mL, Intravenous, PRN  sennosides-docusate sodium (SENOKOT-S) 8.6-50 MG tablet 1 tablet, 1 tablet, Oral, BID  magnesium hydroxide (MILK OF MAGNESIA) 400 MG/5ML suspension 30 mL, 30 mL, Oral, Daily PRN  aspirin chewable tablet 81 mg, 81 mg, Oral, Daily  finasteride (PROSCAR) tablet 5 mg, 5 mg, Oral, Nightly  lamoTRIgine (LAMICTAL) tablet 150 mg, 150 mg, Oral, Daily  magnesium oxide (MAG-OX) tablet 400 mg, 400 mg, Oral, Daily  melatonin tablet 3 mg, 3 mg, Oral, Daily PRN  nitroGLYCERIN (NITROSTAT) SL tablet 0.4 mg, 0.4 mg, Sublingual, Q5 Min PRN  therapeutic multivitamin-minerals 1 tablet, 1 tablet, Oral, Daily  pantoprazole (PROTONIX) tablet 40 mg, 40 mg, Oral, QAM AC  polyethylene glycol (GLYCOLAX) packet 17 g, 17 g, Oral, Daily  pravastatin (PRAVACHOL) tablet 20 mg, 20 mg, Oral, Nightly  tamsulosin (FLOMAX) capsule 0.4 mg, 0.4 mg, Oral, Daily  triamcinolone (KENALOG) 0.1 % cream, , Topical, BID  ondansetron (ZOFRAN) injection 4 mg, 4 mg, Intravenous, Q6H PRN  levothyroxine (SYNTHROID) tablet 25 mcg, 25 mcg, Oral, QAM AC    Physical      Vitals: /72   Pulse 115   Temp 98.3 °F (36.8 °C) (Oral)   Resp 16   Ht 5' 6\" (1.676 m)   Wt 151 lb 14.4 oz (68.9 kg)   SpO2 91%   BMI 24.52 kg/m²   Temp: Temp: 98.3 °F (36.8 °C)  Max: Temp  Av.5 °F (36.9

## 2020-06-07 NOTE — CONSULTS
is on blood thinners. HPI: Braden Orr is a 80 y.o. male. I saw the patient today for retention, and associated symptoms of penile edema, that have been present for months. Symptoms relieved by munoz and aggravated by edema. He has tried the following treatments: diuresis and munoz placement . Past medical History:   He has a past medical history of A-fib ([address] Arthritis, Blepharitis of both eyes, CAD (coronary artery disease), GERD (gastroesophageal reflux disease), Gout, Hyperlipidemia, Hypertension, Macular degeneration, NSTEMI (non-ST elevated myocardial infarction) [facility]), Prostate enlargement, Psychiatric problem, and Tachycardia. Past Surgical History:  He has a past surgical history that includes Coronary angioplasty with stent (2005); skin biopsy; Kidney stone surgery (1980); Coronary artery bypass graft; Cataract removal (1993); TURP (2003); Skin cancer destruction (2006); Colonoscopy; eye surgery; Cardiac surgery; and hip surgery (Left, 6/2/2020). Allergies: Allergies   Allergen Reactions    Lasix [Furosemide] Other (See Comments)     Pt passed out     Zyprexa [Olanzapine]      Pacing.  Citalopram Anxiety     сергей       Social History:  He reports that he has never smoked. He has never used smokeless tobacco. He reports that he does not drink alcohol or use drugs. Family History:  family history includes Heart Disease in his brother and mother.     Medications:   Scheduled Meds:   torsemide  20 mg Oral Daily    lisinopril  10 mg Oral Daily    gabapentin  100 mg Oral Nightly    LORazepam  0.5 mg Oral BID    apixaban  2.5 mg Oral BID    irrigation builder   Irrigation Once    sodium chloride flush  10 mL Intravenous 2 times per day    sennosides-docusate sodium  1 tablet Oral BID    aspirin  81 mg Oral Daily    finasteride  5 mg Oral Nightly    lamoTRIgine  150 mg Oral Daily    magnesium oxide  400 mg Oral Daily    therapeutic multivitamin-minerals  1 tablet Oral Daily    pantoprazole  40 mg Oral QAM AC    polyethylene glycol  17 g Oral Daily    pravastatin  20 mg Oral Nightly    tamsulosin  0.4 mg Oral Daily    triamcinolone   Topical BID    levothyroxine  25 mcg Oral QAM AC     Continuous Infusions:  PRN Meds:acetaminophen, sodium chloride flush, magnesium hydroxide, melatonin, nitroGLYCERIN, ondansetron    Review of Systems:  Constitutional: Negative for fever    Genitourinary: see HPI  Eyes: negative for sudden change in vision  EENT: no complaints  Cardiovascular: Negative for chest pain  Respiratory: Negative for shortness of breath  Gastrointestinal: Negative for nausea  Musculoskeletal: Negative for back pain   Neurological:  weakness  Psychiatric: Negative for anxiety  Integumentary: Negative for rashes or adenopathy     Physical Exam:  Vitals:    06/07/20 0751   BP: 119/72   Pulse: 115   Resp: 16   Temp: 98.3 °F (36.8 °C)   SpO2: 91%     Constitutional: NAD, well-developed, well-nourished. HEENT: MMM. Hearing intact. PERRL  Neck: no thyroid masses appreciated. Trachea is midline. Neck appears unremarkable   Lymph: no palpable adenopathy in supraclavicular, or axillary lymph nodes  Cardiovascular: Regular rate. No peripheral edema  Respiratory: Respirations are even and non-labored. No audible breath sounds. Genitourinary: penoscrotal edema with munoz in place with CYU  Abdomen: Soft. No distension, tenderness, hernias, masses or guarding. No CVA tenderness. No hernias appreciated. Liver and spleen appear normal  Psychiatric: A + O x 3, normal affect. Insight appears intact. Muskuloskeletal: SHELLI x 4   Skin: Pink, warm and dry. No rashes on face and arms.     Labs:  Lab Results   Component Value Date    WBC 3.6 (L) 06/06/2020    HGB 7.5 (L) 06/06/2020    HCT 22.2 (L) 06/06/2020    MCV 97.0 06/06/2020     06/06/2020     Lab Results   Component Value Date    CREATININE 0.7 (L) 06/06/2020    BUN 17 06/06/2020     (L) 06/06/2020    K 4.3 06/06/2020     06/06/2020    CO2 24 06/06/2020     Lab Results   Component Value Date    PSA 0.93 05/04/2015        Imaging:   none    Alfonso Billy MD  6/7/2020

## 2020-06-07 NOTE — PROGRESS NOTES
Shift assessment completed. Pt. A&O x4. Pt. Repositioned w/ assist, new mepilex applied to sacral area. No red area noted. +3 pitting edema noted in bilateral extremities. Slight crackles noted in lower bases of lungs. Incisional dressing to L hip CDI. Pt. Does report some tingling, but says he has \"neuropathy. \" Nelson care provided w/ soap and water. Nelson emptied of 50 cc's. Reviewed POC and evening meds. VSS at this time. Will attempt to wean pt. Off 1 L of O2. Meds given one at a time WWW. PRN tylenol given for reported pain 7/10. No other needs at this time. Call light and BST within reach. Fall precautions in place, will continue to monitor. The care plan and education has been reviewed and mutually agreed upon with the patient.

## 2020-06-08 ENCOUNTER — TELEPHONE (OUTPATIENT)
Dept: INTERNAL MEDICINE CLINIC | Age: 85
End: 2020-06-08

## 2020-06-08 VITALS
BODY MASS INDEX: 24.41 KG/M2 | TEMPERATURE: 98.1 F | HEART RATE: 105 BPM | DIASTOLIC BLOOD PRESSURE: 68 MMHG | HEIGHT: 66 IN | OXYGEN SATURATION: 92 % | WEIGHT: 151.9 LBS | SYSTOLIC BLOOD PRESSURE: 111 MMHG | RESPIRATION RATE: 16 BRPM

## 2020-06-08 LAB
ANION GAP SERPL CALCULATED.3IONS-SCNC: 9 MMOL/L (ref 3–16)
BUN BLDV-MCNC: 18 MG/DL (ref 7–20)
CALCIUM SERPL-MCNC: 8.4 MG/DL (ref 8.3–10.6)
CHLORIDE BLD-SCNC: 98 MMOL/L (ref 99–110)
CO2: 27 MMOL/L (ref 21–32)
CREAT SERPL-MCNC: 1 MG/DL (ref 0.8–1.3)
GFR AFRICAN AMERICAN: >60
GFR NON-AFRICAN AMERICAN: >60
GLUCOSE BLD-MCNC: 103 MG/DL (ref 70–99)
HCT VFR BLD CALC: 23.2 % (ref 40.5–52.5)
HEMOGLOBIN: 8 G/DL (ref 13.5–17.5)
MCH RBC QN AUTO: 33.3 PG (ref 26–34)
MCHC RBC AUTO-ENTMCNC: 34.6 G/DL (ref 31–36)
MCV RBC AUTO: 96.3 FL (ref 80–100)
PDW BLD-RTO: 13.2 % (ref 12.4–15.4)
PLATELET # BLD: 282 K/UL (ref 135–450)
PMV BLD AUTO: 6.9 FL (ref 5–10.5)
POTASSIUM SERPL-SCNC: 3.6 MMOL/L (ref 3.5–5.1)
RBC # BLD: 2.41 M/UL (ref 4.2–5.9)
SODIUM BLD-SCNC: 134 MMOL/L (ref 136–145)
WBC # BLD: 4.5 K/UL (ref 4–11)

## 2020-06-08 PROCEDURE — 80048 BASIC METABOLIC PNL TOTAL CA: CPT

## 2020-06-08 PROCEDURE — 85027 COMPLETE CBC AUTOMATED: CPT

## 2020-06-08 PROCEDURE — 6370000000 HC RX 637 (ALT 250 FOR IP): Performed by: UROLOGY

## 2020-06-08 PROCEDURE — 99024 POSTOP FOLLOW-UP VISIT: CPT | Performed by: NURSE PRACTITIONER

## 2020-06-08 PROCEDURE — 2580000003 HC RX 258: Performed by: ORTHOPAEDIC SURGERY

## 2020-06-08 PROCEDURE — 6370000000 HC RX 637 (ALT 250 FOR IP): Performed by: ORTHOPAEDIC SURGERY

## 2020-06-08 PROCEDURE — 36415 COLL VENOUS BLD VENIPUNCTURE: CPT

## 2020-06-08 PROCEDURE — APPNB30 APP NON BILLABLE TIME 0-30 MINS: Performed by: NURSE PRACTITIONER

## 2020-06-08 PROCEDURE — 6370000000 HC RX 637 (ALT 250 FOR IP): Performed by: INTERNAL MEDICINE

## 2020-06-08 PROCEDURE — 94760 N-INVAS EAR/PLS OXIMETRY 1: CPT

## 2020-06-08 RX ORDER — TAMSULOSIN HYDROCHLORIDE 0.4 MG/1
0.8 CAPSULE ORAL DAILY
Status: DISCONTINUED | OUTPATIENT
Start: 2020-06-08 | End: 2020-06-08 | Stop reason: HOSPADM

## 2020-06-08 RX ADMIN — LORAZEPAM 0.5 MG: 0.5 TABLET ORAL at 09:44

## 2020-06-08 RX ADMIN — LAMOTRIGINE 150 MG: 100 TABLET ORAL at 09:44

## 2020-06-08 RX ADMIN — POLYETHYLENE GLYCOL 3350 17 G: 17 POWDER, FOR SOLUTION ORAL at 09:46

## 2020-06-08 RX ADMIN — MULTIPLE VITAMINS W/ MINERALS TAB 1 TABLET: TAB at 09:44

## 2020-06-08 RX ADMIN — APIXABAN 2.5 MG: 2.5 TABLET, FILM COATED ORAL at 09:44

## 2020-06-08 RX ADMIN — LISINOPRIL 10 MG: 10 TABLET ORAL at 09:44

## 2020-06-08 RX ADMIN — Medication 400 MG: at 09:43

## 2020-06-08 RX ADMIN — TORSEMIDE 20 MG: 20 TABLET ORAL at 09:43

## 2020-06-08 RX ADMIN — PANTOPRAZOLE SODIUM 40 MG: 40 TABLET, DELAYED RELEASE ORAL at 04:34

## 2020-06-08 RX ADMIN — Medication 10 ML: at 09:48

## 2020-06-08 RX ADMIN — LEVOTHYROXINE SODIUM 25 MCG: 0.03 TABLET ORAL at 04:34

## 2020-06-08 RX ADMIN — ASPIRIN 81 MG 81 MG: 81 TABLET ORAL at 08:00

## 2020-06-08 RX ADMIN — TRIAMCINOLONE ACETONIDE: 1 CREAM TOPICAL at 09:46

## 2020-06-08 RX ADMIN — SENNOSIDES AND DOCUSATE SODIUM 1 TABLET: 8.6; 5 TABLET ORAL at 09:43

## 2020-06-08 RX ADMIN — TAMSULOSIN HYDROCHLORIDE 0.8 MG: 0.4 CAPSULE ORAL at 09:49

## 2020-06-08 ASSESSMENT — PAIN SCALES - GENERAL
PAINLEVEL_OUTOF10: 3
PAINLEVEL_OUTOF10: 0

## 2020-06-08 ASSESSMENT — PAIN DESCRIPTION - LOCATION
LOCATION: ANKLE;HIP
LOCATION: ANKLE;HIP
LOCATION: HIP

## 2020-06-08 ASSESSMENT — PAIN DESCRIPTION - DESCRIPTORS
DESCRIPTORS: DISCOMFORT
DESCRIPTORS: DISCOMFORT

## 2020-06-08 ASSESSMENT — PAIN DESCRIPTION - FREQUENCY
FREQUENCY: INTERMITTENT
FREQUENCY: INTERMITTENT

## 2020-06-08 ASSESSMENT — PAIN DESCRIPTION - ORIENTATION
ORIENTATION: LEFT

## 2020-06-08 ASSESSMENT — PAIN DESCRIPTION - PAIN TYPE
TYPE: ACUTE PAIN;SURGICAL PAIN
TYPE: ACUTE PAIN;SURGICAL PAIN
TYPE: SURGICAL PAIN

## 2020-06-08 NOTE — PLAN OF CARE
and symptoms of excessive bleeding  Description: Will show no signs and symptoms of excessive bleeding  Outcome: Ongoing     Problem:  Activity:  Goal: Ability to ambulate will improve  Description: Ability to ambulate will improve  Outcome: Ongoing  Goal: Ability to perform activities at highest level will improve  Description: Ability to perform activities at highest level will improve  Outcome: Ongoing     Problem: Health Behavior:  Goal: Identification of resources available to assist in meeting health care needs will improve  Description: Identification of resources available to assist in meeting health care needs will improve  Outcome: Ongoing

## 2020-06-08 NOTE — PROGRESS NOTES
[facility] Orthopedic Surgery   Progress Note    CHIEF COMPLAINT/DIAGNOSIS:    - 6/2/20 LEFT hip hemiarthroplasty  - Left distal fibular fracture, nondisplaced    SUBJECTIVE: Patient sitting up in chair with daughter at bedside. Reports hip pain well controlled. Denies ankle pain. To SNF today. OBJECTIVE  Physical    VITALS:  /60   Pulse 87   Temp 98.2 °F (36.8 °C) (Oral)   Resp 14   Ht 5' 6\" (1.676 m)   Wt 151 lb 14.4 oz (68.9 kg)   SpO2 96%   BMI 24.52 kg/m²     GENERAL: Alert, NAD   MUSCULOSKELETAL: LEFT LE  INCISION:  Dressing c/d/i  ROM: intact DF/PF; small abrasions medial/lateral malleoli  Sensory:  Intact to light touch in peroneal and tibial distributions  Vascular:   Intact dors ped;  calf soft and nontender  ANKLE STIRRUP BRACE REMAINS OFF    Data    ALL MEDICATIONS HAVE BEEN REVIEWED    CBC:   Recent Labs     06/06/20  0714 06/08/20  0553   WBC 3.6* 4.5   HGB 7.5* 8.0*   HCT 22.2* 23.2*    282     BMP:   Recent Labs     06/06/20  0714 06/08/20  0553   * 134*   K 4.3 3.6    98*   CO2 24 27   BUN 17 18   CREATININE 0.7* 1.0     INR:   No results for input(s): INR in the last 72 hours. ASSESSMENT:  - 6/2/20 LEFT hip hemiarthroplasty, POD#6  - Left distal fibular fracture, nondisplaced  - Atrial fibrillation on home Eliquis  - CAD  - HTN  - BPH    PLAN:   - WB status:  WBAT; Anterolateral hip precautions; no adduction, hyperextension/external rotation; no flexion > 90 degrees;  - DO NOT APPLY ANKLE BRACE: distal fibular fracture is stable and non-displaced and brace may cause skin breakdown.     - DVT prophylaxis: Home Eliquis  - PT/OT  - D/C Plan: SNF today  - Pain Control: current regimen. Due to orthopaedic surgical procedure/condition, patient may require pain medication for up to 6-8 weeks. - Post op urinary retention:  Nelson with voiding trial in week; appreciate Urology consult  - F U with Dr. Guillermo Hernandez in 2 weeks; call [phone].       DEANNE DENNY, APRN-CNP  6/8/2020  10:01 AM    .

## 2020-06-08 NOTE — PROGRESS NOTES
YELLOW Straw/Yellow    Clarity, UA Clear Clear    Glucose, Ur Negative Negative mg/dL    Bilirubin Urine Negative Negative    Ketones, Urine Negative Negative mg/dL    Specific Gravity, UA 1.021 1.005 - 1.030    Blood, Urine Negative Negative    pH, UA 6.0 5.0 - 8.0    Protein, UA 30 (A) Negative mg/dL    Urobilinogen, Urine 0.2 <2.0 E.U./dL    Nitrite, Urine Negative Negative    Leukocyte Esterase, Urine Negative Negative    Microscopic Examination YES     Urine Type Voided     Urine Reflex to Culture Not Indicated    Microscopic Urinalysis    Collection Time: 06/06/20  4:06 AM   Result Value Ref Range    Hyaline Casts, UA 2 0 - 8 /LPF    WBC, UA 1 0 - 5 /HPF    RBC, UA 5 (H) 0 - 4 /HPF    Epithelial Cells, UA 0 0 - 5 /HPF   CBC auto differential    Collection Time: 06/06/20  7:14 AM   Result Value Ref Range    WBC 3.6 (L) 4.0 - 11.0 K/uL    RBC 2.29 (L) 4.20 - 5.90 M/uL    Hemoglobin 7.5 (L) 13.5 - 17.5 g/dL    Hematocrit 22.2 (L) 40.5 - 52.5 %    MCV 97.0 80.0 - 100.0 fL    MCH 33.0 26.0 - 34.0 pg    MCHC 34.0 31.0 - 36.0 g/dL    RDW 13.7 12.4 - 15.4 %    Platelets 375 614 - 526 K/uL    MPV 6.7 5.0 - 10.5 fL    Neutrophils % 66.1 %    Lymphocytes % 17.7 %    Monocytes % 12.3 %    Eosinophils % 3.3 %    Basophils % 0.6 %    Neutrophils Absolute 2.4 1.7 - 7.7 K/uL    Lymphocytes Absolute 0.6 (L) 1.0 - 5.1 K/uL    Monocytes Absolute 0.4 0.0 - 1.3 K/uL    Eosinophils Absolute 0.1 0.0 - 0.6 K/uL    Basophils Absolute 0.0 0.0 - 0.2 K/uL   Basic Metabolic Panel w/ Reflex to MG    Collection Time: 06/06/20  7:14 AM   Result Value Ref Range    Sodium 134 (L) 136 - 145 mmol/L    Potassium reflex Magnesium 4.3 3.5 - 5.1 mmol/L    Chloride 102 99 - 110 mmol/L    CO2 24 21 - 32 mmol/L    Anion Gap 8 3 - 16    Glucose 84 70 - 99 mg/dL    BUN 17 7 - 20 mg/dL    CREATININE 0.7 (L) 0.8 - 1.3 mg/dL    GFR Non-African American >60 >60    GFR African American >60 >60    Calcium 8.1 (L) 8.3 - 10.6 mg/dL   CBC    Collection Time: 06/08/20  5:53 AM   Result Value Ref Range    WBC 4.5 4.0 - 11.0 K/uL    RBC 2.41 (L) 4.20 - 5.90 M/uL    Hemoglobin 8.0 (L) 13.5 - 17.5 g/dL    Hematocrit 23.2 (L) 40.5 - 52.5 %    MCV 96.3 80.0 - 100.0 fL    MCH 33.3 26.0 - 34.0 pg    MCHC 34.6 31.0 - 36.0 g/dL    RDW 13.2 12.4 - 15.4 %    Platelets 317 132 - 168 K/uL    MPV 6.9 5.0 - 10.5 fL   Basic Metabolic Panel    Collection Time: 06/08/20  5:53 AM   Result Value Ref Range    Sodium 134 (L) 136 - 145 mmol/L    Potassium 3.6 3.5 - 5.1 mmol/L    Chloride 98 (L) 99 - 110 mmol/L    CO2 27 21 - 32 mmol/L    Anion Gap 9 3 - 16    Glucose 103 (H) 70 - 99 mg/dL    BUN 18 7 - 20 mg/dL    CREATININE 1.0 0.8 - 1.3 mg/dL    GFR Non-African American >60 >60    GFR African American >60 >60    Calcium 8.4 8.3 - 10.6 mg/dL       ASSESSMENT AND PLAN     Active Problems:    Essential hypertension    Coronary artery disease    Hyperlipidemia    Bradycardia    Chest pain    GERD (gastroesophageal reflux disease)    Mild aortic stenosis    Psychophysiological insomnia    RLS (restless legs syndrome)    Chronic idiopathic constipation    Paroxysmal atrial fibrillation (HCC)    Moderate protein-calorie malnutrition (HCC)    Subcapital fracture of femur, left, closed, initial encounter (HCC)    Chronic diastolic heart failure (HCC)    Closed low lateral malleolus fracture, left, initial encounter    Localized edema    Chronic atrial fibrillation  Resolved Problems:    * No resolved hospital problems.  *     patient is off O2 , Medicare appeal denied , anticipate dismissal in am

## 2020-06-08 NOTE — PROGRESS NOTES
Received return call from [facility] at this time. Report given to [facility]. All questions answered. Awaiting transportation at this time.

## 2020-06-09 ENCOUNTER — TELEPHONE (OUTPATIENT)
Dept: ORTHOPEDIC SURGERY | Age: 85
End: 2020-06-09

## 2020-06-09 NOTE — TELEPHONE ENCOUNTER
Spoke with Brenda Benitez. She was just calling to verify pts post op appointment was correct. Let her know yes, we see patients 2 weeks post op. Appt is scheduled for 6/16.

## 2020-06-11 ENCOUNTER — TELEPHONE (OUTPATIENT)
Dept: INTERNAL MEDICINE CLINIC | Age: 85
End: 2020-06-11

## 2020-06-16 ENCOUNTER — TELEPHONE (OUTPATIENT)
Dept: ORTHOPEDIC SURGERY | Age: 85
End: 2020-06-16

## 2020-06-16 ENCOUNTER — OFFICE VISIT (OUTPATIENT)
Dept: ORTHOPEDIC SURGERY | Age: 85
End: 2020-06-16

## 2020-06-16 VITALS — RESPIRATION RATE: 16 BRPM | WEIGHT: 138 LBS | BODY MASS INDEX: 22.18 KG/M2 | HEIGHT: 66 IN | TEMPERATURE: 97.2 F

## 2020-06-16 PROCEDURE — 99024 POSTOP FOLLOW-UP VISIT: CPT | Performed by: NURSE PRACTITIONER

## 2020-06-16 NOTE — TELEPHONE ENCOUNTER
PATIENT'S DAUGHTER CALLING TO REPORT HER FATHER FELL YESTERDAY AT [address].   PATIENT HAS AN APPOINTMENT TODAY WITH Nemesio Maier.  [phone]

## 2020-06-17 NOTE — PROGRESS NOTES
DIAGNOSIS:    1-Left femoral neck displaced fracture, status post Press-Fit bipolar hemiarthroplasty. 2-Left ankle pain/lateral malleolus Lundberg A fracture, nonoperative    DATE OF SURGERY: 6/2/2020. HISTORY OF PRESENT ILLNESS:  Mr. Capps Class 80 y.o.  male who came in today for 2 weeks postoperative visit. The patient denies any significant pain in the left hip or left ankle. He was placed in a ankle brace left ankle but he has not been wearing it in 6 weeks his skin breakdown. Rates pain a 3/10 VAS mild, aching, intermittent and are improving. Aggravating factors walking. Alleviating factors elevation and rest.He  has been walking weightbearing as tolerated using a walker. No numbness or tingling sensation. No fever or Chills. PHYSICAL EXAMINATION:  The incision is completely healed left hip. No signs of any erythema or drainage. He has no pain with the active or passive range of motion of the right hip. He has intact sensation, distally, and he is neurovascularly intact. He has mild tenderness to palpation over the left lateral malleolus compared to the other side. There is mild swelling left ankle. He has decreased range of motion left ankle. IMAGING:  X-rays were taken in the office today, AP pelvis and 2 views of the left hip and femur, and showed the Press-Fit hemiarthroplasty in good position. No signs of any lucency. X-rays 3 views of the left ankle taken today in office were reviewed and showed the left ankle lateral malleolus Lundberg A minimally displaced fracture. IMPRESSION:    1-2 weeks out from left hip hemiarthroplasty and doing very well. 2-Left ankle lateral malleolus Lundberg A fracture, nonoperative      PLAN:  I have told the patient to continue PT, weightbearing as tolerated, as well as strengthening exercises. Left ankle brace as tolerated. Compression hose to help with the swelling. The patient will come back for a follow up in 6 weeks.   At that time, we will take AP pelvis and 2 views of the affected hip and 3 views left ankle. As this patient has demonstrated risk factors for osteoporosis, such as age greater than [de-identified] years and evidence of a fracture, I have referred the patient back to the primary care physician for evaluation for osteoporosis, including consideration for DEXA scanning, if this is felt to be clinically indicated. The patient is advised to contact the primary care physician to follow-up for further evaluation.        Tessa Castillo, APRN - CNP

## 2020-07-15 ENCOUNTER — CARE COORDINATION (OUTPATIENT)
Dept: CASE MANAGEMENT | Age: 85
End: 2020-07-15

## 2020-07-15 NOTE — CARE COORDINATION
[facility] Transitions Initial Follow Up Call    Call within 2 business days of discharge: Yes    Patient: Severo Kaplan Patient : 10/31/1925   MRN: [id number]  Reason for Admission: Subcapital fracture of femur, left, closed  Discharge Date: 20 RARS: Readmission Risk Score: 17      Last Discharge [facility]       Complaint Diagnosis Description Type Department Provider    20 Fall Subcapital fracture of femur, left, closed, initial encounter ([facility] [address] . .. ED to Hosp-Admission (Discharged) (ADMITTED) Mabel Wallace MD      Per [place] [facility] patient discharged from skilled nursing back to his [facility] apartment on [address] with [facility] and home care 4hours per day. Called [facility]. Nurse Julian Moore states patient is doing well. States patient does have some anxiety which was present prior to fx. Nurse Julian Moore agreed to fax updated med list to a secure fax. No other concerns d/t 24 hour nursing access. Spoke with[facility]    Non-face-to-face services provided:      Care Transitions 24 Hour Call    Patient DME:  Felicity Bonner  Are you an active caregiver in your home?:  No  Care Transitions Interventions         Follow Up  Future Appointments   Date Time Provider Camryn Ivey   2020  1:00 PM MARIS Ashford CNP  Ortho [facility]   2020 11:30 AM Chon Patel MD [facility]   2020  2:30 PM Donnell Galdamez MD  Cardio [facility]       Shabnam Mathias RN  .

## 2020-07-28 ENCOUNTER — OFFICE VISIT (OUTPATIENT)
Dept: ORTHOPEDIC SURGERY | Age: 85
End: 2020-07-28

## 2020-07-28 ENCOUNTER — CARE COORDINATION (OUTPATIENT)
Dept: CASE MANAGEMENT | Age: 85
End: 2020-07-28

## 2020-07-28 PROCEDURE — 99024 POSTOP FOLLOW-UP VISIT: CPT | Performed by: NURSE PRACTITIONER

## 2020-07-28 NOTE — PROGRESS NOTES
Discontinue left ankle brace. Compression hose to help with the swelling. The patient will come back for a follow up in 6 weeks. At that time, we will take AP pelvis and 2 views of the affected hip and 3 views left ankle. As this patient has demonstrated risk factors for osteoporosis, such as age greater than [de-identified] years and evidence of a fracture, I have referred the patient back to the primary care physician for evaluation for osteoporosis, including consideration for DEXA scanning, if this is felt to be clinically indicated. The patient is advised to contact the primary care physician to follow-up for further evaluation.        Chase Quinn, APRN - CNP

## 2020-07-28 NOTE — CARE COORDINATION
[facility] Transitions Follow Up Call    2020    Patient: Paul Lee  Patient : 10/31/1925   MRN: [id number]  Reason for Admission: L femur fx  Discharge Date: 20 RARS: Readmission Risk Score: 17     LVM at [facility] [phone] to fax me Gillermo Epley, [phone]    Spoke with: lvm    Care Transitions Subsequent and Final Call    Subsequent and Final Calls  Care Transitions Interventions  Other Interventions:             Follow Up  Future Appointments   Date Time Provider Camryn Ivey   2020  1:00 PM MARIS Payne - CNP [facility]   2020 11:30 AM Nahid Pastor MD [facility]   2020  2:30 PM Brittney Herrera MD [facility]       Marcie Simms RN

## 2020-07-29 ENCOUNTER — CARE COORDINATION (OUTPATIENT)
Dept: CASE MANAGEMENT | Age: 85
End: 2020-07-29

## 2020-07-29 PROCEDURE — 1111F DSCHRG MED/CURRENT MED MERGE: CPT | Performed by: NURSE PRACTITIONER

## 2020-07-31 ENCOUNTER — OFFICE VISIT (OUTPATIENT)
Dept: CARDIOLOGY CLINIC | Age: 85
End: 2020-07-31
Payer: MEDICARE

## 2020-07-31 VITALS
HEIGHT: 66 IN | HEART RATE: 85 BPM | WEIGHT: 132 LBS | BODY MASS INDEX: 21.21 KG/M2 | DIASTOLIC BLOOD PRESSURE: 50 MMHG | SYSTOLIC BLOOD PRESSURE: 98 MMHG

## 2020-07-31 PROCEDURE — 1123F ACP DISCUSS/DSCN MKR DOCD: CPT | Performed by: INTERNAL MEDICINE

## 2020-07-31 PROCEDURE — 1036F TOBACCO NON-USER: CPT | Performed by: INTERNAL MEDICINE

## 2020-07-31 PROCEDURE — 93000 ELECTROCARDIOGRAM COMPLETE: CPT | Performed by: INTERNAL MEDICINE

## 2020-07-31 PROCEDURE — G8420 CALC BMI NORM PARAMETERS: HCPCS | Performed by: INTERNAL MEDICINE

## 2020-07-31 PROCEDURE — 99214 OFFICE O/P EST MOD 30 MIN: CPT | Performed by: INTERNAL MEDICINE

## 2020-07-31 PROCEDURE — 4040F PNEUMOC VAC/ADMIN/RCVD: CPT | Performed by: INTERNAL MEDICINE

## 2020-07-31 PROCEDURE — G8427 DOCREV CUR MEDS BY ELIG CLIN: HCPCS | Performed by: INTERNAL MEDICINE

## 2020-07-31 RX ORDER — POTASSIUM CHLORIDE 20 MEQ/1
20 TABLET, EXTENDED RELEASE ORAL 2 TIMES DAILY
COMMUNITY
End: 2021-01-06 | Stop reason: ALTCHOICE

## 2020-07-31 RX ORDER — FERROUS SULFATE 325(65) MG
325 TABLET ORAL
COMMUNITY
End: 2022-10-24

## 2020-07-31 RX ORDER — LORAZEPAM 0.5 MG/1
0.5 TABLET ORAL EVERY 6 HOURS PRN
Status: ON HOLD | COMMUNITY
End: 2021-06-27 | Stop reason: SDUPTHER

## 2020-07-31 RX ORDER — TORSEMIDE 10 MG/1
10 TABLET ORAL DAILY
COMMUNITY
End: 2020-07-31 | Stop reason: ALTCHOICE

## 2020-07-31 NOTE — PROGRESS NOTES
[address]  Cardiac Consult     Referring Provider:  Jamia Arias MD     Chief Complaint   Patient presents with    Coronary Artery Disease    Hypertension    Hyperlipidemia        History of Present Illness:  80 y.o. male with bipolar formally followed by Dr. Lolly Meza seen in f/u for CAD, HTN, hyperlipidemia and bradycardia. He underwent stenting of the LCX and RCA in 2005. Last stress myoview 12/2016 was normal. He has had bradycardia resulting in discontinuation of his beta blocker. He was admitted 4/19 with afib and chest pain. He underwent MOE cardioversion and has not had further afib. He was admitted 6/2020 with a fall and hip fracture. He lives at [facility] and was trying to adjust the TV and \"Tripped\". He was back in afib that was controlled at that time. Apparently over the last few days he has had some tachycardia. HR up to 120 per daughter. The doctor there was going to start lopressor until the daughter informed her of the prior bradycardic issues. His daughter is concerned that a heart rhythm issue could have caused the fall resulting in the fracture. He has been asymptomatic and cannot tell when he is tachycardic or whether or not he is in afib. Past Medical History:   has a past medical history of A-fib ([address] Arthritis, Blepharitis of both eyes, CAD (coronary artery disease), GERD (gastroesophageal reflux disease), Gout, Hyperlipidemia, Hypertension, Macular degeneration, NSTEMI (non-ST elevated myocardial infarction) [facility]), Prostate enlargement, Psychiatric problem, and Tachycardia. Surgical History:   has a past surgical history that includes Coronary angioplasty with stent (2005); skin biopsy; Kidney stone surgery (1980); Coronary artery bypass graft; Cataract removal (1993); TURP (2003); Skin cancer destruction (2006); Colonoscopy; eye surgery; Cardiac surgery; and hip surgery (Left, 6/2/2020). Social History:   reports that he has never smoked.  He has never used smokeless tobacco. He reports that he does not drink alcohol or use drugs. Family History:  family history includes Heart Disease in his brother and mother. Allergies:  Lasix [furosemide]; Zyprexa [olanzapine]; and Citalopram     Home Medications:  Outpatient Encounter Medications as of 7/31/2020   Medication Sig Dispense Refill    potassium chloride (KLOR-CON M20) 20 MEQ extended release tablet Take 20 mEq by mouth 2 times daily      LORazepam (ATIVAN) 0.5 MG tablet Take 0.5 mg by mouth every 6 hours as needed for Anxiety.  ferrous sulfate (IRON 325) 325 (65 Fe) MG tablet Take 325 mg by mouth daily (with breakfast)      torsemide (DEMADEX) 10 MG tablet Take 10 mg by mouth daily      gabapentin (NEURONTIN) 100 MG capsule Take 1 capsule by mouth nightly for 30 days.  30 capsule 0    sennosides-docusate sodium (SENOKOT-S) 8.6-50 MG tablet Take 1 tablet by mouth 2 times daily (Patient taking differently: Take 1 tablet by mouth 2 times daily PRN)      apixaban (ELIQUIS) 2.5 MG TABS tablet Take 1 tablet by mouth 2 times daily 180 tablet 3    finasteride (PROSCAR) 5 MG tablet TAKE 1 TABLET EVERY NIGHT 90 tablet 3    lamoTRIgine (LAMICTAL) 150 MG tablet Take 1 tablet by mouth daily 90 tablet 1    pantoprazole (PROTONIX) 40 MG tablet TAKE 1 TABLET EVERY DAY 90 tablet 3    levothyroxine (SYNTHROID) 25 MCG tablet TAKE 1 TABLET EVERY DAY 90 tablet 3    tamsulosin (FLOMAX) 0.4 MG capsule Take 0.4 mg by mouth daily      Omega-3 Fat Ac-Cholecalciferol (DRY EYE OMEGA BENEFITS/VIT D-3) 778-103 MG-UNIT CAPS Take 1 capsule by mouth daily      polyethylene glycol (GLYCOLAX) powder Take 17 g by mouth daily as needed      Magnesium 400 MG TABS Take 1 tablet by mouth daily       aspirin 81 MG chewable tablet Take 1 tablet by mouth daily 30 tablet 3    [DISCONTINUED] lisinopril (PRINIVIL;ZESTRIL) 10 MG tablet Take 1 tablet by mouth daily (Patient not taking: Reported on 7/29/2020) 30 tablet 3    [DISCONTINUED] pravastatin (PRAVACHOL) 20 MG tablet TAKE 1 TABLET EVERY DAY (Patient not taking: Reported on 7/29/2020) 90 tablet 4    nitroGLYCERIN (NITROSTAT) 0.4 MG SL tablet Place 1 tablet under the tongue every 5 minutes as needed for Chest pain 25 tablet 3    melatonin 5 MG TABS tablet Take 5 mg by mouth daily as needed (Insomnia)      triamcinolone (KENALOG) 0.1 % cream Apply to affected areas twice daily 45 g 1     No facility-administered encounter medications on file as of 7/31/2020. [x] Medications and dosages reviewed. ROS:  [x]Full ROS obtained and negative except as mentioned in HPI      Physical Examination:    Vitals:    07/31/20 1448   BP: (!) 98/50      BP (!) 98/50 (Site: Left Upper Arm, Position: Sitting, Cuff Size: Medium Adult)   Ht 5' 6\" (1.676 m)   Wt 132 lb (59.9 kg)   BMI 21.31 kg/m²     · GENERAL: Elderly male walking with walker in NAD  · NEUROLOGICAL: Alert and oriented  · PSYCH: Calm affect  · SKIN: Warm and dry, no rash  · HEENT: Normocephalic, Sclera non-icteric, mucus membranes moist  · NECK: supple, JVP normal  · CAROTID: Normal upstroke, no bruits  · CARDIAC: Normal PMI, regular rate and irrhythm, normal S1S2, 2/6 early ejection murmur, rub, or gallop  · RESPIRATORY: Normal respiratory effort, clear to auscultation bilaterally  · EXTREMITIES: No LE edema  · MUSCULOSKELETAL: No joint swelling or tenderness, no chest wall tenderness  · GASTROINTESTINAL: normal bowel sounds, soft, non-tender, no bruit    EKG: afib, HR=90    ECHO 6/2020  Summary   -Limited exam per Dr. Preston Crook for chronic diastolic heart failure.   -Normal left ventricle size, wall thickness, and systolic function with an   estimated ejection fraction of 55%.   -No regional wall motion abnormalities are seen. -Moderate aortic stenosis with a peak velocity of 2.97 m/s and a mean   pressure gradient of 22mmHg. The aortic valve area is estimated at 0.65   cm^2. No significant regurgitation noted.    -Right atrial enlargement noted based on volume index.   -Thickened mitral valve without evidence of stenosis. There is heavy mitral   annular calcification noted. There is mild-to-moderate mitral regurgitation   noted. -There is moderate tricuspid regurgitation with a RVSP estimation of 50   mmHg. This is suggestive of moderate pulmonary hypertension.   -Mild pulmonic regurgitation present. Assessment:     CAD:   Stable. Medical therapy  S/p stenting of LCX and RCA 2005. Stress myoview 2016 normal.   Chest pain seems resolved. Hyperlipidemia:   LDL=53, Well controlled on pravastatin   Continue current therapy    HTN:   Well controlled. Follow on current meds  BP (!) 98/50 (Site: Left Upper Arm, Position: Sitting, Cuff Size: Medium Adult)   Ht 5' 6\" (1.676 m)   Wt 132 lb (59.9 kg)   BMI 21.31 kg/m²     Afib: In afib and HR=90 today. Apparently fast at times at [facility]  I am hesitant to just add better blocker with prior bradycardia  Place [facility]OT to see if he stays in afib, and what his rate runs. He already has EP f/u in 7 weeks. Chest Pain:  resolved    Aortic Stenosis:  Moderate. Mean gradient 22. Folllow    Edema:  Resolved with Demadex. I will let MD at [facility] adjust diuretics.      Plan:  [facility]  EP f/u as scheduled      Thank you for allowing me to participate in the care of this individual.      Rachael Weber M.D., [facility] [place]

## 2020-08-03 ENCOUNTER — TELEPHONE (OUTPATIENT)
Dept: CARDIOLOGY CLINIC | Age: 85
End: 2020-08-03

## 2020-08-03 NOTE — TELEPHONE ENCOUNTER
Pt daughter calling [employer] has pt wearing a heart monitor and she states that he's not going to be able to record \"events\" he doesn't remember/know how to do that. She called the company and they told her that's not a problem it will still record everything. She wants to know if that's true? Also, she wanted to know the range he can be from the monitor for it to continue to record?  Pls call to advise Thank you

## 2020-08-05 ENCOUNTER — TELEPHONE (OUTPATIENT)
Dept: CARDIOLOGY CLINIC | Age: 85
End: 2020-08-05

## 2020-08-05 NOTE — TELEPHONE ENCOUNTER
Daughter wants to know if this patient has any HR parameters . Needs to know because he gets physical therapy . Needs to know how high his HR could be and he still have therapy .

## 2020-08-06 ENCOUNTER — TELEPHONE (OUTPATIENT)
Dept: CARDIOLOGY CLINIC | Age: 85
End: 2020-08-06

## 2020-08-06 RX ORDER — METOPROLOL SUCCINATE 25 MG/1
25 TABLET, EXTENDED RELEASE ORAL DAILY
Qty: 90 TABLET | Refills: 3 | Status: SHIPPED | OUTPATIENT
Start: 2020-08-06 | End: 2020-08-06 | Stop reason: SDUPTHER

## 2020-08-06 RX ORDER — METOPROLOL SUCCINATE 25 MG/1
25 TABLET, EXTENDED RELEASE ORAL DAILY
Qty: 90 TABLET | Refills: 3 | Status: SHIPPED | OUTPATIENT
Start: 2020-08-06 | End: 2020-08-11

## 2020-08-06 NOTE — TELEPHONE ENCOUNTER
Spoke w/ pt's daughter. She states the therapist wont work with him if HR 120bpm or >. She states he gets nervous when the therapist comes which is probably why his HR is elevated.  He is asymptomatic with high HR's and it usually runs 90-100bpm at rest.

## 2020-08-11 ENCOUNTER — TELEPHONE (OUTPATIENT)
Dept: CARDIOLOGY CLINIC | Age: 85
End: 2020-08-11

## 2020-08-11 NOTE — TELEPHONE ENCOUNTER
Spoke with Pascale Box. She would prefer if we stopped Toprol for now. Her father is feeling sluggish and tired after starting it. Also spoke to Suellen Stevenson at [facility] and told her [facility] okay with discontinuing it.

## 2020-08-11 NOTE — TELEPHONE ENCOUNTER
LMOM for pts dtr Oscar. [facility] is fine with whatever they want to do with Toprol. If he thinks it is making him feel bad he can stop it. He thinks it is unlikely that the HR of 57 is the cause of him feeling bad.

## 2020-08-11 NOTE — TELEPHONE ENCOUNTER
Pt daughter calling with concerns with her dads medication Toprol his heart rate was 57 yesterday and pt is not feeling well. Med was held yesterday and today. Pt is wearing a 30 heart monitor and daugher would like to know what her dad should do? pls call to advise thank you.

## 2020-08-25 ENCOUNTER — TELEPHONE (OUTPATIENT)
Dept: ORTHOPEDIC SURGERY | Age: 85
End: 2020-08-25

## 2020-08-31 PROCEDURE — 93228 REMOTE 30 DAY ECG REV/REPORT: CPT | Performed by: INTERNAL MEDICINE

## 2020-09-14 ENCOUNTER — TELEPHONE (OUTPATIENT)
Dept: ORTHOPEDIC SURGERY | Age: 85
End: 2020-09-14

## 2020-09-14 NOTE — TELEPHONE ENCOUNTER
Patient's daughter Odalys Solid called and will not be able to be at the appt tomorrow and she would like [employer] to give her a call, she has some questions for her.  Ph# [phone]

## 2020-09-14 NOTE — TELEPHONE ENCOUNTER
Spoke with Thong Dodson. She is unable to make appointment tomorrow and just wanted to make sure pt was getting xrays done because he has fallen 3 times in the past week. Let her know we will be getting xrays per last OV note. Thong Dodson states her sister [facility] (patients other daughter) will be at appointment tomorrow so we do not need to give her a f/u call.

## 2020-09-15 ENCOUNTER — OFFICE VISIT (OUTPATIENT)
Dept: ORTHOPEDIC SURGERY | Age: 85
End: 2020-09-15
Payer: MEDICARE

## 2020-09-15 VITALS — TEMPERATURE: 97.2 F | HEIGHT: 67 IN | WEIGHT: 132 LBS | BODY MASS INDEX: 20.72 KG/M2

## 2020-09-15 PROBLEM — S80.02XA CONTUSION OF LEFT KNEE: Status: ACTIVE | Noted: 2020-09-15

## 2020-09-15 PROBLEM — M25.562 LEFT KNEE PAIN: Status: ACTIVE | Noted: 2020-09-15

## 2020-09-15 PROBLEM — S80.01XA CONTUSION OF RIGHT KNEE: Status: ACTIVE | Noted: 2020-09-15

## 2020-09-15 PROCEDURE — 99214 OFFICE O/P EST MOD 30 MIN: CPT | Performed by: ORTHOPAEDIC SURGERY

## 2020-09-15 PROCEDURE — G8420 CALC BMI NORM PARAMETERS: HCPCS | Performed by: ORTHOPAEDIC SURGERY

## 2020-09-15 PROCEDURE — 1123F ACP DISCUSS/DSCN MKR DOCD: CPT | Performed by: ORTHOPAEDIC SURGERY

## 2020-09-15 PROCEDURE — G8427 DOCREV CUR MEDS BY ELIG CLIN: HCPCS | Performed by: ORTHOPAEDIC SURGERY

## 2020-09-15 PROCEDURE — 1036F TOBACCO NON-USER: CPT | Performed by: ORTHOPAEDIC SURGERY

## 2020-09-15 PROCEDURE — 4040F PNEUMOC VAC/ADMIN/RCVD: CPT | Performed by: ORTHOPAEDIC SURGERY

## 2020-09-15 NOTE — PROGRESS NOTES
DIAGNOSIS:    1-Left femoral neck displaced fracture, status post Press-Fit bipolar hemiarthroplasty. 2-Left ankle pain/lateral malleolus Lundberg A fracture, nonoperative  3-Bilateral knee pain after new fall/contusion, abrasion    DATE OF SURGERY: 6/2/2020. HISTORY OF PRESENT ILLNESS:  Tristan Maxwell 80 y.o.  male who came in today for 3 months postoperative visit. The patient denies any significant pain in the left hip or left ankle. He was initially placed in a ankle brace left ankle but he has not been wearing it in 6 weeks his skin breakdown. Rates pain a 1/10 VAS mild, aching, intermittent and are improving in left hip and ankle. Aggravating factors walking. Alleviating factors elevation and rest.He  has been walking weightbearing as tolerated using a walker. He is in assisted living and has therapy coming in working with him 1 day/week due to [employer], but stopped due to he was no longer progressing. He is also here with new complaints of pain in bilateral knees after he fell several times in the past week and landed on his knees. He has significant bruising in the left knee and abrasions on bilateral knees. Pain is worse with walking and better with rest and elevation. He is on anticoagulants. No numbness or tingling sensation. No fever or Chills.     Past Medical History:   Diagnosis Date    A-fib [facility])     Arthritis     Blepharitis of both eyes     CAD (coronary artery disease)     stents 2005, x3    GERD (gastroesophageal reflux disease)     Gout     Hyperlipidemia     Hypertension     Macular degeneration     NSTEMI (non-ST elevated myocardial infarction) ([facility]     Prostate enlargement     Psychiatric problem     anxiety; bipolar disorder    Tachycardia        Past Surgical History:   Procedure Laterality Date    CARDIAC SURGERY      CATARACT REMOVAL  1993    X5    COLONOSCOPY      CORONARY ANGIOPLASTY WITH STENT PLACEMENT  2005      X3    CORONARY ARTERY BYPASS GRAFT      [facility]    EYE SURGERY      HIP SURGERY Left 6/2/2020    LEFT BIPOLAR HIP HEMIARTHROPLASTY performed by Nathan Keith MD at [address] [place] [address]    SKIN BIOPSY      melanoma, basal    SKIN CANCER DESTRUCTION  2006    Melanoma  ()    JACOBO  2003       Social History     Socioeconomic History    Marital status:      Spouse name: Not on file    Number of children: 3    Years of education: Not on file    Highest education level: Not on file   Occupational History    Occupation: Retired     Employer: [employer]     Comment: . Retired age 58. Social Needs    Financial resource strain: Not on file    Food insecurity     Worry: Not on file     Inability: Not on file    Transportation needs     Medical: Not on file     Non-medical: Not on file   Tobacco Use    Smoking status: Never Smoker    Smokeless tobacco: Never Used   Substance and Sexual Activity    Alcohol use: No    Drug use: No    Sexual activity: Not on file   Lifestyle    Physical activity     Days per week: Not on file     Minutes per session: Not on file    Stress: Not on file   Relationships    Social connections     Talks on phone: Not on file     Gets together: Not on file     Attends Islam service: Not on file     Active member of club or organization: Not on file     Attends meetings of clubs or organizations: Not on file     Relationship status: Not on file    Intimate partner violence     Fear of current or ex partner: Not on file     Emotionally abused: Not on file     Physically abused: Not on file     Forced sexual activity: Not on file   Other Topics Concern    Not on file   Social History Narrative    Lived in [facility] before eventually moving to [place] in [address] [place] [address]. Lives with his 60 yo son in [facility]. Has two daughters who are supportive.  Kristin Marcelo [phone] and Jorge Leigh [phone])     Two prior marriages. Flying in airplanes used to be a hobby, has given this up. Family History   Problem Relation Age of Onset    Heart Disease Mother     Heart Disease Brother     High Blood Pressure Neg Hx     High Cholesterol Neg Hx     Mental Illness Neg Hx        Current Outpatient Medications on File Prior to Visit   Medication Sig Dispense Refill    potassium chloride (KLOR-CON M20) 20 MEQ extended release tablet Take 20 mEq by mouth 2 times daily      LORazepam (ATIVAN) 0.5 MG tablet Take 0.5 mg by mouth every 6 hours as needed for Anxiety.       ferrous sulfate (IRON 325) 325 (65 Fe) MG tablet Take 325 mg by mouth daily (with breakfast)      sennosides-docusate sodium (SENOKOT-S) 8.6-50 MG tablet Take 1 tablet by mouth 2 times daily (Patient taking differently: Take 1 tablet by mouth 2 times daily PRN)      apixaban (ELIQUIS) 2.5 MG TABS tablet Take 1 tablet by mouth 2 times daily 180 tablet 3    finasteride (PROSCAR) 5 MG tablet TAKE 1 TABLET EVERY NIGHT 90 tablet 3    lamoTRIgine (LAMICTAL) 150 MG tablet Take 1 tablet by mouth daily 90 tablet 1    pantoprazole (PROTONIX) 40 MG tablet TAKE 1 TABLET EVERY DAY 90 tablet 3    levothyroxine (SYNTHROID) 25 MCG tablet TAKE 1 TABLET EVERY DAY 90 tablet 3    tamsulosin (FLOMAX) 0.4 MG capsule Take 0.4 mg by mouth daily      Omega-3 Fat Ac-Cholecalciferol (DRY EYE OMEGA BENEFITS/VIT D-3) 421-340 MG-UNIT CAPS Take 1 capsule by mouth daily      polyethylene glycol (GLYCOLAX) powder Take 17 g by mouth daily as needed      nitroGLYCERIN (NITROSTAT) 0.4 MG SL tablet Place 1 tablet under the tongue every 5 minutes as needed for Chest pain 25 tablet 3    melatonin 5 MG TABS tablet Take 5 mg by mouth daily as needed (Insomnia)      triamcinolone (KENALOG) 0.1 % cream Apply to affected areas twice daily 45 g 1    Magnesium 400 MG TABS Take 1 tablet by mouth daily       aspirin 81 MG chewable tablet Take 1 tablet by mouth daily 30 tablet 3    gabapentin (NEURONTIN) 100 MG capsule Take 1 capsule by mouth nightly for 30 days. 30 capsule 0     No current facility-administered medications on file prior to visit. Pertinent items are noted in HPI  Review of systems reviewed from Patient History Form dated on today from the nursing home and available in the patient's chart under the Media tab. No change noted. PHYSICAL EXAMINATION:  Mr. Ana Borja is a very pleasant 80 y.o.  male who presents today in no acute distress, awake, alert, and oriented. He is well dressed, nourished and  groomed. Patient with normal affect. Height is  5' 7\" (1.702 m), weight is 132 lb (59.9 kg), Body mass index is 20.67 kg/m². Resting respiratory rate is 16. Ambulates today with a wheelchair. The incision is completely healed left hip. No signs of any erythema or drainage. He has mild pain with the active or passive range of motion of the right hip. He has intact sensation, distally, and he is neurovascularly intact. He has no tenderness to palpation over the left lateral malleolus compared to the other side. There is mild swelling left ankle and left calf. There is a large ecchymotic area left knee with increased swelling and abrasions bilateral knees. He has decreased range of motion bilateral knees due to pain. Bilateral knees stable to valgus and varus stress. He has full range of motion left ankle. No tenderness left calf. Negative Homans. IMAGING:  X-rays were taken in the office today, AP pelvis and 2 views of the left hip and femur, and showed the Press-Fit hemiarthroplasty in good position. No signs of any lucency. X-rays 3 views of the left ankle taken today in office were reviewed and showed the left ankle lateral malleolus Lundberg A minimally displaced fracture. X-rays 3 views of bilateral knees taken today in office were reviewed and showed no acute fracture.   There are mild osteophytes tricompartmental bilateral

## 2020-09-16 ENCOUNTER — TELEPHONE (OUTPATIENT)
Dept: ORTHOPEDIC SURGERY | Age: 85
End: 2020-09-16

## 2020-09-16 NOTE — TELEPHONE ENCOUNTER
PATIENT'S DAUGHTER, CYNTHIA HAWKINS, CALLING TO GET AN ORDER FOR PHYSICAL THERAPY FAXED OVER TO THE NURSING HOME.   FAX NUMBER [phone]  Jaciel Vásquez [phone]

## 2020-09-22 ENCOUNTER — OFFICE VISIT (OUTPATIENT)
Dept: CARDIOLOGY CLINIC | Age: 85
End: 2020-09-22
Payer: MEDICARE

## 2020-09-22 VITALS
SYSTOLIC BLOOD PRESSURE: 120 MMHG | HEIGHT: 66 IN | HEART RATE: 108 BPM | WEIGHT: 131 LBS | OXYGEN SATURATION: 93 % | DIASTOLIC BLOOD PRESSURE: 70 MMHG | BODY MASS INDEX: 21.05 KG/M2

## 2020-09-22 PROCEDURE — 1036F TOBACCO NON-USER: CPT | Performed by: INTERNAL MEDICINE

## 2020-09-22 PROCEDURE — 99214 OFFICE O/P EST MOD 30 MIN: CPT | Performed by: INTERNAL MEDICINE

## 2020-09-22 PROCEDURE — G8420 CALC BMI NORM PARAMETERS: HCPCS | Performed by: INTERNAL MEDICINE

## 2020-09-22 PROCEDURE — 4040F PNEUMOC VAC/ADMIN/RCVD: CPT | Performed by: INTERNAL MEDICINE

## 2020-09-22 PROCEDURE — 1123F ACP DISCUSS/DSCN MKR DOCD: CPT | Performed by: INTERNAL MEDICINE

## 2020-09-22 PROCEDURE — G8427 DOCREV CUR MEDS BY ELIG CLIN: HCPCS | Performed by: INTERNAL MEDICINE

## 2020-09-22 PROCEDURE — 93000 ELECTROCARDIOGRAM COMPLETE: CPT | Performed by: INTERNAL MEDICINE

## 2020-09-22 RX ORDER — CALCIUM CARBONATE 500(1250)
500 TABLET ORAL DAILY
COMMUNITY
End: 2022-10-24

## 2020-09-22 NOTE — PROGRESS NOTES
[address]   Electrophysiology Consultation   Date: 9/22/2020  Reason for Consultation: Afib  Consult Requesting Physician: Cosme Duarte MD    CC:afib  HPI: Dank Gill is a 80 y.o. past medical history significant for coronary artery disease status post CABG years ago, status post PTCA in 2005.  Hypertension, hyperlipidemia who has presented to the hospital with chest pressure. Patient has long history of GERD and is on PPI.  Admitted to hospital 4/2019 with complaints of chest discomfort. Was found to be in afib and underwent a MOE/DCCV. Admitted again 6/2020 after falling and sustaining a hip fracture. Tejas Oakes presents to the office in follow up. He keeps having falling off and on. His daughter presents to the office with him today. He occasionally has some chest pressure. He underwent a 30 day monitor which showed Afib/flutter with a burden of about 68%. He had been placed back on metoprolol and he continued to have bradycardia and pauses. Assessment and plan:     Persistent atrial fibrillation    - ECG today shows afib , Rate 83 BPM   - He could not tolerate BB d/t Bradycardia, rate well controlled today but has some elevated rates with exertion   -Discussed doing a DCCV if he is interested    -Patient is not interested in cardioversion.    -With his age and condition he is not a good candidate for an ablation.    -30 day monitor revealed Afib/Flutter with RVR 68% burden on 7/31/2020  -S/p DCCV and MOE 4/10/2019   -[id number] Vasc score and anticoagulation discussed. High risk for stroke and thromboembolism. Anticoagulation is recommended. I discussed anticoagulation to decrease the risk of thromboembolic events including stroke. Benefits and alternatives were discussed with patient. Risk of bleeding was discussed. Patient verbalized understanding.   -On Eliquis 2.5 mg BID.     Bradycardia   -Episodes noted with beta blocker   His average heart rate with Holter is 86    I have discussed with by Kali Grimaldo MD at [address] [place] [address]    SKIN BIOPSY      melanoma, basal    SKIN CANCER DESTRUCTION  2006    Melanoma  ()    TURP  2003       Allergies   Allergen Reactions    Lasix [Furosemide] Other (See Comments)     Pt passed out     Zyprexa [Olanzapine]      Pacing.  Citalopram Anxiety     сергей       Social History:   reports that he has never smoked. He has never used smokeless tobacco. He reports that he does not drink alcohol or use drugs. Family History:  family history includes Heart Disease in his brother and mother. Review of System:  [x] Full ROS obtained and negative except as mentioned in HPI    Physical Examination:  Vitals:    09/22/20 1430   BP: 120/70   Pulse: 108   SpO2: 93%      Wt Readings from Last 3 Encounters:   09/22/20 131 lb (59.4 kg)   09/15/20 132 lb (59.9 kg)   07/31/20 132 lb (59.9 kg)       Labs  Lab Results   Component Value Date    TSHREFLEX 2.64 12/26/2015    TSH 5.09 05/04/2015    TSH 2.56 03/05/2010    CREATININE 1.0 06/08/2020    CREATININE 0.7 06/06/2020    AST 23 05/31/2020    ALT 17 05/31/2020        Medication:  Prior to Admission medications    Medication Sig Start Date End Date Taking? Authorizing Provider   Multiple Vitamins-Minerals (MULTIVITAMIN ADULT PO) Take by mouth   Yes Historical Provider, MD   calcium carbonate (OSCAL) 500 MG TABS tablet Take 500 mg by mouth daily   Yes Historical Provider, MD   Nutritional Supplements (ENSURE COMPLETE PO) Take by mouth 2 times daily   Yes Historical Provider, MD   potassium chloride (KLOR-CON M20) 20 MEQ extended release tablet Take 20 mEq by mouth 2 times daily   Yes Historical Provider, MD   LORazepam (ATIVAN) 0.5 MG tablet Take 0.5 mg by mouth every 6 hours as needed for Anxiety.    Yes Historical Provider, MD   ferrous sulfate (IRON 325) 325 (65 Fe) MG tablet Take 325 mg by mouth daily (with breakfast)   Yes Historical Provider, MD   sennosides-docusate sodium (SENOKOT-S) · Abdominal: Soft. No tenderness. · Musculoskeletal: No tenderness. No edema    · Lymphadenopathy: Has no cervical adenopathy. · Neurological: Alert and oriented. Follows command, he is on wheelchair and generalized weakness  · Skin: Skin is warm, No rash noted. · Psychiatric: Has a normal behavior         Thank you for allowing me to participate in the care of Yeyo Melo. Further evaluation will be based upon the patient's clinical course and testing results. All questions and concerns were addressed to the patient/family. Alternatives to my treatment were discussed. I have discussed the above stated plan and the patient verbalized understanding and agreed with the plan. NOTE: This report was transcribed using voice recognition software. Every effort was made to ensure accuracy, however, inadvertent computerized transcription errors may be present. Mercedez Reeves MD, MPH  [place] [address]   Office: [phone]     Scribe attestation: This note was scribed in the presence of Mercedez Reeves MD by Misty Ho RN  Physician Attestation: I, Dr. Mercedez Reeves, confirm that the scribe's documentation has been prepared under my direction and personally reviewed by me in its entirety. I also confirm that the note above accurately reflects all work, treatment, procedures, and medical decision making performed by me.

## 2020-09-23 ENCOUNTER — TELEPHONE (OUTPATIENT)
Dept: ORTHOPEDIC SURGERY | Age: 85
End: 2020-09-23

## 2020-09-30 ENCOUNTER — APPOINTMENT (OUTPATIENT)
Dept: CT IMAGING | Age: 85
End: 2020-09-30
Payer: MEDICARE

## 2020-09-30 ENCOUNTER — HOSPITAL ENCOUNTER (EMERGENCY)
Age: 85
Discharge: HOME | End: 2020-09-30
Payer: MEDICARE

## 2020-09-30 VITALS
HEART RATE: 111 BPM | TEMPERATURE: 97.5 F | SYSTOLIC BLOOD PRESSURE: 149 MMHG | DIASTOLIC BLOOD PRESSURE: 91 MMHG | BODY MASS INDEX: 19.37 KG/M2 | WEIGHT: 120 LBS | OXYGEN SATURATION: 95 % | RESPIRATION RATE: 21 BRPM

## 2020-09-30 LAB
A/G RATIO: 1.3 (ref 1.1–2.2)
ALBUMIN SERPL-MCNC: 4.1 G/DL (ref 3.4–5)
ALP BLD-CCNC: 137 U/L (ref 40–129)
ALT SERPL-CCNC: 13 U/L (ref 10–40)
ANION GAP SERPL CALCULATED.3IONS-SCNC: 9 MMOL/L (ref 3–16)
AST SERPL-CCNC: 30 U/L (ref 15–37)
BACTERIA: ABNORMAL /HPF
BASOPHILS ABSOLUTE: 0 K/UL (ref 0–0.2)
BASOPHILS RELATIVE PERCENT: 1 %
BILIRUB SERPL-MCNC: 0.4 MG/DL (ref 0–1)
BILIRUBIN URINE: NEGATIVE
BLOOD, URINE: ABNORMAL
BUN BLDV-MCNC: 26 MG/DL (ref 7–20)
CALCIUM SERPL-MCNC: 9.4 MG/DL (ref 8.3–10.6)
CHLORIDE BLD-SCNC: 104 MMOL/L (ref 99–110)
CLARITY: CLEAR
CO2: 28 MMOL/L (ref 21–32)
COLOR: YELLOW
CREAT SERPL-MCNC: 0.8 MG/DL (ref 0.8–1.3)
EKG ATRIAL RATE: 124 BPM
EKG DIAGNOSIS: NORMAL
EKG P AXIS: -20 DEGREES
EKG P-R INTERVAL: 192 MS
EKG Q-T INTERVAL: 334 MS
EKG QRS DURATION: 88 MS
EKG QTC CALCULATION (BAZETT): 479 MS
EKG R AXIS: 4 DEGREES
EKG T AXIS: 180 DEGREES
EKG VENTRICULAR RATE: 124 BPM
EOSINOPHILS ABSOLUTE: 0.1 K/UL (ref 0–0.6)
EOSINOPHILS RELATIVE PERCENT: 3.2 %
EPITHELIAL CELLS, UA: 2 /HPF (ref 0–5)
GFR AFRICAN AMERICAN: >60
GFR NON-AFRICAN AMERICAN: >60
GLOBULIN: 3.1 G/DL
GLUCOSE BLD-MCNC: 101 MG/DL (ref 70–99)
GLUCOSE URINE: NEGATIVE MG/DL
HCT VFR BLD CALC: 33.3 % (ref 40.5–52.5)
HEMOGLOBIN: 10.9 G/DL (ref 13.5–17.5)
HYALINE CASTS: 0 /LPF (ref 0–8)
KETONES, URINE: NEGATIVE MG/DL
LACTIC ACID, SEPSIS: 0.7 MMOL/L (ref 0.4–1.9)
LEUKOCYTE ESTERASE, URINE: NEGATIVE
LYMPHOCYTES ABSOLUTE: 1 K/UL (ref 1–5.1)
LYMPHOCYTES RELATIVE PERCENT: 25.3 %
MCH RBC QN AUTO: 30 PG (ref 26–34)
MCHC RBC AUTO-ENTMCNC: 32.8 G/DL (ref 31–36)
MCV RBC AUTO: 91.5 FL (ref 80–100)
MICROSCOPIC EXAMINATION: YES
MONOCYTES ABSOLUTE: 0.5 K/UL (ref 0–1.3)
MONOCYTES RELATIVE PERCENT: 12.2 %
NEUTROPHILS ABSOLUTE: 2.4 K/UL (ref 1.7–7.7)
NEUTROPHILS RELATIVE PERCENT: 58.3 %
NITRITE, URINE: NEGATIVE
PDW BLD-RTO: 16.3 % (ref 12.4–15.4)
PH UA: 7 (ref 5–8)
PLATELET # BLD: 204 K/UL (ref 135–450)
PMV BLD AUTO: 7.2 FL (ref 5–10.5)
POTASSIUM REFLEX MAGNESIUM: 4.8 MMOL/L (ref 3.5–5.1)
PROTEIN UA: NEGATIVE MG/DL
RBC # BLD: 3.64 M/UL (ref 4.2–5.9)
RBC UA: 35 /HPF (ref 0–4)
SODIUM BLD-SCNC: 141 MMOL/L (ref 136–145)
SPECIFIC GRAVITY UA: 1.01 (ref 1–1.03)
TOTAL PROTEIN: 7.2 G/DL (ref 6.4–8.2)
URINE REFLEX TO CULTURE: ABNORMAL
URINE TYPE: ABNORMAL
UROBILINOGEN, URINE: 0.2 E.U./DL
WBC # BLD: 4.1 K/UL (ref 4–11)
WBC UA: 2 /HPF (ref 0–5)

## 2020-09-30 PROCEDURE — 96374 THER/PROPH/DIAG INJ IV PUSH: CPT

## 2020-09-30 PROCEDURE — 93010 ELECTROCARDIOGRAM REPORT: CPT | Performed by: INTERNAL MEDICINE

## 2020-09-30 PROCEDURE — 2500000003 HC RX 250 WO HCPCS: Performed by: PHYSICIAN ASSISTANT

## 2020-09-30 PROCEDURE — 83605 ASSAY OF LACTIC ACID: CPT

## 2020-09-30 PROCEDURE — 93005 ELECTROCARDIOGRAM TRACING: CPT | Performed by: EMERGENCY MEDICINE

## 2020-09-30 PROCEDURE — 93005 ELECTROCARDIOGRAM TRACING: CPT | Performed by: PHYSICIAN ASSISTANT

## 2020-09-30 PROCEDURE — 2580000003 HC RX 258: Performed by: PHYSICIAN ASSISTANT

## 2020-09-30 PROCEDURE — 87040 BLOOD CULTURE FOR BACTERIA: CPT

## 2020-09-30 PROCEDURE — 87076 CULTURE ANAEROBE IDENT EACH: CPT

## 2020-09-30 PROCEDURE — 80053 COMPREHEN METABOLIC PANEL: CPT

## 2020-09-30 PROCEDURE — 99285 EMERGENCY DEPT VISIT HI MDM: CPT

## 2020-09-30 PROCEDURE — 81001 URINALYSIS AUTO W/SCOPE: CPT

## 2020-09-30 PROCEDURE — 85025 COMPLETE CBC W/AUTO DIFF WBC: CPT

## 2020-09-30 RX ORDER — SODIUM CHLORIDE 0.9 % (FLUSH) 0.9 %
10 SYRINGE (ML) INJECTION EVERY 12 HOURS SCHEDULED
Status: DISCONTINUED | OUTPATIENT
Start: 2020-09-30 | End: 2020-09-30 | Stop reason: HOSPADM

## 2020-09-30 RX ORDER — DILTIAZEM HYDROCHLORIDE 5 MG/ML
10 INJECTION INTRAVENOUS ONCE
Status: COMPLETED | OUTPATIENT
Start: 2020-09-30 | End: 2020-09-30

## 2020-09-30 RX ORDER — 0.9 % SODIUM CHLORIDE 0.9 %
30 INTRAVENOUS SOLUTION INTRAVENOUS ONCE
Status: COMPLETED | OUTPATIENT
Start: 2020-09-30 | End: 2020-09-30

## 2020-09-30 RX ORDER — SODIUM CHLORIDE 0.9 % (FLUSH) 0.9 %
10 SYRINGE (ML) INJECTION PRN
Status: DISCONTINUED | OUTPATIENT
Start: 2020-09-30 | End: 2020-09-30 | Stop reason: HOSPADM

## 2020-09-30 RX ADMIN — SODIUM CHLORIDE 1632 ML: 9 INJECTION, SOLUTION INTRAVENOUS at 12:04

## 2020-09-30 RX ADMIN — DILTIAZEM HYDROCHLORIDE 10 MG: 5 INJECTION INTRAVENOUS at 16:55

## 2020-09-30 NOTE — ED NOTES
Daughter at bedside. 2nd daughter arrived. Updated on discharge plans. Daughter states she can return PT to [facility] with help getting him in the car.        Meme Gibson RN  09/30/20 [id number]

## 2020-09-30 NOTE — ED NOTES
Bed: 07  Expected date:   Expected time:   Means of arrival:   Comments:  Robbi Stevens RN  09/30/20 [id number]

## 2020-09-30 NOTE — ED NOTES
Report and discharge instructions called to [facility]. Reviewed cardizem instructions several times with Xiomara Hernández.        Vaishnavi Elizabeth RN  09/30/20 9732

## 2020-09-30 NOTE — ED NOTES
Daughter called me to bedside concerned with \"bumps and stuff\" on PTs penis. Penis appears to be dry on tip, I applied petroleum jelly to area. PT cleaned, attends changed.       Viktorai Barfield, KARL  09/30/20 7924

## 2020-09-30 NOTE — ED NOTES
PT st cath for urine specimen. Cultures and lactate obtained and sent to lab.        Augustine Yoo RN  09/30/20 1200

## 2020-10-01 ENCOUNTER — TELEPHONE (OUTPATIENT)
Dept: CARDIOLOGY CLINIC | Age: 85
End: 2020-10-01

## 2020-10-01 LAB
EKG ATRIAL RATE: 122 BPM
EKG DIAGNOSIS: NORMAL
EKG P-R INTERVAL: 196 MS
EKG Q-T INTERVAL: 308 MS
EKG QRS DURATION: 84 MS
EKG QTC CALCULATION (BAZETT): 438 MS
EKG R AXIS: 6 DEGREES
EKG T AXIS: 199 DEGREES
EKG VENTRICULAR RATE: 122 BPM

## 2020-10-01 PROCEDURE — 93010 ELECTROCARDIOGRAM REPORT: CPT | Performed by: INTERNAL MEDICINE

## 2020-10-01 ASSESSMENT — ENCOUNTER SYMPTOMS
SORE THROAT: 0
ABDOMINAL PAIN: 0
EYE PAIN: 0
NAUSEA: 0
SHORTNESS OF BREATH: 0
COUGH: 0
BACK PAIN: 0
RHINORRHEA: 0
DIARRHEA: 0
VOMITING: 0
CONSTIPATION: 0

## 2020-10-01 NOTE — ED PROVIDER NOTES
nausea and vomiting. Genitourinary: Positive for frequency and urgency. Negative for decreased urine volume and dysuria. Musculoskeletal: Negative for back pain and neck pain. Skin: Negative for rash and wound. Neurological: Negative for dizziness and light-headedness.        PAST MEDICAL HISTORY     Past Medical History:   Diagnosis Date    A-fib [facility])     Arthritis     Atrial fibrillation ([facility] [address]     Blepharitis of both eyes     CAD (coronary artery disease)     stents 2005, x3    GERD (gastroesophageal reflux disease)     Gout     Hyperlipidemia     Hypertension     Macular degeneration     NSTEMI (non-ST elevated myocardial infarction) ([facility] [address]     Prostate enlargement     Psychiatric problem     anxiety; bipolar disorder    Tachycardia        SURGICAL HISTORY     Past Surgical History:   Procedure Laterality Date    CARDIAC SURGERY      CATARACT REMOVAL  1993    X5    COLONOSCOPY      CORONARY ANGIOPLASTY WITH STENT PLACEMENT  2005      X3    [facility]    EYE SURGERY      HIP SURGERY Left 6/2/2020    LEFT BIPOLAR HIP HEMIARTHROPLASTY performed by Porsche Krause MD at [facility]      melanoma, basal    SKIN CANCER DESTRUCTION  2006    Melanoma  ()    TURP  2003       [address]       Discharge Medication List as of 9/30/2020  6:21 PM      CONTINUE these medications which have NOT CHANGED    Details   Multiple Vitamins-Minerals (MULTIVITAMIN ADULT PO) Take by mouthHistorical Med      calcium carbonate (OSCAL) 500 MG TABS tablet Take 500 mg by mouth dailyHistorical Med      Nutritional Supplements (ENSURE COMPLETE PO) Take by mouth 2 times dailyHistorical Med      potassium chloride (KLOR-CON M20) 20 MEQ extended release tablet Take 20 mEq by mouth 2 times dailyHistorical Med      LORazepam (ATIVAN) 0.5 MG tablet Take 0.5 mg by mouth every 6 hours as needed for Anxiety. Historical Med      ferrous sulfate (IRON 325) 325 (65 Fe) MG tablet Take 325 mg by mouth daily (with breakfast)Historical Med      gabapentin (NEURONTIN) 100 MG capsule Take 1 capsule by mouth nightly for 30 days. , Disp-30 capsule,R-0Normal      sennosides-docusate sodium (SENOKOT-S) 8.6-50 MG tablet Take 1 tablet by mouth 2 times dailyOTC      apixaban (ELIQUIS) 2.5 MG TABS tablet Take 1 tablet by mouth 2 times daily, Disp-180 tablet, R-3Normal      finasteride (PROSCAR) 5 MG tablet TAKE 1 TABLET EVERY NIGHT, Disp-90 tablet, R-3Normal      lamoTRIgine (LAMICTAL) 150 MG tablet Take 1 tablet by mouth daily, Disp-90 tablet, R-1Normal      pantoprazole (PROTONIX) 40 MG tablet TAKE 1 TABLET EVERY DAY, Disp-90 tablet, R-3Normal      levothyroxine (SYNTHROID) 25 MCG tablet TAKE 1 TABLET EVERY DAY, Disp-90 tablet, R-3Normal      tamsulosin (FLOMAX) 0.4 MG capsule Take 0.4 mg by mouth dailyHistorical Med      polyethylene glycol (GLYCOLAX) powder Take 17 g by mouth daily as neededHistorical Med      nitroGLYCERIN (NITROSTAT) 0.4 MG SL tablet Place 1 tablet under the tongue every 5 minutes as needed for Chest pain, Disp-25 tablet, R-3Normal      melatonin 5 MG TABS tablet Take 5 mg by mouth daily as needed (Insomnia)Historical Med      triamcinolone (KENALOG) 0.1 % cream Apply to affected areas twice daily, Disp-45 g, R-1, Normal      Magnesium 400 MG TABS Take 1 tablet by mouth daily Historical Med      aspirin 81 MG chewable tablet Take 1 tablet by mouth daily, Disp-30 tablet, R-3             ALLERGIES     Lasix [furosemide]; Zyprexa [olanzapine]; and Citalopram    FAMILYHISTORY       Family History   Problem Relation Age of Onset    Heart Disease Mother     Heart Disease Brother     High Blood Pressure Neg Hx     High Cholesterol Neg Hx     Mental Illness Neg Hx         SOCIAL HISTORY       Social History     Socioeconomic History    Marital status:       Spouse name: None    Number of children: 3  Years of education: None    Highest education level: None   Occupational History    Occupation: Retired     Employer: [employer]     Comment: . Retired age 58. Social Needs    Financial resource strain: None    Food insecurity     Worry: None     Inability: None    Transportation needs     Medical: None     Non-medical: None   Tobacco Use    Smoking status: Never Smoker    Smokeless tobacco: Never Used   Substance and Sexual Activity    Alcohol use: No    Drug use: No    Sexual activity: None   Lifestyle    Physical activity     Days per week: None     Minutes per session: None    Stress: None   Relationships    Social connections     Talks on phone: None     Gets together: None     Attends [employer] service: None     Active member of club or organization: None     Attends meetings of clubs or organizations: None     Relationship status: None    Intimate partner violence     Fear of current or ex partner: None     Emotionally abused: None     Physically abused: None     Forced sexual activity: None   Other Topics Concern    None   Social History Narrative    Lived in [facility] before eventually moving to [place] in [address] [place] [address]. Lives with his 62 yo son in [facility]. Has two daughters who are supportive. Glenda Hernandes [phone] and Charly Esqueda [phone])     Two prior marriages. Flying in airplanes used to be a hobby, has given this up. SCREENINGS             PHYSICAL EXAM    (up to 7 for level 4, 8 or more for level 5)     ED Triage Vitals   BP Temp Temp Source Pulse Resp SpO2 Height Weight   09/30/20 1109 09/30/20 1109 09/30/20 1109 09/30/20 1109 09/30/20 1109 09/30/20 1109 -- 09/30/20 1135   (!) 156/89 97.5 °F (36.4 °C) Infrared 115 19 95 %  120 lb (54.4 kg)       Physical Exam  Vitals signs and nursing note reviewed. Constitutional:       Appearance: He is well-developed and underweight. He is not diaphoretic.    HENT:      Head: Normocephalic and atraumatic. Right Ear: External ear normal.      Left Ear: External ear normal.      Nose: Nose normal.   Eyes:      General:         Right eye: No discharge. Left eye: No discharge. Neck:      Musculoskeletal: Normal range of motion and neck supple. Cardiovascular:      Rate and Rhythm: Tachycardia present. Rhythm irregular. Pulses: Normal pulses. Heart sounds: No murmur. No friction rub. No gallop. Pulmonary:      Effort: Pulmonary effort is normal. No respiratory distress. Breath sounds: No stridor. Abdominal:      General: Abdomen is flat. There is no distension. Tenderness: There is no abdominal tenderness. There is no guarding. Musculoskeletal: Normal range of motion. Right lower leg: Edema present. Left lower leg: Edema present. Skin:     General: Skin is warm and dry. Coloration: Skin is not pale. Neurological:      Mental Status: He is alert and oriented to person, place, and time. Psychiatric:         Behavior: Behavior normal.         DIAGNOSTIC RESULTS   LABS:    Labs Reviewed   CULTURE, BLOOD 2 - Abnormal; Notable for the following components:       Result Value    Culture, Blood 2   (*)     Value: Gram stain Aerobic bottle:  Gram positive rods  Information to follow      All other components within normal limits    Narrative:     ORDER#: [id number]                          ORDERED BY: Sonya Rahman  SOURCE: Blood                              COLLECTED:  09/30/20 11:51  ANTIBIOTICS AT [facility].:                      RECEIVED :  09/30/20 20:45  CALL  [place]  [facility] tel. [phone],  Microbiology results called to and read back by KARL Hopson, 10/01/2020  07:59, by CONSTANCE  If child <=2 yrs old please draw pediatric bottle. ~Blood Culture #2  Performed at:  [address] [address]   Phone [phone]   CBC WITH AUTO DIFFERENTIAL - Abnormal; Notable for the following components:    RBC 3.64 (*)     Hemoglobin 10.9 (*)     Hematocrit 33.3 (*)     RDW 16.3 (*)     All other components within normal limits    Narrative:     Performed at:  [facility] [address] [place]   Phone [phone]   COMPREHENSIVE METABOLIC PANEL W/ REFLEX TO MG FOR LOW K - Abnormal; Notable for the following components:    Glucose 101 (*)     BUN 26 (*)     Alkaline Phosphatase 137 (*)     All other components within normal limits    Narrative:     Performed at:  [facility] [address], [address] [place]   Phone [phone]   URINE RT REFLEX TO CULTURE - Abnormal; Notable for the following components:    Blood, Urine MODERATE (*)     All other components within normal limits    Narrative:     Performed at:  [facility] [address]  [place], [place]   Phone [phone]   MICROSCOPIC URINALYSIS - Abnormal; Notable for the following components:    Bacteria, UA RARE (*)     RBC, UA 35 (*)     All other components within normal limits    Narrative:     Performed at:  [facility] [address] [facility] [address] [place]   Phone [phone]   CULTURE, BLOOD 1    Narrative:     ORDER#: [id number]                          ORDERED BY: Katiana Cr  SOURCE: Blood                              COLLECTED:  09/30/20 11:51  ANTIBIOTICS AT [facility].:                      RECEIVED :  09/30/20 20:45  If child <=2 yrs old please draw pediatric bottle. ~Blood Culture #1  Performed at:  [address] [address]   Phone [phone]   LACTATE, SEPSIS    Narrative:     Performed at:  [facility] [address], [place], [facility] [place]   Phone [phone]       All other labs were within normal range or not returned as of this dictation. EKG:  All EKG's are interpreted by the Emergency Department Physician who either signs orCo-signs this chart in the absence of a cardiologist.  Please see their note for interpretation of EKG. RADIOLOGY:   Non-plain film images such as CT, Ultrasound and MRI are read by the radiologist. Plain radiographic images are visualized andpreliminarily interpreted by the  ED Provider with the below findings:        Interpretation [facility] Radiologist below, if available at the time of this note:    No orders to display     No results found. PROCEDURES   Unless otherwise noted below, none     Procedures    CRITICAL CARE TIME     Critical Care  There was a high probability of life-threatening clinical deterioration in the patient's condition requiring my urgent intervention. Total critical care time with the patient was 35 minutes minutes excluding separately reportable procedures. Critical care required due to patients atrial fibrillation with increased rate      CONSULTS:  IP CONSULT TO CARDIOLOGY      EMERGENCY DEPARTMENT COURSE and DIFFERENTIAL DIAGNOSIS/MDM:   Vitals:    Vitals:    09/30/20 1800 09/30/20 1815 09/30/20 1830 09/30/20 1845   BP: 132/64  (!) 141/61 (!) 149/91   Pulse: 97 104 102 111   Resp: 14 18 17 21   Temp:       TempSrc:       SpO2:   95%    Weight:           Patient was given thefollowing medications:  Medications   0.9 % sodium chloride IV bolus 1,632 mL (0 mL/kg × 54.4 kg Intravenous Stopped 9/30/20 1438)   dilTIAZem injection 10 mg (10 mg Intravenous Given 9/30/20 1655)           Initially the work-up was quite broad, and we focused in on the incontinence, my suspicion was for urinary tract infection or potentially even kidney stone. However given the history that was obtained later from the daughter, I believe that the patient was having some paroxysmal atrial fibrillation, and A. fib with RVR.     Patient responded well to a dose of Cardizem, his heart rate went from the 120s into the high 90s, and we

## 2020-10-01 NOTE — TELEPHONE ENCOUNTER
Pt daughter Wenceslao Chan calling to schedule hospital follow up and she has a question about the new medication that the PA in the ER gave him dilTIAZem (CARDIZEM) 30 MG tablet     Take 1 tablet by mouth 3 times daily Hold the tablet if heart rate below 60 or blood pressure below 227 systolic    pls call to advise thank you

## 2020-10-01 NOTE — TELEPHONE ENCOUNTER
Called and spoke with the daughter, explained that the diltiazem is short acting and does not last in the system long. I also explained that if he has an symptoms we will talk about changing his medication.

## 2020-10-01 NOTE — ED NOTES
[facility]   Emergency Department Culture Follow-Up       Lyubov Cook (CSN: [id number]) was seen and evaluated at [facility] Emergency Department on 9/30/2020 by provider Raquel Rangel. A blood culture was positive and is growing a gram positive annette in 1/2 cultures (suspected contaminant). Sensitivity results: Currently unavailable      Treatment Course: The patient was NOT treated in the emergency department with antimicrobial therapy. Recommendation:    Patient was called and reports that he has no fever and does not feel weak or ill. Patient was instructed to check his temperature 2-3 times a day and come into the hospital if he has a temperature over 100 or feels ill. This recommendation was reviewed with and agreed by ED provider Dr. Marcos Singh. Follow-Up:    Patient was instructed to continue to monitor his temp and come in if ill. Will continue to watch for culture results.      Thank you,    Estrella Sainz  10/1/2020

## 2020-10-03 LAB
CULTURE, BLOOD 2: ABNORMAL
ORGANISM: ABNORMAL

## 2020-10-04 LAB — BLOOD CULTURE, ROUTINE: NORMAL

## 2020-10-06 ENCOUNTER — TELEPHONE (OUTPATIENT)
Dept: ORTHOPEDIC SURGERY | Age: 85
End: 2020-10-06

## 2020-10-06 ENCOUNTER — OFFICE VISIT (OUTPATIENT)
Dept: CARDIOLOGY CLINIC | Age: 85
End: 2020-10-06
Payer: MEDICARE

## 2020-10-06 VITALS
SYSTOLIC BLOOD PRESSURE: 116 MMHG | HEART RATE: 87 BPM | WEIGHT: 124.4 LBS | BODY MASS INDEX: 19.99 KG/M2 | HEIGHT: 66 IN | OXYGEN SATURATION: 98 % | DIASTOLIC BLOOD PRESSURE: 60 MMHG

## 2020-10-06 PROCEDURE — 1123F ACP DISCUSS/DSCN MKR DOCD: CPT | Performed by: NURSE PRACTITIONER

## 2020-10-06 PROCEDURE — 99214 OFFICE O/P EST MOD 30 MIN: CPT | Performed by: NURSE PRACTITIONER

## 2020-10-06 PROCEDURE — 1036F TOBACCO NON-USER: CPT | Performed by: NURSE PRACTITIONER

## 2020-10-06 PROCEDURE — G8427 DOCREV CUR MEDS BY ELIG CLIN: HCPCS | Performed by: NURSE PRACTITIONER

## 2020-10-06 PROCEDURE — 4040F PNEUMOC VAC/ADMIN/RCVD: CPT | Performed by: NURSE PRACTITIONER

## 2020-10-06 PROCEDURE — G8420 CALC BMI NORM PARAMETERS: HCPCS | Performed by: NURSE PRACTITIONER

## 2020-10-06 PROCEDURE — G8484 FLU IMMUNIZE NO ADMIN: HCPCS | Performed by: NURSE PRACTITIONER

## 2020-10-06 PROCEDURE — 93000 ELECTROCARDIOGRAM COMPLETE: CPT | Performed by: NURSE PRACTITIONER

## 2020-10-06 NOTE — PROGRESS NOTES
[facility]   Electrophysiology  Tooneleonardo Rivero, APRN-CNP  Attending EP: Dr. Margie Da Silva   Date: 10/6/2020  I had the privilege of visiting Lidia Baron in the office. Chief Complaint:   Chief Complaint   Patient presents with    Atrial Fibrillation     History of Present Illness: History obtained from patient and medical record. Lidia Baron is 80 y.o. male with a past medical history of HTN, HLD, CAD s/p remote CABG and PTCA 2005, and atrial fibrillation. Admitted for CP 4/2019 and found to be in afib/RVR. S/p MOE/DCCV (4/10/2019, Dr. Dillon Arevalo). Admitted 6/2020 after fall with hip fx. Has intermittent falls. He wore event monitor 7/2020 that showed AF/FL burden 68%, avg HR 84 (). He was placed back on metoprolol and continued to have bradycardia with pauses and it was stopped. Interval history: Today, Lidia Baron is being seen for atrial fibrillation s/p ED visit for afib RVR. He was recently hospitalized with \"not feeling well\" and he requested to go to ED. He has had elevated HR in the past with activity. He was started on diltiazem 30 mg tid and returned to the SNF where he is in assisted living. He has been tolerating. Daughter states that ED provider did not feel episode was related to afib. HR's have been in the 80's and -658 systolic. Denies lightheadedness, dizziness, or syncope. He is tolerating AC and denies s/s of bleeding. He is not interested in invasive procedures of DCCV. Daughter wanted to follow up due to concerns with him being started on diltiazem with hx of bradycardia and recen visit with Dr. Dillon Arevalo. Denies having chest pain, palpitations, shortness of breath, peripheral edema, or dizziness at the time of this visit. With regard to medication therapy the patient has been compliant with prescribed regimen. He has tolerated therapy to date. Allergies:   Allergies   Allergen Reactions    Lasix [Furosemide] Other (See Comments)     Pt passed out  Zyprexa [Olanzapine]      Pacing.  Citalopram Anxiety     сергей     Home Medications:  Prior to Visit Medications    Medication Sig Taking? Authorizing Provider   dilTIAZem (CARDIZEM) 30 MG tablet Take 1 tablet by mouth 3 times daily Hold the tablet if heart rate below 60 or blood pressure below 574 systolic  Jimmy Doty PA-C   Multiple Vitamins-Minerals (MULTIVITAMIN ADULT PO) Take by mouth  Historical Provider, MD   calcium carbonate (OSCAL) 500 MG TABS tablet Take 500 mg by mouth daily  Historical Provider, MD   Nutritional Supplements (ENSURE COMPLETE PO) Take by mouth 2 times daily  Historical Provider, MD   potassium chloride (KLOR-CON M20) 20 MEQ extended release tablet Take 20 mEq by mouth 2 times daily  Historical Provider, MD   LORazepam (ATIVAN) 0.5 MG tablet Take 0.5 mg by mouth every 6 hours as needed for Anxiety. Historical Provider, MD   ferrous sulfate (IRON 325) 325 (65 Fe) MG tablet Take 325 mg by mouth daily (with breakfast)  Historical Provider, MD   gabapentin (NEURONTIN) 100 MG capsule Take 1 capsule by mouth nightly for 30 days.   Judson Lynn MD   sennosides-docusate sodium (SENOKOT-S) 8.6-50 MG tablet Take 1 tablet by mouth 2 times daily  Patient taking differently: Take 1 tablet by mouth 2 times daily PRN  Judson Lynn MD   apixaban (ELIQUIS) 2.5 MG TABS tablet Take 1 tablet by mouth 2 times daily  Collette Squires, MD   finasteride (PROSCAR) 5 MG tablet TAKE 1 TABLET EVERY NIGHT  MARIS Goodman CNP   lamoTRIgine (LAMICTAL) 150 MG tablet Take 1 tablet by mouth daily  MARIS Goodman CNP   pantoprazole (PROTONIX) 40 MG tablet TAKE 1 TABLET EVERY DAY  MARIS Goodman CNP   levothyroxine (SYNTHROID) 25 MCG tablet TAKE 1 TABLET EVERY DAY  MARIS Goodman CNP   tamsulosin (FLOMAX) 0.4 MG capsule Take 0.4 mg by mouth daily  Historical Provider, MD   polyethylene glycol (GLYCOLAX) powder Take 17 g by mouth daily as needed  Historical Provider, MD   nitroGLYCERIN (NITROSTAT) 0.4 MG SL tablet Place 1 tablet under the tongue every 5 minutes as needed for Chest pain  MARIS Benson CNP   melatonin 5 MG TABS tablet Take 5 mg by mouth daily as needed (Insomnia)  Historical Provider, MD   triamcinolone (KENALOG) 0.1 % cream Apply to affected areas twice daily  Apple Prieto APRN - CNP   Magnesium 400 MG TABS Take 1 tablet by mouth daily   Historical Provider, MD   aspirin 81 MG chewable tablet Take 1 tablet by mouth daily  Darene Carrel, MD      Past Medical History:  Past Medical History:   Diagnosis Date    A-fib [facility])     Arthritis     [facility] fibrillation ([facility] [address]     Blepharitis of both eyes     CAD (coronary artery disease)     stents 2005, x3    GERD (gastroesophageal reflux disease)     Gout     Hyperlipidemia     Hypertension     Macular degeneration     NSTEMI (non-ST elevated myocardial infarction) ([facility] [address]     Prostate enlargement     Psychiatric problem     anxiety; bipolar disorder    Tachycardia      Past Surgical History:    has a past surgical history that includes Coronary angioplasty with stent (2005); skin biopsy; Kidney stone surgery (1980); Coronary artery bypass graft; Cataract removal (1993); TURP (2003); Skin cancer destruction (2006); Colonoscopy; eye surgery; Cardiac surgery; and hip surgery (Left, 6/2/2020). Social History:  Reviewed. reports that he has never smoked. He has never used smokeless tobacco. He reports that he does not drink alcohol or use drugs. Family History:  Reviewed. family history includes Heart Disease in his brother and mother. Denies family history of sudden cardiac death, arrhythmia, premature CAD    Review of System:  · Constitutional: No fevers, chills, weight changes or weakness  · HEENT: No visual changes. No mouth sores or sore throat. · Cardiovascular: denies chest pain, denies dyspnea on exertion, denies palpitations or denies loss of consciousness.  No cough, hemoptysis, Value Date    CREATININE 0.8 09/30/2020    BUN 26 (H) 09/30/2020     09/30/2020    K 4.8 09/30/2020     09/30/2020    CO2 28 09/30/2020     CrCl cannot be calculated (Unknown ideal weight.). Lab Results   Component Value Date     03/12/2014       Thyroid:   Lab Results   Component Value Date    TSH 5.09 (H) 05/04/2015    O8LYKUV 7.7 03/05/2010     Lipid Panel:   Lab Results   Component Value Date    CHOL 85 11/03/2016    HDL 39 11/03/2016    HDL 32 02/09/2012    TRIG 38 11/03/2016     LFTs:  Lab Results   Component Value Date    ALT 13 09/30/2020    AST 30 09/30/2020    ALKPHOS 137 (H) 09/30/2020    BILITOT 0.4 09/30/2020     Coags:   Lab Results   Component Value Date    PROTIME 15.5 (H) 05/31/2020    INR 1.33 (H) 05/31/2020    APTT 30.5 04/08/2019     ECG: 10/6/2020 AFL HR 82, , QTc 394    Echo: 6/1/2020  Summary   -Limited exam per Dr. Genaro Quinn for chronic diastolic heart failure.   -Normal left ventricle size, wall thickness, and systolic function with an   estimated ejection fraction of 55%.   -No regional wall motion abnormalities are seen. -Moderate aortic stenosis with a peak velocity of 2.97 m/s and a mean   pressure gradient of 22mmHg. The aortic valve area is estimated at 0.65   cm^2. No significant regurgitation noted. -Right atrial enlargement noted based on volume index.   -Thickened mitral valve without evidence of stenosis. There is heavy mitral   annular calcification noted. There is mild-to-moderate mitral regurgitation   noted. -There is moderate tricuspid regurgitation with a RVSP estimation of 50   mmHg. This is suggestive of moderate pulmonary hypertension.   -Mild pulmonic regurgitation present. Assessment:  Paroxysmal Atrial Fibrillation/Flutter  - ECG today shows AFL  - On diltiazem 30 mg tid  - Denies symptoms in AF/FL  - [id number] Vasc score and anticoagulation discussed. High risk for stroke and thromboembolism.  Anticoagulation is recommended.   ~ On Eliquis 2.5 mg bid and tolerating  - Afib risk factors including age, HTN, obesity, inactivity and CARLOS were discussed with patient. Risk factor modification recommended    - He has been seen in the past and is not a candidate for ablation for afib. He has declined other invasive EP procedures due to being DNR. Bradycardia   - Has not toelrated BB in the past   - No known recurrence   - ECG today shows HR 80's   - He is [facility]  HTN-goal <130/80   - Controlled   - Continue current medications   - Encouraged patient to check BP at home, log and bring to office visits  - Discussed lifestyle modifications, weight loss, low sodium diet  CAD   - S/p stent to Lcx and RCA 2015   - Normal moyoview 2016   - No reports of angina   - Continue medical therapy    AS   - Mean pressure gradient of 22mmHg per echo   - Follows with Dr. Monse Rainey  - No medications changes, diltiazem seems to be working and he has not had known recurrent bradycardia  - He is not interested in invasive EP procedures even if he was a candidate for a PPM, which he does not have a clear indication for at this time  - Can follow as needed, no further EP recommendations    F/U: Follow-up with EP in PRN  -Follow up with device clinic as scheduled  -Call [facility] at [phone] with any questions    I have addressed the patient's cardiac risk factors and adjusted pharmacologic treatment as needed. In addition, I have reinforced the need for patient directed risk factor modification. I independently reviewed the ECG    All questions and concerns were addressed with the patient. Alternatives to treatment were discussed. Thank you for allowing to us to participate in the care of Charis Phelan.     MARIS Mccauley-CNP  [facility]   Office: [phone]   \

## 2020-10-06 NOTE — TELEPHONE ENCOUNTER
Aura Campos from [facility]; called; req sign orders ([id number]) on 9/18/20. Please advise.      Call [phone] fx [phone]

## 2020-10-06 NOTE — PATIENT INSTRUCTIONS
- No medication changes   - Continue Eliquis 2.5 two times daily  - Continue diltiazem 30 mg tid and if he tolerates can consider switching to daily or two times daily forms of medication  - Call office with questions

## 2020-10-16 ENCOUNTER — TELEPHONE (OUTPATIENT)
Dept: ORTHOPEDIC SURGERY | Age: 85
End: 2020-10-16

## 2020-10-16 NOTE — TELEPHONE ENCOUNTER
Medical Facility Question     Facility Name: [address] [place] [address]  Contact Number: [phone] /OPT# 1  Request: VERBAL ORDERS FOR EXT PHYSICAL THERAPY / 2X A WK FOR 2 WKS

## 2020-10-30 ENCOUNTER — TELEPHONE (OUTPATIENT)
Dept: ORTHOPEDIC SURGERY | Age: 85
End: 2020-10-30

## 2020-10-30 NOTE — TELEPHONE ENCOUNTER
[address] [place] Name: [address] [place] [address]  Contact Name: Fariha Simon Number: [phone]  Request or Information: REPORT PATIENT HAS BEEN DISCHARGED FROM PHYSICAL THERAPY

## 2020-11-05 ENCOUNTER — TELEPHONE (OUTPATIENT)
Dept: CARDIOLOGY CLINIC | Age: 85
End: 2020-11-05

## 2020-11-05 NOTE — TELEPHONE ENCOUNTER
Called pt about the message below and spoke to the patient daughter she stated that she was exposed to Rolan and that she is her dads ride here and she would like to change the appointment and wanted to know it it was ok that he wait to be seen or if it was urgent that he comes. Please call Elvia Marie [phone] She said he is not having any trouble and that she don't want him to leave the nursing home because of COVID. Patient daughter is on the HIPPA form.  Please advise, Thank you

## 2020-12-09 ENCOUNTER — TELEPHONE (OUTPATIENT)
Dept: ORTHOPEDIC SURGERY | Age: 85
End: 2020-12-09

## 2020-12-09 NOTE — TELEPHONE ENCOUNTER
Spoke to Amina Stringer with [facility]. She requested that the office note from 09.15.2020 be re-faxed. The original fax did not include the report in it's entirety. The complete note was faxed today to [phone].

## 2020-12-09 NOTE — TELEPHONE ENCOUNTER
Sigrid Jackson from [facility] called regarding a electronic signature for home therapy she did not received the last office notes , and his insurance denied the bill .   Her phone number is [phone]

## 2020-12-10 ENCOUNTER — TELEPHONE (OUTPATIENT)
Dept: ORTHOPEDIC SURGERY | Age: 85
End: 2020-12-10

## 2020-12-10 NOTE — TELEPHONE ENCOUNTER
[address] [place] [address]   Requested the last visit office notes on 9.15.20 with signature her fx # [phone] ph # [phone]

## 2020-12-11 ENCOUNTER — TELEPHONE (OUTPATIENT)
Dept: ORTHOPEDIC SURGERY | Age: 85
End: 2020-12-11

## 2020-12-11 NOTE — TELEPHONE ENCOUNTER
Spoke with patients POA and daughter, Emy Graces about upcoming appt. Daughter requested that the appt be pushed back a couple of weeks because her father is \"receiving the covid vaccine soon\". A new appt has been scheduled for 01.12.2021 at the [facility] location.

## 2020-12-11 NOTE — TELEPHONE ENCOUNTER
General Question     Subject: Daughter called and wanted to see how important this upcoming appt is. She is worried about Covid and its hard to get him out.     Patient and /or Facility Request: Ricardo Kaya Daughter    Contact Number: [phone]

## 2021-01-06 ENCOUNTER — OFFICE VISIT (OUTPATIENT)
Dept: CARDIOLOGY CLINIC | Age: 86
End: 2021-01-06
Payer: MEDICARE

## 2021-01-06 VITALS
HEIGHT: 66 IN | HEART RATE: 107 BPM | WEIGHT: 132 LBS | DIASTOLIC BLOOD PRESSURE: 64 MMHG | BODY MASS INDEX: 21.21 KG/M2 | TEMPERATURE: 96.6 F | SYSTOLIC BLOOD PRESSURE: 108 MMHG | OXYGEN SATURATION: 95 %

## 2021-01-06 DIAGNOSIS — I48.0 PAROXYSMAL ATRIAL FIBRILLATION (HCC): Primary | ICD-10-CM

## 2021-01-06 DIAGNOSIS — I10 ESSENTIAL HYPERTENSION: Chronic | ICD-10-CM

## 2021-01-06 DIAGNOSIS — I35.0 MILD AORTIC STENOSIS: ICD-10-CM

## 2021-01-06 PROCEDURE — 1123F ACP DISCUSS/DSCN MKR DOCD: CPT | Performed by: INTERNAL MEDICINE

## 2021-01-06 PROCEDURE — 99214 OFFICE O/P EST MOD 30 MIN: CPT | Performed by: INTERNAL MEDICINE

## 2021-01-06 PROCEDURE — 1036F TOBACCO NON-USER: CPT | Performed by: INTERNAL MEDICINE

## 2021-01-06 PROCEDURE — G8420 CALC BMI NORM PARAMETERS: HCPCS | Performed by: INTERNAL MEDICINE

## 2021-01-06 PROCEDURE — 4040F PNEUMOC VAC/ADMIN/RCVD: CPT | Performed by: INTERNAL MEDICINE

## 2021-01-06 PROCEDURE — G8427 DOCREV CUR MEDS BY ELIG CLIN: HCPCS | Performed by: INTERNAL MEDICINE

## 2021-01-06 PROCEDURE — G8484 FLU IMMUNIZE NO ADMIN: HCPCS | Performed by: INTERNAL MEDICINE

## 2021-01-06 RX ORDER — ALENDRONATE SODIUM 70 MG/1
TABLET ORAL
COMMUNITY
Start: 2020-11-11 | End: 2021-01-06 | Stop reason: ALTCHOICE

## 2021-01-06 RX ORDER — ZIPRASIDONE HYDROCHLORIDE 20 MG/1
20 CAPSULE ORAL NIGHTLY
Status: ON HOLD | COMMUNITY
End: 2022-05-25 | Stop reason: HOSPADM

## 2021-01-06 NOTE — PROGRESS NOTES
[facility]  Cardiac Consult     Referring Provider:  Katya Carbajal MD     Chief Complaint   Patient presents with    Hypertension     4 month f/u since seeing EP     Hyperlipidemia    Atrial Fibrillation        History of Present Illness:  80 y.o. male with bipolar formally followed by Dr. Kyle Montejo seen in f/u for CAD, HTN, hyperlipidemia and bradycardia. He underwent stenting of the LCX and RCA in 2005. Last stress myoview 12/2016 was normal. He has had bradycardia resulting in discontinuation of his beta blocker. He was admitted 4/19 with afib and chest pain. He underwent MOE cardioversion. He was admitted 6/2020 with a fall and hip fracture. He lives at [facility] and was trying to adjust the TV and \"Tripped\". He was back in afib that was controlled at that time. He was having more tachycardia and diltizem added. He is doing well with HR in the 80's most of the time. Past Medical History:   has a past medical history of A-fib ([facility] [address] Arthritis, Atrial fibrillation ([facility] [address] Blepharitis of both eyes, CAD (coronary artery disease), GERD (gastroesophageal reflux disease), Gout, Hyperlipidemia, Hypertension, Macular degeneration, NSTEMI (non-ST elevated myocardial infarction) [facility]), Prostate enlargement, Psychiatric problem, and Tachycardia. Surgical History:   has a past surgical history that includes Coronary angioplasty with stent (2005); skin biopsy; Kidney stone surgery (1980); Coronary artery bypass graft; Cataract removal (1993); TURP (2003); Skin cancer destruction (2006); Colonoscopy; eye surgery; Cardiac surgery; and hip surgery (Left, 6/2/2020). Social History:   reports that he has never smoked. He has never used smokeless tobacco. He reports that he does not drink alcohol or use drugs. Family History:  family history includes Heart Disease in his brother and mother.      Allergies:  Lasix [furosemide], Zyprexa [olanzapine], and Citalopram     Home Medications:  Outpatient Encounter Medications as of 1/6/2021   Medication Sig Dispense Refill    ziprasidone (GEODON) 20 MG capsule Take 20 mg by mouth nightly      Artificial Tear Ointment (DRY EYES OP) Apply 250 capsules to eye daily      dilTIAZem (CARDIZEM) 30 MG tablet Take 1 tablet by mouth 3 times daily Hold the tablet if heart rate below 60 or blood pressure below 369 systolic 032 tablet 1    Multiple Vitamins-Minerals (MULTIVITAMIN ADULT PO) Take by mouth      calcium carbonate (OSCAL) 500 MG TABS tablet Take 500 mg by mouth daily      Nutritional Supplements (ENSURE COMPLETE PO) Take by mouth 2 times daily      LORazepam (ATIVAN) 0.5 MG tablet Take 0.5 mg by mouth every 6 hours as needed for Anxiety.       ferrous sulfate (IRON 325) 325 (65 Fe) MG tablet Take 325 mg by mouth daily (with breakfast)      sennosides-docusate sodium (SENOKOT-S) 8.6-50 MG tablet Take 1 tablet by mouth 2 times daily      apixaban (ELIQUIS) 2.5 MG TABS tablet Take 1 tablet by mouth 2 times daily 180 tablet 3    finasteride (PROSCAR) 5 MG tablet TAKE 1 TABLET EVERY NIGHT 90 tablet 3    lamoTRIgine (LAMICTAL) 150 MG tablet Take 1 tablet by mouth daily 90 tablet 1    pantoprazole (PROTONIX) 40 MG tablet TAKE 1 TABLET EVERY DAY 90 tablet 3    levothyroxine (SYNTHROID) 25 MCG tablet TAKE 1 TABLET EVERY DAY 90 tablet 3    tamsulosin (FLOMAX) 0.4 MG capsule Take 0.4 mg by mouth daily      polyethylene glycol (GLYCOLAX) powder Take 17 g by mouth daily as needed      nitroGLYCERIN (NITROSTAT) 0.4 MG SL tablet Place 1 tablet under the tongue every 5 minutes as needed for Chest pain 25 tablet 3    melatonin 5 MG TABS tablet Take 5 mg by mouth daily as needed (Insomnia)      triamcinolone (KENALOG) 0.1 % cream Apply to affected areas twice daily 45 g 1    Magnesium 400 MG TABS Take 1 tablet by mouth daily       [DISCONTINUED] alendronate (FOSAMAX) 70 MG tablet       [DISCONTINUED] potassium chloride (KLOR-CON M20) 20 MEQ extended release tablet Take 20 mEq by mouth 2 times daily      gabapentin (NEURONTIN) 100 MG capsule Take 1 capsule by mouth nightly for 30 days. 30 capsule 0    aspirin 81 MG chewable tablet Take 1 tablet by mouth daily 30 tablet 3     No facility-administered encounter medications on file as of 1/6/2021. [x] Medications and dosages reviewed. ROS:  [x]Full ROS obtained and negative except as mentioned in HPI      Physical Examination:    Vitals:    01/06/21 1359   BP: 108/64   Pulse: 107   Temp: 96.6 °F (35.9 °C)   SpO2: 95%      /64 (Site: Right Upper Arm, Position: Sitting, Cuff Size: Medium Adult)   Pulse 107   Temp 96.6 °F (35.9 °C) (Infrared)   Ht 5' 6\" (1.676 m)   Wt 132 lb (59.9 kg)   SpO2 95%   BMI 21.31 kg/m²     · GENERAL: Elderly male walking with walker in NAD  · NEUROLOGICAL: Alert and oriented  · PSYCH: Calm affect  · SKIN: Warm and dry, no rash  · HEENT: Normocephalic, Sclera non-icteric, mucus membranes moist  · NECK: supple, JVP normal  · CAROTID: Normal upstroke, no bruits  · CARDIAC: Normal PMI, regular rate and irregular rhythm normal S1S2, 2/6 early ejection murmur, rub, or gallop  · RESPIRATORY: Normal respiratory effort, clear to auscultation bilaterally  · EXTREMITIES: No LE edema  · MUSCULOSKELETAL: No joint swelling or tenderness, no chest wall tenderness  · GASTROINTESTINAL: normal bowel sounds, soft, non-tender, no bruit      ECHO 6/2020  Summary   -Limited exam per Dr. Judie Chavez for chronic diastolic heart failure.   -Normal left ventricle size, wall thickness, and systolic function with an   estimated ejection fraction of 55%.   -No regional wall motion abnormalities are seen. -Moderate aortic stenosis with a peak velocity of 2.97 m/s and a mean   pressure gradient of 22mmHg. The aortic valve area is estimated at 0.65   cm^2. No significant regurgitation noted. -Right atrial enlargement noted based on volume index.   -Thickened mitral valve without evidence of stenosis.  There is heavy mitral   annular calcification noted. There is mild-to-moderate mitral regurgitation   noted. -There is moderate tricuspid regurgitation with a RVSP estimation of 50   mmHg. This is suggestive of moderate pulmonary hypertension.   -Mild pulmonic regurgitation present. Assessment:     CAD:   Stable. Continue medical therapy  S/p stenting of LCX and RCA 2005. Stress myoview 2016 normal.   Chest pain seems resolved. Hyperlipidemia:   LDL=53, No longer on pravastatin   Follow    HTN:   Controlled. Follow on current meds  /64 (Site: Right Upper Arm, Position: Sitting, Cuff Size: Medium Adult)   Pulse 107   Temp 96.6 °F (35.9 °C) (Infrared)   Ht 5' 6\" (1.676 m)   Wt 132 lb (59.9 kg)   SpO2 95%   BMI 21.31 kg/m²     Afib: In afib and HR=80-90  Stable  MCOT reviewed-Average HR=84    Chest Pain:  resolved    Aortic Stenosis:  Moderate. Mean gradient 22. Folllow  Repeat ECHO 6 months    Edema:  Resolved with Demadex. I will let MD at [facility] adjust diuretics.      Plan:  Stable  F/u 6 months with ECHO    Thank you for allowing me to participate in the care of this individual.      Medhat Ryan M.D., [facility] [place]

## 2021-02-23 ENCOUNTER — OFFICE VISIT (OUTPATIENT)
Dept: ORTHOPEDIC SURGERY | Age: 86
End: 2021-02-23
Payer: MEDICARE

## 2021-02-23 VITALS
SYSTOLIC BLOOD PRESSURE: 107 MMHG | HEIGHT: 66 IN | TEMPERATURE: 97.7 F | WEIGHT: 132 LBS | DIASTOLIC BLOOD PRESSURE: 66 MMHG | BODY MASS INDEX: 21.21 KG/M2

## 2021-02-23 DIAGNOSIS — S72.012A SUBCAPITAL FRACTURE OF FEMUR, LEFT, CLOSED, INITIAL ENCOUNTER (HCC): ICD-10-CM

## 2021-02-23 DIAGNOSIS — S82.62XA CLOSED LOW LATERAL MALLEOLUS FRACTURE, LEFT, INITIAL ENCOUNTER: Primary | ICD-10-CM

## 2021-02-23 PROCEDURE — 1036F TOBACCO NON-USER: CPT | Performed by: NURSE PRACTITIONER

## 2021-02-23 PROCEDURE — 4040F PNEUMOC VAC/ADMIN/RCVD: CPT | Performed by: NURSE PRACTITIONER

## 2021-02-23 PROCEDURE — 99213 OFFICE O/P EST LOW 20 MIN: CPT | Performed by: NURSE PRACTITIONER

## 2021-02-23 PROCEDURE — G8484 FLU IMMUNIZE NO ADMIN: HCPCS | Performed by: NURSE PRACTITIONER

## 2021-02-23 PROCEDURE — 1123F ACP DISCUSS/DSCN MKR DOCD: CPT | Performed by: NURSE PRACTITIONER

## 2021-02-23 PROCEDURE — G8427 DOCREV CUR MEDS BY ELIG CLIN: HCPCS | Performed by: NURSE PRACTITIONER

## 2021-02-23 PROCEDURE — G8420 CALC BMI NORM PARAMETERS: HCPCS | Performed by: NURSE PRACTITIONER

## 2021-02-23 NOTE — PROGRESS NOTES
DIAGNOSIS:    1-Left femoral neck displaced fracture, status post Press-Fit bipolar hemiarthroplasty. 2-Left ankle pain/lateral malleolus Lundberg A fracture, nonoperative  3-Bilateral knee pain after new fall/contusion, abrasion    DATE OF SURGERY: 6/2/2020. HISTORY OF PRESENT ILLNESS:  Fracisco Paz 80 y.o.  male who came in today for 6 months postoperative visit. The patient denies any significant pain in the left hip or left ankle. He was initially placed in a ankle brace left ankle but he has not been wearing it due to his skin breakdown. Rates pain a 0/10 VAS and is doing much better in his left hip and left ankle. Sandgap Maryon He  has been walking weightbearing as tolerated using a walker. He is in assisted living and has therapy coming in working with him 1 day/week due to Rolan, but stopped due to he was no longer progressing. He is also here with new complaints of pain in bilateral knees after he fell several times in September and landed on his knees. He has significant bruising in the left knee and abrasions on bilateral knees that is much improved and denies any pain today in his knees. He is on anticoagulants. No numbness or tingling sensation. No fever or Chills.     Past Medical History:   Diagnosis Date    A-fib [facility])     Arthritis     Atrial fibrillation (HCC)     Blepharitis of both eyes     CAD (coronary artery disease)     stents 2005, x3    GERD (gastroesophageal reflux disease)     Gout     Hyperlipidemia     Hypertension     Macular degeneration     NSTEMI (non-ST elevated myocardial infarction) ([facility] [address]     Prostate enlargement     Psychiatric problem     anxiety; bipolar disorder    Tachycardia      Past Surgical History:   Procedure Laterality Date    CARDIAC SURGERY      CATARACT REMOVAL  1993    X5    COLONOSCOPY      CORONARY ANGIOPLASTY WITH STENT PLACEMENT  2005      X3    [facility]    EYE SURGERY      HIP SURGERY Left 6/2/2020    LEFT BIPOLAR HIP HEMIARTHROPLASTY performed by Richard De La Cruz MD at [address] [place] [address]    SKIN BIOPSY      melanoma, basal    SKIN CANCER DESTRUCTION  2006    Melanoma  ()    TURP  2003     Family History   Problem Relation Age of Onset    Heart Disease Mother     Heart Disease Brother     High Blood Pressure Neg Hx     High Cholesterol Neg Hx     Mental Illness Neg Hx      Social History     Socioeconomic History    Marital status:      Spouse name: Not on file    Number of children: 3    Years of education: Not on file    Highest education level: Not on file   Occupational History    Occupation: Retired     Employer: [employer]     Comment: . Retired age 58. Social Needs    Financial resource strain: Not on file    Food insecurity     Worry: Not on file     Inability: Not on file    Transportation needs     Medical: Not on file     Non-medical: Not on file   Tobacco Use    Smoking status: Never Smoker    Smokeless tobacco: Never Used   Substance and Sexual Activity    Alcohol use: No    Drug use: Never    Sexual activity: Not on file   Lifestyle    Physical activity     Days per week: Not on file     Minutes per session: Not on file    Stress: Not on file   Relationships    Social connections     Talks on phone: Not on file     Gets together: Not on file     Attends Sabianist service: Not on file     Active member of club or organization: Not on file     Attends meetings of clubs or organizations: Not on file     Relationship status: Not on file    Intimate partner violence     Fear of current or ex partner: Not on file     Emotionally abused: Not on file     Physically abused: Not on file     Forced sexual activity: Not on file   Other Topics Concern    Not on file   Social History Narrative    Lived in [facility] before eventually moving to [facility] in [address] [place] [address] [place].      Lives with his 62 yo son in [facility]. Has two daughters who are supportive. Ayah Music [phone] and Theador Peasant [phone])     Two prior marriages. Flying in airplanes used to be a hobby, has given this up. Current Outpatient Medications   Medication Sig Dispense Refill    ziprasidone (GEODON) 20 MG capsule Take 20 mg by mouth nightly      Artificial Tear Ointment (DRY EYES OP) Apply 250 capsules to eye daily      dilTIAZem (CARDIZEM) 30 MG tablet Take 1 tablet by mouth 3 times daily Hold the tablet if heart rate below 60 or blood pressure below 975 systolic 239 tablet 1    Multiple Vitamins-Minerals (MULTIVITAMIN ADULT PO) Take by mouth      calcium carbonate (OSCAL) 500 MG TABS tablet Take 500 mg by mouth daily      Nutritional Supplements (ENSURE COMPLETE PO) Take by mouth 2 times daily      LORazepam (ATIVAN) 0.5 MG tablet Take 0.5 mg by mouth every 6 hours as needed for Anxiety.       ferrous sulfate (IRON 325) 325 (65 Fe) MG tablet Take 325 mg by mouth daily (with breakfast)      sennosides-docusate sodium (SENOKOT-S) 8.6-50 MG tablet Take 1 tablet by mouth 2 times daily      apixaban (ELIQUIS) 2.5 MG TABS tablet Take 1 tablet by mouth 2 times daily 180 tablet 3    finasteride (PROSCAR) 5 MG tablet TAKE 1 TABLET EVERY NIGHT 90 tablet 3    lamoTRIgine (LAMICTAL) 150 MG tablet Take 1 tablet by mouth daily 90 tablet 1    pantoprazole (PROTONIX) 40 MG tablet TAKE 1 TABLET EVERY DAY 90 tablet 3    levothyroxine (SYNTHROID) 25 MCG tablet TAKE 1 TABLET EVERY DAY 90 tablet 3    tamsulosin (FLOMAX) 0.4 MG capsule Take 0.4 mg by mouth daily      polyethylene glycol (GLYCOLAX) powder Take 17 g by mouth daily as needed      nitroGLYCERIN (NITROSTAT) 0.4 MG SL tablet Place 1 tablet under the tongue every 5 minutes as needed for Chest pain 25 tablet 3    melatonin 5 MG TABS tablet Take 5 mg by mouth daily as needed (Insomnia)      triamcinolone (KENALOG) 0.1 % cream Apply to affected areas twice daily 45 g 1    Magnesium 400 MG TABS Take 1 tablet by mouth daily       gabapentin (NEURONTIN) 100 MG capsule Take 1 capsule by mouth nightly for 30 days. 30 capsule 0    aspirin 81 MG chewable tablet Take 1 tablet by mouth daily 30 tablet 3     No current facility-administered medications for this visit. Pertinent items are noted in HPI  Review of systems reviewed from Patient History Form dated on 2/23/2021 and available in the patient's chart under the Media tab. No change noted. PHYSICAL EXAMINATION:  Mr. Cathy Garcia is a very pleasant 80 y.o.  male who presents today in no acute distress, awake, alert, and oriented. He is well dressed, nourished and  groomed. Patient with normal affect. Height is  5' 6\" (1.676 m), weight is 132 lb (59.9 kg), Body mass index is 21.31 kg/m². Resting respiratory rate is 16. Ambulates today with a wheelchair. The incision is completely healed left hip. No signs of any erythema or drainage. He has no pain with the active or passive range of motion of the right hip. He has intact sensation, distally, and he is neurovascularly intact. He has no tenderness to palpation over the left lateral malleolus compared to the other side. There is mild swelling left ankle and left calf with 2+ edema compared to the other side. Skin intact bilateral knees, abrasions have healed. He has decreased range of motion bilateral knees (0-95). Bilateral knees stable to valgus and varus stress. He has full range of motion left ankle. He is nontender to palpation over the left ankle lateral malleolus. No tenderness left calf. Negative Homans. IMAGING:  X-rays were taken in the office today, AP pelvis and 2 views of the left hip and femur, and showed the Press-Fit hemiarthroplasty in good position. No signs of any lucency.     X-rays 3 views of the left ankle taken today in office were reviewed and showed the left ankle lateral malleolus Lundberg A minimally displaced fracture, healing well. X-rays 3 views of bilateral knees taken 9/15/2020 in office were reviewed and showed no acute fracture. There are mild osteophytes tricompartmental bilateral knees. IMPRESSION:    1-6 months out from left hip hemiarthroplasty and doing very well. 2-Left ankle lateral malleolus Lundberg A fracture, nonoperative  3-Bilateral knee pain after new fall/contusion, abrasion    PLAN:  I have told the patient to work on ROM with PT, weightbearing as tolerated, as well as strengthening exercises. Compression hose to help with the swelling. The patient will come back for a follow up in June. At that time, we will take AP pelvis and 2 views of the affected hip and 3 views left ankle. Upon follow-up, if his pain in the knees is not improved, he may benefit from cortisone injections bilateral knees. As this patient has demonstrated risk factors for osteoporosis, such as age greater than [de-identified] years and evidence of a fracture, I have referred the patient back to the primary care physician for evaluation for osteoporosis, including consideration for DEXA scanning, if this is felt to be clinically indicated. The patient is advised to contact the primary care physician to follow-up for further evaluation.      Marvin Verdugo, MARIS - CNP

## 2021-05-04 ENCOUNTER — TELEPHONE (OUTPATIENT)
Dept: ORTHOPEDIC SURGERY | Age: 86
End: 2021-05-04

## 2021-05-04 ENCOUNTER — OFFICE VISIT (OUTPATIENT)
Dept: ORTHOPEDIC SURGERY | Age: 86
End: 2021-05-04
Payer: MEDICARE

## 2021-05-04 VITALS — BODY MASS INDEX: 21.21 KG/M2 | WEIGHT: 132 LBS | HEIGHT: 66 IN

## 2021-05-04 DIAGNOSIS — M25.551 RIGHT HIP PAIN: ICD-10-CM

## 2021-05-04 DIAGNOSIS — S82.62XA CLOSED LOW LATERAL MALLEOLUS FRACTURE, LEFT, INITIAL ENCOUNTER: ICD-10-CM

## 2021-05-04 DIAGNOSIS — M25.572 ACUTE LEFT ANKLE PAIN: ICD-10-CM
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
## 2021-05-04 DIAGNOSIS — S72.012A SUBCAPITAL FRACTURE OF FEMUR, LEFT, CLOSED, INITIAL ENCOUNTER (HCC): Primary | ICD-10-CM

## 2021-05-04 PROCEDURE — 4040F PNEUMOC VAC/ADMIN/RCVD: CPT | Performed by: ORTHOPAEDIC SURGERY

## 2021-05-04 PROCEDURE — G8420 CALC BMI NORM PARAMETERS: HCPCS | Performed by: ORTHOPAEDIC SURGERY

## 2021-05-04 PROCEDURE — 99214 OFFICE O/P EST MOD 30 MIN: CPT | Performed by: ORTHOPAEDIC SURGERY

## 2021-05-04 PROCEDURE — G8427 DOCREV CUR MEDS BY ELIG CLIN: HCPCS | Performed by: ORTHOPAEDIC SURGERY

## 2021-05-04 PROCEDURE — 1036F TOBACCO NON-USER: CPT | Performed by: ORTHOPAEDIC SURGERY

## 2021-05-04 PROCEDURE — 1123F ACP DISCUSS/DSCN MKR DOCD: CPT | Performed by: ORTHOPAEDIC SURGERY

## 2021-05-04 RX ORDER — BUSPIRONE HYDROCHLORIDE 10 MG/1
10 TABLET ORAL 3 TIMES DAILY
Status: ON HOLD | COMMUNITY
End: 2021-06-25

## 2021-05-04 NOTE — PROGRESS NOTES
DIAGNOSIS:    1-Left femoral neck displaced fracture, status post Press-Fit bipolar hemiarthroplasty. 2-Left ankle pain/lateral malleolus Lundberg A fracture, nonoperative  3-Bilateral knee pain after new fall/contusion, abrasion  4-Left hip pain/contusion-new fall  5-Left ankle pain/contusion-new fall     DATE OF SURGERY: 6/2/2020. HISTORY OF PRESENT ILLNESS:  Alma Cohen 80 y.o.  male who came in today for urgent visit with c/o new fall at the nursing home when he was getting off of the bed on 5/2/2021. He states that the pain he is in is causing him to walk with a limp using a walker. He feels like he is leaning to the right side. Rates pain a 1/10 VAS mild achy and intermittent. He  has been walking weightbearing as tolerated using a walker. He is in assisted living and has therapy coming in working with him 1 day/week due to [place], but stopped due to he was no longer progressing. Continues to have intermittent pain in bilateral knees. He has significant bruising in the left knee and abrasions on bilateral knees that is much improved and denies any pain today in his knees. He is on anticoagulants. No numbness or tingling sensation. No fever or Chills.     Past Medical History:   Diagnosis Date    A-fib [facility])     Arthritis     Atrial fibrillation (HCC)     Blepharitis of both eyes     CAD (coronary artery disease)     stents 2005, x3    GERD (gastroesophageal reflux disease)     Gout     Hyperlipidemia     Hypertension     Macular degeneration     NSTEMI (non-ST elevated myocardial infarction) ([facility] [address]     Prostate enlargement     Psychiatric problem     anxiety; bipolar disorder    Tachycardia        Past Surgical History:   Procedure Laterality Date    CARDIAC SURGERY      CATARACT REMOVAL  1993    X5    COLONOSCOPY      CORONARY ANGIOPLASTY WITH STENT PLACEMENT  2005      X3    [facility]ON[facility]      [facility]    EYE SURGERY      HIP SURGERY Left 6/2/2020    LEFT BIPOLAR HIP HEMIARTHROPLASTY performed by Qian Chery MD at [address] [place] [address]    SKIN BIOPSY      melanoma, basal    SKIN CANCER DESTRUCTION  2006    Melanoma  ()    TURP  2003       Social History     Socioeconomic History    Marital status:      Spouse name: Not on file    Number of children: 3    Years of education: Not on file    Highest education level: Not on file   Occupational History    Occupation: Retired     Employer: [employer]     Comment: . Retired age 58. Social Needs    Financial resource strain: Not on file    Food insecurity     Worry: Not on file     Inability: Not on file    Transportation needs     Medical: Not on file     Non-medical: Not on file   Tobacco Use    Smoking status: Never Smoker    Smokeless tobacco: Never Used   Substance and Sexual Activity    Alcohol use: No    Drug use: Never    Sexual activity: Not on file   Lifestyle    Physical activity     Days per week: Not on file     Minutes per session: Not on file    Stress: Not on file   Relationships    Social connections     Talks on phone: Not on file     Gets together: Not on file     Attends Orthodoxy service: Not on file     Active member of club or organization: Not on file     Attends meetings of clubs or organizations: Not on file     Relationship status: Not on file    Intimate partner violence     Fear of current or ex partner: Not on file     Emotionally abused: Not on file     Physically abused: Not on file     Forced sexual activity: Not on file   Other Topics Concern    Not on file   Social History Narrative    Lived in [facility] before eventually moving to [place] in [address] [place] [address]. Lives with his 62 yo son in [facility]. Has two daughters who are supportive. Mouna Johnston [phone] and Chiquita Gonzalez [phone])     Two prior marriages.      Flying in airplanes used to be a hobby, has given this up. Family History   Problem Relation Age of Onset    Heart Disease Mother     Heart Disease Brother     High Blood Pressure Neg Hx     High Cholesterol Neg Hx     Mental Illness Neg Hx        Current Outpatient Medications on File Prior to Visit   Medication Sig Dispense Refill    busPIRone (BUSPAR) 10 MG tablet Take 10 mg by mouth 3 times daily      ziprasidone (GEODON) 20 MG capsule Take 20 mg by mouth nightly      Artificial Tear Ointment (DRY EYES OP) Apply 250 capsules to eye daily      dilTIAZem (CARDIZEM) 30 MG tablet Take 1 tablet by mouth 3 times daily Hold the tablet if heart rate below 60 or blood pressure below 886 systolic 714 tablet 1    Multiple Vitamins-Minerals (MULTIVITAMIN ADULT PO) Take by mouth      calcium carbonate (OSCAL) 500 MG TABS tablet Take 500 mg by mouth daily      Nutritional Supplements (ENSURE COMPLETE PO) Take by mouth 2 times daily      LORazepam (ATIVAN) 0.5 MG tablet Take 0.5 mg by mouth every 6 hours as needed for Anxiety.  ferrous sulfate (IRON 325) 325 (65 Fe) MG tablet Take 325 mg by mouth daily (with breakfast)      gabapentin (NEURONTIN) 100 MG capsule Take 1 capsule by mouth nightly for 30 days.  30 capsule 0    sennosides-docusate sodium (SENOKOT-S) 8.6-50 MG tablet Take 1 tablet by mouth 2 times daily      apixaban (ELIQUIS) 2.5 MG TABS tablet Take 1 tablet by mouth 2 times daily 180 tablet 3    finasteride (PROSCAR) 5 MG tablet TAKE 1 TABLET EVERY NIGHT 90 tablet 3    lamoTRIgine (LAMICTAL) 150 MG tablet Take 1 tablet by mouth daily 90 tablet 1    pantoprazole (PROTONIX) 40 MG tablet TAKE 1 TABLET EVERY DAY 90 tablet 3    levothyroxine (SYNTHROID) 25 MCG tablet TAKE 1 TABLET EVERY DAY 90 tablet 3    tamsulosin (FLOMAX) 0.4 MG capsule Take 0.4 mg by mouth daily      polyethylene glycol (GLYCOLAX) powder Take 17 g by mouth daily as needed      nitroGLYCERIN (NITROSTAT) 0.4 MG SL tablet Place 1 tablet under the tongue every 5 minutes as needed for Chest pain 25 tablet 3    melatonin 5 MG TABS tablet Take 5 mg by mouth daily as needed (Insomnia)      triamcinolone (KENALOG) 0.1 % cream Apply to affected areas twice daily 45 g 1    Magnesium 400 MG TABS Take 1 tablet by mouth daily       aspirin 81 MG chewable tablet Take 1 tablet by mouth daily 30 tablet 3     No current facility-administered medications on file prior to visit. Pertinent items are noted in HPI  Review of systems reviewed from Patient History Form dated on today from the nursing home and available in the patient's chart under the Media tab. No change noted. PHYSICAL EXAMINATION:  Mr. Gokul Alejandro is a very pleasant 80 y.o.  male who presents today in no acute distress, awake, alert, and oriented. He is well dressed, nourished and  groomed. Patient with normal affect. Height is  5' 6\" (1.676 m), weight is 132 lb (59.9 kg), Body mass index is 21.31 kg/m². Resting respiratory rate is 16. Ambulates today using a walker with a slow shuffled gait leaning to the right. The incision is completely healed left hip. No signs of any erythema or drainage. He has no pain with the active or passive range of motion of the right hip. He has intact sensation, distally, and he is neurovascularly intact. He has no tenderness to palpation over the left lateral malleolus compared to the other side. There is mild swelling left ankle and left calf with 2+ edema compared to the other side. Skin intact bilateral knees, abrasions have healed. He has decreased range of motion bilateral knees (0-95). Bilateral knees stable to valgus and varus stress. He has full range of motion left ankle. He is nontender to palpation over the left ankle lateral malleolus. No tenderness left calf. Negative Homans. IMAGING:  X-rays were taken in the office today, AP pelvis and 2 views of the left hip and femur, and showed the Press-Fit hemiarthroplasty in good position.   No signs of any lucency. No fracture. X-rays 3 views of the left ankle taken today in office were reviewed and showed the left ankle lateral malleolus Lundberg A minimally displaced fracture, healing well. No new fracture. X-rays 3 views of bilateral knees taken 9/15/2020 in office were reviewed and showed no acute fracture. There are mild osteophytes tricompartmental bilateral knees. IMPRESSION:    1- 1 year out from left hip hemiarthroplasty and doing very well. 2-Left ankle lateral malleolus Lundberg A fracture, nonoperative  3-Bilateral knee pain after new fall/contusion, abrasion  4-Left hip pain/contusion-new fall  5-Left ankle pain/contusion-new fall     PLAN:  I discussed with the patient that no new fracture was seen. I have told the patient to work on ROM with PT, weightbearing as tolerated, as well as strengthening exercises. Compression hose to help with the swelling. I discussed with the patient that I think that he would really benefit from a course of physical and occupational therapy for further strengthening and stretching. An Rx for physical and occupational therapy was given to the patient. Upon follow-up, if his pain in the knees is not improved, he may benefit from cortisone injections bilateral knees. As this patient has demonstrated risk factors for osteoporosis, such as age greater than [de-identified] years and evidence of a fracture, I have referred the patient back to the primary care physician for evaluation for osteoporosis, including consideration for DEXA scanning, if this is felt to be clinically indicated. The patient is advised to contact the primary care physician to follow-up for further evaluation.        Hannah Causey MD

## 2021-06-25 ENCOUNTER — HOSPITAL ENCOUNTER (INPATIENT)
Age: 86
LOS: 3 days | Discharge: NURSING, REHAB OR LONG-TERM CARE | DRG: 280 | End: 2021-06-28
Attending: EMERGENCY MEDICINE | Admitting: INTERNAL MEDICINE
Payer: MEDICARE

## 2021-06-25 ENCOUNTER — APPOINTMENT (OUTPATIENT)
Dept: GENERAL RADIOLOGY | Age: 86
DRG: 280 | End: 2021-06-25
Payer: MEDICARE

## 2021-06-25 DIAGNOSIS — I24.9 ACS (ACUTE CORONARY SYNDROME) (HCC): Primary | ICD-10-CM

## 2021-06-25 DIAGNOSIS — I48.92 ATRIAL FLUTTER, UNSPECIFIED TYPE (HCC): ICD-10-CM

## 2021-06-25 DIAGNOSIS — F06.4 ANXIETY DISORDER DUE TO KNOWN PHYSIOLOGICAL CONDITION: ICD-10-CM

## 2021-06-25 PROBLEM — I21.4 NSTEMI (NON-ST ELEVATED MYOCARDIAL INFARCTION) (HCC): Status: ACTIVE | Noted: 2021-06-25

## 2021-06-25 PROBLEM — I48.91 ATRIAL FIBRILLATION (HCC): Status: ACTIVE | Noted: 2021-06-25

## 2021-06-25 LAB
A/G RATIO: 1 (ref 1.1–2.2)
ALBUMIN SERPL-MCNC: 3.2 G/DL (ref 3.4–5)
ALP BLD-CCNC: 145 U/L (ref 40–129)
ALT SERPL-CCNC: 17 U/L (ref 10–40)
ANION GAP SERPL CALCULATED.3IONS-SCNC: 9 MMOL/L (ref 3–16)
APTT: 36.1 SEC (ref 24.2–36.2)
APTT: 69.1 SEC (ref 24.2–36.2)
AST SERPL-CCNC: 27 U/L (ref 15–37)
BASOPHILS ABSOLUTE: 0 K/UL (ref 0–0.2)
BASOPHILS RELATIVE PERCENT: 0.5 %
BILIRUB SERPL-MCNC: 0.4 MG/DL (ref 0–1)
BUN BLDV-MCNC: 28 MG/DL (ref 7–20)
CALCIUM SERPL-MCNC: 8.7 MG/DL (ref 8.3–10.6)
CHLORIDE BLD-SCNC: 104 MMOL/L (ref 99–110)
CO2: 26 MMOL/L (ref 21–32)
CREAT SERPL-MCNC: 0.8 MG/DL (ref 0.8–1.3)
EKG ATRIAL RATE: 241 BPM
EKG DIAGNOSIS: NORMAL
EKG Q-T INTERVAL: 364 MS
EKG QRS DURATION: 110 MS
EKG QTC CALCULATION (BAZETT): 419 MS
EKG R AXIS: -4 DEGREES
EKG T AXIS: 226 DEGREES
EKG VENTRICULAR RATE: 80 BPM
EOSINOPHILS ABSOLUTE: 0.1 K/UL (ref 0–0.6)
EOSINOPHILS RELATIVE PERCENT: 1.8 %
GFR AFRICAN AMERICAN: >60
GFR NON-AFRICAN AMERICAN: >60
GLOBULIN: 3.1 G/DL
GLUCOSE BLD-MCNC: 91 MG/DL (ref 70–99)
HCT VFR BLD CALC: 24.8 % (ref 40.5–52.5)
HCT VFR BLD CALC: 32.7 % (ref 40.5–52.5)
HEMOGLOBIN: 11 G/DL (ref 13.5–17.5)
HEMOGLOBIN: 8 G/DL (ref 13.5–17.5)
LYMPHOCYTES ABSOLUTE: 0.8 K/UL (ref 1–5.1)
LYMPHOCYTES RELATIVE PERCENT: 17.5 %
MCH RBC QN AUTO: 31.9 PG (ref 26–34)
MCH RBC QN AUTO: 32.7 PG (ref 26–34)
MCHC RBC AUTO-ENTMCNC: 32.5 G/DL (ref 31–36)
MCHC RBC AUTO-ENTMCNC: 33.5 G/DL (ref 31–36)
MCV RBC AUTO: 97.8 FL (ref 80–100)
MCV RBC AUTO: 98.3 FL (ref 80–100)
MONOCYTES ABSOLUTE: 0.6 K/UL (ref 0–1.3)
MONOCYTES RELATIVE PERCENT: 13.4 %
NEUTROPHILS ABSOLUTE: 2.9 K/UL (ref 1.7–7.7)
NEUTROPHILS RELATIVE PERCENT: 66.8 %
PDW BLD-RTO: 14.1 % (ref 12.4–15.4)
PDW BLD-RTO: 14.1 % (ref 12.4–15.4)
PLATELET # BLD: 151 K/UL (ref 135–450)
PLATELET # BLD: 200 K/UL (ref 135–450)
PMV BLD AUTO: 6.6 FL (ref 5–10.5)
PMV BLD AUTO: 7 FL (ref 5–10.5)
POTASSIUM SERPL-SCNC: 4.8 MMOL/L (ref 3.5–5.1)
PRO-BNP: 999 PG/ML (ref 0–449)
RBC # BLD: 2.52 M/UL (ref 4.2–5.9)
RBC # BLD: 3.35 M/UL (ref 4.2–5.9)
SODIUM BLD-SCNC: 139 MMOL/L (ref 136–145)
TOTAL PROTEIN: 6.3 G/DL (ref 6.4–8.2)
TROPONIN: 0.06 NG/ML
TROPONIN: 0.06 NG/ML
TROPONIN: 0.07 NG/ML
WBC # BLD: 2.9 K/UL (ref 4–11)
WBC # BLD: 4.3 K/UL (ref 4–11)

## 2021-06-25 PROCEDURE — 2060000000 HC ICU INTERMEDIATE R&B

## 2021-06-25 PROCEDURE — 99283 EMERGENCY DEPT VISIT LOW MDM: CPT

## 2021-06-25 PROCEDURE — 71045 X-RAY EXAM CHEST 1 VIEW: CPT

## 2021-06-25 PROCEDURE — 80053 COMPREHEN METABOLIC PANEL: CPT

## 2021-06-25 PROCEDURE — 85730 THROMBOPLASTIN TIME PARTIAL: CPT

## 2021-06-25 PROCEDURE — 84484 ASSAY OF TROPONIN QUANT: CPT

## 2021-06-25 PROCEDURE — 6360000002 HC RX W HCPCS: Performed by: INTERNAL MEDICINE

## 2021-06-25 PROCEDURE — 85025 COMPLETE CBC W/AUTO DIFF WBC: CPT

## 2021-06-25 PROCEDURE — 83880 ASSAY OF NATRIURETIC PEPTIDE: CPT

## 2021-06-25 PROCEDURE — 85027 COMPLETE CBC AUTOMATED: CPT

## 2021-06-25 PROCEDURE — 93005 ELECTROCARDIOGRAM TRACING: CPT | Performed by: PHYSICIAN ASSISTANT

## 2021-06-25 PROCEDURE — 93010 ELECTROCARDIOGRAM REPORT: CPT | Performed by: INTERNAL MEDICINE

## 2021-06-25 PROCEDURE — 6360000002 HC RX W HCPCS: Performed by: EMERGENCY MEDICINE

## 2021-06-25 PROCEDURE — 6370000000 HC RX 637 (ALT 250 FOR IP): Performed by: INTERNAL MEDICINE

## 2021-06-25 PROCEDURE — 36415 COLL VENOUS BLD VENIPUNCTURE: CPT

## 2021-06-25 PROCEDURE — 6370000000 HC RX 637 (ALT 250 FOR IP): Performed by: EMERGENCY MEDICINE

## 2021-06-25 RX ORDER — FINASTERIDE 5 MG/1
5 TABLET, FILM COATED ORAL NIGHTLY
Status: DISCONTINUED | OUTPATIENT
Start: 2021-06-25 | End: 2021-06-28 | Stop reason: HOSPADM

## 2021-06-25 RX ORDER — HYDRALAZINE HYDROCHLORIDE 20 MG/ML
10 INJECTION INTRAMUSCULAR; INTRAVENOUS EVERY 6 HOURS PRN
Status: DISCONTINUED | OUTPATIENT
Start: 2021-06-25 | End: 2021-06-28 | Stop reason: HOSPADM

## 2021-06-25 RX ORDER — SODIUM CHLORIDE 9 MG/ML
25 INJECTION, SOLUTION INTRAVENOUS PRN
Status: DISCONTINUED | OUTPATIENT
Start: 2021-06-25 | End: 2021-06-28 | Stop reason: HOSPADM

## 2021-06-25 RX ORDER — POLYETHYLENE GLYCOL 3350 17 G/17G
17 POWDER, FOR SOLUTION ORAL DAILY
Status: DISCONTINUED | OUTPATIENT
Start: 2021-06-25 | End: 2021-06-25 | Stop reason: ALTCHOICE

## 2021-06-25 RX ORDER — SODIUM CHLORIDE 0.9 % (FLUSH) 0.9 %
5-40 SYRINGE (ML) INJECTION EVERY 12 HOURS SCHEDULED
Status: DISCONTINUED | OUTPATIENT
Start: 2021-06-25 | End: 2021-06-28 | Stop reason: HOSPADM

## 2021-06-25 RX ORDER — HEPARIN SODIUM 1000 [USP'U]/ML
30 INJECTION, SOLUTION INTRAVENOUS; SUBCUTANEOUS PRN
Status: DISCONTINUED | OUTPATIENT
Start: 2021-06-25 | End: 2021-06-25 | Stop reason: SDUPTHER

## 2021-06-25 RX ORDER — CALCIUM CARBONATE 500(1250)
500 TABLET ORAL DAILY
Status: DISCONTINUED | OUTPATIENT
Start: 2021-06-26 | End: 2021-06-28 | Stop reason: HOSPADM

## 2021-06-25 RX ORDER — SODIUM CHLORIDE 0.9 % (FLUSH) 0.9 %
10 SYRINGE (ML) INJECTION PRN
Status: DISCONTINUED | OUTPATIENT
Start: 2021-06-25 | End: 2021-06-28 | Stop reason: HOSPADM

## 2021-06-25 RX ORDER — GABAPENTIN 100 MG/1
100 CAPSULE ORAL NIGHTLY
Status: DISCONTINUED | OUTPATIENT
Start: 2021-06-25 | End: 2021-06-28 | Stop reason: HOSPADM

## 2021-06-25 RX ORDER — TAMSULOSIN HYDROCHLORIDE 0.4 MG/1
0.4 CAPSULE ORAL DAILY
Status: DISCONTINUED | OUTPATIENT
Start: 2021-06-25 | End: 2021-06-28 | Stop reason: HOSPADM

## 2021-06-25 RX ORDER — NITROGLYCERIN 0.4 MG/1
0.4 TABLET SUBLINGUAL EVERY 5 MIN PRN
Status: DISCONTINUED | OUTPATIENT
Start: 2021-06-25 | End: 2021-06-28 | Stop reason: HOSPADM

## 2021-06-25 RX ORDER — HEPARIN SODIUM 1000 [USP'U]/ML
30 INJECTION, SOLUTION INTRAVENOUS; SUBCUTANEOUS PRN
Status: DISCONTINUED | OUTPATIENT
Start: 2021-06-25 | End: 2021-06-26 | Stop reason: ALTCHOICE

## 2021-06-25 RX ORDER — FERROUS SULFATE 325(65) MG
325 TABLET ORAL
Status: DISCONTINUED | OUTPATIENT
Start: 2021-06-26 | End: 2021-06-28 | Stop reason: HOSPADM

## 2021-06-25 RX ORDER — ATORVASTATIN CALCIUM 80 MG/1
80 TABLET, FILM COATED ORAL NIGHTLY
Status: DISCONTINUED | OUTPATIENT
Start: 2021-06-25 | End: 2021-06-28 | Stop reason: HOSPADM

## 2021-06-25 RX ORDER — SENNA AND DOCUSATE SODIUM 50; 8.6 MG/1; MG/1
1 TABLET, FILM COATED ORAL 2 TIMES DAILY
Status: DISCONTINUED | OUTPATIENT
Start: 2021-06-25 | End: 2021-06-28 | Stop reason: HOSPADM

## 2021-06-25 RX ORDER — ASPIRIN 81 MG/1
324 TABLET, CHEWABLE ORAL ONCE
Status: COMPLETED | OUTPATIENT
Start: 2021-06-25 | End: 2021-06-25

## 2021-06-25 RX ORDER — 0.9 % SODIUM CHLORIDE 0.9 %
500 INTRAVENOUS SOLUTION INTRAVENOUS PRN
Status: DISCONTINUED | OUTPATIENT
Start: 2021-06-25 | End: 2021-06-28 | Stop reason: HOSPADM

## 2021-06-25 RX ORDER — HEPARIN SODIUM 1000 [USP'U]/ML
60 INJECTION, SOLUTION INTRAVENOUS; SUBCUTANEOUS PRN
Status: DISCONTINUED | OUTPATIENT
Start: 2021-06-25 | End: 2021-06-26 | Stop reason: ALTCHOICE

## 2021-06-25 RX ORDER — LORAZEPAM 0.5 MG/1
0.5 TABLET ORAL 2 TIMES DAILY
Status: DISCONTINUED | OUTPATIENT
Start: 2021-06-25 | End: 2021-06-28 | Stop reason: HOSPADM

## 2021-06-25 RX ORDER — ASPIRIN 81 MG/1
81 TABLET, CHEWABLE ORAL DAILY
Status: DISCONTINUED | OUTPATIENT
Start: 2021-06-26 | End: 2021-06-28 | Stop reason: HOSPADM

## 2021-06-25 RX ORDER — HEPARIN SODIUM 1000 [USP'U]/ML
60 INJECTION, SOLUTION INTRAVENOUS; SUBCUTANEOUS ONCE
Status: COMPLETED | OUTPATIENT
Start: 2021-06-25 | End: 2021-06-25

## 2021-06-25 RX ORDER — ONDANSETRON 2 MG/ML
4 INJECTION INTRAMUSCULAR; INTRAVENOUS EVERY 6 HOURS PRN
Status: DISCONTINUED | OUTPATIENT
Start: 2021-06-25 | End: 2021-06-28 | Stop reason: HOSPADM

## 2021-06-25 RX ORDER — ZIPRASIDONE HYDROCHLORIDE 20 MG/1
20 CAPSULE ORAL NIGHTLY
Status: DISCONTINUED | OUTPATIENT
Start: 2021-06-25 | End: 2021-06-28 | Stop reason: HOSPADM

## 2021-06-25 RX ORDER — POTASSIUM CHLORIDE 7.45 MG/ML
10 INJECTION INTRAVENOUS PRN
Status: DISCONTINUED | OUTPATIENT
Start: 2021-06-25 | End: 2021-06-28 | Stop reason: HOSPADM

## 2021-06-25 RX ORDER — LANOLIN ALCOHOL/MO/W.PET/CERES
5 CREAM (GRAM) TOPICAL DAILY PRN
Status: DISCONTINUED | OUTPATIENT
Start: 2021-06-25 | End: 2021-06-28 | Stop reason: HOSPADM

## 2021-06-25 RX ORDER — BUSPIRONE HYDROCHLORIDE 5 MG/1
10 TABLET ORAL 3 TIMES DAILY
Status: DISCONTINUED | OUTPATIENT
Start: 2021-06-25 | End: 2021-06-25 | Stop reason: ALTCHOICE

## 2021-06-25 RX ORDER — HEPARIN SODIUM 1000 [USP'U]/ML
60 INJECTION, SOLUTION INTRAVENOUS; SUBCUTANEOUS PRN
Status: DISCONTINUED | OUTPATIENT
Start: 2021-06-25 | End: 2021-06-25 | Stop reason: SDUPTHER

## 2021-06-25 RX ORDER — LORAZEPAM 0.5 MG/1
0.5 TABLET ORAL EVERY 6 HOURS PRN
Status: DISCONTINUED | OUTPATIENT
Start: 2021-06-25 | End: 2021-06-25

## 2021-06-25 RX ORDER — ONDANSETRON 4 MG/1
4 TABLET, ORALLY DISINTEGRATING ORAL EVERY 8 HOURS PRN
Status: DISCONTINUED | OUTPATIENT
Start: 2021-06-25 | End: 2021-06-28 | Stop reason: HOSPADM

## 2021-06-25 RX ORDER — NITROGLYCERIN 0.4 MG/1
0.4 TABLET SUBLINGUAL EVERY 5 MIN PRN
Status: DISCONTINUED | OUTPATIENT
Start: 2021-06-25 | End: 2021-06-25 | Stop reason: SDUPTHER

## 2021-06-25 RX ORDER — HEPARIN SODIUM 1000 [USP'U]/ML
60 INJECTION, SOLUTION INTRAVENOUS; SUBCUTANEOUS ONCE
Status: DISCONTINUED | OUTPATIENT
Start: 2021-06-25 | End: 2021-06-25 | Stop reason: ALTCHOICE

## 2021-06-25 RX ORDER — ACETAMINOPHEN 650 MG/1
650 SUPPOSITORY RECTAL EVERY 6 HOURS PRN
Status: DISCONTINUED | OUTPATIENT
Start: 2021-06-25 | End: 2021-06-28 | Stop reason: HOSPADM

## 2021-06-25 RX ORDER — HEPARIN SODIUM 10000 [USP'U]/100ML
5-30 INJECTION, SOLUTION INTRAVENOUS CONTINUOUS
Status: DISCONTINUED | OUTPATIENT
Start: 2021-06-25 | End: 2021-06-25 | Stop reason: SDUPTHER

## 2021-06-25 RX ORDER — ACETAMINOPHEN 325 MG/1
650 TABLET ORAL EVERY 6 HOURS PRN
Status: DISCONTINUED | OUTPATIENT
Start: 2021-06-25 | End: 2021-06-28 | Stop reason: HOSPADM

## 2021-06-25 RX ORDER — PANTOPRAZOLE SODIUM 40 MG/1
40 TABLET, DELAYED RELEASE ORAL
Status: DISCONTINUED | OUTPATIENT
Start: 2021-06-26 | End: 2021-06-28 | Stop reason: HOSPADM

## 2021-06-25 RX ORDER — LEVOTHYROXINE SODIUM 0.03 MG/1
25 TABLET ORAL DAILY
Status: DISCONTINUED | OUTPATIENT
Start: 2021-06-26 | End: 2021-06-28 | Stop reason: HOSPADM

## 2021-06-25 RX ORDER — POTASSIUM CHLORIDE 20 MEQ/1
40 TABLET, EXTENDED RELEASE ORAL PRN
Status: DISCONTINUED | OUTPATIENT
Start: 2021-06-25 | End: 2021-06-28 | Stop reason: HOSPADM

## 2021-06-25 RX ORDER — HEPARIN SODIUM 10000 [USP'U]/100ML
5-30 INJECTION, SOLUTION INTRAVENOUS CONTINUOUS
Status: DISCONTINUED | OUTPATIENT
Start: 2021-06-25 | End: 2021-06-26

## 2021-06-25 RX ADMIN — GABAPENTIN 100 MG: 100 CAPSULE ORAL at 21:05

## 2021-06-25 RX ADMIN — HEPARIN SODIUM 3590 UNITS: 1000 INJECTION INTRAVENOUS; SUBCUTANEOUS at 14:56

## 2021-06-25 RX ADMIN — TAMSULOSIN HYDROCHLORIDE 0.4 MG: 0.4 CAPSULE ORAL at 18:21

## 2021-06-25 RX ADMIN — HEPARIN SODIUM 12 UNITS/KG/HR: 10000 INJECTION, SOLUTION INTRAVENOUS at 18:15

## 2021-06-25 RX ADMIN — DILTIAZEM HYDROCHLORIDE 30 MG: 30 TABLET, FILM COATED ORAL at 18:21

## 2021-06-25 RX ADMIN — ATORVASTATIN CALCIUM 80 MG: 80 TABLET, FILM COATED ORAL at 21:05

## 2021-06-25 RX ADMIN — LORAZEPAM 0.5 MG: 0.5 TABLET ORAL at 21:05

## 2021-06-25 RX ADMIN — STANDARDIZED SENNA CONCENTRATE AND DOCUSATE SODIUM 1 TABLET: 8.6; 5 TABLET ORAL at 21:05

## 2021-06-25 RX ADMIN — ZIPRASIDONE HYDROCHLORIDE 20 MG: 20 CAPSULE ORAL at 21:05

## 2021-06-25 RX ADMIN — FINASTERIDE 5 MG: 5 TABLET, FILM COATED ORAL at 21:05

## 2021-06-25 RX ADMIN — HEPARIN SODIUM 12 UNITS/KG/HR: 10000 INJECTION, SOLUTION INTRAVENOUS at 15:02

## 2021-06-25 RX ADMIN — ASPIRIN 324 MG: 81 TABLET, CHEWABLE ORAL at 15:03

## 2021-06-25 RX ADMIN — DILTIAZEM HYDROCHLORIDE 30 MG: 30 TABLET, FILM COATED ORAL at 21:05

## 2021-06-25 ASSESSMENT — ENCOUNTER SYMPTOMS
VOMITING: 0
EYE REDNESS: 0
NAUSEA: 0
SHORTNESS OF BREATH: 0
COUGH: 0
EYE PAIN: 0

## 2021-06-25 ASSESSMENT — PAIN DESCRIPTION - ORIENTATION: ORIENTATION: MID

## 2021-06-25 ASSESSMENT — PAIN SCALES - GENERAL
PAINLEVEL_OUTOF10: 1
PAINLEVEL_OUTOF10: 0
PAINLEVEL_OUTOF10: 0

## 2021-06-25 ASSESSMENT — HEART SCORE: ECG: 2

## 2021-06-25 ASSESSMENT — PAIN DESCRIPTION - ONSET: ONSET: ON-GOING

## 2021-06-25 ASSESSMENT — PAIN DESCRIPTION - FREQUENCY: FREQUENCY: CONTINUOUS

## 2021-06-25 ASSESSMENT — PAIN DESCRIPTION - PROGRESSION: CLINICAL_PROGRESSION: GRADUALLY IMPROVING

## 2021-06-25 ASSESSMENT — PAIN DESCRIPTION - LOCATION: LOCATION: CHEST

## 2021-06-25 ASSESSMENT — PAIN DESCRIPTION - PAIN TYPE: TYPE: ACUTE PAIN

## 2021-06-25 ASSESSMENT — PAIN - FUNCTIONAL ASSESSMENT: PAIN_FUNCTIONAL_ASSESSMENT: ACTIVITIES ARE NOT PREVENTED

## 2021-06-25 ASSESSMENT — PAIN DESCRIPTION - DESCRIPTORS: DESCRIPTORS: PRESSURE

## 2021-06-25 NOTE — PROGRESS NOTES
4 Eyes Skin Assessment     The patient is being assess for  Admission    I agree that 2 RN's have performed a thorough Head to Toe Skin Assessment on the patient. ALL assessment sites listed below have been assessed. Areas assessed by both nurses: body  [x]   Head, Face, and Ears   [x]   Shoulders, Back, and Chest  [x]   Arms, Elbows, and Hands   [x]   Coccyx, Sacrum, and Ischum  [x]   Legs, Feet, and Heels        Does the Patient have Skin Breakdown? Yes a wound was noted on the Admission Assessment and an WOUND LDA was Initiated documentation include the Inna-wound, Wound Assessment, Measurements, Dressing Treatment, Drainage, and Color\",         Nemesio Prevention initiated:  no    Wound Care Orders initiated:  Yes consult put in, wound cleansed with normal saline and mepilex in place.       [id number]  nurse consulted for Pressure Injury (Stage 3,4, Unstageable, DTI, NWPT, and Complex wounds):  No      Nurse 1 eSignature: Electronically signed by Livia Irwin RN on 6/25/21 at 6:44 PM EDT    **SHARE this note so that the co-signing nurse is able to place an eSignature**    Nurse 2 eSignature: Electronically signed by Melissa Wong RN on 6/25/21 at 7:05 PM EDT

## 2021-06-25 NOTE — ED NOTES
Bed: 02  Expected date:   Expected time:   Means of arrival: [facility]  Comments:  [address] RN  06/25/21 3982

## 2021-06-25 NOTE — PLAN OF CARE
Problem: Pain:  Goal: Pain level will decrease  Description: Pain level will decrease  Outcome: Ongoing  Goal: Control of acute pain  Description: Control of acute pain  Outcome: Ongoing  Goal: Control of chronic pain  Description: Control of chronic pain  Outcome: Ongoing     Problem: Skin Integrity:  Goal: Will show no infection signs and symptoms  Description: Will show no infection signs and symptoms  Outcome: Ongoing  Goal: Absence of new skin breakdown  Description: Absence of new skin breakdown  Outcome: Ongoing     Problem: Falls - Risk of:  Goal: Will remain free from falls  Description: Will remain free from falls  Outcome: Ongoing  Goal: Absence of physical injury  Description: Absence of physical injury  Outcome: Ongoing   Pt has not complained of pain this shift. VSS on room air with slightly elevated BP at this time. Pt has laceration on LLE on shin that was cleansed with NS and mepilex placed as well as a wound care consult placed. Pt remains free from falls and physical injury. Pt up to chair with chair alarm on and call light within reach.

## 2021-06-25 NOTE — ED PROVIDER NOTES
[address] PROVIDER NOTE    Patient Identification  Pt Name: Shay Salguero  MRN: [id number]  Mairagfjasmina 10/31/1925  Date of evaluation: 6/25/2021  Provider: Edison Tyler MD  PCP: Monica Trevizo MD    Chief Complaint  Chest Pain (Patient brought in via [facility] from [facility]. Called for chest pain, gave 3 nitro at nursing home. Patient was hypotensive fluids started by EMS . )      HPI  (History provided by patient and daughter)  This is a 80 y.o. male who was brought in by EMS transportation from [facility] for chest pain. Patient's chest pain started this morning when he was eating. The nursing home given 3 separate doses of nitroglycerin. After this, the patient developed hypotension and EMS was called. Patient reports chest pain did resolve with nitroglycerin. On arrival, EMS found the patient to be hypertensive and initiated fluids. By the time the patient arrived here, his blood pressure had improved significantly and normalized. Patient describes the pain as dull and substernal.  He states it was mild at the time, likely 3 or 4 out of 10. There is no radiation of the pain. He did not develop diaphoresis or shortness of breath. Although patient states he was initially chest pain-free when we began talking to her by taking of his history, he eventually developed even milder, but similar chest pain at the end of my evaluation. Patient's daughter states he has used nitroglycerin in the past for his chest pain, but not recently. He has not had chest pain like this in a long time. Patient denies shortness of breath, lightheadedness, or other symptoms    ROS  10 systems reviewed, pertinent positives/negatives per HPI otherwise noted to be negative.     I have reviewed the following nursing documentation:  Allergies: Lasix [furosemide], Zyprexa [olanzapine], and Citalopram    Past medical history:   Past Medical History:   Diagnosis Date    [facility] ([facility] [address]     Arthritis     Atrial fibrillation ([facility] [address]     Blepharitis of both eyes     CAD (coronary artery disease)     stents 2005, x3    GERD (gastroesophageal reflux disease)     Gout     Hyperlipidemia     Hypertension     Macular degeneration     NSTEMI (non-ST elevated myocardial infarction) ([facility] [address]     Prostate enlargement     Psychiatric problem     anxiety; bipolar disorder    Tachycardia      Past surgical history:   Past Surgical History:   Procedure Laterality Date    CARDIAC SURGERY      CATARACT REMOVAL  1993    X5    COLONOSCOPY      CORONARY ANGIOPLASTY WITH STENT PLACEMENT  2005      X3    [facility]ON[facility]      [facility] ([facility])    EYE SURGERY      HIP SURGERY Left 6/2/2020    LEFT BIPOLAR HIP HEMIARTHROPLASTY performed by Melvin Vergara MD at [address] [place] [address]    SKIN BIOPSY      melanoma, basal    SKIN CANCER DESTRUCTION  2006    Melanoma  ()    TURP  2003       Home medications:   Previous Medications    APIXABAN (ELIQUIS) 2.5 MG TABS TABLET    Take 1 tablet by mouth 2 times daily    ARTIFICIAL TEAR OINTMENT (DRY EYES OP)    Apply 250 capsules to eye daily    ASPIRIN 81 MG CHEWABLE TABLET    Take 1 tablet by mouth daily    BUSPIRONE (BUSPAR) 10 MG TABLET    Take 10 mg by mouth 3 times daily    CALCIUM CARBONATE (OSCAL) 500 MG TABS TABLET    Take 500 mg by mouth daily    DILTIAZEM (CARDIZEM) 30 MG TABLET    Take 1 tablet by mouth 3 times daily Hold the tablet if heart rate below 60 or blood pressure below 613 systolic    FERROUS SULFATE (IRON 325) 325 (65 FE) MG TABLET    Take 325 mg by mouth daily (with breakfast)    FINASTERIDE (PROSCAR) 5 MG TABLET    TAKE 1 TABLET EVERY NIGHT    GABAPENTIN (NEURONTIN) 100 MG CAPSULE    Take 1 capsule by mouth nightly for 30 days.     LAMOTRIGINE (LAMICTAL) 150 MG TABLET    Take 1 tablet by mouth daily    LEVOTHYROXINE (SYNTHROID) 25 MCG TABLET    TAKE 1 TABLET EVERY DAY    LORAZEPAM (ATIVAN) 0.5 MG TABLET    Take 0.5 mg by mouth every 6 hours as needed for Anxiety. MAGNESIUM 400 MG TABS    Take 1 tablet by mouth daily     MELATONIN 5 MG TABS TABLET    Take 5 mg by mouth daily as needed (Insomnia)    MULTIPLE VITAMINS-MINERALS (MULTIVITAMIN ADULT PO)    Take by mouth    NITROGLYCERIN (NITROSTAT) 0.4 MG SL TABLET    Place 1 tablet under the tongue every 5 minutes as needed for Chest pain    NUTRITIONAL SUPPLEMENTS (ENSURE COMPLETE PO)    Take by mouth 2 times daily    PANTOPRAZOLE (PROTONIX) 40 MG TABLET    TAKE 1 TABLET EVERY DAY    POLYETHYLENE GLYCOL (GLYCOLAX) POWDER    Take 17 g by mouth daily as needed    SENNOSIDES-DOCUSATE SODIUM (SENOKOT-S) 8.6-50 MG TABLET    Take 1 tablet by mouth 2 times daily    TAMSULOSIN (FLOMAX) 0.4 MG CAPSULE    Take 0.4 mg by mouth daily    TRIAMCINOLONE (KENALOG) 0.1 % CREAM    Apply to affected areas twice daily    ZIPRASIDONE (GEODON) 20 MG CAPSULE    Take 20 mg by mouth nightly       Social history:  reports that he has never smoked. He has never used smokeless tobacco. He reports that he does not drink alcohol and does not use drugs. Family history:    Family History   Problem Relation Age of Onset    Heart Disease Mother     Heart Disease Brother     High Blood Pressure Neg Hx     High Cholesterol Neg Hx     Mental Illness Neg Hx        Exam  ED Triage Vitals   BP Temp Temp Source Pulse Resp SpO2 Height Weight   06/25/21 1200 06/25/21 1204 06/25/21 1200 06/25/21 1200 06/25/21 1200 06/25/21 1200 06/25/21 1200 06/25/21 1200   (!) 108/57 97.8 °F (36.6 °C) Oral 84 16 94 % 5' 5\" (1.651 m) 132 lb (59.9 kg)     Nursing note and vitals reviewed. Constitutional: Well developed, well nourished. Non-toxic in appearance. HENT:      Head: Normocephalic and atraumatic. Ears: External ears normal.      Nose: Nose normal.     Mouth: Membrane mucosa moist and pink. Eyes: Anicteric sclera. No discharge. Neck: Supple. Trachea midline.    Cardiovascular: RRR; Harsh systolic murmur. Pulmonary/Chest: Effort normal. No respiratory distress. CTAB. No stridor. No wheezes. No rales. Abdominal: Soft. No distension. Nontender to palpation. Musculoskeletal: Moves all extremities. No gross deformity. Patient has +2 pitting edema in the left lower extremity, with trace edema in the right lower extremity. This edema, including the discrepancy between the legs, is chronic per the patient and his daughter. Neurological: Alert and oriented. Face symmetric. Speech is clear. Skin: Warm and dry. No rash. Psychiatric: Normal mood and affect. Behavior is normal.    EKG  The Ekg interpreted by me in the absence of a cardiologist shows. atrial flutter with variable block with a rate of 80  Axis is   Left axis deviation  QTc is  normal  Intervals and Durations are unremarkable. T-wave inversions in the lateral leads, new in comparison to previous EKG performed on 9/30/2020 and suggestive of ischemia    Radiology  XR CHEST PORTABLE   Final Result   1. Bilateral pulmonary opacities likely represent mild pulmonary edema. 2. Blunting of the costophrenic angles likely represents trace pleural fluid   and atelectasis.              Labs  Results for orders placed or performed during the hospital encounter of 06/25/21   CBC Auto Differential   Result Value Ref Range    WBC 4.3 4.0 - 11.0 K/uL    RBC 3.35 (L) 4.20 - 5.90 M/uL    Hemoglobin 11.0 (L) 13.5 - 17.5 g/dL    Hematocrit 32.7 (L) 40.5 - 52.5 %    MCV 97.8 80.0 - 100.0 fL    MCH 32.7 26.0 - 34.0 pg    MCHC 33.5 31.0 - 36.0 g/dL    RDW 14.1 12.4 - 15.4 %    Platelets 383 057 - 012 K/uL    MPV 7.0 5.0 - 10.5 fL    Neutrophils % 66.8 %    Lymphocytes % 17.5 %    Monocytes % 13.4 %    Eosinophils % 1.8 %    Basophils % 0.5 %    Neutrophils Absolute 2.9 1.7 - 7.7 K/uL    Lymphocytes Absolute 0.8 (L) 1.0 - 5.1 K/uL    Monocytes Absolute 0.6 0.0 - 1.3 K/uL    Eosinophils Absolute 0.1 0.0 - 0.6 K/uL    Basophils Absolute 0.0 0.0 - 0.2 K/uL   Comprehensive Metabolic Panel   Result Value Ref Range    Sodium 139 136 - 145 mmol/L    Potassium 4.8 3.5 - 5.1 mmol/L    Chloride 104 99 - 110 mmol/L    CO2 26 21 - 32 mmol/L    Anion Gap 9 3 - 16    Glucose 91 70 - 99 mg/dL    BUN 28 (H) 7 - 20 mg/dL    CREATININE 0.8 0.8 - 1.3 mg/dL    GFR Non-African American >60 >60    GFR African American >60 >60    Calcium 8.7 8.3 - 10.6 mg/dL    Total Protein 6.3 (L) 6.4 - 8.2 g/dL    Albumin 3.2 (L) 3.4 - 5.0 g/dL    Albumin/Globulin Ratio 1.0 (L) 1.1 - 2.2    Total Bilirubin 0.4 0.0 - 1.0 mg/dL    Alkaline Phosphatase 145 (H) 40 - 129 U/L    ALT 17 10 - 40 U/L    AST 27 15 - 37 U/L    Globulin 3.1 g/dL   Troponin   Result Value Ref Range    Troponin 0.06 (H) <0.01 ng/mL   Brain Natriuretic Peptide   Result Value Ref Range    Pro- (H) 0 - 449 pg/mL   CBC   Result Value Ref Range    WBC 2.9 (L) 4.0 - 11.0 K/uL    RBC 2.52 (L) 4.20 - 5.90 M/uL    Hemoglobin 8.0 (L) 13.5 - 17.5 g/dL    Hematocrit 24.8 (L) 40.5 - 52.5 %    MCV 98.3 80.0 - 100.0 fL    MCH 31.9 26.0 - 34.0 pg    MCHC 32.5 31.0 - 36.0 g/dL    RDW 14.1 12.4 - 15.4 %    Platelets 979 124 - 589 K/uL    MPV 6.6 5.0 - 10.5 fL   APTT   Result Value Ref Range    aPTT 36.1 24.2 - 36.2 sec   Troponin   Result Value Ref Range    Troponin 0.06 (H) <0.01 ng/mL       Screenings  Heart Score for chest pain patients  History: Moderately Suspicious  ECG: Significant ST-deviation  Patient Age: > 65 years  *Risk factors for Atherosclerotic disease: Coronary Artery Disease, Hypercholesterolemia, Hypertension, Positive family History  Risk Factors: > 3 Risk factors or history of atherosclerotic disease*  Troponin: > 3X normal limit  Heart Score Total: 9       MDM and ED Course  Given the patient's age and comorbidities, I was already concerned for ACS as an etiology of the patient's current presentation. His initial EKG showed no evidence of ST elevation MI.   However, there were new T wave inversions in the lateral leads. Patient's initial troponin was elevated to 0.06. He does have an underlying troponin leak. Reviewing his records, this is always been at 0.02. He has not had a troponin higher than than for at least the last four years of our records. I discussed the case with Dr. Johan Cyr, cardiology. Given the patient's mild troponin elevation, mild EKG changes, and age, we agree that cardiac catheterization is not indicated at this time. I did initiate heparin per ACS protocol given the patient meets criteria for this. I also initiated aspirin. On re-evaluation, the patient's chest pain had resolved. I consulted Dr. Chaves Late through [address] [place] [address] for admission. He reviewed the patient's history, physical exam, labs, imaging studies, and emergency department course and has decided to admit Conchis Fitzgerald for further evaluation and treatment. As I have deemed necessary from their history, physical, and studies, I have considered and evaluated Conchis Fitzgerald for the following diagnoses:  PULMONARY EMBOLISM, ACUTE CORONARY SYNDROME, CARDIAC TAMPONADE, PNEUMOTHORAX, PNEUMONIA, PERICARDITIS, AORTIC DISSECTION/ANEURYSM, HEMOTHORAX      The total Critical Care time is 30 minutes which excludes separately billable procedures. Final Impression  1. ACS (acute coronary syndrome) ([facility] [address]    2. Atrial flutter, unspecified type (HCC)        Blood pressure (!) 108/57, pulse 84, temperature 97.8 °F (36.6 °C), resp. rate 16, height 5' 5\" (1.651 m), weight 132 lb (59.9 kg), SpO2 94 %. Disposition:  Admit      This chart was generated using the [address] [place] [address] dictation system. I created this record but it may contain dictation errors given the limitations of this technology.        Yang Dimas MD  06/25/21 1656

## 2021-06-25 NOTE — CARE COORDINATION
Discharge Planning Assessment     discharge planner met with patient to discuss reason for admission, current living situation, and potential needs at the time of discharge. Pt in ED d/t ACS    Demographics/Insurance verified:  Yes    Current type of dwelling:  Assisted Living at [facility]    Patient from ECF/SW confirmed with:  Magaly in admissions    Living arrangements:  Assisted Living    Tentative discharge plan:  Back to [facility]. PT/OT pending. May need SNF at discharge. Follow therapy recommendations. Transportation at the time of discharge: Will need transport back.     Electronically signed by ESE Girard, IRASEMA on 6/25/2021 at 3:02 PM

## 2021-06-25 NOTE — ED NOTES
Patient arrived from [address] [place] [address]. Alert and oriented denies any pain at this time. Bed locked and in lowest position. Call light within reach.       Sharon Solorzano RN  06/25/21 8529

## 2021-06-25 NOTE — H&P
[facility] and [facility]  [facility]  [facility]    PCP: Violeta Case MD    Cc:   Chief Complaint   Patient presents with    Chest Pain     Patient brought in via [place] ems from [facility]. Called for chest pain, gave 3 nitro at nursing home. Patient was hypotensive fluids started by EMS . HPI:  Shivani Garay is a 80 y.o. male who has a past medical history of A-fib ([address] Arthritis, Atrial fibrillation ([address] Blepharitis of both eyes, CAD (coronary artery disease), GERD (gastroesophageal reflux disease), Gout, Hyperlipidemia, Hypertension, Macular degeneration, NSTEMI (non-ST elevated myocardial infarction) [facility]), Prostate enlargement, Psychiatric problem, and Tachycardia. Patient presents with NSTEMI (non-ST elevated myocardial infarction) ([address] HPI     80 y.o. male who was brought in by EMS transportation from [facility] for chest pain. Patient's chest pain started this morning when he was eating. The nursing home given 3 separate doses of nitroglycerin. After this, the patient developed hypotension and EMS was called. Patient reports chest pain did resolve with nitroglycerin. On arrival, EMS found the patient to be hypertensive and initiated fluids. By the time the patient arrived here, his blood pressure had improved significantly and normalized. Patient describes the pain as dull and substernal.  He states it was mild at the time, likely 3 or 4 out of 10. There is no radiation of the pain. He did not develop diaphoresis or shortness of breath. Although patient states he was initially chest pain-free when we began talking to her by taking of his history, he eventually developed even milder, but similar chest pain at the end of my evaluation. Patient's daughter states he has used nitroglycerin in the past for his chest pain, but not recently. He has not had chest pain like this in a long time.   Patient denies shortness of breath, lightheadedness, or other symptoms    Pt has elevated troponins in ER  To be admitted for NSTEMI  Cards consulted      Problem list of hospitalization thus far: Active Hospital Problems    Diagnosis     Hyperlipidemia [E78.5]      Priority: High    Essential hypertension [I10]      Priority: High    Atherosclerosis of native coronary artery of native heart with angina pectoris ([facility] [I25.119]      Priority: High    NSTEMI (non-ST elevated myocardial infarction) (HCC) [I21.4]     Chronic diastolic heart failure (HCC) [I50.32]     Moderate protein-calorie malnutrition (HCC) [E44.0]          Review of Systems: (1 system for EPF, 2-9 for detailed, 10+ for comprehensive)  Review of Systems   Constitutional: Negative for chills and fever. HENT: Negative for ear pain and hearing loss. Eyes: Negative for pain and redness. Respiratory: Negative for cough and shortness of breath. Cardiovascular: Positive for chest pain. Negative for leg swelling. Gastrointestinal: Negative for nausea and vomiting. Endocrine: Negative for polydipsia and polyphagia. Genitourinary: Negative for frequency and urgency. Musculoskeletal: Negative for gait problem and neck pain. Skin: Negative for rash. Allergic/Immunologic: Negative for food allergies. Neurological: Positive for light-headedness. Negative for dizziness. Hematological: Negative for adenopathy. Psychiatric/Behavioral: Negative for agitation and dysphoric mood.            Past Medical History:   Past Medical History:   Diagnosis Date    [facility]fib ([facility]     Arthritis     Atrial fibrillation (HCC)     Blepharitis of both eyes     CAD (coronary artery disease)     stents 2005, x3    GERD (gastroesophageal reflux disease)     Gout     Hyperlipidemia     Hypertension     Macular degeneration     NSTEMI (non-ST elevated myocardial infarction) ([facility])     Prostate enlargement     Psychiatric problem     anxiety; bipolar disorder    Tachycardia        Past Surgical History:   Past Surgical tablet Take 500 mg by mouth daily    Historical Provider, MD   Nutritional Supplements (ENSURE COMPLETE PO) Take by mouth 2 times daily    Historical Provider, MD   LORazepam (ATIVAN) 0.5 MG tablet Take 0.5 mg by mouth every 6 hours as needed for Anxiety. Historical Provider, MD   ferrous sulfate (IRON 325) 325 (65 Fe) MG tablet Take 325 mg by mouth daily (with breakfast)    Historical Provider, MD   gabapentin (NEURONTIN) 100 MG capsule Take 1 capsule by mouth nightly for 30 days.  6/6/20 10/6/20  Naga Rincon MD   sennosides-docusate sodium (SENOKOT-S) 8.6-50 MG tablet Take 1 tablet by mouth 2 times daily 6/6/20   Naga Rincon MD   apixaban [place] Sprain) 2.5 MG TABS tablet Take 1 tablet by mouth 2 times daily 5/1/20   Katy Conner MD   finasteride (PROSCAR) 5 MG tablet TAKE 1 TABLET EVERY NIGHT 1/15/20   MARIS Clifton CNP   lamoTRIgine (LAMICTAL) 150 MG tablet Take 1 tablet by mouth daily 12/30/19   MARIS Clifton CNP   pantoprazole (PROTONIX) 40 MG tablet TAKE 1 TABLET EVERY DAY 9/25/19   MARIS Clifton CNP   levothyroxine (SYNTHROID) 25 MCG tablet TAKE 1 TABLET EVERY DAY 9/25/19   MARIS Clifton CNP   tamsulosin (FLOMAX) 0.4 MG capsule Take 0.4 mg by mouth daily    Historical Provider, MD   polyethylene glycol (GLYCOLAX) powder Take 17 g by mouth daily as needed    Historical Provider, MD   nitroGLYCERIN (NITROSTAT) 0.4 MG SL tablet Place 1 tablet under the tongue every 5 minutes as needed for Chest pain 3/28/19   MARIS Rollins CNP   melatonin 5 MG TABS tablet Take 5 mg by mouth daily as needed (Insomnia)    Historical Provider, MD   triamcinolone (KENALOG) 0.1 % cream Apply to affected areas twice daily 1/11/18   MARIS Clifton CNP   Magnesium 400 MG TABS Take 1 tablet by mouth daily     Historical Provider, MD   aspirin 81 MG chewable tablet Take 1 tablet by mouth daily 11/3/16   Linda Spears MD         Allergies   Allergen Reactions   Hannah Barnett Lasix [Furosemide] Other (See Comments)     Pt passed out     Zyprexa [Olanzapine]      Pacing.  Citalopram Anxiety     сергей             EXAM: (2-7 system for EPF/Detailed, ?8 for Comprehensive)  /72   Pulse 82   Temp 97.8 °F (36.6 °C)   Resp 16   Ht 5' 5\" (1.651 m)   Wt 132 lb (59.9 kg)   SpO2 99%   BMI 21.97 kg/m²   Constitutional: vitals as above: alert, appears stated age and cooperative  Psychiatric: normal insight and judgment, oriented to person, place, time, and general circumstances  Head: Normocephalic, without obvious abnormality, atraumatic  Eyes:lids and lashes normal, conjunctivae and sclerae normal and pupils equal, round, reactive to light and accomodation  EMNT: external ears normal, lips normal  Neck: no adenopathy, supple, symmetrical, trachea midline and thyroid not enlarged, symmetric, no tenderness/mass/nodules   Respiratory: clear to auscultation without wheezes or rales  Cardiovascular:irreg irreg  Gastrointestinal: soft, non-tender, non-distended, normal bowel sounds, no masses or organomegaly  Lymphatic:   Extremities: no edema, no clubbing   Skin:No rashes or nodules noted.   Neurologic:    LABS:  Labs Reviewed   CBC WITH AUTO DIFFERENTIAL - Abnormal; Notable for the following components:       Result Value    RBC 3.35 (*)     Hemoglobin 11.0 (*)     Hematocrit 32.7 (*)     Lymphocytes Absolute 0.8 (*)     All other components within normal limits    Narrative:     Performed at:  [facility] [address], [facility], [address]   Phone [phone]   COMPREHENSIVE METABOLIC PANEL - Abnormal; Notable for the following components:    BUN 28 (*)     Total Protein 6.3 (*)     Albumin 3.2 (*)     Albumin/Globulin Ratio 1.0 (*)     Alkaline Phosphatase 145 (*)     All other components within normal limits    Narrative:     Performed at:  [facility]  [address] [facility], [address]   Phone [phone] TROPONIN - Abnormal; Notable for the following components:    Troponin 0.06 (*)     All other components within normal limits    Narrative:     Performed at:  [facility]  [address] [facility]  [place], [address]   Phone [phone]   BRAIN NATRIURETIC PEPTIDE - Abnormal; Notable for the following components:    Pro- (*)     All other components within normal limits    Narrative:     Performed at:  [facility]  [address] [facility]  [place], [address]   Phone [phone]   CBC   APTT   APTT   APTT         IMAGING:  Imaging results from the ER have been reviewed in the computerized chart. XR CHEST PORTABLE    Result Date: 6/25/2021  EXAMINATION: ONE XRAY VIEW OF THE CHEST 6/25/2021 12:40 pm COMPARISON: Chest radiograph 06/06/2020 HISTORY: ORDERING SYSTEM PROVIDED HISTORY: SOB TECHNOLOGIST PROVIDED HISTORY: Reason for exam:->SOB Reason for Exam: Chest Pain (Patient brought in via [place] ems from [facility]. Called for chest pain, gave 3 nitro at nursing home. Patient was hypotensive fluids started by EMS . ) Acuity: Acute Type of Exam: Initial FINDINGS: There is prominence of the pulmonary vascular markings with airspace disease seen in the centrally/involving the lung bases. Trace blunting of the costophrenic angles noted bilaterally. Cardiac silhouette is enlarged but unchanged. Stable calcifications seen in the aortic arch. Sternotomy wires are noted. No pneumothorax. 1. Bilateral pulmonary opacities likely represent mild pulmonary edema. 2. Blunting of the costophrenic angles likely represents trace pleural fluid and atelectasis. EKG: from ER, interpreted by self, atrial fib at 80. No st elevation is noted.  Comparison is made to ekg in old chart; there is difference as older study is in sinus rhythm          MEDICAL DECISION MAKING:    Principal Problem:    NSTEMI (non-ST elevated myocardial infarction) [facility]) -New Problem to me. Pt with elevated troponin, 0,06, and with recurrent chest pain  Plan: Pt to be admitted to telemetry floor. Serial cardiac enzymes to be followed. Heparin drip to be ordered. Will give ASA, NTG. Pt has IV morphine ordered for pain control. Cards to see. Given age, not sure how aggressive therapy would be / can be  Active Problems:    Essential hypertension -Established problem. Stable. 130/72  Plan: Pt home BP meds reviewed and will be continued. IV Hydralazine ordered for control of extremely high blood pressures   Will monitor labs to assess Creat/K for possible complications of medications. Atrial Fib ([address] -Established problem. In fib, rate controlled though  Plan: place on tele. Cards to see. Hyperlipidemia -Established problem. Stable. Plan: Continue present orders/plan. Moderate protein-calorie malnutrition ([address]    Chronic diastolic heart failure ([facility]          Diagnoses as listed above, designated as new or established and plan outlined for each. Data Reviewed:   (1) Lab tests were reviewed or ordered. (1) Radiology tests were reviewed or ordered. (1) Medical test (Echo, EKG, PFT/jered) were ordered. (1)History was not obtained from someone other than patient  (1) Old records  were reviewed - see HPI/MDM for pertinent details if review done. (2) Case wasdiscussed with another health care provider: Dr Wily Kay  (2) Imaging was viewed by myself. (2) EKG  was viewed by myself. The patient isbeing placed in inpatient status with the expectation of requiring a hospital stay spanning at least two midnights for care and treatment of the problems noted in the problem list.  This determination is also based on thepatients comorbidities and past medical history, the severity and timing of the signs and symptoms upon presentation.         Electronically signed by: Alexey Payne MD 6/25/2021

## 2021-06-26 PROBLEM — N47.1 PHIMOSIS: Status: ACTIVE | Noted: 2021-06-26

## 2021-06-26 LAB
A/G RATIO: 1.3 (ref 1.1–2.2)
ALBUMIN SERPL-MCNC: 3.9 G/DL (ref 3.4–5)
ALP BLD-CCNC: 162 U/L (ref 40–129)
ALT SERPL-CCNC: 17 U/L (ref 10–40)
ANION GAP SERPL CALCULATED.3IONS-SCNC: 7 MMOL/L (ref 3–16)
APTT: 55.9 SEC (ref 24.2–36.2)
APTT: 58.5 SEC (ref 24.2–36.2)
AST SERPL-CCNC: 23 U/L (ref 15–37)
BASOPHILS ABSOLUTE: 0 K/UL (ref 0–0.2)
BASOPHILS RELATIVE PERCENT: 0.7 %
BILIRUB SERPL-MCNC: 0.8 MG/DL (ref 0–1)
BUN BLDV-MCNC: 17 MG/DL (ref 7–20)
CALCIUM SERPL-MCNC: 9 MG/DL (ref 8.3–10.6)
CHLORIDE BLD-SCNC: 103 MMOL/L (ref 99–110)
CO2: 31 MMOL/L (ref 21–32)
CREAT SERPL-MCNC: 0.8 MG/DL (ref 0.8–1.3)
EKG ATRIAL RATE: 250 BPM
EKG DIAGNOSIS: NORMAL
EKG Q-T INTERVAL: 356 MS
EKG QRS DURATION: 98 MS
EKG QTC CALCULATION (BAZETT): 459 MS
EKG R AXIS: 43 DEGREES
EKG T AXIS: 239 DEGREES
EKG VENTRICULAR RATE: 100 BPM
EOSINOPHILS ABSOLUTE: 0.1 K/UL (ref 0–0.6)
EOSINOPHILS RELATIVE PERCENT: 2.2 %
GFR AFRICAN AMERICAN: >60
GFR NON-AFRICAN AMERICAN: >60
GLOBULIN: 3 G/DL
GLUCOSE BLD-MCNC: 88 MG/DL (ref 70–99)
HCT VFR BLD CALC: 35.9 % (ref 40.5–52.5)
HEMOGLOBIN: 12 G/DL (ref 13.5–17.5)
LYMPHOCYTES ABSOLUTE: 0.8 K/UL (ref 1–5.1)
LYMPHOCYTES RELATIVE PERCENT: 15.8 %
MCH RBC QN AUTO: 32.6 PG (ref 26–34)
MCHC RBC AUTO-ENTMCNC: 33.4 G/DL (ref 31–36)
MCV RBC AUTO: 97.5 FL (ref 80–100)
MONOCYTES ABSOLUTE: 0.5 K/UL (ref 0–1.3)
MONOCYTES RELATIVE PERCENT: 10.7 %
NEUTROPHILS ABSOLUTE: 3.4 K/UL (ref 1.7–7.7)
NEUTROPHILS RELATIVE PERCENT: 70.6 %
PDW BLD-RTO: 14.1 % (ref 12.4–15.4)
PLATELET # BLD: 216 K/UL (ref 135–450)
PMV BLD AUTO: 6.9 FL (ref 5–10.5)
POTASSIUM REFLEX MAGNESIUM: 4 MMOL/L (ref 3.5–5.1)
POTASSIUM SERPL-SCNC: 4 MMOL/L (ref 3.5–5.1)
RBC # BLD: 3.68 M/UL (ref 4.2–5.9)
SODIUM BLD-SCNC: 141 MMOL/L (ref 136–145)
TOTAL PROTEIN: 6.9 G/DL (ref 6.4–8.2)
WBC # BLD: 4.9 K/UL (ref 4–11)

## 2021-06-26 PROCEDURE — 6370000000 HC RX 637 (ALT 250 FOR IP): Performed by: INTERNAL MEDICINE

## 2021-06-26 PROCEDURE — 93010 ELECTROCARDIOGRAM REPORT: CPT | Performed by: INTERNAL MEDICINE

## 2021-06-26 PROCEDURE — 93005 ELECTROCARDIOGRAM TRACING: CPT | Performed by: INTERNAL MEDICINE

## 2021-06-26 PROCEDURE — 99222 1ST HOSP IP/OBS MODERATE 55: CPT | Performed by: INTERNAL MEDICINE

## 2021-06-26 PROCEDURE — 92610 EVALUATE SWALLOWING FUNCTION: CPT

## 2021-06-26 PROCEDURE — 6360000002 HC RX W HCPCS: Performed by: INTERNAL MEDICINE

## 2021-06-26 PROCEDURE — 83036 HEMOGLOBIN GLYCOSYLATED A1C: CPT

## 2021-06-26 PROCEDURE — 2060000000 HC ICU INTERMEDIATE R&B

## 2021-06-26 PROCEDURE — 2580000003 HC RX 258: Performed by: INTERNAL MEDICINE

## 2021-06-26 PROCEDURE — 85730 THROMBOPLASTIN TIME PARTIAL: CPT

## 2021-06-26 PROCEDURE — 92526 ORAL FUNCTION THERAPY: CPT

## 2021-06-26 PROCEDURE — 36415 COLL VENOUS BLD VENIPUNCTURE: CPT

## 2021-06-26 PROCEDURE — 80053 COMPREHEN METABOLIC PANEL: CPT

## 2021-06-26 PROCEDURE — 85025 COMPLETE CBC W/AUTO DIFF WBC: CPT

## 2021-06-26 RX ADMIN — STANDARDIZED SENNA CONCENTRATE AND DOCUSATE SODIUM 1 TABLET: 8.6; 5 TABLET ORAL at 09:38

## 2021-06-26 RX ADMIN — NITROGLYCERIN 0.4 MG: 0.4 TABLET, ORALLY DISINTEGRATING SUBLINGUAL at 11:52

## 2021-06-26 RX ADMIN — Medication 10 ML: at 09:40

## 2021-06-26 RX ADMIN — STANDARDIZED SENNA CONCENTRATE AND DOCUSATE SODIUM 1 TABLET: 8.6; 5 TABLET ORAL at 21:59

## 2021-06-26 RX ADMIN — LAMOTRIGINE 150 MG: 100 TABLET ORAL at 09:38

## 2021-06-26 RX ADMIN — MAGNESIUM GLUCONATE 500 MG ORAL TABLET 400 MG: 500 TABLET ORAL at 09:39

## 2021-06-26 RX ADMIN — LORAZEPAM 0.5 MG: 0.5 TABLET ORAL at 11:09

## 2021-06-26 RX ADMIN — ZIPRASIDONE HYDROCHLORIDE 20 MG: 20 CAPSULE ORAL at 22:02

## 2021-06-26 RX ADMIN — FERROUS SULFATE TAB 325 MG (65 MG ELEMENTAL FE) 325 MG: 325 (65 FE) TAB at 09:39

## 2021-06-26 RX ADMIN — LORAZEPAM 0.5 MG: 0.5 TABLET ORAL at 21:58

## 2021-06-26 RX ADMIN — ASPIRIN 81 MG: 81 TABLET, CHEWABLE ORAL at 09:38

## 2021-06-26 RX ADMIN — FINASTERIDE 5 MG: 5 TABLET, FILM COATED ORAL at 21:58

## 2021-06-26 RX ADMIN — APIXABAN 2.5 MG: 2.5 TABLET, FILM COATED ORAL at 21:58

## 2021-06-26 RX ADMIN — LEVOTHYROXINE SODIUM 25 MCG: 0.03 TABLET ORAL at 06:20

## 2021-06-26 RX ADMIN — GABAPENTIN 100 MG: 100 CAPSULE ORAL at 21:58

## 2021-06-26 RX ADMIN — DILTIAZEM HYDROCHLORIDE 30 MG: 30 TABLET, FILM COATED ORAL at 13:29

## 2021-06-26 RX ADMIN — DILTIAZEM HYDROCHLORIDE 30 MG: 30 TABLET, FILM COATED ORAL at 21:58

## 2021-06-26 RX ADMIN — PANTOPRAZOLE SODIUM 40 MG: 40 TABLET, DELAYED RELEASE ORAL at 06:20

## 2021-06-26 RX ADMIN — TAMSULOSIN HYDROCHLORIDE 0.4 MG: 0.4 CAPSULE ORAL at 09:39

## 2021-06-26 RX ADMIN — DILTIAZEM HYDROCHLORIDE 30 MG: 30 TABLET, FILM COATED ORAL at 09:39

## 2021-06-26 RX ADMIN — ONDANSETRON 4 MG: 2 INJECTION INTRAMUSCULAR; INTRAVENOUS at 11:08

## 2021-06-26 RX ADMIN — Medication 10 ML: at 21:59

## 2021-06-26 RX ADMIN — Medication 500 MG: at 09:39

## 2021-06-26 RX ADMIN — ATORVASTATIN CALCIUM 80 MG: 80 TABLET, FILM COATED ORAL at 21:59

## 2021-06-26 ASSESSMENT — PAIN SCALES - GENERAL
PAINLEVEL_OUTOF10: 0
PAINLEVEL_OUTOF10: 2
PAINLEVEL_OUTOF10: 0

## 2021-06-26 ASSESSMENT — PAIN DESCRIPTION - DESCRIPTORS: DESCRIPTORS: PRESSURE

## 2021-06-26 ASSESSMENT — PAIN DESCRIPTION - ONSET: ONSET: SUDDEN

## 2021-06-26 ASSESSMENT — PAIN DESCRIPTION - FREQUENCY: FREQUENCY: INTERMITTENT

## 2021-06-26 ASSESSMENT — PAIN DESCRIPTION - ORIENTATION: ORIENTATION: LEFT

## 2021-06-26 ASSESSMENT — PAIN DESCRIPTION - LOCATION: LOCATION: CHEST

## 2021-06-26 ASSESSMENT — PAIN DESCRIPTION - PAIN TYPE: TYPE: ACUTE PAIN

## 2021-06-26 NOTE — PLAN OF CARE
Pt is reporting no pain or discomfort. Pt is in bed with eyes closed. Vital signs are stable. PTT was therapeutic. Heparin drip remains unchanged per orders. PTT scheduled for 0300.     Vitals:    06/25/21 1930   BP: (!) 148/83   Pulse: 95   Resp: 18   Temp: 98.2 °F (36.8 °C)   SpO2: 95%       Problem: Pain:  Goal: Pain level will decrease  Description: Pain level will decrease  6/26/2021 0005 by Cheryle Silvas, RN  Outcome: Ongoing     Problem: Skin Integrity:  Goal: Will show no infection signs and symptoms  Description: Will show no infection signs and symptoms  6/26/2021 0005 by Cheryle Silvas, RN  Outcome: Ongoing     Problem: Falls - Risk of:  Goal: Will remain free from falls  Description: Will remain free from falls  6/26/2021 0005 by Cheryle Silvas, RN  Outcome: Ongoing

## 2021-06-26 NOTE — PROGRESS NOTES
dysphagia. Recommended Diet and Intervention 6/26/2021:  Diet Solids Recommendation:  Regular diet Liquid Consistency Recommendation: Thin liquids   Recommended form of Meds:  Crushed with Puree     Compensatory Swallowing Strategies: Alternate solids/liquids , Effortful Swallows , Upright as possible with all PO intake , Small bites/sips , Swallow 2 times per bite , Remain upright 30-45 min     SHORT TERM DYSPHAGIA GOALS/PLAN OF CARE: Speech therapy for dysphagia tx 3-5 times per week during acute care stay. 1. Pt will functionally tolerate recommended diet with no overt clinical s/s of aspiration  2. Pt will demonstrate understanding of aspiration risk and precautions via education/demonstration with occasional prompting  3. Pt will advance to least restrictive diet as indicated   4.  If clinical s/s of aspiration/penetration continue to be noted, Pt will participate in Modified Barium Swallow Study     Dysphagia Therapeutic Intervention:  Diet Tolerance Monitoring , Patient/Family Education     Discharge Recommendations: Recommend ongoing speech therapy for dysphagia therapy upon discharge from hospital    Patient Positioning: Upright in bed    Current Diet Level (prior to evaluation):  Regular diet with Thin liquids    Respiratory Status:   [x]Room Air   []O2 via nasal cannula   []Other:    Dentition:  [x]Adequate  []Dentures   []Missing Many Teeth  []Edentulous  []Other:    Baseline Vocal Quality:  [x]Normal  []Dysphonic   []Aphonic   []Hoarse  []Wet  []Weak  []Other:    Volitional Cough:  [x]Strong  []Weak  []Wet  []Absent  []Congested  []Other:    Volitional Swallow:   []Absent   [x]Delayed     []Adequate     []Required use of drink     Oral Mechanism Exam:  []WFL [x]Mild   [] Moderate  []Severe  []To be assessed  Impaired:   []Left side      []Right side    []Labial ROM/Coordination    []Labial Symmetry   []Lingual ROM/Coordination   []Lingual Symmetry  []Gag  []Other:     Oral Phase: []WFL [x]Mild [] Moderate  []Severe  []To be assessed   [x]Impaired/Prolonged Mastication:   []Spillage Left:   []Spillage Right:  []Pocketing Left:   []Pocketing Right:   [x]Decreased Anterior to Posterior Transit:   []Suspected Premature Bolus Loss:   []Lingual/Palatal Residue:   []Other:     Pharyngeal Phase: []WFL [x]Mild   [] Moderate  []Severe  []To be assessed   [x]Delayed Swallow:   [x]Suspected Pharyngeal Pooling:   []Decreased Laryngeal Elevation:   []Absent Swallow:  []Wet Vocal Quality:   []Throat Clearing-Immediate:   []Throat Clearing-Delayed:   []Cough-Immediate:   []Cough-Delayed:  []Change in Vital Signs:  []Suspected Delayed Pharyngeal Clearing:  []Other:       Eating Assistance:  []Independent  [x]Setup or clean-up assistance   [] Supervision or touching assistance   [] Partial or moderate assistance   [] Substantial or maximal assistance  [] Dependent     EDUCATION:   Provided education regarding role of SLP, results of assessment, recommendations and general speech pathology plan of care. [] Pt verbalized understanding and agreement   [x] Pt requires ongoing learning   [] No evidence of comprehension     If patient discharges prior to next visit, this note will serve as discharge.      Timed Code Minutes: 0 minutes  Total Treatment Minutes: 25 minutes    Electronically signed by:    Nate Singh MS, [address] [place], [place] [id number]   Speech-Language Pathologist

## 2021-06-26 NOTE — PROGRESS NOTES
myocardial infarction) ([facility] [I21.4] 06/25/2021    Atrial fibrillation ([facility] [I48.91] 06/25/2021    Chronic diastolic heart failure (HCC) [I50.32] 06/01/2020    Moderate protein-calorie malnutrition (HCC) [E44.0] 04/08/2019       Current Facility-Administered Medications: magnesium oxide (MAG-OX) tablet 400 mg, 400 mg, Oral, Daily  melatonin tablet 4.5 mg, 4.5 mg, Oral, Daily PRN  tamsulosin (FLOMAX) capsule 0.4 mg, 0.4 mg, Oral, Daily  pantoprazole (PROTONIX) tablet 40 mg, 40 mg, Oral, QAM AC  levothyroxine (SYNTHROID) tablet 25 mcg, 25 mcg, Oral, Daily  lamoTRIgine (LAMICTAL) tablet 150 mg, 150 mg, Oral, Daily  finasteride (PROSCAR) tablet 5 mg, 5 mg, Oral, Nightly  gabapentin (NEURONTIN) capsule 100 mg, 100 mg, Oral, Nightly  sennosides-docusate sodium (SENOKOT-S) 8.6-50 MG tablet 1 tablet, 1 tablet, Oral, BID  ferrous sulfate (IRON 325) tablet 325 mg, 325 mg, Oral, Daily with breakfast  calcium elemental (OSCAL) tablet 500 mg, 500 mg, Oral, Daily  dilTIAZem (CARDIZEM) tablet 30 mg, 30 mg, Oral, TID  ziprasidone (GEODON) capsule 20 mg, 20 mg, Oral, Nightly  sodium chloride flush 0.9 % injection 5-40 mL, 5-40 mL, Intravenous, 2 times per day  sodium chloride flush 0.9 % injection 10 mL, 10 mL, Intravenous, PRN  0.9 % sodium chloride infusion, 25 mL, Intravenous, PRN  ondansetron (ZOFRAN-ODT) disintegrating tablet 4 mg, 4 mg, Oral, Q8H PRN **OR** ondansetron (ZOFRAN) injection 4 mg, 4 mg, Intravenous, Q6H PRN  acetaminophen (TYLENOL) tablet 650 mg, 650 mg, Oral, Q6H PRN **OR** acetaminophen (TYLENOL) suppository 650 mg, 650 mg, Rectal, Q6H PRN  magnesium hydroxide (MILK OF MAGNESIA) 400 MG/5ML suspension 30 mL, 30 mL, Oral, Daily PRN  aspirin chewable tablet 81 mg, 81 mg, Oral, Daily  heparin (porcine) injection 3,590 Units, 60 Units/kg, Intravenous, PRN  heparin (porcine) injection 1,800 Units, 30 Units/kg, Intravenous, PRN  heparin 25,000 units in dextrose 5% 250 mL (premix) infusion, 5-30 Units/kg/hr, past 96 hrs (Last 3 readings):   Weight   06/26/21 0457 128 lb 14.4 oz (58.5 kg)   06/25/21 1815 129 lb 11.2 oz (58.8 kg)   06/25/21 1200 132 lb (59.9 kg)           Intake/Output Summary (Last 24 hours) at 6/26/2021 0840  Last data filed at 6/26/2021 6632  Gross per 24 hour   Intake 110.19 ml   Output --   Net 110.19 ml         Physical Exam:   /70   Pulse 93   Temp 98.1 °F (36.7 °C) (Axillary)   Resp 14   Ht 5' 5\" (1.651 m)   Wt 128 lb 14.4 oz (58.5 kg)   SpO2 94%   BMI 21.45 kg/m²   General appearance: alert, appears stated age and cooperative  Head: Normocephalic, without obvious abnormality, atraumatic  Lungs: clear to auscultation bilaterally  Heart: regular rate and rhythm, S1, S2 normal, no murmur, click, rub or gallop  Abdomen: soft, non-tender; bowel sounds normal; no masses,  no organomegaly  Extremities: extremities normal, atraumatic, no cyanosis or edema    Labs:  Lab Results   Component Value Date    WBC 4.9 06/26/2021    HGB 12.0 (L) 06/26/2021    HCT 35.9 (L) 06/26/2021     06/26/2021    CHOL 85 11/03/2016    TRIG 38 11/03/2016    HDL 39 (L) 11/03/2016    ALT 17 06/26/2021    AST 23 06/26/2021     06/26/2021    K 4.0 06/26/2021    K 4.0 06/26/2021     06/26/2021    CREATININE 0.8 06/26/2021    BUN 17 06/26/2021    CO2 31 06/26/2021    TSH 5.09 (H) 05/04/2015    PSA 0.93 05/04/2015    INR 1.33 (H) 05/31/2020    LABMICR YES 09/30/2020     Lab Results   Component Value Date    TROPONINI 0.07 (H) 06/25/2021       Recent Imaging Results are Reviewed:  XR CHEST PORTABLE    Result Date: 6/25/2021  EXAMINATION: ONE XRAY VIEW OF THE CHEST 6/25/2021 12:40 pm COMPARISON: Chest radiograph 06/06/2020 HISTORY: ORDERING SYSTEM PROVIDED HISTORY: SOB TECHNOLOGIST PROVIDED HISTORY: Reason for exam:->SOB Reason for Exam: Chest Pain (Patient brought in via [place] ems from [facility]. Called for chest pain, gave 3 nitro at senior living.  Patient was hypotensive fluids started by EMS . ) Acuity: Acute Type of Exam: Initial FINDINGS: There is prominence of the pulmonary vascular markings with airspace disease seen in the centrally/involving the lung bases. Trace blunting of the costophrenic angles noted bilaterally. Cardiac silhouette is enlarged but unchanged. Stable calcifications seen in the aortic arch. Sternotomy wires are noted. No pneumothorax. 1. Bilateral pulmonary opacities likely represent mild pulmonary edema. 2. Blunting of the costophrenic angles likely represents trace pleural fluid and atelectasis. Assessment and Plan:  Principal Problem:    NSTEMI (non-ST elevated myocardial infarction) ([address] -Established problem. Uncontrolled. Trop still slightly higher, 0.07 now. Awaiting repeat from this am  Plan: cont on heparin, cards to see. Iv morphine for pain  Active Problems:    Essential hypertension -Established problem. Stable. 123/70  Plan: cont same meds    Atherosclerosis of native coronary artery of native heart with angina pectoris ([address]  Plan: cards eval in progers[facility]    Hyperlipidemia -Established problem. Stable. Plan: stay on statin    Moderate protein-calorie malnutrition ([address] -Established problem. Stable. bmi 21  Plan: Continue present orders/plan. Chronic diastolic heart failure (HCC)    Phimosis ([address] -New Problem to me.     Plan: urology consulted            Hardik Valencia MD  6/26/2021

## 2021-06-26 NOTE — PLAN OF CARE
Problem: Pain:  Goal: Pain level will decrease  Description: Pain level will decrease  6/26/2021 1145 by Radha Keller RN  Outcome: Ongoing     Problem: Skin Integrity:  Goal: Absence of new skin breakdown  Description: Absence of new skin breakdown  6/26/2021 1145 by Radha Keller RN  Outcome: Ongoing

## 2021-06-26 NOTE — PROGRESS NOTES
Pt complaining of chest pain. Stat EKG ordered. MD notified. VSS    11:53 one dose nitro given. Pt stated it started after he took pills this AM. He said they don't feel like they went down. Water and yogurt offered to assist. Pt does not know what would help. EKG done, no changes in rate or rhythm.      12:12p Cardiology notified

## 2021-06-26 NOTE — CONSULTS
Urology Consult Note  [facility]     Patient: Dima So MRN: [id number]  Room/Bed: [facility]-6322/1164-62   YOB: 1925  Age/Sex: 80 y. o.male  Admission Date: 6/25/2021     Date of Service:  6/26/2021    Consulting Provider: MARIS Rubalcava CNP  Admitting/Requesting Physician: Nikita Haywood MD  Primary Care Physician: Opal Garcia MD    Reason for Consult: Phimosis    ASSESSMENT/PLAN     Consulted for Phimosis  Able to retract foreskin and evaluate meatus which is patent  Voiding with Cr 0.8  See's Dr. Tristan Bourgeois for this annually    Recommendations:  -Continue to monitor phimosis outpt with Dr. Tristan Bourgeois  -Likely will not require circumcision   -OK to discharge per  when stable, Call urology with any questions    All patient questions were answered. He understands the plan as listed above. HISTORY     Chief Complaint:   Chief Complaint   Patient presents with    Chest Pain     Patient brought in via [place] ems from [facility]. Called for chest pain, gave 3 nitro at nursing home. Patient was hypotensive fluids started by EMS . History of Present Illness: Dima So is a 80 y.o. male with phimosis. Onset of symptoms was many years ago with the sme course since that time. Symptoms are aggravated by foreskin. Symptoms improved with retracting foreskin. Associated symptoms include weak stream. Patient also reports hesitency. Past Medical History:  He has a past medical history of A-fib ([address] Arthritis, Atrial fibrillation ([address] Blepharitis of both eyes, CAD (coronary artery disease), GERD (gastroesophageal reflux disease), Gout, Hyperlipidemia, Hypertension, Macular degeneration, NSTEMI (non-ST elevated myocardial infarction) [facility]), Prostate enlargement, Psychiatric problem, and Tachycardia.      Hospital Problem List:  Principal Problem:    NSTEMI (non-ST elevated myocardial infarction) [facility])  Active Problems:    Essential hypertension    Atherosclerosis of native coronary artery of native heart with angina pectoris (HCC)    Hyperlipidemia    Moderate protein-calorie malnutrition (HCC)    Chronic diastolic heart failure (HCC)    Atrial fibrillation ([facility]    Phimosis  Resolved Problems:    * No resolved hospital problems. *      Past Surgical History:  He has a past surgical history that includes Coronary angioplasty with stent (2005); skin biopsy; Kidney stone surgery (1980); Coronary artery bypass graft; Cataract removal (1993); TURP (2003); Skin cancer destruction (2006); Colonoscopy; eye surgery; Cardiac surgery; and hip surgery (Left, 6/2/2020). Social History:  He reports that he has never smoked. He has never used smokeless tobacco. He reports that he does not drink alcohol and does not use drugs. Family History:  family history includes Heart Disease in his brother and mother. Allergies: Allergies   Allergen Reactions    Lasix [Furosemide] Other (See Comments)     Pt passed out     Zyprexa [Olanzapine]      Pacing.      Citalopram Anxiety     сергей       Medications:  Scheduled Meds:   magnesium oxide  400 mg Oral Daily    tamsulosin  0.4 mg Oral Daily    pantoprazole  40 mg Oral QAM AC    levothyroxine  25 mcg Oral Daily    lamoTRIgine  150 mg Oral Daily    finasteride  5 mg Oral Nightly    gabapentin  100 mg Oral Nightly    sennosides-docusate sodium  1 tablet Oral BID    ferrous sulfate  325 mg Oral Daily with breakfast    calcium elemental  500 mg Oral Daily    dilTIAZem  30 mg Oral TID    ziprasidone  20 mg Oral Nightly    sodium chloride flush  5-40 mL Intravenous 2 times per day    aspirin  81 mg Oral Daily    atorvastatin  80 mg Oral Nightly    LORazepam  0.5 mg Oral BID     Continuous Infusions:   sodium chloride      heparin (PORCINE) Infusion 12 Units/kg/hr (06/26/21 0629)     PRN Meds:melatonin, sodium chloride flush, sodium chloride, ondansetron **OR** ondansetron, acetaminophen **OR** acetaminophen, magnesium hydroxide, heparin (porcine), heparin (porcine), nitroGLYCERIN, hydrALAZINE, potassium chloride **OR** potassium alternative oral replacement **OR** potassium chloride, sodium chloride    Review of Systems:  Pertinent positives/negatives reviewed in HPI. All other systems reviewed and negative, unless noted below. Constitutional: Negative  Genitourinary: see HPI  HEENT: Negative   Cardiovascular: Negative   Respiratory: Negative   Gastrointestinal: Negative   Musculoskeletal: Negative   Neurological: Negative   Psychiatric: Negative   Integumentary: Negative     PHYSICAL EXAM     Vitals:    06/26/21 1115   BP: 127/75   Pulse: 85   Resp: 16   Temp: 97.8 °F (36.6 °C)   SpO2: 92%     CONSTITUTIONAL: The patient is well nourished/developed, with no distress noted. NEUROLOGICAL/PSYCHIATRIC: Oriented to place and time, normal affected noted. NECK: The neck is symmetrical and supple, with no masses noted. CARDIOVASCULAR: Regular rate and rhythm, no evidence of swelling noted. RESPIRATORY: Normal respiratory effort with no wheezing noted. ABDOMEN: Abdomen soft, non-tender, non-distended. No enlarged liver or spleen. No hernias noted. Stool occult blood not indicated. SKIN: Skin appears normal.  LYMPHATICS: No adenopathy noted. CVA: No CVA tenderness bilaterally. GENITOURINARY: The penis is without rash or lesions and meatus with expected size and location. The scrotum appears normal. Bilateral testicles appears to be of normal size and location. No masses or tenderness noted of testicles or epididymis. LESLEE: Deferred. [Sphincter with good tone. Prostate is of normal size, smooth and with benign consistency.  The seminal vesicles are not palpable.]    Ins/Outs:    Intake/Output Summary (Last 24 hours) at 6/26/2021 1223  Last data filed at 6/26/2021 1015  Gross per 24 hour   Intake 230.19 ml   Output --   Net 230.19 ml       LABS     CBC   Lab Results   Component Value Date    WBC 4.9 06/26/2021    RBC 3.68 06/26/2021    HGB 12.0 06/26/2021    HCT 35.9 06/26/2021    MCV 97.5 06/26/2021    MCH 32.6 06/26/2021    MCHC 33.4 06/26/2021    RDW 14.1 06/26/2021     06/26/2021    MPV 6.9 06/26/2021     BMP   Lab Results   Component Value Date     06/26/2021    K 4.0 06/26/2021    K 4.0 06/26/2021     06/26/2021    CO2 31 06/26/2021    BUN 17 06/26/2021    CREATININE 0.8 06/26/2021    GLUCOSE 88 06/26/2021    CALCIUM 9.0 06/26/2021     Urinalysis:   Lab Results   Component Value Date    COLORU YELLOW 09/30/2020    GLUCOSEU Negative 09/30/2020    BLOODU MODERATE 09/30/2020    NITRU Negative 09/30/2020    LEUKOCYTESUR Negative 09/30/2020     Urine culture: No results for input(s): Raydell Paget in the last 72 hours. PSA:   Lab Results   Component Value Date    PSA 0.93 05/04/2015         IMAGING     XR CHEST PORTABLE    Result Date: 6/25/2021  EXAMINATION: ONE XRAY VIEW OF THE CHEST 6/25/2021 12:40 pm COMPARISON: Chest radiograph 06/06/2020 HISTORY: ORDERING SYSTEM PROVIDED HISTORY: SOB TECHNOLOGIST PROVIDED HISTORY: Reason for exam:->SOB Reason for Exam: Chest Pain (Patient brought in via [place] ems from [facility]. Called for chest pain, gave 3 nitro at nursing home. Patient was hypotensive fluids started by EMS . ) Acuity: Acute Type of Exam: Initial FINDINGS: There is prominence of the pulmonary vascular markings with airspace disease seen in the centrally/involving the lung bases. Trace blunting of the costophrenic angles noted bilaterally. Cardiac silhouette is enlarged but unchanged. Stable calcifications seen in the aortic arch. Sternotomy wires are noted. No pneumothorax. 1. Bilateral pulmonary opacities likely represent mild pulmonary edema. 2. Blunting of the costophrenic angles likely represents trace pleural fluid and atelectasis.             Electronically signed by: MARIS Nielsen CNP  6/26/2021   [facility]  Office Contact: [phone]

## 2021-06-26 NOTE — CONSULTS
[address] [facility]  [phone]      Chief Complaint   Patient presents with    Chest Pain     Patient brought in via [place] [facility] from [facility]. Called for chest pain, gave 3 nitro at nursing home. Patient was hypotensive fluids started by EMS . History of Present Illness:  Santo Matias is a 80 y.o. patient who presented to the hospital with complaints of weakness, fatgiue. He was at nursing home and told the staff he had chest pain. He was given nitro and his bp dropped to 80/40 so he was sent to ED. His sister and health aid state he has declined significantly and is unable to care for himself. He had episode of CP after swallowing his pills today. Troponin is slightly elevated. I have been asked to provide consultation regarding further management and testing. 80 y.o. male with bipolar formally followed by Dr. Emma Heck seen in f/u for CAD, HTN, hyperlipidemia and bradycardia. He underwent stenting of the LCX and RCA in 2005. Last stress myoview 12/2016 was normal. Permanent afib. Emperatriz Espinosa Past Medical History:   has a past medical history of A-fib [address] [address] Arthritis, Atrial fibrillation ([facility] [address] Blepharitis of both eyes, CAD (coronary artery disease), GERD (gastroesophageal reflux disease), Gout, Hyperlipidemia, Hypertension, Macular degeneration, NSTEMI (non-ST elevated myocardial infarction) [facility]), Prostate enlargement, Psychiatric problem, and Tachycardia. Surgical History:   has a past surgical history that includes Coronary angioplasty with stent (2005); skin biopsy; Kidney stone surgery (1980); Coronary artery bypass graft; Cataract removal (1993); TURP (2003); Skin cancer destruction (2006); Colonoscopy; eye surgery; Cardiac surgery; and hip surgery (Left, 6/2/2020). Social History:   reports that he has never smoked. He has never used smokeless tobacco. He reports that he does not drink alcohol and does not use drugs.      Family History:  family history nitroGLYCERIN (NITROSTAT) 0.4 MG SL tablet Place 1 tablet under the tongue every 5 minutes as needed for Chest pain 3/28/19  Yes MARIS He CNP   melatonin 5 MG TABS tablet Take 5 mg by mouth daily as needed (Insomnia)   Yes Historical Provider, MD   Magnesium 400 MG TABS Take 1 tablet by mouth daily    Yes Historical Provider, MD   Artificial Tear Ointment (DRY EYES OP) Apply 250 capsules to eye daily    Historical Provider, MD   polyethylene glycol (GLYCOLAX) powder Take 17 g by mouth daily as needed    Historical Provider, MD   triamcinolone (KENALOG) 0.1 % cream Apply to affected areas twice daily 1/11/18   MARIS Miller CNP   aspirin 81 MG chewable tablet Take 1 tablet by mouth daily 11/3/16   Stu Cherry MD        Current Medications:  Current Facility-Administered Medications   Medication Dose Route Frequency Provider Last Rate Last Admin    magnesium oxide (MAG-OX) tablet 400 mg  400 mg Oral Daily Elmo Cruz MD   400 mg at 06/26/21 0939    melatonin tablet 4.5 mg  4.5 mg Oral Daily PRN Elmo Cruz MD        tamsulosin [facility]) capsule 0.4 mg  0.4 mg Oral Daily Elmo Cruz MD   0.4 mg at 06/26/21 0939    pantoprazole (PROTONIX) tablet 40 mg  40 mg Oral QAM AC Elmo Cruz MD   40 mg at 06/26/21 3054    levothyroxine (SYNTHROID) tablet 25 mcg  25 mcg Oral Daily Elmo Cruz MD   25 mcg at 06/26/21 3422    lamoTRIgine (LAMICTAL) tablet 150 mg  150 mg Oral Daily Elmo Cruz MD   150 mg at 06/26/21 8648    finasteride (PROSCAR) tablet 5 mg  5 mg Oral Nightly Elmo Cruz MD   5 mg at 06/25/21 2105    gabapentin (NEURONTIN) capsule 100 mg  100 mg Oral Nightly Elmo Cruz MD   100 mg at 06/25/21 2105    sennosides-docusate sodium (SENOKOT-S) 8.6-50 MG tablet 1 tablet  1 tablet Oral BID Elmo Cruz MD   1 tablet at 06/26/21 5729    ferrous sulfate (IRON 325) tablet 325 mg  325 mg Oral Daily with breakfast Elmo Cruz MD   325 mg at 06/26/21 0939    calcium elemental (OSCAL) tablet 500 mg  500 mg Oral Daily Yelena Cedeno MD   500 mg at 06/26/21 4948    dilTIAZem (CARDIZEM) tablet 30 mg  30 mg Oral TID Yelena Cedeno MD   30 mg at 06/26/21 1957    ziprasidone (GEODON) capsule 20 mg  20 mg Oral Nightly Yelena Cedeno MD   20 mg at 06/25/21 2105    sodium chloride flush 0.9 % injection 5-40 mL  5-40 mL Intravenous 2 times per day Yelena Cedeno MD   10 mL at 06/26/21 0940    sodium chloride flush 0.9 % injection 10 mL  10 mL Intravenous PRN Yelena Cedeno MD        0.9 % sodium chloride infusion  25 mL Intravenous PRN Yelena Cedeno MD        ondansetron (ZOFRAN-ODT) disintegrating tablet 4 mg  4 mg Oral Q8H PRN Yelena Cedeno MD        Or    ondansetron [facility]) injection 4 mg  4 mg Intravenous Q6H PRN Yelena Cedeno MD   4 mg at 06/26/21 1108    acetaminophen (TYLENOL) tablet 650 mg  650 mg Oral Q6H PRN Yelena Cedeno MD        Or    acetaminophen (TYLENOL) suppository 650 mg  650 mg Rectal Q6H PRN Yelena Cedeno MD        magnesium hydroxide (MILK OF MAGNESIA) 400 MG/5ML suspension 30 mL  30 mL Oral Daily PRN Yelena Cedeno MD        aspirin chewable tablet 81 mg  81 mg Oral Daily Yelena Cedeno MD   81 mg at 06/26/21 6671    heparin (porcine) injection 3,590 Units  60 Units/kg Intravenous PRN Yelena Cedeno MD        heparin (porcine) injection 1,800 Units  30 Units/kg Intravenous PRN Yelena Cedeno MD        heparin 25,000 units in dextrose 5% 250 mL (premix) infusion  5-30 Units/kg/hr Intravenous Continuous Yelena Cedeno MD 7.2 mL/hr at 06/26/21 0629 12 Units/kg/hr at 06/26/21 0629    atorvastatin (LIPITOR) tablet 80 mg  80 mg Oral Nightly Yelena Cedeno MD   80 mg at 06/25/21 2105    nitroGLYCERIN (NITROSTAT) SL tablet 0.4 mg  0.4 mg Sublingual Q5 Min PRN Yelena Cedeno MD   0.4 mg at 06/26/21 1152    hydrALAZINE (APRESOLINE) injection 10 mg  10 mg Intravenous Q6H PRN Yelena Cedeno, MD        potassium chloride (KLOR-CON M) extended release tablet 40 mEq  40 mEq Oral PRN Leonides Ford MD        Or    potassium bicarb-citric acid (EFFER-K) effervescent tablet 40 mEq  40 mEq Oral PRN Leonides Ford MD        Or    potassium chloride 10 mEq/100 mL IVPB (Peripheral Line)  10 mEq Intravenous PRN Leonides Ford MD        0.9 % sodium chloride bolus  500 mL Intravenous PRN Leonides Ford MD        LORazepam (ATIVAN) tablet 0.5 mg  0.5 mg Oral BID Leonides Ford MD   0.5 mg at 06/26/21 1109        Allergies:  Lasix [furosemide], Zyprexa [olanzapine], and Citalopram     Review of Systems:     · Constitutional: there has been no unanticipated weight loss. There's been no change in energy level, sleep pattern, or activity level. · Eyes: No visual changes or diplopia. No scleral icterus. · ENT: No Headaches, hearing loss or vertigo. No mouth sores or sore throat. · Cardiovascular: Reviewed in HPI  · Respiratory: No cough or wheezing, no sputum production. No hematemesis. · Gastrointestinal: No abdominal pain, appetite loss, blood in stools. No change in bowel or bladder habits. · Genitourinary: No dysuria, trouble voiding, or hematuria. · Musculoskeletal:  No gait disturbance, weakness or joint complaints. · Integumentary: No rash or pruritis. · Neurological: No headache, diplopia, change in muscle strength, numbness or tingling. No change in gait, balance, coordination, mood, affect, memory, mentation, behavior. · Psychiatric: No anxiety, no depression. · Endocrine: No malaise, fatigue or temperature intolerance. No excessive thirst, fluid intake, or urination. No tremor. · Hematologic/Lymphatic: No abnormal bruising or bleeding, blood clots or swollen lymph nodes. · Allergic/Immunologic: No nasal congestion or hives.   ·     Physical Examination:    Vitals:    06/26/21 1115   BP: 127/75   Pulse: 85   Resp: 16   Temp: 97.8 °F (36.6 °C)   SpO2: 92%    Weight: 128 lb 14.4 oz inferolateral ischemia or digitalis effectAbnormal     Pt has CAD with following history:  CABG: (date/details),   Valve replacement (date/details)   GXT/Myoview/Lexiscan: (date)  (results)   Stress/echo: (date) (result)   CATH/PCI:  (date)- (results)  - (# and type of stents) -   ECHO: (date) results EF    %     ECHO 6/2020  Summary   -Limited exam per Dr. Osmin Garcia for chronic diastolic heart failure.   -Normal left ventricle size, wall thickness, and systolic function with an   estimated ejection fraction of 55%.  -No regional wall motion abnormalities are seen.   -Moderate aortic stenosis with a peak velocity of 2.97 m/s and a mean   pressure gradient of 22mmHg. The aortic valve area is estimated at 0.65   cm^2. No significant regurgitation noted.   -Right atrial enlargement noted based on volume index.   -Thickened mitral valve without evidence of stenosis. There is heavy mitral   annular calcification noted. There is mild-to-moderate mitral regurgitation   noted.   -There is moderate tricuspid regurgitation with a RVSP estimation of 50   mmHg. This is suggestive of moderate pulmonary hypertension.   -Mild pulmonic regurgitation present. .  MOE (date) (results)  EP procedures/studies/ablation:  (specific data). Device information:  Event monitor: (date/results)    All testing and labs listed below were personally reviewed.     Assessment  Patient Active Problem List   Diagnosis    Essential hypertension    Atherosclerosis of native coronary artery of native heart with angina pectoris (HCC)    Hyperlipidemia    Bradycardia    Chest pain    GERD (gastroesophageal reflux disease)    Bilateral carotid artery disease (HCC)    Mild aortic stenosis    Psychophysiological insomnia    Anxiety disorder    RLS (restless legs syndrome)    Chronic idiopathic constipation    Bipolar 1 disorder (HCC)    Paroxysmal atrial fibrillation (HCC)    Moderate protein-calorie malnutrition ([address]    Subcapital fracture of femur, left, closed, initial encounter ([facility]    Chronic diastolic heart failure ([facility]    Closed low lateral malleolus fracture, left, initial encounter    Localized edema    Chronic atrial fibrillation (HCC)    Contusion of left knee    Contusion of right knee    Left knee pain    NSTEMI (non-ST elevated myocardial infarction) ([facility]    Atrial fibrillation ([facility]    Phimosis         Plan:    I had the opportunity to review the clinical symptoms and presentation of Vineet Baptiste. Assessment/Plan:  Principal Problem:    NSTEMI (non-ST elevated myocardial infarction) ([facility] [address]  Plan: Chest pain from pills stuck in esophagus. Stricture? No angina. Noncardiac chest pain. Elevated troponin due to demand ischemia from hypotension from nitro. Stop heparin gtt. No plan for further evaluation at this time. He is frail, cachectic elderly and has poor overall prognosis. Active Problems:        Atrial fibrillation (HCC)  Plan: cont eliquis, cardizem, aspirin. Will sign off. Call with questions. I will address the patient's cardiac risk factors and adjusted pharmacologic treatment as needed. In addition, I have reinforced the need for patient directed risk factor modification. Tobacco use was discussed with the patient and educated on the negative effects. I have asked the patient to not utilize these agents. Thank you for allowing to us to participate in the care or Vineet Baptiste. Further evaluation will be based upon the patient's clinical course and testing results. All questions and concerns were addressed to the patient/family. Alternatives to my treatment were discussed. The note was completed using EMR. Every effort was made to ensure accuracy; however, inadvertent computerized transcription errors may be present.     Stefano Louie MD, MD 6/26/2021 1:11 PM

## 2021-06-27 LAB
ANION GAP SERPL CALCULATED.3IONS-SCNC: 7 MMOL/L (ref 3–16)
APTT: 35.3 SEC (ref 24.2–36.2)
BASOPHILS ABSOLUTE: 0 K/UL (ref 0–0.2)
BASOPHILS RELATIVE PERCENT: 0.8 %
BUN BLDV-MCNC: 14 MG/DL (ref 7–20)
CALCIUM SERPL-MCNC: 9.1 MG/DL (ref 8.3–10.6)
CHLORIDE BLD-SCNC: 104 MMOL/L (ref 99–110)
CO2: 30 MMOL/L (ref 21–32)
CREAT SERPL-MCNC: 0.8 MG/DL (ref 0.8–1.3)
EKG ATRIAL RATE: 84 BPM
EKG DIAGNOSIS: NORMAL
EKG P AXIS: 12 DEGREES
EKG P-R INTERVAL: 178 MS
EKG Q-T INTERVAL: 366 MS
EKG QRS DURATION: 98 MS
EKG QTC CALCULATION (BAZETT): 432 MS
EKG R AXIS: -8 DEGREES
EKG T AXIS: 172 DEGREES
EKG VENTRICULAR RATE: 84 BPM
EOSINOPHILS ABSOLUTE: 0.1 K/UL (ref 0–0.6)
EOSINOPHILS RELATIVE PERCENT: 1.8 %
ESTIMATED AVERAGE GLUCOSE: 114 MG/DL
GFR AFRICAN AMERICAN: >60
GFR NON-AFRICAN AMERICAN: >60
GLUCOSE BLD-MCNC: 79 MG/DL (ref 70–99)
HBA1C MFR BLD: 5.6 %
HCT VFR BLD CALC: 36.5 % (ref 40.5–52.5)
HEMOGLOBIN: 12.3 G/DL (ref 13.5–17.5)
LYMPHOCYTES ABSOLUTE: 0.5 K/UL (ref 1–5.1)
LYMPHOCYTES RELATIVE PERCENT: 14.9 %
MCH RBC QN AUTO: 32.5 PG (ref 26–34)
MCHC RBC AUTO-ENTMCNC: 33.8 G/DL (ref 31–36)
MCV RBC AUTO: 96.2 FL (ref 80–100)
MONOCYTES ABSOLUTE: 0.4 K/UL (ref 0–1.3)
MONOCYTES RELATIVE PERCENT: 14.2 %
NEUTROPHILS ABSOLUTE: 2.1 K/UL (ref 1.7–7.7)
NEUTROPHILS RELATIVE PERCENT: 68.3 %
PDW BLD-RTO: 14.1 % (ref 12.4–15.4)
PLATELET # BLD: 181 K/UL (ref 135–450)
PMV BLD AUTO: 6.8 FL (ref 5–10.5)
POTASSIUM SERPL-SCNC: 4.2 MMOL/L (ref 3.5–5.1)
RBC # BLD: 3.79 M/UL (ref 4.2–5.9)
SODIUM BLD-SCNC: 141 MMOL/L (ref 136–145)
TROPONIN: 0.07 NG/ML
WBC # BLD: 3.1 K/UL (ref 4–11)

## 2021-06-27 PROCEDURE — 85025 COMPLETE CBC W/AUTO DIFF WBC: CPT

## 2021-06-27 PROCEDURE — 84484 ASSAY OF TROPONIN QUANT: CPT

## 2021-06-27 PROCEDURE — 6370000000 HC RX 637 (ALT 250 FOR IP): Performed by: INTERNAL MEDICINE

## 2021-06-27 PROCEDURE — 2580000003 HC RX 258: Performed by: INTERNAL MEDICINE

## 2021-06-27 PROCEDURE — 2060000000 HC ICU INTERMEDIATE R&B

## 2021-06-27 PROCEDURE — 36415 COLL VENOUS BLD VENIPUNCTURE: CPT

## 2021-06-27 PROCEDURE — 97166 OT EVAL MOD COMPLEX 45 MIN: CPT

## 2021-06-27 PROCEDURE — 85730 THROMBOPLASTIN TIME PARTIAL: CPT

## 2021-06-27 PROCEDURE — 93010 ELECTROCARDIOGRAM REPORT: CPT | Performed by: INTERNAL MEDICINE

## 2021-06-27 PROCEDURE — 97535 SELF CARE MNGMENT TRAINING: CPT

## 2021-06-27 PROCEDURE — 97162 PT EVAL MOD COMPLEX 30 MIN: CPT

## 2021-06-27 PROCEDURE — 97530 THERAPEUTIC ACTIVITIES: CPT

## 2021-06-27 PROCEDURE — 80048 BASIC METABOLIC PNL TOTAL CA: CPT

## 2021-06-27 PROCEDURE — 6360000002 HC RX W HCPCS: Performed by: INTERNAL MEDICINE

## 2021-06-27 RX ORDER — LORAZEPAM 0.5 MG/1
0.5 TABLET ORAL EVERY 6 HOURS PRN
Qty: 12 TABLET | Refills: 0 | Status: SHIPPED | OUTPATIENT
Start: 2021-06-27 | End: 2021-06-30

## 2021-06-27 RX ADMIN — APIXABAN 2.5 MG: 2.5 TABLET, FILM COATED ORAL at 09:28

## 2021-06-27 RX ADMIN — ZIPRASIDONE HYDROCHLORIDE 20 MG: 20 CAPSULE ORAL at 21:26

## 2021-06-27 RX ADMIN — LORAZEPAM 0.5 MG: 0.5 TABLET ORAL at 09:28

## 2021-06-27 RX ADMIN — PANTOPRAZOLE SODIUM 40 MG: 40 TABLET, DELAYED RELEASE ORAL at 06:09

## 2021-06-27 RX ADMIN — MAGNESIUM GLUCONATE 500 MG ORAL TABLET 400 MG: 500 TABLET ORAL at 09:29

## 2021-06-27 RX ADMIN — STANDARDIZED SENNA CONCENTRATE AND DOCUSATE SODIUM 1 TABLET: 8.6; 5 TABLET ORAL at 09:28

## 2021-06-27 RX ADMIN — APIXABAN 2.5 MG: 2.5 TABLET, FILM COATED ORAL at 21:26

## 2021-06-27 RX ADMIN — Medication 500 MG: at 09:28

## 2021-06-27 RX ADMIN — GABAPENTIN 100 MG: 100 CAPSULE ORAL at 21:26

## 2021-06-27 RX ADMIN — ASPIRIN 81 MG: 81 TABLET, CHEWABLE ORAL at 09:28

## 2021-06-27 RX ADMIN — STANDARDIZED SENNA CONCENTRATE AND DOCUSATE SODIUM 1 TABLET: 8.6; 5 TABLET ORAL at 21:26

## 2021-06-27 RX ADMIN — FERROUS SULFATE TAB 325 MG (65 MG ELEMENTAL FE) 325 MG: 325 (65 FE) TAB at 09:28

## 2021-06-27 RX ADMIN — Medication 10 ML: at 21:26

## 2021-06-27 RX ADMIN — ONDANSETRON 4 MG: 2 INJECTION INTRAMUSCULAR; INTRAVENOUS at 16:02

## 2021-06-27 RX ADMIN — LAMOTRIGINE 150 MG: 100 TABLET ORAL at 09:28

## 2021-06-27 RX ADMIN — Medication 10 ML: at 09:29

## 2021-06-27 RX ADMIN — FINASTERIDE 5 MG: 5 TABLET, FILM COATED ORAL at 21:25

## 2021-06-27 RX ADMIN — LEVOTHYROXINE SODIUM 25 MCG: 0.03 TABLET ORAL at 06:09

## 2021-06-27 RX ADMIN — DILTIAZEM HYDROCHLORIDE 30 MG: 30 TABLET, FILM COATED ORAL at 21:25

## 2021-06-27 RX ADMIN — ATORVASTATIN CALCIUM 80 MG: 80 TABLET, FILM COATED ORAL at 21:26

## 2021-06-27 RX ADMIN — LORAZEPAM 0.5 MG: 0.5 TABLET ORAL at 21:25

## 2021-06-27 RX ADMIN — DILTIAZEM HYDROCHLORIDE 30 MG: 30 TABLET, FILM COATED ORAL at 09:28

## 2021-06-27 RX ADMIN — TAMSULOSIN HYDROCHLORIDE 0.4 MG: 0.4 CAPSULE ORAL at 09:28

## 2021-06-27 ASSESSMENT — PAIN SCALES - GENERAL
PAINLEVEL_OUTOF10: 0

## 2021-06-27 NOTE — DISCHARGE SUMMARY
[facility] -- Physician Discharge Summary     Dima So  10/31/1925  MRN: [id number]    Admit Date: 6/25/2021  Discharge Date: No discharge date for patient encounter. Attending MD: Nikita Haywood MD  Discharging MD: Nikita Haywood MD  PCP: Opal Garcia MD [address] / [facility] 83. [phone]    Admission Diagnosis: NSTEMI (non-ST elevated myocardial infarction) [facility]) [I21.4]  NSTEMI (non-ST elevated myocardial infarction) [facility]) [I21.4]  DISCHARGE DIAGNOSIS: same    Full Hospital Problem List:  Active Hospital Problems    Diagnosis Date Noted    Hyperlipidemia [E78.5] 08/11/2011     Priority: High    Essential hypertension [I10] 06/10/2011     Priority: High    Atherosclerosis of native coronary artery of native heart with angina pectoris ([facility] [I25.119] 06/10/2011     Priority: High    Phimosis [N47.1] 06/26/2021    NSTEMI (non-ST elevated myocardial infarction) ([facility] [I21.4] 06/25/2021    Atrial fibrillation (HCC) [I48.91] 06/25/2021    Chronic diastolic heart failure ([facility] [I50.32] 06/01/2020    Moderate protein-calorie malnutrition ([facility] [E44.0] 04/08/2019           Hospital Course:  80 y. o. male who was brought in by EMS transportation from [facility] for chest pain.  Patient's chest pain started this morning when he was eating. [address] [place] [address] Avenue home given 3 separate doses of nitroglycerin.  After this, the patient developed hypotension and EMS was called. Kurtcedric Bob reports chest pain did resolve with nitroglycerin.  On arrival, EMS found the patient to be hypertensive and initiated fluids.  By the time the patient arrived here, his blood pressure had improved significantly and normalized.  Patient describes the pain as dull and substernal.  He states it was mild at the time, likely 3 or 4 out of 10.  There is no radiation of the pain.  He did not develop diaphoresis or shortness of breath.  Although patient states he was initially chest pain-free when we began talking to her by taking of his history, he eventually developed even milder, but similar chest pain at the end of my evaluation.  Patient's daughter states he has used nitroglycerin in the past for his chest pain, but not recently. He has not had chest pain like this in a long time.  Patient denies shortness of breath, lightheadedness, or other symptoms     Pt has elevated troponins in ER  To be admitted for NSTEMI  Cards consulted  Per their recs:  No angina. Noncardiac chest pain. Elevated troponin due to demand ischemia from hypotension from nitro. Stop heparin gtt. No plan for further evaluation at this time. He is frail, cachectic elderly and has poor overall prognosis. Will sign off. Call with questions. Pt is chest pain free next day  Able to eat with minimal discomfort  There could be poss GI cause, ?stricture  Workup discussed with pt, but he wants to defer   If it recures, he is advised he can proceed with outpt GI workup        Consults made during Hospitalization:  IP CONSULT TO CARDIOLOGY  IP CONSULT TO UROLOGY    Treatment team at time of Discharge: Treatment Team: Attending Provider: Linh Early MD; Consulting Physician: Linh Early MD; Consulting Physician: Deb Castillo MD; Consulting Physician: Doug Giles MD; Registered Nurse: Ellen Ashby RN; Utilization Reviewer: Uma Martinez RN; Physical Therapist: Indiana Sandoval PT    Imaging Results:  XR CHEST PORTABLE    Result Date: 6/25/2021  EXAMINATION: ONE XRAY VIEW OF THE CHEST 6/25/2021 12:40 pm COMPARISON: Chest radiograph 06/06/2020 HISTORY: ORDERING SYSTEM PROVIDED HISTORY: SOB TECHNOLOGIST PROVIDED HISTORY: Reason for exam:->SOB Reason for Exam: Chest Pain (Patient brought in via [facility] from [facility]. Called for chest pain, gave 3 nitro at [facility].  Patient was hypotensive fluids started by EMS . ) Acuity: Acute Type of Exam: Initial FINDINGS: There is prominence of the pulmonary vascular markings with airspace disease seen in the centrally/involving the lung bases. Trace blunting of the costophrenic angles noted bilaterally. Cardiac silhouette is enlarged but unchanged. Stable calcifications seen in the aortic arch. Sternotomy wires are noted. No pneumothorax. 1. Bilateral pulmonary opacities likely represent mild pulmonary edema. 2. Blunting of the costophrenic angles likely represents trace pleural fluid and atelectasis.          Discharge Exam:  BP (!) 148/91   Pulse 116   Temp 98.2 °F (36.8 °C) (Oral)   Resp 14   Ht 5' 5\" (1.651 m)   Wt 127 lb 12.8 oz (58 kg)   SpO2 93%   BMI 21.27 kg/m²   General appearance: alert, appears stated age and cooperative  Head: Normocephalic, without obvious abnormality, atraumatic  Lungs: clear to auscultation bilaterally  Heart: regular rate and rhythm, S1, S2 normal, no murmur, click, rub or gallop  Abdomen: soft, non-tender; bowel sounds normal; no masses,  no organomegaly  Extremities: extremities normal, atraumatic, no cyanosis or edema    Disposition: SNF    Condition: stable    Discharge Medications:   Noah Rocha   [place] Medication Instructions [id number]    Printed on:06/27/21 1009   Medication Information                      apixaban (ELIQUIS) 2.5 MG TABS tablet  Take 1 tablet by mouth 2 times daily             Artificial Tear Ointment (DRY EYES OP)  Apply 250 capsules to eye daily             aspirin 81 MG chewable tablet  Take 1 tablet by mouth daily             calcium carbonate (OSCAL) 500 MG TABS tablet  Take 500 mg by mouth daily             dilTIAZem (CARDIZEM) 30 MG tablet  Take 1 tablet by mouth 3 times daily Hold the tablet if heart rate below 60 or blood pressure below 548 systolic             ferrous sulfate (IRON 325) 325 (65 Fe) MG tablet  Take 325 mg by mouth daily (with breakfast)             finasteride (PROSCAR) 5 MG tablet  TAKE 1 TABLET EVERY NIGHT             gabapentin (NEURONTIN) 100 MG capsule  Take 1 capsule by mouth nightly for 30 days. lamoTRIgine (LAMICTAL) 150 MG tablet  Take 1 tablet by mouth daily             levothyroxine (SYNTHROID) 25 MCG tablet  TAKE 1 TABLET EVERY DAY             LORazepam (ATIVAN) 0.5 MG tablet  Take 1 tablet by mouth every 6 hours as needed for Anxiety for up to 3 days. Magnesium 400 MG TABS  Take 1 tablet by mouth daily              melatonin 5 MG TABS tablet  Take 5 mg by mouth daily as needed (Insomnia)             Multiple Vitamins-Minerals (MULTIVITAMIN ADULT PO)  Take by mouth             nitroGLYCERIN (NITROSTAT) 0.4 MG SL tablet  Place 1 tablet under the tongue every 5 minutes as needed for Chest pain             Nutritional Supplements (ENSURE COMPLETE PO)  Take by mouth 2 times daily             pantoprazole (PROTONIX) 40 MG tablet  TAKE 1 TABLET EVERY DAY             polyethylene glycol (GLYCOLAX) powder  Take 17 g by mouth daily as needed             sennosides-docusate sodium (SENOKOT-S) 8.6-50 MG tablet  Take 1 tablet by mouth 2 times daily             tamsulosin (FLOMAX) 0.4 MG capsule  Take 0.4 mg by mouth daily             triamcinolone (KENALOG) 0.1 % cream  Apply to affected areas twice daily             ziprasidone (GEODON) 20 MG capsule  Take 20 mg by mouth nightly                 Allergies: Allergies   Allergen Reactions    Lasix [Furosemide] Other (See Comments)     Pt passed out     Zyprexa [Olanzapine]      Pacing.  Citalopram Anxiety     сергей       Follow up Instructions: Follow-up with PCP: Ronal Virk MD in 2 wk .       Total time spent on day of discharge including face-to-face visit, examination, documentation, counseling, preparation of discharge plans and followup, and discharge medicine reconciliation and presciptions is 36 minutes    Signed:  Leonides Ford MD  6/27/2021

## 2021-06-27 NOTE — DISCHARGE INSTR - COC
Continuity of Care Form    Patient Name: Nitza Mills   :  10/31/1925  MRN:  [id number]    Admit date:  2021  Discharge date:  2021    Code Status Order: [facility]   Advance Directives:   [facility] Documentation     Date/Time Healthcare Directive Type of Healthcare Directive Copy in [address] Agent's Name Healthcare Agent's Phone Number    [phone]  Yes, patient has an advance directive for healthcare treatment  --  Yes, copy in chart  --  --  --          Admitting Physician:  Linh Early MD  PCP: Jenniffer Castillo MD    Discharging Nurse: [facility] Unit/Room#: 1KP-4520/2805-61  Discharging Unit Phone Number: [phone]    Emergency Contact:   Extended Emergency Contact Information  Primary Emergency Contact: [address] [place] [address] Phone: [phone]  Mobile Phone: [phone]  Relation: Child  Secondary Emergency Contact: Dayna [address] Phone: [phone]  Relation: Child    Past Surgical History:  Past Surgical History:   Procedure Laterality Date    CARDIAC SURGERY      CATARACT REMOVAL  1993    X5    COLONOSCOPY     [address] WITH STENT PLACEMENT  2005      X3    CORONARY ARTERY BYPASS GRAFT      , x4, kellie Riggs)    EYE SURGERY      HIP SURGERY Left 2020    LEFT BIPOLAR HIP HEMIARTHROPLASTY performed by Jolynn Saldana MD at \[facility]\""    SKIN CANCER DESTRUCTION  2006    Melanoma  ()    TUR         Immunization History:   Immunization History   Administered Date(s) Administered    Influenza 2011, 2013    Influenza Virus Vaccine 2011, 10/08/2012, 2013, 10/23/2014, 2015, 10/20/2016    Influenza, High Dose (Fluzone 65 yrs and older) 2015, 10/20/2016, 2017, 10/18/2018    Influenza, Triv, inactivated, subunit, adjuvanted, IM (Fluad 65 yrs and older) 10/01/2019    Pneumococcal Conjugate 13-valent ([id number]) 12/01/2014    Pneumococcal Polysaccharide ([id number]) 01/01/2016    Td, unspecified formulation 10/23/2014    Zoster Live (Zostavax) 10/08/2012       Active Problems:  Patient Active Problem List   Diagnosis Code    Essential hypertension I10    Atherosclerosis of native coronary artery of native heart with angina pectoris ([facility]) I25.119    Hyperlipidemia E78.5    Bradycardia R00.1    Chest pain R07.9    GERD (gastroesophageal reflux disease) K21.9    Bilateral carotid artery disease ([facility]) I77.9    Mild aortic stenosis I35.0    Psychophysiological insomnia F51.04    Anxiety disorder F41.9    RLS (restless legs syndrome) G25.81    Chronic idiopathic constipation K59.04    Bipolar 1 disorder ([facility]) F31.9    Paroxysmal atrial fibrillation ([facility]) I48.0    Moderate protein-calorie malnutrition ([facility]) E44.0    Subcapital fracture of femur, left, closed, initial encounter ([facility] S72.012A    Chronic diastolic heart failure ([facility]) I50.32    Closed low lateral malleolus fracture, left, initial encounter S82. 62XA    Localized edema R60.0    Chronic atrial fibrillation ([facility]) I48.20    Contusion of left knee S80. 02XA    Contusion of right knee S80. 01XA    Left knee pain M25.562    NSTEMI (non-ST elevated myocardial infarction) ([facility]) I21.4    Atrial fibrillation ([facility]) I48.91    Phimosis N47.1       Isolation/Infection:   Isolation          No Isolation        Patient Infection Status     Infection Onset Added Last Indicated Last Indicated By Review Planned Expiration Resolved Resolved By    None active    Resolved    COVID-19 Rule Out 05/31/20 05/31/20 05/31/20 COVID-19 (Ordered)   06/02/20 Rule-Out Test Resulted          Nurse Assessment:  Last Vital Signs: /65   Pulse 109   Temp 97.6 °F (36.4 °C) (Oral)   Resp 16   Ht 5' 5\" (1.651 m)   Wt 120 lb 6.4 oz (54.6 kg)   SpO2 92%   BMI 20.04 kg/m²     Last documented pain score (0-10 scale): Pain Level: 0  Last Weight:   Wt Readings from Last 1 Encounters:   06/28/21 120 lb 6.4 oz (54.6 kg)     Mental Status:  oriented and alert    IV Access:  - None    Nursing Mobility/ADLs:  Walking   Assisted  Transfer  Assisted  Bathing  Assisted  Dressing  Assisted  Toileting  Assisted  Feeding  Assisted  Med Admin  Assisted  Med Delivery   whole mixed with applesauce    Wound Care Documentation and Therapy:  Wound 06/02/20 Ankle Left; Outer; Inner Purple, non-blanching on both inner and outer bony prominences (Active)   Number of days: 391        Elimination:  Continence:   · Bowel: Yes  · Bladder: Yes  Urinary Catheter: None   Colostomy/Ileostomy/Ileal Conduit: No       Date of Last BM: 06/25/2021    Intake/Output Summary (Last 24 hours) at 6/28/2021 1224  Last data filed at 6/28/2021 0950  Gross per 24 hour   Intake 250 ml   Output --   Net 250 ml     I/O last 3 completed shifts: In: 20 [I.V.:20]  Out: -     Safety Concerns:     History of Falls (last 30 days)    Impairments/Disabilities:      None    Nutrition Therapy:  Current Nutrition Therapy:   - Oral Diet:  General    Routes of Feeding: Oral  Liquids: No straws  Daily Fluid Restriction: no  Last Modified Barium Swallow with Video (Video Swallowing Test): not done    Treatments at the Time of Hospital Discharge:   Respiratory Treatments: None  Oxygen Therapy:  is not on home oxygen therapy.   Ventilator:    - No ventilator support    Rehab Therapies: Physical Therapy and Occupational Therapy  Weight Bearing Status/Restrictions: No weight bearing restirctions  Other Medical Equipment (for information only, NOT a DME order):  walker  Other Treatments: None    Patient's personal belongings (please select all that are sent with patient):  None    RN SIGNATURE:  Electronically signed by Radha Keller RN on 6/27/21 at 10:59 AM EDT    CASE MANAGEMENT/SOCIAL WORK SECTION    Inpatient Status Date: 6-25-21    Readmission Risk Assessment Score:  Readmission Risk              Risk of Unplanned Readmission:  19           Discharging to Facility/  6-28-21 to [facility] at   2:00pm   · Name: Reena Varghese   · Address: [address] [place] [address] Road  · Report: [phone]  · Fax: [phone]    / signature: Pascale Alexandra [facility] MSW     PHYSICIAN SECTION    Prognosis: Fair    Condition at Discharge: Stable    Rehab Potential (if transferring to Rehab): Good    Recommended Labs or Other Treatments After Discharge: ***    Physician Certification: I certify the above information and transfer of Hay Pepper  is necessary for the continuing treatment of the diagnosis listed and that he requires formerly [facility] for greater 30 days.      Update Admission H&P: No change in H&P    PHYSICIAN SIGNATURE:  Electronically signed by Santana Hernandez MD on 6/28/21 at 11:22 AM EDT

## 2021-06-27 NOTE — PROGRESS NOTES
Atrial flutter, unspecified type ([address] and Anxiety disorder due to known physiological condition were also pertinent to this visit. has a past medical history of A-fib ([address] Arthritis, Atrial fibrillation ([address] Blepharitis of both eyes, CAD (coronary artery disease), GERD (gastroesophageal reflux disease), Gout, Hyperlipidemia, Hypertension, Macular degeneration, NSTEMI (non-ST elevated myocardial infarction) [facility]), Prostate enlargement, Psychiatric problem, and Tachycardia. has a past surgical history that includes Coronary angioplasty with stent (2005); skin biopsy; Kidney stone surgery (1980); Coronary artery bypass graft; Cataract removal (1993); TURP (2003); Skin cancer destruction (2006); Colonoscopy; eye surgery; Cardiac surgery; and hip surgery (Left, 6/2/2020). Treatment Diagnosis: decreased independence with ADL due to late affects of chest pain, hx of hip fx, falls, scoliosis      Restrictions  Restrictions/Precautions  Restrictions/Precautions: Fall Risk (high fall risk)  Position Activity Restriction  Other position/activity restrictions: Admitted 6/25: This is a 80 y.o. male who was brought in by EMS transportation from [facility] for chest pain. Patient's chest pain started this morning when he was eating. The nursing home given 3 separate doses of nitroglycerin. After this, the patient developed hypotension and EMS was called. Patient reports chest pain did resolve with nitroglycerin. On arrival, EMS found the patient to be hypertensive and initiated fluids. By the time the patient arrived here, his blood pressure had improved significantly and normalized. Found to have NSTEMI. His sister and health aid state he has declined significantly and is unable to care for himself. Subjective   General  Chart Reviewed: Yes  Family / Caregiver Present: Yes (daughter)  Diagnosis: chest pain  Subjective  Subjective: Patient supine in bed and agreeable to therapy.   Requesting to go back to bed  Patient Currently in Pain: No  Pain Assessment  Pain Assessment: 0-10  Pain Level: 0  Vital Signs  Temp: 97.8 °F (36.6 °C)  Temp Source: Axillary  Pulse: 104  Heart Rate Source: Monitor  Resp: 16  BP: 116/72  BP Location: Left upper arm  Patient Position: Semi fowlers  Level of Consciousness: Alert (0)  MEWS Score: 2  Patient Currently in Pain: No  Oxygen Therapy  SpO2: 94 %  Pulse Oximeter Device Mode: Intermittent  Pulse Oximeter Device Location: Finger  O2 Device: None (Room air)  Social/Functional History  Social/Functional History  Lives With: Alone  Type of Home: Assisted living  Home Layout: One level  Home Access: Level entry  Bathroom Shower/Tub: Walk-in shower  Bathroom Toilet: Handicap height  Bathroom Equipment: Shower chair, Grab bars in shower, Grab bars around toilet  Home Equipment: Rolling walker, Reacher, Sock aid  ADL Assistance: Needs assistance  Ambulation Assistance: Needs assistance (with walker - daughter reports he is supposed to ask for help prior to ambulaing in room, but sometimes he doesn't wait)  Transfer Assistance: Independent  Additional Comments: reports falls in last 6 months (patient denied, but daughter reported falls), just finished with out-patient PT (daughter reported he needed to take bus to therapy)       Objective        Orientation  Overall Orientation Status: Impaired  Orientation Level: Oriented to person  Observation/Palpation  Posture: Fair  Observation: severe scoliosis  Balance  Sitting Balance: Stand by assistance  Standing Balance: Minimal assistance  Standing Balance  Time: up to 5 miutes  Activity: pericare, lower body dressing, toileting  Functional Mobility  Functional - Mobility Device: Rolling Walker  Activity: To/from bathroom  Assist Level: Minimal assistance  Functional Mobility Comments: leans behind base of support  Toilet Transfers  Toilet - Technique: Ambulating  Toilet Transfer: Minimal assistance  Wheelchair Bed Transfers  Wheelchair/Bed - Technique: Ambulating  Level of Asssistance: Minimal assistance  ADL  Feeding: Setup  Grooming: Minimal assistance  UE Bathing: Increased time to complete;Minimal assistance  LE Bathing: Increased time to complete;Maximum assistance  UE Dressing: Increased time to complete;Maximum assistance  LE Dressing: Maximum assistance; Increased time to complete  Toileting: Increased time to complete;Maximum assistance  Additional Comments: patient ambulated to bathroom, patient falling backwards, needing min assist to maintain weight over base of support. patient does have a hx of falls  Tone RUE  RUE Tone: Normotonic  Tone LUE  LUE Tone: Normotonic  Coordination  Movements Are Fluid And Coordinated: No  Coordination and Movement description: Decreased speed     Bed mobility  Sit to Supine: Minimal assistance  Transfers  Sit to stand: Minimal assistance  Stand to sit: Minimal assistance     Cognition  Overall Cognitive Status: Exceptions  Arousal/Alertness: Delayed responses to stimuli  Following Commands: Follows one step commands with increased time; Follows one step commands with repetition  Attention Span: Difficulty attending to directions  Memory: Decreased short term memory;Decreased recall of biographical Information  Safety Judgement: Decreased awareness of need for safety  Problem Solving: Assistance required to correct errors made;Assistance required to identify errors made;Assistance required to implement solutions;Decreased awareness of errors  Insights: Decreased awareness of deficits  Initiation: Requires cues for all  Sequencing: Requires cues for all                                        Plan   Plan  Plan Comment: patient discharging to  home today    G-Code     OutComes Score                                                  AM-PAC Score        AM-PAC Inpatient Daily Activity Raw Score: 13 (06/27/21 1212)  AM-PAC Inpatient ADL T-Scale Score : 32.03 (06/27/21 1212)  ADL Inpatient CMS 0-100% Score: 63.03 (06/27/21 1212)  ADL Inpatient CMS G-Code Modifier : CL (06/27/21 1212)    Goals  Patient Goals   Patient goals : patient's goal is to return to his apartment       Therapy Time   Individual Concurrent Group Co-treatment   Time In 1140         Time Out 1215         Minutes 35         Timed Code Treatment Minutes: [address] Crystal Devi, OT

## 2021-06-27 NOTE — PLAN OF CARE
Problem: Pain:  Goal: Pain level will decrease  Description: Pain level will decrease  6/27/2021 1043 by Gae Aase, RN  Outcome: Completed     Problem: Skin Integrity:  Goal: Absence of new skin breakdown  Description: Absence of new skin breakdown  6/27/2021 1043 by Gae Aase, RN  Outcome: Completed

## 2021-06-27 NOTE — PROGRESS NOTES
Physical Therapy    Facility/Department: [address]  Initial Assessment    NAME: Hay Pepper  : 10/31/1925  MRN: [id number]    Date of Service: 2021    Discharge Recommendations:  Hay Pepper scored a 17/24 on the AM-PAC short mobility form. Current research shows that an AM-PAC score of 17 or less is typically not associated with a discharge to the patient's home setting. Based on the patient's AM-PAC score and their current functional mobility deficits, it is recommended that the patient have 3-5 sessions per week of Physical Therapy at d/c to increase the patient's independence. Please see assessment section for further patient specific details. If patient discharges prior to next session this note will serve as a discharge summary. Please see below for the latest assessment towards goals. 3-5 sessions per week   PT Equipment Recommendations  Equipment Needed: No    Assessment   Body structures, Functions, Activity limitations: Decreased functional mobility ; Decreased safe awareness;Decreased endurance;Decreased strength;Decreased balance  Assessment: Patient not at baseline function and would benefit from skilled PT to address above deficits and facilitate return to baseline function. Patient presents at significant risk of falls and strongly recommend 24 hour supervision and hands on assistance for all mobility. Unsure if available in current setting. Treatment Diagnosis: decreased functional mobility, impaired gait, decreased balance  Prognosis: Good  Decision Making: Medium Complexity  Clinical Presentation: evolving  PT Education: Goals;PT Role;Plan of Care  Patient Education: verbalized understanding  Barriers to Learning: cognitive, hearing  REQUIRES PT FOLLOW UP: Yes  Activity Tolerance  Activity Tolerance: Patient Tolerated treatment well       Patient Diagnosis(es): The primary encounter diagnosis was ACS (acute coronary syndrome) ([facility] [address]  Diagnoses of Atrial flutter, unspecified type ([address] and Anxiety disorder due to known physiological condition were also pertinent to this visit. has a past medical history of A-fib ([address] Arthritis, Atrial fibrillation ([address] Blepharitis of both eyes, CAD (coronary artery disease), GERD (gastroesophageal reflux disease), Gout, Hyperlipidemia, Hypertension, Macular degeneration, NSTEMI (non-ST elevated myocardial infarction) [facility]), Prostate enlargement, Psychiatric problem, and Tachycardia. has a past surgical history that includes Coronary angioplasty with stent (2005); skin biopsy; Kidney stone surgery (1980); Coronary artery bypass graft; Cataract removal (1993); TURP (2003); Skin cancer destruction (2006); Colonoscopy; eye surgery; Cardiac surgery; and hip surgery (Left, 6/2/2020). Restrictions  Restrictions/Precautions  Restrictions/Precautions: Fall Risk (high fall risk)  Required Braces or Orthoses?: No  Position Activity Restriction  Other position/activity restrictions: Admitted 6/25: This is a 80 y.o. male who was brought in by EMS transportation from [facility] for chest pain. Patient's chest pain started this morning when he was eating. The nursing home given 3 separate doses of nitroglycerin. After this, the patient developed hypotension and EMS was called. Patient reports chest pain did resolve with nitroglycerin. On arrival, EMS found the patient to be hypertensive and initiated fluids. By the time the patient arrived here, his blood pressure had improved significantly and normalized. Found to have NSTEMI. His sister and health aid state he has declined significantly and is unable to care for himself.   Vision/Hearing  Vision: Within Functional Limits  Hearing: Exceptions to [facility]  Hearing Exceptions: Hard of hearing/hearing concerns     Subjective  General  Chart Reviewed: Yes  Family / Caregiver Present: Yes (daughter)  Diagnosis: NSTEMI  Follows Commands: Impaired  Other (Comment): increased time, delayed responses to questions  General Comment  Comments: supine in bed upon arrival with alarm on  Subjective  Subjective: Denied pain. Agreeable to therapy. Daughter reports patient is being discharged to assisted living at 1 this date. Pain Screening  Patient Currently in Pain: No             Social/Functional History  Social/Functional History  Lives With: Alone  Type of Home: Assisted living  Home Layout: One level  Home Access: Level entry  Bathroom Shower/Tub: Walk-in shower  Bathroom Toilet: Handicap height  Bathroom Equipment: Shower chair, Grab bars in shower, Grab bars around toilet  Home Equipment: Rolling walker, Reacher, Sock aid  ADL Assistance: Needs assistance  Ambulation Assistance: Needs assistance (with walker - daughter reports he is supposed to ask for help prior to [place] in room, but sometimes he doesn't wait)  Transfer Assistance: Independent  Additional Comments: reports falls in last 6 months (patient denied, but daughter reported falls), just finished with out-patient PT (daughter reported he needed to take bus to therapy)  Cognition   Cognition  Overall Cognitive Status: Exceptions  Arousal/Alertness: Delayed responses to stimuli  Following Commands: Follows one step commands with increased time; Follows one step commands with repetition  Attention Span: Difficulty attending to directions  Memory: Decreased short term memory;Decreased recall of biographical Information  Safety Judgement: Decreased awareness of need for safety  Problem Solving: Assistance required to correct errors made;Assistance required to identify errors made;Assistance required to implement solutions;Decreased awareness of errors  Insights: Decreased awareness of deficits  Initiation: Requires cues for all  Sequencing: Requires cues for all    Objective          AROM RLE (degrees)  RLE AROM: WFL  AROM LLE (degrees)  LLE AROM : WFL  Strength RLE  Strength RLE: WFL  Strength LLE  Strength LLE: WFL        Bed mobility  Sit to Supine: Minimal assistance  Scooting: Stand by assistance  Transfers  Sit to Stand: Contact guard assistance;Minimal Assistance (min A from bed, CGA from toilet with cues for hand placement, increased time to acheive upright posture, posterior lean)  Stand to sit: Contact guard assistance  Ambulation  Ambulation?: Yes  Ambulation 1  Surface: level tile  Device: Rolling Walker  Assistance: Contact guard assistance  Quality of Gait: decreased step length and jose, forward flexed posture, scoliosis evident  Distance: 10' + 20'  Stairs/Curb  Stairs?: No     Balance  Sitting - Static: Good  Sitting - Dynamic: Good  Standing - Static: Fair (static standing with CGA, posterior lean throughout)  Standing - Dynamic: [address]  Times per week: 3-5  Times per day: Daily  Current Treatment Recommendations: Strengthening, Transfer Training, Endurance Training, Balance Training, Gait Training, Functional Mobility Training, Safety Education & Training, Home Exercise Program  Safety Devices  Type of devices:  All fall risk precautions in place, Call light within reach, Chair alarm in place, Left in chair, Patient at risk for falls, Nurse notified  Restraints  Initially in place: No             AM-PAC Score  AM-PAC Inpatient Mobility Raw Score : 17 (06/27/21 1222)  AM-PAC Inpatient T-Scale Score : 42.13 (06/27/21 1222)  Mobility Inpatient CMS 0-100% Score: 50.57 (06/27/21 1222)  Mobility Inpatient CMS G-Code Modifier : CK (06/27/21 1222)          Goals  Short term goals  Time Frame for Short term goals: to be met prior to discharge  Short term goal 1: Bed mobility with supervision  Short term goal 2: Sit to/from stand with supervision  Short term goal 3: Ambulate 48' with AAD and supervision  Patient Goals   Patient goals : did not state       Therapy Time   Individual Concurrent Group Co-treatment   Time In 1119         Time Out 1149         Minutes 30         Timed Code Treatment Minutes: Camryn Toth PT

## 2021-06-27 NOTE — PLAN OF CARE
Pt reports no pain overnight. Vital signs have been stable. Pt has appeared anxious/agitated overnight. He has slept intermittently. Pulling off his shoes and trying to put them back on. Pt has attempted to exit the bed multiple times, including to use the bathroom, but has not used his call light. Camera is in place. Bed alarm is on. Pt remains free from falls. Vitals:    06/27/21 0453   BP: (!) 150/83   Pulse: 110   Resp: 16   Temp: 98 °F (36.7 °C)   SpO2: 90%   .   Problem: Pain:  Goal: Pain level will decrease  Description: Pain level will decrease  Outcome: Ongoing     Problem: Skin Integrity:  Goal: Will show no infection signs and symptoms  Description: Will show no infection signs and symptoms  Outcome: Ongoing     Problem: Falls - Risk of:  Goal: Will remain free from falls  Description: Will remain free from falls  Outcome: Ongoing

## 2021-06-27 NOTE — CARE COORDINATION
Social Work Discharge Summary    Date of Discharge: 6/27  Disposition: Iva Amado  Level of Care: AL return     Facility:  Report: [phone]  Fax: [phone] Obtained: n/a  THEO/ANDREW: n/a    Transportation:   The patient will be transported via: Stretcher   Patient will be picked up at (1 PM) via ([address]). RN [facility] made aware.     Niecy MULLIGAN, [address]

## 2021-06-28 ENCOUNTER — TELEPHONE (OUTPATIENT)
Dept: CARDIOLOGY CLINIC | Age: 86
End: 2021-06-28

## 2021-06-28 VITALS
TEMPERATURE: 97.6 F | RESPIRATION RATE: 16 BRPM | HEART RATE: 109 BPM | BODY MASS INDEX: 20.06 KG/M2 | OXYGEN SATURATION: 92 % | DIASTOLIC BLOOD PRESSURE: 65 MMHG | SYSTOLIC BLOOD PRESSURE: 101 MMHG | HEIGHT: 65 IN | WEIGHT: 120.4 LBS

## 2021-06-28 LAB
APTT: 37.6 SEC (ref 24.2–36.2)
LV EF: 63 %
LVEF MODALITY: NORMAL

## 2021-06-28 PROCEDURE — 36415 COLL VENOUS BLD VENIPUNCTURE: CPT

## 2021-06-28 PROCEDURE — 6370000000 HC RX 637 (ALT 250 FOR IP): Performed by: INTERNAL MEDICINE

## 2021-06-28 PROCEDURE — 85730 THROMBOPLASTIN TIME PARTIAL: CPT

## 2021-06-28 PROCEDURE — 2580000003 HC RX 258: Performed by: INTERNAL MEDICINE

## 2021-06-28 PROCEDURE — 92526 ORAL FUNCTION THERAPY: CPT

## 2021-06-28 PROCEDURE — 93306 TTE W/DOPPLER COMPLETE: CPT

## 2021-06-28 RX ORDER — BACITRACIN, NEOMYCIN, POLYMYXIN B 400; 3.5; 5 [USP'U]/G; MG/G; [USP'U]/G
OINTMENT TOPICAL 2 TIMES DAILY
Status: DISCONTINUED | OUTPATIENT
Start: 2021-06-28 | End: 2021-06-28 | Stop reason: HOSPADM

## 2021-06-28 RX ADMIN — Medication 10 ML: at 09:39

## 2021-06-28 RX ADMIN — MAGNESIUM GLUCONATE 500 MG ORAL TABLET 400 MG: 500 TABLET ORAL at 09:38

## 2021-06-28 RX ADMIN — LORAZEPAM 0.5 MG: 0.5 TABLET ORAL at 09:38

## 2021-06-28 RX ADMIN — APIXABAN 2.5 MG: 2.5 TABLET, FILM COATED ORAL at 09:38

## 2021-06-28 RX ADMIN — PANTOPRAZOLE SODIUM 40 MG: 40 TABLET, DELAYED RELEASE ORAL at 05:29

## 2021-06-28 RX ADMIN — FERROUS SULFATE TAB 325 MG (65 MG ELEMENTAL FE) 325 MG: 325 (65 FE) TAB at 09:44

## 2021-06-28 RX ADMIN — LEVOTHYROXINE SODIUM 25 MCG: 0.03 TABLET ORAL at 05:30

## 2021-06-28 RX ADMIN — ASPIRIN 81 MG: 81 TABLET, CHEWABLE ORAL at 09:38

## 2021-06-28 RX ADMIN — TAMSULOSIN HYDROCHLORIDE 0.4 MG: 0.4 CAPSULE ORAL at 09:37

## 2021-06-28 RX ADMIN — Medication 500 MG: at 09:37

## 2021-06-28 RX ADMIN — STANDARDIZED SENNA CONCENTRATE AND DOCUSATE SODIUM 1 TABLET: 8.6; 5 TABLET ORAL at 09:37

## 2021-06-28 RX ADMIN — LAMOTRIGINE 150 MG: 100 TABLET ORAL at 09:37

## 2021-06-28 ASSESSMENT — PAIN SCALES - GENERAL
PAINLEVEL_OUTOF10: 0

## 2021-06-28 NOTE — PLAN OF CARE
Problem: OXYGENATION/RESPIRATORY FUNCTION  Goal: Patient will maintain patent airway  Outcome: Ongoing  Goal: Patient will achieve/maintain normal respiratory rate/effort  Description: Respiratory rate and effort will be within normal limits for the patient  Outcome: Ongoing     Problem: HEMODYNAMIC STATUS  Goal: Patient has stable vital signs and fluid balance  Outcome: Ongoing     Problem: FLUID AND ELECTROLYTE IMBALANCE  Goal: Fluid and electrolyte balance are achieved/maintained  Outcome: Ongoing   Pt respiratory status is WDL at this time on room air. Pt VSS and is able to maintain fluid balance. Pt electrolyte balance is maintained. Pt remains free from falls and physical injury.

## 2021-06-28 NOTE — PROGRESS NOTES
Speech Language Pathology  Dysphagia Treatment Note    Name:  Shivani Garay  :   10/31/1925  Medical Diagnosis:  NSTEMI (non-ST elevated myocardial infarction) ([facility] [address] [I21.4]  NSTEMI (non-ST elevated myocardial infarction) ([facility] [address] [I21.4]  Treatment Diagnosis: Oropharyngeal Dysphagia  Pain level:  Pt denies pain at this time    Current Diet Level: Regular diet with thin liquids/meds with puree  Tolerance of Current Diet Level: Nurse reports good tolerance with no overt signs of aspiration observed. Assessment of Texture Tolerance:  -Impressions: Pt consuming meds with Ensure via straw upon my arrival. Pt's daughter present and reports that that's \"how he usually takes them. \" Upon palpation, severely reduced vs ABSENT laryngeal excursion during the swallow noted with Ensure via straw with meds. Po meds with applesauce attempted with significantly improved timely swallow initiation and laryngeal elevation noted. Po trials of thin and nectar thick liquids via straw demonstrated clinical symptoms of premature bolus loss to pharynx with delayed swallow initiation and with moderately reduced laryngeal excursion. Improved timely swallow initiation noted with liquids with single sips via cup. Education regarding improved functional airway protection with liquids via cup vs straw, explained to the Pt and her daughter who verbalized understanding. MBS is recommended if symptoms of dysphagia/aspiration persist or worsen. Diet and Treatment Recommendations:  Regular diet with thin liquids/NO STRAWS/meds with applesauce    (Dysphagia Goals addressed, if appropriate)  1. Pt will functionally tolerate recommended diet with no overt clinical s/s of aspiration (ongoing 2021)   2. Pt will demonstrate understanding of aspiration risk and precautions via education/demonstration with occasional prompting (ongoing 2021)   3. Pt will advance to least restrictive diet as indicated (ongoing 2021)   4.  If clinical s/s of aspiration/penetration continue to be noted, Pt will participate in Modified Barium Swallow Study (ongoing 6/28/2021)     Plan:  Continued daily Dysphagia treatment with goals per plan of care. Patient/Family Education:Education given to the Pt and nurse, who verbalized understanding    Discharge Recommendations:  Pt will benefit from continued skilled Speech Therapy for Dysphagia services, prior to returning home. Timed Code Treatment: 0 min    Total Treatment Time: 15 min    If patient discharges prior to next session this note will serve as a discharge summary.      Ilan Martel [facility]#[id number]

## 2021-06-28 NOTE — TELEPHONE ENCOUNTER
Pt daughter called on answering service to cancel appts for today pt is in [facility]. Shan Tanya wants to know if pt can have ECHO done while in the hospital? Pls call to advise Thank you

## 2021-06-28 NOTE — PROGRESS NOTES
Report given to [facility] with all questions answered.

## 2021-06-28 NOTE — PROGRESS NOTES
Pt incontinent of urine and depends changed. Pt denies any other needs at this time. Telemetry on. Vital signs stable. Call light within reach, bed in lowest position, bed alarm on, and pt educated to use call light for assistance.

## 2021-06-28 NOTE — PROGRESS NOTES
Progress Note - Dr. Farias [facility] - Internal Medicine  PCP: Dorys Delgado MD [address] [facility] [address] / [address] [facility] [address]. [phone]    Hospital Day: 3  Code Status: DNR-CC  Current Diet: ADULT DIET; Regular        CC: follow up on medical issues    Subjective:   Ermalinda Severin is a 80 y.o. male. He denies problems    Pt doing ok  D/c placed yesterdy, but pt needs SNF and this requires precert  Awaiting this today  No new issues  Denies chest pain, swallowin issues    He denies chest pain, denies shortness of breath, denies nausea,  denies emesis. 10 system Review of Systems is reviewed with patient, and pertinent positives are noted in HPI above . Otherwise, Review of systems is negative. I have reviewed the patient's medical and social history in detail and updated the computerized patient record. To recap: He  has a past medical history of A-fib ([facility] [address] Arthritis, Atrial fibrillation ([facility] [address] Blepharitis of both eyes, CAD (coronary artery disease), GERD (gastroesophageal reflux disease), Gout, Hyperlipidemia, Hypertension, Macular degeneration, NSTEMI (non-ST elevated myocardial infarction) [facility]), Prostate enlargement, Psychiatric problem, and Tachycardia. . He  has a past surgical history that includes Coronary angioplasty with stent (2005); skin biopsy; Kidney stone surgery (1980); Coronary artery bypass graft; Cataract removal (1993); TURP (2003); Skin cancer destruction (2006); Colonoscopy; eye surgery; Cardiac surgery; and hip surgery (Left, 6/2/2020). Trav Morse He  reports that he has never smoked. He has never used smokeless tobacco. He reports that he does not drink alcohol and does not use drugs. .        Active Hospital Problems    Diagnosis Date Noted    Hyperlipidemia [E78.5] 08/11/2011     Priority: High    Essential hypertension [I10] 06/10/2011     Priority: High    Atherosclerosis of native coronary artery of native heart with angina pectoris ([facility] [address] [I25.119] 06/10/2011     Priority: High    Phimosis [N47.1] PRN  hydrALAZINE (APRESOLINE) injection 10 mg, 10 mg, Intravenous, Q6H PRN  potassium chloride (KLOR-CON M) extended release tablet 40 mEq, 40 mEq, Oral, PRN **OR** potassium bicarb-citric acid (EFFER-K) effervescent tablet 40 mEq, 40 mEq, Oral, PRN **OR** potassium chloride 10 mEq/100 mL IVPB (Peripheral Line), 10 mEq, Intravenous, PRN  0.9 % sodium chloride bolus, 500 mL, Intravenous, PRN  LORazepam (ATIVAN) tablet 0.5 mg, 0.5 mg, Oral, BID         Objective:  /80   Pulse 116   Temp 98 °F (36.7 °C) (Oral)   Resp 16   Ht 5' 5\" (1.651 m)   Wt 120 lb 6.4 oz (54.6 kg)   SpO2 92%   BMI 20.04 kg/m²      Patient Vitals for the past 24 hrs:   BP Temp Temp src Pulse Resp SpO2 Weight   06/28/21 0326 129/80 98 °F (36.7 °C) Oral 116 16 92 % --   06/28/21 0220 -- -- -- -- -- -- 120 lb 6.4 oz (54.6 kg)   06/28/21 0059 138/81 98 °F (36.7 °C) Oral 101 16 94 % --   06/27/21 2115 136/86 98.6 °F (37 °C) Oral 102 16 94 % --   06/27/21 1615 121/75 98 °F (36.7 °C) Axillary 90 14 95 % --   06/27/21 1345 (!) 107/59 -- -- -- -- -- --   06/27/21 1115 116/72 97.8 °F (36.6 °C) Axillary 104 16 94 % --   06/27/21 0800 (!) 148/91 98.2 °F (36.8 °C) Oral 116 14 93 % --     Patient Vitals for the past 96 hrs (Last 3 readings):   Weight   06/28/21 0220 120 lb 6.4 oz (54.6 kg)   06/27/21 0453 127 lb 12.8 oz (58 kg)   06/26/21 0457 128 lb 14.4 oz (58.5 kg)           Intake/Output Summary (Last 24 hours) at 6/28/2021 0754  Last data filed at 6/27/2021 1603  Gross per 24 hour   Intake 20 ml   Output --   Net 20 ml         Physical Exam:   /80   Pulse 116   Temp 98 °F (36.7 °C) (Oral)   Resp 16   Ht 5' 5\" (1.651 m)   Wt 120 lb 6.4 oz (54.6 kg)   SpO2 92%   BMI 20.04 kg/m²   General appearance: alert, appears stated age and cooperative  Head: Normocephalic, without obvious abnormality, atraumatic  Lungs: clear to auscultation bilaterally  Heart: irreg irreg  Abdomen: soft, non-tender; bowel sounds normal; no masses,  no organomegaly  Extremities: extremities normal, atraumatic, no cyanosis or edema    Labs:  Lab Results   Component Value Date    WBC 3.1 (L) 06/27/2021    HGB 12.3 (L) 06/27/2021    HCT 36.5 (L) 06/27/2021     06/27/2021    CHOL 85 11/03/2016    TRIG 38 11/03/2016    HDL 39 (L) 11/03/2016    ALT 17 06/26/2021    AST 23 06/26/2021     06/27/2021    K 4.2 06/27/2021     06/27/2021    CREATININE 0.8 06/27/2021    BUN 14 06/27/2021    CO2 30 06/27/2021    TSH 5.09 (H) 05/04/2015    PSA 0.93 05/04/2015    INR 1.33 (H) 05/31/2020    LABA1C 5.6 06/26/2021    LABMICR YES 09/30/2020     Lab Results   Component Value Date    TROPONINI 0.07 (H) 06/27/2021       Recent Imaging Results are Reviewed:  XR CHEST PORTABLE    Result Date: 6/25/2021  EXAMINATION: ONE XRAY VIEW OF THE CHEST 6/25/2021 12:40 pm COMPARISON: Chest radiograph 06/06/2020 HISTORY: ORDERING SYSTEM PROVIDED HISTORY: SOB TECHNOLOGIST PROVIDED HISTORY: Reason for exam:->SOB Reason for Exam: Chest Pain (Patient brought in via [facility] from [facility]. Called for chest pain, gave 3 nitro at nursing home. Patient was hypotensive fluids started by EMS . ) Acuity: Acute Type of Exam: Initial FINDINGS: There is prominence of the pulmonary vascular markings with airspace disease seen in the centrally/involving the lung bases. Trace blunting of the costophrenic angles noted bilaterally. Cardiac silhouette is enlarged but unchanged. Stable calcifications seen in the aortic arch. Sternotomy wires are noted. No pneumothorax. 1. Bilateral pulmonary opacities likely represent mild pulmonary edema. 2. Blunting of the costophrenic angles likely represents trace pleural fluid and atelectasis. Assessment and Plan:  Principal Problem:    NSTEMI (non-ST elevated myocardial infarction) ([facility] [address] -Established problem. Stable. Plan: no further workup per cards  Active Problems:    Essential hypertension -Established problem. Stable. 129/80  Plan: cont same meds    Atherosclerosis of native coronary artery of native heart with angina pectoris ([facility] [address]    Hyperlipidemia -Established problem. Stable. Stay on statin  Plan: Continue present orders/plan. Moderate protein-calorie malnutrition ([facility] [address] -Established problem. Stable. Plan: Continue present orders/plan. Chronic diastolic heart failure (HCC)    Atrial fibrillation ([facility] [address] -Established problem. Stable. Chronic, remains in fib  Plan: Continue present orders/plan. Phimosis -Established problem. Stable. Plan: see urology as outpt; at baseline per their consult      Disp  -await precert.  Stable for d/c whenever this set up            Yosvany Foley MD  6/28/2021

## 2021-06-28 NOTE — PROGRESS NOTES
Physician Progress Note      PATIENT:               Richi Dickinson  CSN #:                  [id number]  :                       10/31/1925  ADMIT DATE:       2021 11:57 AM  DISCH DATE:  RESPONDING  PROVIDER #:        Jaci Dozier MD          QUERY TEXT:    Pt admitted with NSTEMI and has CHF documented. If possible, please document   in progress notes and discharge summary further specificity regarding the type   and acuity of CHF:    The medical record reflects the following:  Risk Factors: Age, NSTEMI, HTN, Chronic Diastolic CHF  Clinical Indicators: Per admission lab 21 proBNP 999 and 21 CxR   \"Bilateral pulmonary opacities likely represent mild pulmonary edema. Blunting   of the costophrenic angles likely represents trace pleural fluid  and atelectasis. \"  Treatment: Cardiology Consult, iv NS bolus, iv heparin, monitor labs  Thank you, Chele Tapia RN [facility] CDS  Options provided:  -- Acute on Chronic Diastolic CHF  -- This patient is not in Acute on Chronic Diastolic CHF  -- This patient is only in Chronic Diastolic CHF  -- Other - I will add my own diagnosis  -- Disagree - Not applicable / Not valid  -- Disagree - Clinically unable to determine / Unknown  -- Refer to Clinical Documentation Reviewer    PROVIDER RESPONSE TEXT:    This patient is in acute on chronic diastolic CHF.     Query created by: Edgard Meraz on 2021 9:51 AM      Electronically signed by:  Jaci Dozier MD 2021 11:22 AM

## 2021-06-28 NOTE — CARE COORDINATION
Patient discharged 6-28-21 to [facility] at   2:00pm [facility]. Call report [phone], fax [phone]. Patient/Family aware of and agreeable to the discharge plan. Please complete & sign the LUCÍA/AVS/Prescriptions,  RN please print LUCÍA/AVS once completed.  Thank you, Ellen Doan, ESE, [phone]    All discharge needs met per case management

## 2021-06-28 NOTE — CARE COORDINATION
Orders faxed to [facility] and THEO submitted. Requested Echo as priority   Perfect serve message to MD to sign Ativan prescription.

## 2021-06-28 NOTE — PROGRESS NOTES
Urology Progress Note  [facility]     Patient: Hay Pepper MRN: [id number]  Room/Bed: [facility]-6409/0440-16   YOB: 1925  Age/Sex: 80 y. o.male  Admission Date: 6/25/2021     Date of Service:  6/28/2021    ASSESSMENT/PLAN     Consulted for Phimosis  Able to retract foreskin and evaluate meatus which is patent  Voiding with Cr 0.8  Sees Dr. Raquel Rasmussen for this annually     Recommendations:  -Continue to monitor phimosis outpt with Dr. García Led  -Likely will not require circumcision   -OK to discharge per  when stable, urology will sign off please call urology with any questions    All patient questions were answered. He understands the plan as listed above. SUBJECTIVE     Chief Complaint:   Chief Complaint   Patient presents with    Chest Pain     Patient brought in via [place] ems from [facility]. Called for chest pain, gave 3 nitro at nursing home. Patient was hypotensive fluids started by EMS . 24 Hour Events: Today patient reports no pain with penis or difficulty with foreskin voiding well. Otherwise symptoms are overall improved and pain is adequately controlled. Denies nausea/vomiting. No other genitourinary symptoms. OBJECTIVE     Hospital Problem List:  Principal Problem:    NSTEMI (non-ST elevated myocardial infarction) ([address]  Active Problems:    Essential hypertension    Atherosclerosis of native coronary artery of native heart with angina pectoris ([address]    Hyperlipidemia    Moderate protein-calorie malnutrition (HCC)    Chronic diastolic heart failure (HCC)    Atrial fibrillation ([address]    Phimosis  Resolved Problems:    * No resolved hospital problems. *      Physical Exam:  Vitals:    06/28/21 0326   BP: 129/80   Pulse: 116   Resp: 16   Temp: 98 °F (36.7 °C)   SpO2: 92%     CONSTITUTIONAL: The patient is well nourished/developed, with no distress noted. NEUROLOGICAL/PSYCHIATRIC: Oriented to place and time, normal affected noted.    CARDIOVASCULAR: Regular rate and rhythm, no evidence of swelling noted. RESPIRATORY: Normal respiratory effort with no wheezing noted. ABDOMEN: Abdomen soft, non-tender, non-distended. No enlarged liver or spleen. No hernias noted. GENITOURINARY: No CVA tenderness bilaterally. Phimosis foreskin easily retracted no ulcerations or evidence of balanitis    Ins/Outs:    Intake/Output Summary (Last 24 hours) at 6/28/2021 0928  Last data filed at 6/27/2021 1603  Gross per 24 hour   Intake 20 ml   Output --   Net 20 ml       Labs:  CBC   Lab Results   Component Value Date    WBC 3.1 06/27/2021    RBC 3.79 06/27/2021    HGB 12.3 06/27/2021    HCT 36.5 06/27/2021    MCV 96.2 06/27/2021    MCH 32.5 06/27/2021    MCHC 33.8 06/27/2021    RDW 14.1 06/27/2021     06/27/2021    MPV 6.8 06/27/2021     BMP   Lab Results   Component Value Date     06/27/2021    K 4.2 06/27/2021    K 4.0 06/26/2021     06/27/2021    CO2 30 06/27/2021    BUN 14 06/27/2021    CREATININE 0.8 06/27/2021    GLUCOSE 79 06/27/2021    CALCIUM 9.1 06/27/2021     Urinalysis:   Lab Results   Component Value Date    COLORU YELLOW 09/30/2020    GLUCOSEU Negative 09/30/2020    BLOODU MODERATE 09/30/2020    NITRU Negative 09/30/2020    LEUKOCYTESUR Negative 09/30/2020     Urine culture: No results for input(s): Marcial Thao in the last 72 hours. PSA:   Lab Results   Component Value Date    PSA 0.93 05/04/2015        Imaging:  XR CHEST PORTABLE    Result Date: 6/25/2021  EXAMINATION: ONE XRAY VIEW OF THE CHEST 6/25/2021 12:40 pm COMPARISON: Chest radiograph 06/06/2020 HISTORY: ORDERING SYSTEM PROVIDED HISTORY: SOB TECHNOLOGIST PROVIDED HISTORY: Reason for exam:->SOB Reason for Exam: Chest Pain (Patient brought in via [place] ems from [facility]. Called for chest pain, gave 3 nitro at [facility].  Patient was hypotensive fluids started by EMS . ) Acuity: Acute Type of Exam: Initial FINDINGS: There is prominence of the pulmonary vascular markings with airspace disease seen in the centrally/involving the lung bases. Trace blunting of the costophrenic angles noted bilaterally. Cardiac silhouette is enlarged but unchanged. Stable calcifications seen in the aortic arch. Sternotomy wires are noted. No pneumothorax. 1. Bilateral pulmonary opacities likely represent mild pulmonary edema. 2. Blunting of the costophrenic angles likely represents trace pleural fluid and atelectasis.             Electronically signed by: Maxwell Null MD, 6/28/2021 9:28 AM  [facility]  Office Contact: [phone]

## 2021-06-28 NOTE — CARE COORDINATION
[address] [address] Continence Nurse  Consult Note       NAME:  Lowell Baig  MEDICAL RECORD NUMBER:  [id number]  AGE: 80 y.o. GENDER: male  : 10/31/1925  TODAY'S DATE:  2021    Subjective   Reason for WOCN Evaluation and Assessment: laceration to left lower leg      Lowell Baig is a 80 y.o. male referred by:   [x] Physician  [x] Nursing  [] Other:     Wound Identification:  Wound Type: traumatic  Contributing Factors: none    Wound History: patient thinks it was more than 3 weeks ago he hit his leg.   Current Wound Care Treatment:  Silicone pad    Patient Goal of Care:  [x] Wound Healing  [] Odor Control  [] Palliative Care  [] Pain Control   [] Other:         PAST MEDICAL HISTORY        Diagnosis Date    A-fib ([facility]     Arthritis     Atrial fibrillation (HCC)     Blepharitis of both eyes     CAD (coronary artery disease)     stents 2005, x3    GERD (gastroesophageal reflux disease)     Gout     Hyperlipidemia     Hypertension     Macular degeneration     NSTEMI (non-ST elevated myocardial infarction) ([facility]     Prostate enlargement     Psychiatric problem     anxiety; bipolar disorder    Tachycardia        PAST SURGICAL HISTORY    Past Surgical History:   Procedure Laterality Date    CARDIAC SURGERY      CATARACT REMOVAL  1993    X5    COLONOSCOPY      CORONARY ANGIOPLASTY WITH STENT PLACEMENT  2005      X3    [facility]ON[facility] G[facility]    EYE SURGERY      HIP SURGERY Left 2020    LEFT BIPOLAR HIP HEMIARTHROPLASTY performed by Bill Cheng MD at [address] [place] [address]    SKIN BIOPSY      melanoma, basal    SKIN CANCER DESTRUCTION  2006    Melanoma  ()    TURP         FAMILY HISTORY    Family History   Problem Relation Age of Onset    Heart Disease Mother     Heart Disease Brother     High Blood Pressure Neg Hx     High Cholesterol Neg Hx     Mental Illness Neg Hx        SOCIAL HISTORY    Social History     Tobacco Use    Smoking status: Never Smoker    Smokeless tobacco: Never Used   Vaping Use    Vaping Use: Never used   Substance Use Topics    Alcohol use: No    Drug use: Never       ALLERGIES    Allergies   Allergen Reactions    Lasix [Furosemide] Other (See Comments)     Pt passed out     Zyprexa [Olanzapine]      Pacing.  Citalopram Anxiety     сергей       MEDICATIONS    No current facility-administered medications on file prior to encounter. Current Outpatient Medications on File Prior to Encounter   Medication Sig Dispense Refill    ziprasidone (GEODON) 20 MG capsule Take 20 mg by mouth nightly      dilTIAZem (CARDIZEM) 30 MG tablet Take 1 tablet by mouth 3 times daily Hold the tablet if heart rate below 60 or blood pressure below 300 systolic 865 tablet 1    Multiple Vitamins-Minerals (MULTIVITAMIN ADULT PO) Take by mouth      calcium carbonate (OSCAL) 500 MG TABS tablet Take 500 mg by mouth daily      Nutritional Supplements (ENSURE COMPLETE PO) Take by mouth 2 times daily      ferrous sulfate (IRON 325) 325 (65 Fe) MG tablet Take 325 mg by mouth daily (with breakfast)      gabapentin (NEURONTIN) 100 MG capsule Take 1 capsule by mouth nightly for 30 days.  30 capsule 0    sennosides-docusate sodium (SENOKOT-S) 8.6-50 MG tablet Take 1 tablet by mouth 2 times daily      apixaban (ELIQUIS) 2.5 MG TABS tablet Take 1 tablet by mouth 2 times daily 180 tablet 3    finasteride (PROSCAR) 5 MG tablet TAKE 1 TABLET EVERY NIGHT 90 tablet 3    lamoTRIgine (LAMICTAL) 150 MG tablet Take 1 tablet by mouth daily 90 tablet 1    pantoprazole (PROTONIX) 40 MG tablet TAKE 1 TABLET EVERY DAY 90 tablet 3    levothyroxine (SYNTHROID) 25 MCG tablet TAKE 1 TABLET EVERY DAY 90 tablet 3    tamsulosin (FLOMAX) 0.4 MG capsule Take 0.4 mg by mouth daily      nitroGLYCERIN (NITROSTAT) 0.4 MG SL tablet Place 1 tablet under the tongue every 5 minutes as needed for Chest pain 25 tablet 3    melatonin 5 MG TABS tablet Take 5 mg by mouth daily as needed (Insomnia)      Magnesium 400 MG TABS Take 1 tablet by mouth daily       Artificial Tear Ointment (DRY EYES OP) Apply 250 capsules to eye daily      polyethylene glycol (GLYCOLAX) powder Take 17 g by mouth daily as needed      triamcinolone (KENALOG) 0.1 % cream Apply to affected areas twice daily 45 g 1    aspirin 81 MG chewable tablet Take 1 tablet by mouth daily 30 tablet 3       Objective    /65   Pulse 109   Temp 97.6 °F (36.4 °C) (Oral)   Resp 16   Ht 5' 5\" (1.651 m)   Wt 120 lb 6.4 oz (54.6 kg)   SpO2 92%   BMI 20.04 kg/m²     LABS:  WBC:    Lab Results   Component Value Date    WBC 3.1 06/27/2021     H/H:    Lab Results   Component Value Date    HGB 12.3 06/27/2021    HCT 36.5 06/27/2021     PTT:    Lab Results   Component Value Date    APTT 37.6 06/28/2021   [APTT}  PT/INR:    Lab Results   Component Value Date    PROTIME 15.5 05/31/2020    INR 1.33 05/31/2020     HgBA1c:    Lab Results   Component Value Date    LABA1C 5.6 06/26/2021       Assessment   Nemesio Risk Score: Nemesio Scale Score: 16    Patient Active Problem List   Diagnosis Code    Essential hypertension I10    Atherosclerosis of native coronary artery of native heart with angina pectoris (HCC) I25.119    Hyperlipidemia E78.5    Bradycardia R00.1    Chest pain R07.9    GERD (gastroesophageal reflux disease) K21.9    Bilateral carotid artery disease (HCC) I77.9    Mild aortic stenosis I35.0    Psychophysiological insomnia F51.04    Anxiety disorder F41.9    RLS (restless legs syndrome) G25.81    Chronic idiopathic constipation K59.04    Bipolar 1 disorder (HCC) F31.9    Paroxysmal atrial fibrillation (HCC) I48.0    Moderate protein-calorie malnutrition (HCC) E44.0    Subcapital fracture of femur, left, closed, initial encounter ([facility] S72.012A    Chronic diastolic heart failure (HCC) I50.32    Closed low lateral malleolus fracture, left, initial encounter S82. 62XA    Localized edema R60.0    Chronic atrial fibrillation ([facility]) I48.20    Contusion of left knee S80. 02XA    Contusion of right knee S80. 01XA    Left knee pain M25.562    NSTEMI (non-ST elevated myocardial infarction) ([facility]) I21.4    Atrial fibrillation ([facility]) I48.91    Phimosis N47.1       Measurements:  Wound 06/02/20 Ankle Left; Outer; Inner Purple, non-blanching on both inner and outer bony prominences (Active)   Number of days: 391     Incision 06/02/20 Hip Anterior; Left (Active)   Number of days: 391       Patient has laceration to left shin. Patient says he has had it since before Father's day (6/20), but does not remember exactly when it happened. Patient agreeable to visit. Dressing pulled back, old draiange and devitalized tissue on dressing. Wound bed red, no drainage, swelling or odor. Wound measures 2.2 cm x 2. Cm. Will keep clean with normal saline. Will request  antibiotic cream to be applied to  area and cover with dry dressing. Patient agreeable to this plan. Also discussed plan of care with nurse Marvin Braga. Patient is scheduled for discharge today back to [address] [place] [address]. Response to treatment:  Well tolerated by patient. Pain Assessment:  Severity:  0 / 10  Quality of pain: N/A  Wound Pain Timing/Severity: none  Premedicated: No    Plan   Plan of Care:   Clean with saline, apply neosporin, cover with dry dressing. Change daily. Specialty Bed Required : No   [] Low Air Loss   [] Pressure Redistribution  [] Fluid Immersion  [] Bariatric  [] Total Pressure Relief  [] Other:     Current Diet: ADULT DIET; Regular;  No Drinking Straws  Dietician consult:  No    Discharge Plan:  Placement for patient upon discharge: skilled nursing    Patient appropriate for Outpatient [address]: No    Referrals:  []   [] [address] [employer] [facility]  [] Supplies  [] Other    Patient/Caregiver Teaching:  Level of patient/caregiver understanding able to:   [x] Indicates understanding [x] Needs reinforcement  [] Unsuccessful      [] Verbal Understanding  [] Demonstrated understanding       [] No evidence of learning  [] Refused teaching         [] N/A       Electronically signed by ABILIO Gonzalez, RN  Wound/Ostomy Care on 6/28/2021 at 12:37 PM

## 2021-06-28 NOTE — CARE COORDINATION
Discharge Planning Assessment    RN/SW discharge planner met with patient/ (and family member) to discuss reason for admission, current living situation, and potential needs at the time of discharge    Demographics/Insurance verified Yes/medicare    Current type of dwellin [address]  Patient from ECF/SW confirmed with:  [address] [address]         Phone: [phone]       Fax: [phone]        Transportation at the time of discharge: needs transport

## 2021-07-04 ENCOUNTER — HOSPITAL ENCOUNTER (EMERGENCY)
Age: 86
Discharge: NURSING, REHAB OR LONG-TERM CARE | End: 2021-07-05
Payer: MEDICARE

## 2021-07-04 ENCOUNTER — APPOINTMENT (OUTPATIENT)
Dept: CT IMAGING | Age: 86
End: 2021-07-04
Payer: MEDICARE

## 2021-07-04 ENCOUNTER — APPOINTMENT (OUTPATIENT)
Dept: GENERAL RADIOLOGY | Age: 86
End: 2021-07-04
Payer: MEDICARE
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
## 2021-07-04 PROCEDURE — 99284 EMERGENCY DEPT VISIT MOD MDM: CPT

## 2021-07-04 PROCEDURE — 72125 CT NECK SPINE W/O DYE: CPT

## 2021-07-04 PROCEDURE — 73502 X-RAY EXAM HIP UNI 2-3 VIEWS: CPT

## 2021-07-04 PROCEDURE — 70450 CT HEAD/BRAIN W/O DYE: CPT

## 2021-07-04 RX ORDER — LORAZEPAM 0.5 MG/1
0.5 TABLET ORAL DAILY PRN
Status: ON HOLD | COMMUNITY
End: 2022-05-20

## 2021-07-04 RX ORDER — MIRTAZAPINE 15 MG/1
15 TABLET, FILM COATED ORAL NIGHTLY
COMMUNITY
End: 2022-04-15

## 2021-07-04 RX ORDER — LORAZEPAM 0.5 MG/1
0.5 TABLET ORAL 2 TIMES DAILY
Status: ON HOLD | COMMUNITY
End: 2022-05-25 | Stop reason: HOSPADM

## 2021-07-04 RX ORDER — OXYBUTYNIN CHLORIDE 5 MG/1
5 TABLET ORAL 3 TIMES DAILY
COMMUNITY
End: 2022-04-15

## 2021-07-05 VITALS
DIASTOLIC BLOOD PRESSURE: 85 MMHG | HEIGHT: 65 IN | OXYGEN SATURATION: 95 % | SYSTOLIC BLOOD PRESSURE: 139 MMHG | HEART RATE: 104 BPM | WEIGHT: 120 LBS | BODY MASS INDEX: 19.99 KG/M2 | TEMPERATURE: 98.3 F | RESPIRATION RATE: 20 BRPM

## 2021-07-05 LAB
EKG ATRIAL RATE: 133 BPM
EKG DIAGNOSIS: NORMAL
EKG Q-T INTERVAL: 324 MS
EKG QRS DURATION: 100 MS
EKG QTC CALCULATION (BAZETT): 461 MS
EKG R AXIS: 16 DEGREES
EKG T AXIS: 250 DEGREES
EKG VENTRICULAR RATE: 122 BPM

## 2021-07-05 PROCEDURE — 93010 ELECTROCARDIOGRAM REPORT: CPT | Performed by: INTERNAL MEDICINE

## 2021-07-05 PROCEDURE — 6370000000 HC RX 637 (ALT 250 FOR IP): Performed by: PHYSICIAN ASSISTANT

## 2021-07-05 PROCEDURE — 93005 ELECTROCARDIOGRAM TRACING: CPT | Performed by: PHYSICIAN ASSISTANT

## 2021-07-05 RX ADMIN — DILTIAZEM HYDROCHLORIDE 30 MG: 30 TABLET, FILM COATED ORAL at 02:19

## 2021-07-05 ASSESSMENT — ENCOUNTER SYMPTOMS
NAUSEA: 0
ABDOMINAL PAIN: 0
SHORTNESS OF BREATH: 0
DIARRHEA: 0
VOMITING: 0

## 2021-07-05 NOTE — ED NOTES
Bed: 18  Expected date:   Expected time:   Means of arrival:   Comments:  Giselle Min RN  07/04/21 9507

## 2021-07-05 NOTE — ED PROVIDER NOTES
LEVOTHYROXINE (SYNTHROID) 25 MCG TABLET    TAKE 1 TABLET EVERY DAY    LORAZEPAM (ATIVAN) 0.5 MG TABLET    Take 0.5 mg by mouth daily as needed for Anxiety. LORAZEPAM (ATIVAN) 0.5 MG TABLET    Take 0.5 mg by mouth 2 times daily.     MAGNESIUM 400 MG TABS    Take 1 tablet by mouth daily     MELATONIN 5 MG TABS TABLET    Take 5 mg by mouth daily as needed (Insomnia)    MIRTAZAPINE (REMERON) 15 MG TABLET    Take 15 mg by mouth nightly    MULTIPLE VITAMINS-MINERALS (MULTIVITAMIN ADULT PO)    Take by mouth    NITROGLYCERIN (NITROSTAT) 0.4 MG SL TABLET    Place 1 tablet under the tongue every 5 minutes as needed for Chest pain    NUTRITIONAL SUPPLEMENTS (ENSURE COMPLETE PO)    Take by mouth 2 times daily    OXYBUTYNIN (DITROPAN) 5 MG TABLET    Take 5 mg by mouth 3 times daily    PANTOPRAZOLE (PROTONIX) 40 MG TABLET    TAKE 1 TABLET EVERY DAY    POLYETHYLENE GLYCOL (GLYCOLAX) POWDER    Take 17 g by mouth daily as needed    SENNOSIDES-DOCUSATE SODIUM (SENOKOT-S) 8.6-50 MG TABLET    Take 1 tablet by mouth 2 times daily    TAMSULOSIN (FLOMAX) 0.4 MG CAPSULE    Take 0.4 mg by mouth daily    TRIAMCINOLONE (KENALOG) 0.1 % CREAM    Apply to affected areas twice daily    ZIPRASIDONE (GEODON) 20 MG CAPSULE    Take 20 mg by mouth nightly         ALLERGIES     Lasix [furosemide], Zyprexa [olanzapine], and Citalopram    FAMILYHISTORY       Family History   Problem Relation Age of Onset    Heart Disease Mother     Heart Disease Brother     High Blood Pressure Neg Hx     High Cholesterol Neg Hx     Mental Illness Neg Hx           SOCIAL HISTORY       Social History     Tobacco Use    Smoking status: Never Smoker    Smokeless tobacco: Never Used   Vaping Use    Vaping Use: Never used   Substance Use Topics    Alcohol use: No    Drug use: Never       SCREENINGS             PHYSICAL EXAM    (up to 7 for level 4, 8 or more for level 5)     ED Triage Vitals [07/04/21 2156]   BP Temp Temp Source Pulse Resp SpO2 Height Weight 131/89 98.3 °F (36.8 °C) Oral 107 -- 94 % 5' 5\" (1.651 m) 120 lb (54.4 kg)       Physical Exam  Vitals and nursing note reviewed. Constitutional:       General: He is not in acute distress. Appearance: Normal appearance. He is well-developed. He is not ill-appearing, toxic-appearing or diaphoretic. HENT:      Head: Normocephalic and atraumatic. Nose: Nose normal.   Eyes:      General:         Right eye: No discharge. Left eye: No discharge. Cardiovascular:      Rate and Rhythm: Normal rate and regular rhythm. Heart sounds: Normal heart sounds. No murmur heard. No gallop. Pulmonary:      Effort: Pulmonary effort is normal. No respiratory distress. Breath sounds: Normal breath sounds. No wheezing or rales. Abdominal:      General: Bowel sounds are normal.      Tenderness: There is no abdominal tenderness. There is no guarding or rebound. Musculoskeletal:         General: No swelling or tenderness. Normal range of motion. Cervical back: Normal range of motion and neck supple. Right hip: Normal.      Left hip: Normal.      Right knee: Normal.      Left knee: Normal.      Right lower leg: Normal.      Left lower leg: Normal.   Skin:     General: Skin is warm and dry. Capillary Refill: Capillary refill takes less than 2 seconds. Coloration: Skin is not jaundiced or pale. Findings: No rash. Neurological:      General: No focal deficit present. Mental Status: He is alert. Mental status is at baseline. GCS: GCS eye subscore is 4. GCS verbal subscore is 5. GCS motor subscore is 6. Cranial Nerves: Cranial nerves are intact. Sensory: Sensation is intact. Motor: Motor function is intact. Psychiatric:         Behavior: Behavior normal.         DIAGNOSTIC RESULTS   LABS:    Labs Reviewed - No data to display    When ordered only abnormal lab results are displayed.  All other labs were within normal range or not returned as of this Study   TTE procedure:ECHOCARDIOGRAM COMPLETE 2D W DOPPLER W COLOR. Procedure Date Date: 06/28/2021 Start: 01:30 PM Study Location: [facility] - Echo Lab Technical Quality: Limited visualization due to poor acoustical window. Indications:Hypertension and Aortic stenosis. Patient Status: Routine Height: 65 inches Weight: 120 pounds BSA: 1.59 m2 BMI: 19.97 kg/m2 BP: 101/65 mmHg  Conclusions   Summary  -Normal left ventricle size and systolic function with an estimated ejection  fraction of 60-65%. -There is mild left ventricular hypertrophy  -There are not definitive regional wall motion abnormalities.  -Normal diastolic function. E/e' = 10.9.  -Left atrium is dilated. -Aortic stenosis with a peak velocity of 3.9m/s and a mean pressure gradient  of 29mmHg. Most peak gradients in the 3.5 m/sec range. I suspect AS is more  moderate than severe. MIKE (VTI) is 0.45 cm2.  -No evidence of aortic valve regurgitation.  -There is moderate calcification of the posterior mitral valve leaflet. -Mild mitral regurgitation.  -Mild tricuspid regurgitation.  -The right ventricle is normal in size with reduced function by TAPSE 1.3  cm. RVS velocity is 11.2 cm/s. -RVSP is estimated to be 26-29 mmHG. -IVC not well visualized. Signature   ------------------------------------------------------------------  Electronically signed by Fausto Butler MD, [facility] (Interpreting  physician) on 06/28/2021 at 04:38 PM  ------------------------------------------------------------------   Findings   Left Ventricle  Normal left ventricle size and systolic function with an estimated ejection  fraction of 60-65%. There is mild left ventricular hypertrophy  There are not definitive regional wall motion abnormalities. E/e' = 10.9. Normal diastolic function. Mitral Valve  There is moderate calcification of the posterior mitral valve leaflet. Mild mitral regurgitation. Left Atrium  Left atrium is dilated.    Aortic Valve  Aortic stenosis with a peak velocity of 3.9m/s and a mean pressure gradient  of 29mmHg. Most peak gradients in the 3.5 m/sec range. I suspect AS is more  moderate than severe. No evidence of aortic valve regurgitation. Aorta  The aortic root is normal in size. Right Ventricle  The right ventricle is normal in size with reduced function by TAPSE 1.3 cm. RVS velocity is 11.2 cm/s. RVSP is estimated to be 26-29 mmHG. Tricuspid Valve  Tricuspid valve is structurally normal.  Mild tricuspid regurgitation. Right Atrium  The right atrial size is normal.   Pulmonic Valve  The pulmonic valve is not well visualized. No evidence of pulmonic valve regurgitation. Pericardial Effusion  No pericardial effusion noted. Pleural Effusion  No pleural effusion. Miscellaneous  IVC not well visualized.   M-Mode/2D Measurements (cm)   LV Diastolic Dimension: 8.53 cm LV Systolic Dimension: 3.2 cm  LV Septum Diastolic: 8.42 cm  LV PW Diastolic: 4.78 cm        AO Root Dimension: 2.7 cm                                  LA Dimension: 4 cm                                  LA Area: 18 cm2  LVOT: 2 cm                      LA volume/Index: 49.2 ml /31 ml/m2  Doppler Measurements   AV Peak Velocity: 357 cm/s     MV Peak E-Wave: 108 cm/s  AV Peak Gradient: 50.98 mmHg   MV Peak A-Wave: 43.3 cm/s  AV Mean Gradient: 29 mmHg      MV E/A Ratio: 2.49  LVOT Peak Velocity: 67.9 cm/s  AV Area (Continuity):0.74 cm2   TR Velocity:217 cm/s  TR Gradient:18.84 mmHg  Estimated RVSP: 26 mmHg  E' Septal Velocity: 9.79 cm/s  E' Lateral Velocity: 8.59 cm/s   Aortic Valve   Peak Velocity: 357 cm/s     Mean Velocity: 258 cm/s  Peak Gradient: 50.98 mmHg   Mean Gradient: 29 mmHg  Area (continuity): 0.74 cm2  AV VTI: 71.7 cm  Aorta   Aortic Root: 2.7 cm  LVOT Diameter: 2 cm            PROCEDURES   Unless otherwise noted below, none     Procedures    CRITICAL CARE TIME   N/A    CONSULTS:  None      EMERGENCY DEPARTMENT COURSE and DIFFERENTIAL DIAGNOSIS/MDM: Vitals:    Vitals:    07/04/21 2156 07/04/21 2225   BP: 131/89    Pulse: 107 84   Temp: 98.3 °F (36.8 °C)    TempSrc: Oral    SpO2: 94%    Weight: 120 lb (54.4 kg)    Height: 5' 5\" (1.651 m)        Patient was given the following medications:  Medications - No data to display        Patient presents the emergency department for evaluation of possible injury after a fall from a chair. Patient just slid out of his recliner. Unsure if he hit his head. Patient was initially complaining of some left hip pain. He then denied it to the triage nursing but then stated to this provider that he had some left hip discomfort. Patient has a history of left hip fracture. X-ray shows no acute bony fracture and previous repair appears stable. Patient does not have any pain to the right hip. No tenderness to cervical thoracic or lumbar spine. Abdomen is soft nontender without rebound or guarding. Heart rate is irregular at a rate of 84. CT of the patient's head shows no acute intracranial bleed. No acute bony fracture of the cervical spine. Patient will be discharged back to his nursing facility for further continued management. No acute injuries noted on today's examination that would require further management emergently. I estimate there is LOW risk for FRACTURE, COMPARTMENT SYNDROME, DEEP VENOUS THROMBOSIS, SEPTIC ARTHRITIS, TENDON OR NEUROVASCULAR INJURY, thus I consider the discharge disposition reasonable. FINAL IMPRESSION      1.  Fall from chair, initial encounter          DISPOSITION/PLAN   DISPOSITION Decision To Discharge 07/05/2021 12:04:40 AM      PATIENT REFERRED TO:  Dorys Delgado MD  [address] [place] [address]  [phone]    Schedule an appointment as soon as possible for a visit   As needed, for re-evaluation    [facility] Emergency Department  [address] [facility]  [phone]    If symptoms worsen      DISCHARGE MEDICATIONS:  New Prescriptions    No medications on file       DISCONTINUED MEDICATIONS:  Discontinued Medications    No medications on file              (Please note that portions of this note were completed with a voice recognition program.  Efforts were made to edit the dictations but occasionally words are mis-transcribed.)    Alaina Renee PA-C (electronically signed)            Alaina Renee PA-C  07/05/21 0016

## 2021-07-05 NOTE — ED NOTES
Spoke with Eryn Ho, nurse resuming care at [facility]. [facility] with shoes and pants in patient belongings bag.       Willie Jeong RN  37/68/77 [phone]

## 2021-08-02 ENCOUNTER — OFFICE VISIT (OUTPATIENT)
Dept: CARDIOLOGY CLINIC | Age: 86
End: 2021-08-02
Payer: MEDICARE

## 2021-08-02 VITALS
OXYGEN SATURATION: 96 % | SYSTOLIC BLOOD PRESSURE: 126 MMHG | HEIGHT: 66 IN | DIASTOLIC BLOOD PRESSURE: 82 MMHG | WEIGHT: 116 LBS | BODY MASS INDEX: 18.64 KG/M2 | HEART RATE: 70 BPM

## 2021-08-02 DIAGNOSIS — I25.10 ATHEROSCLEROSIS OF NATIVE CORONARY ARTERY OF NATIVE HEART WITHOUT ANGINA PECTORIS: ICD-10-CM

## 2021-08-02 DIAGNOSIS — I48.91 ATRIAL FIBRILLATION, UNSPECIFIED TYPE (HCC): ICD-10-CM

## 2021-08-02 DIAGNOSIS — I10 ESSENTIAL HYPERTENSION: Chronic | ICD-10-CM

## 2021-08-02 DIAGNOSIS — R60.0 LOCALIZED EDEMA: ICD-10-CM

## 2021-08-02 DIAGNOSIS — I35.0 NONRHEUMATIC AORTIC VALVE STENOSIS: Primary | ICD-10-CM

## 2021-08-02 DIAGNOSIS — E78.2 MIXED HYPERLIPIDEMIA: Chronic | ICD-10-CM

## 2021-08-02 PROCEDURE — 1036F TOBACCO NON-USER: CPT | Performed by: INTERNAL MEDICINE

## 2021-08-02 PROCEDURE — G8420 CALC BMI NORM PARAMETERS: HCPCS | Performed by: INTERNAL MEDICINE

## 2021-08-02 PROCEDURE — 99214 OFFICE O/P EST MOD 30 MIN: CPT | Performed by: INTERNAL MEDICINE

## 2021-08-02 PROCEDURE — 1123F ACP DISCUSS/DSCN MKR DOCD: CPT | Performed by: INTERNAL MEDICINE

## 2021-08-02 PROCEDURE — 4040F PNEUMOC VAC/ADMIN/RCVD: CPT | Performed by: INTERNAL MEDICINE

## 2021-08-02 PROCEDURE — G8427 DOCREV CUR MEDS BY ELIG CLIN: HCPCS | Performed by: INTERNAL MEDICINE

## 2021-08-02 NOTE — PROGRESS NOTES
Cardiac surgery; and hip surgery (Left, 6/2/2020). Social History:   reports that he has never smoked. He has never used smokeless tobacco. He reports that he does not drink alcohol and does not use drugs. Family History:  family history includes Heart Disease in his brother and mother. Allergies:  Lasix [furosemide], Zyprexa [olanzapine], and Citalopram     Home Medications:  Outpatient Encounter Medications as of 8/2/2021   Medication Sig Dispense Refill    LORazepam (ATIVAN) 0.5 MG tablet Take 0.5 mg by mouth daily as needed for Anxiety.  mirtazapine (REMERON) 15 MG tablet Take 15 mg by mouth nightly      LORazepam (ATIVAN) 0.5 MG tablet Take 0.5 mg by mouth 2 times daily.  oxybutynin (DITROPAN) 5 MG tablet Take 5 mg by mouth 3 times daily      ziprasidone (GEODON) 20 MG capsule Take 20 mg by mouth nightly      Artificial Tear Ointment (DRY EYES OP) Apply 250 capsules to eye daily      dilTIAZem (CARDIZEM) 30 MG tablet Take 1 tablet by mouth 3 times daily Hold the tablet if heart rate below 60 or blood pressure below 665 systolic 075 tablet 1    Multiple Vitamins-Minerals (MULTIVITAMIN ADULT PO) Take by mouth      calcium carbonate (OSCAL) 500 MG TABS tablet Take 500 mg by mouth daily      Nutritional Supplements (ENSURE COMPLETE PO) Take by mouth 2 times daily      ferrous sulfate (IRON 325) 325 (65 Fe) MG tablet Take 325 mg by mouth daily (with breakfast)      gabapentin (NEURONTIN) 100 MG capsule Take 1 capsule by mouth nightly for 30 days.  30 capsule 0    sennosides-docusate sodium (SENOKOT-S) 8.6-50 MG tablet Take 1 tablet by mouth 2 times daily      apixaban (ELIQUIS) 2.5 MG TABS tablet Take 1 tablet by mouth 2 times daily 180 tablet 3    finasteride (PROSCAR) 5 MG tablet TAKE 1 TABLET EVERY NIGHT 90 tablet 3    lamoTRIgine (LAMICTAL) 150 MG tablet Take 1 tablet by mouth daily 90 tablet 1    pantoprazole (PROTONIX) 40 MG tablet TAKE 1 TABLET EVERY DAY 90 tablet 3    levothyroxine (SYNTHROID) 25 MCG tablet TAKE 1 TABLET EVERY DAY 90 tablet 3    tamsulosin (FLOMAX) 0.4 MG capsule Take 0.4 mg by mouth daily      polyethylene glycol (GLYCOLAX) powder Take 17 g by mouth daily as needed      nitroGLYCERIN (NITROSTAT) 0.4 MG SL tablet Place 1 tablet under the tongue every 5 minutes as needed for Chest pain 25 tablet 3    melatonin 5 MG TABS tablet Take 5 mg by mouth daily as needed (Insomnia)      triamcinolone (KENALOG) 0.1 % cream Apply to affected areas twice daily 45 g 1    Magnesium 400 MG TABS Take 1 tablet by mouth daily       aspirin 81 MG chewable tablet Take 1 tablet by mouth daily 30 tablet 3     No facility-administered encounter medications on file as of 8/2/2021. [x] Medications and dosages reviewed. ROS:  [x]Full ROS obtained and negative except as mentioned in HPI      Physical Examination:    Vitals:    08/02/21 1049   BP: 126/82   Pulse: 70   SpO2: 96%      /82 (Site: Right Upper Arm, Position: Sitting, Cuff Size: Medium Adult)   Pulse 70   Ht 5' 6\" (1.676 m)   Wt 116 lb (52.6 kg)   SpO2 96%   BMI 18.72 kg/m²     · GENERAL: Elderly male walking with walker in NAD  · NEUROLOGICAL: Alert and oriented  · PSYCH: Calm affect  · SKIN: Warm and dry, no rash  · HEENT: Normocephalic, Sclera non-icteric, mucus membranes moist  · NECK: supple, JVP normal  · CAROTID: Normal upstroke, no bruits  · CARDIAC: Normal PMI, regular rate and irregular rhythm normal S1S2, 2/6 early ejection murmur, rub, or gallop  · RESPIRATORY: Normal respiratory effort, clear to auscultation bilaterally  · EXTREMITIES: no LE edema  · MUSCULOSKELETAL: No joint swelling or tenderness, no chest wall tenderness  · GASTROINTESTINAL: normal bowel sounds, soft, non-tender, no bruit      ECHO 6/2021  Summary   -Normal left ventricle size and systolic function with an estimated ejection   fraction of 60-65%.    -There is mild left ventricular hypertrophy   -There are not definitive regional wall motion abnormalities.   -Normal diastolic function. E/e' = 10.9.   -Left atrium is dilated. -Aortic stenosis with a peak velocity of 3.9m/s and a mean pressure gradient   of 29mmHg. Most peak gradients in the 3.5 m/sec range. I suspect AS is more   moderate than severe. MIKE (VTI) is 0.45 cm2.   -No evidence of aortic valve regurgitation.   -There is moderate calcification of the posterior mitral valve leaflet. -Mild mitral regurgitation.   -Mild tricuspid regurgitation.   -The right ventricle is normal in size with reduced function by TAPSE 1.3   cm. RVS velocity is 11.2 cm/s. -RVSP is estimated to be 26-29 mmHG. -IVC not well visualized. Assessment:     CAD:   Stable. Continue medical therapy  S/p stenting of LCX and RCA 2005. Stress myoview 2016 normal.   Chest pain seems resolved. Follow    Hyperlipidemia:   LDL=53, No longer on pravastatin   Follow    HTN:   Controlled. Follow on current meds  /82 (Site: Right Upper Arm, Position: Sitting, Cuff Size: Medium Adult)   Pulse 70   Ht 5' 6\" (1.676 m)   Wt 116 lb (52.6 kg)   SpO2 96%   BMI 18.72 kg/m²     Afib: In afib and HR=80-90  Stable  MCOT reviewed-Average HR=84  Continue Dilt 30 mg q 8. _Hold SBP<100 or HR <60    Chest Pain:  resolved    Aortic Stenosis:  Moderate-severe. Mean gradient 29. Repeat ECHO 6 months    Edema:  Resolved. Off of Demadex.       Plan:  Stable  F/u ECHO 6 months    Thank you for allowing me to participate in the care of this individual.      Shekhar Davis M.D., [facility] [place]

## 2022-04-15 ENCOUNTER — HOSPITAL ENCOUNTER (OUTPATIENT)
Dept: NON INVASIVE DIAGNOSTICS | Age: 87
Discharge: HOME | End: 2022-04-15
Payer: MEDICARE

## 2022-04-15 ENCOUNTER — HOSPITAL ENCOUNTER (OUTPATIENT)
Age: 87
Discharge: HOME | End: 2022-04-15
Payer: MEDICARE

## 2022-04-15 ENCOUNTER — OFFICE VISIT (OUTPATIENT)
Dept: CARDIOLOGY CLINIC | Age: 87
End: 2022-04-15
Payer: MEDICARE

## 2022-04-15 VITALS
WEIGHT: 138 LBS | BODY MASS INDEX: 22.18 KG/M2 | HEART RATE: 67 BPM | DIASTOLIC BLOOD PRESSURE: 60 MMHG | OXYGEN SATURATION: 94 % | SYSTOLIC BLOOD PRESSURE: 112 MMHG | HEIGHT: 66 IN

## 2022-04-15 DIAGNOSIS — I35.0 NONRHEUMATIC AORTIC VALVE STENOSIS: ICD-10-CM

## 2022-04-15 DIAGNOSIS — E78.2 MIXED HYPERLIPIDEMIA: Chronic | ICD-10-CM

## 2022-04-15 DIAGNOSIS — I25.10 ATHEROSCLEROSIS OF NATIVE CORONARY ARTERY OF NATIVE HEART WITHOUT ANGINA PECTORIS: ICD-10-CM

## 2022-04-15 DIAGNOSIS — I77.9 BILATERAL CAROTID ARTERY DISEASE, UNSPECIFIED TYPE (HCC): Chronic | ICD-10-CM

## 2022-04-15 DIAGNOSIS — E03.9 HYPOTHYROIDISM, UNSPECIFIED TYPE: Primary | ICD-10-CM

## 2022-04-15 DIAGNOSIS — I10 ESSENTIAL HYPERTENSION: Chronic | ICD-10-CM

## 2022-04-15 DIAGNOSIS — E03.9 HYPOTHYROIDISM, UNSPECIFIED TYPE: ICD-10-CM

## 2022-04-15 DIAGNOSIS — I48.0 PAROXYSMAL ATRIAL FIBRILLATION (HCC): ICD-10-CM

## 2022-04-15 LAB
ANION GAP SERPL CALCULATED.3IONS-SCNC: 11 MMOL/L (ref 3–16)
BASOPHILS ABSOLUTE: 0 K/UL (ref 0–0.2)
BASOPHILS RELATIVE PERCENT: 0.5 %
BUN BLDV-MCNC: 20 MG/DL (ref 7–20)
CALCIUM SERPL-MCNC: 8.9 MG/DL (ref 8.3–10.6)
CHLORIDE BLD-SCNC: 105 MMOL/L (ref 99–110)
CO2: 30 MMOL/L (ref 21–32)
CREAT SERPL-MCNC: 1.3 MG/DL (ref 0.8–1.3)
EOSINOPHILS ABSOLUTE: 0.2 K/UL (ref 0–0.6)
EOSINOPHILS RELATIVE PERCENT: 4.3 %
GFR AFRICAN AMERICAN: >60
GFR NON-AFRICAN AMERICAN: 51
GLUCOSE BLD-MCNC: 133 MG/DL (ref 70–99)
HCT VFR BLD CALC: 38.4 % (ref 40.5–52.5)
HEMOGLOBIN: 12.8 G/DL (ref 13.5–17.5)
LV EF: 65 %
LVEF MODALITY: NORMAL
LYMPHOCYTES ABSOLUTE: 0.7 K/UL (ref 1–5.1)
LYMPHOCYTES RELATIVE PERCENT: 19.8 %
MCH RBC QN AUTO: 30.5 PG (ref 26–34)
MCHC RBC AUTO-ENTMCNC: 33.3 G/DL (ref 31–36)
MCV RBC AUTO: 91.6 FL (ref 80–100)
MONOCYTES ABSOLUTE: 0.5 K/UL (ref 0–1.3)
MONOCYTES RELATIVE PERCENT: 13.2 %
NEUTROPHILS ABSOLUTE: 2.4 K/UL (ref 1.7–7.7)
NEUTROPHILS RELATIVE PERCENT: 62.2 %
PDW BLD-RTO: 14.8 % (ref 12.4–15.4)
PLATELET # BLD: 218 K/UL (ref 135–450)
PMV BLD AUTO: 7.2 FL (ref 5–10.5)
POTASSIUM SERPL-SCNC: 3.9 MMOL/L (ref 3.5–5.1)
RBC # BLD: 4.2 M/UL (ref 4.2–5.9)
SODIUM BLD-SCNC: 146 MMOL/L (ref 136–145)
TSH REFLEX: 2.96 UIU/ML (ref 0.27–4.2)
WBC # BLD: 3.8 K/UL (ref 4–11)

## 2022-04-15 PROCEDURE — G8427 DOCREV CUR MEDS BY ELIG CLIN: HCPCS | Performed by: INTERNAL MEDICINE

## 2022-04-15 PROCEDURE — 84443 ASSAY THYROID STIM HORMONE: CPT

## 2022-04-15 PROCEDURE — 4040F PNEUMOC VAC/ADMIN/RCVD: CPT | Performed by: INTERNAL MEDICINE

## 2022-04-15 PROCEDURE — G8420 CALC BMI NORM PARAMETERS: HCPCS | Performed by: INTERNAL MEDICINE

## 2022-04-15 PROCEDURE — 36415 COLL VENOUS BLD VENIPUNCTURE: CPT

## 2022-04-15 PROCEDURE — 80048 BASIC METABOLIC PNL TOTAL CA: CPT

## 2022-04-15 PROCEDURE — 85025 COMPLETE CBC W/AUTO DIFF WBC: CPT

## 2022-04-15 PROCEDURE — 1123F ACP DISCUSS/DSCN MKR DOCD: CPT | Performed by: INTERNAL MEDICINE

## 2022-04-15 PROCEDURE — 93306 TTE W/DOPPLER COMPLETE: CPT

## 2022-04-15 PROCEDURE — 1036F TOBACCO NON-USER: CPT | Performed by: INTERNAL MEDICINE

## 2022-04-15 PROCEDURE — 99214 OFFICE O/P EST MOD 30 MIN: CPT | Performed by: INTERNAL MEDICINE

## 2022-04-15 RX ORDER — ALUMINA, MAGNESIA, AND SIMETHICONE 2400; 2400; 240 MG/30ML; MG/30ML; MG/30ML
30 SUSPENSION ORAL EVERY 6 HOURS PRN
COMMUNITY

## 2022-04-15 NOTE — PATIENT INSTRUCTIONS
Stop Aspirin  Lab work (CBC and BMP) drawn today at hospital  Watch for symptoms of chest pain, SOB, lightheadedness or syncope  Return in 6 months

## 2022-04-15 NOTE — PROGRESS NOTES
[facility], [place], operated by [facility]  Cardiac Consult     Referring Provider:  Missy Hamilton MD     Chief Complaint   Patient presents with    Follow-up    Hypertension     f/u echo    Hyperlipidemia    Atrial Fibrillation        History of Present Illness:  80 y.o. male with bipolar formally followed by Dr. Moreno Loredo seen in f/u for CAD, HTN, hyperlipidemia and bradycardia. He underwent stenting of the LCX and RCA in 2005. Last stress myoview 12/2016 was normal. He has had bradycardia resulting in discontinuation of his beta blocker. He was admitted 4/19 with afib and chest pain. He underwent MOE cardioversion. He was admitted 6/2020 with a fall and hip fracture. He lives at [facility] and was trying to adjust the TV and \"Tripped\". He was back in afib that was controlled at that time. He was having more tachycardia and diltizem added. He was admitted 6/2021 with chest pain that was felt to be GI. Returned to ER a few days later as he fell. His daughter is with him. He is in the nursing home. He is getting weaker and weaker. \"Stiff\". No dyspnea, chest pain or edema. Past Medical History:   has a past medical history of A-fib ([address] Arthritis, Atrial fibrillation ([facility] [address] Blepharitis of both eyes, CAD (coronary artery disease), GERD (gastroesophageal reflux disease), Gout, Hyperlipidemia, Hypertension, Macular degeneration, NSTEMI (non-ST elevated myocardial infarction) [facility]), Prostate enlargement, Psychiatric problem, and Tachycardia. Surgical History:   has a past surgical history that includes Coronary angioplasty with stent (2005); skin biopsy; Kidney stone surgery (1980); Coronary artery bypass graft; Cataract removal (1993); TURP (2003); Skin cancer destruction (2006); Colonoscopy; eye surgery; Cardiac surgery; and hip surgery (Left, 6/2/2020). Social History:   reports that he has never smoked. He has never used smokeless tobacco. He reports that he does not drink alcohol and does not use drugs.      Family History:  family history includes Heart Disease in his brother and mother. Allergies:  Lasix [furosemide], Zyprexa [olanzapine], and Citalopram     Home Medications:  Outpatient Encounter Medications as of 4/15/2022   Medication Sig Dispense Refill    aluminum & magnesium hydroxide-simethicone (MYLANTA) 400-400-40 MG/5ML SUSP Take 30 mLs by mouth every 6 hours as needed      LORazepam (ATIVAN) 0.5 MG tablet Take 0.5 mg by mouth daily as needed for Anxiety.  LORazepam (ATIVAN) 0.5 MG tablet Take 0.5 mg by mouth 2 times daily.  ziprasidone (GEODON) 20 MG capsule Take 20 mg by mouth nightly      Artificial Tear Ointment (DRY EYES OP) Apply 250 capsules to eye daily      dilTIAZem (CARDIZEM) 30 MG tablet Take 1 tablet by mouth 3 times daily Hold the tablet if heart rate below 60 or blood pressure below 438 systolic 501 tablet 1    Multiple Vitamins-Minerals (MULTIVITAMIN ADULT PO) Take by mouth      calcium carbonate (OSCAL) 500 MG TABS tablet Take 500 mg by mouth daily      ferrous sulfate (IRON 325) 325 (65 Fe) MG tablet Take 325 mg by mouth daily (with breakfast)      gabapentin (NEURONTIN) 100 MG capsule Take 1 capsule by mouth nightly for 30 days.  30 capsule 0    sennosides-docusate sodium (SENOKOT-S) 8.6-50 MG tablet Take 1 tablet by mouth 2 times daily      apixaban (ELIQUIS) 2.5 MG TABS tablet Take 1 tablet by mouth 2 times daily 180 tablet 3    finasteride (PROSCAR) 5 MG tablet TAKE 1 TABLET EVERY NIGHT 90 tablet 3    lamoTRIgine (LAMICTAL) 150 MG tablet Take 1 tablet by mouth daily 90 tablet 1    pantoprazole (PROTONIX) 40 MG tablet TAKE 1 TABLET EVERY DAY 90 tablet 3    levothyroxine (SYNTHROID) 25 MCG tablet TAKE 1 TABLET EVERY DAY 90 tablet 3    tamsulosin (FLOMAX) 0.4 MG capsule Take 0.4 mg by mouth daily      polyethylene glycol (GLYCOLAX) powder Take 17 g by mouth daily as needed      nitroGLYCERIN (NITROSTAT) 0.4 MG SL tablet Place 1 tablet under the tongue every 5 minutes as needed for Chest pain 25 tablet 3    melatonin 5 MG TABS tablet Take 5 mg by mouth daily as needed (Insomnia)      triamcinolone (KENALOG) 0.1 % cream Apply to affected areas twice daily 45 g 1    Magnesium 400 MG TABS Take 1 tablet by mouth daily       [DISCONTINUED] mirtazapine (REMERON) 15 MG tablet Take 15 mg by mouth nightly (Patient not taking: Reported on 4/15/2022)      [DISCONTINUED] oxybutynin (DITROPAN) 5 MG tablet Take 5 mg by mouth 3 times daily (Patient not taking: Reported on 4/15/2022)      [DISCONTINUED] Nutritional Supplements (ENSURE COMPLETE PO) Take by mouth 2 times daily (Patient not taking: Reported on 4/15/2022)      [DISCONTINUED] aspirin 81 MG chewable tablet Take 1 tablet by mouth daily 30 tablet 3     No facility-administered encounter medications on file as of 4/15/2022. [x] Medications and dosages reviewed.     ROS:  [x]Full ROS obtained and negative except as mentioned in HPI      Physical Examination:    Vitals:    04/15/22 0926   BP: 112/60   Pulse: 67   SpO2: 94%      /60 (Site: Left Upper Arm, Position: Sitting, Cuff Size: Medium Adult)   Pulse 67   Ht 5' 6\" (1.676 m)   Wt 138 lb (62.6 kg)   SpO2 94%   BMI 22.27 kg/m²     · GENERAL: Elderly male walking with walker in NAD  · NEUROLOGICAL: Alert and oriented  · PSYCH: Calm affect  · SKIN: Warm and dry, no rash  · HEENT: Normocephalic, Sclera non-icteric, mucus membranes moist  · NECK: supple, JVP normal  · CAROTID: Normal upstroke, no bruits  · CARDIAC: Normal PMI, regular rate and irregular rhythm normal S1S2, 2/6 early ejection murmur, rub, or gallop  · RESPIRATORY: Normal respiratory effort, clear to auscultation bilaterally  · EXTREMITIES: no LE edema  · MUSCULOSKELETAL: No joint swelling or tenderness, no chest wall tenderness  · GASTROINTESTINAL: normal bowel sounds, soft, non-tender, no bruit      ECHO 4/2022  Summary   -Normal left ventricle size and systolic function with an estimated ejection fraction of 65%. No regional wall motion abnormalities are seen. -There is moderate concentric left ventricular hypertrophy. -E/e\"= 12. A-fib.   -Mild to moderate mitral regurgitation.   -The left atrium is dilated. -The aortic valve area is calculated at .8 cm2 with a maximum pressure   gradient of 58 mmHg and a mean pressure gradient of 34 mmHg. This is c/w   moderate-severe aortic stenosis. No evidence of aortic valve regurgitation.   -The right ventricle is mildly enlarged and mildly decreased in function.   -Moderate tricuspid regurgitation. RVSP 32mmHg.   -The right atrium is dilated. -Mild pulmonic regurgitation. -IVC not well visualized. Assessment:     CAD:   Remains stable. Continue medical therapy  S/p stenting of LCX and RCA 2005. Stress myoview 2016 normal.   Chest pain seems resolved. Follow    Hyperlipidemia:   LDL=53, No longer on pravastatin   Follow    HTN:   Well controlled. Follow on current meds. No hypotension  /60 (Site: Left Upper Arm, Position: Sitting, Cuff Size: Medium Adult)   Pulse 67   Ht 5' 6\" (1.676 m)   Wt 138 lb (62.6 kg)   SpO2 94%   BMI 22.27 kg/m²     Afib: In afib and HR=80-90  Stable  MCOT reviewed-Average HR=84  Continue Dilt 30 mg q 8. _    Chest Pain:  resolved    Aortic Stenosis:  Moderate-severe. Mean gradient 34. Does not meet ctiteria for TAVR and asymptomatic  Getting very weak and frail. Discussed with daughter. I am not sure he will be a candidate for TAVR even if he progresses to severe AS    Edema:  Resolved. Off of Demadex.       Plan:  Labs  Stop ASA on eliquis  F/u 6 months      Thank you for allowing me to participate in the care of this individual.      Margaret Lamb M.D., [facility] [place]

## 2022-04-25 ENCOUNTER — TELEPHONE (OUTPATIENT)
Dept: CARDIOLOGY CLINIC | Age: 87
End: 2022-04-25

## 2022-04-25 NOTE — TELEPHONE ENCOUNTER
----- Message from Devora Hall RN sent at 4/25/2022  4:06 PM EDT -----  Labs good. Sugar slightly high. Follow that with PCP. F/u me as planned.

## 2022-04-25 NOTE — TELEPHONE ENCOUNTER
Spoke with the patients daughter and advised her of the results below per [facility]K. Voiced understanding .  Call complete

## 2022-05-15 ENCOUNTER — APPOINTMENT (OUTPATIENT)
Dept: CT IMAGING | Age: 87
End: 2022-05-15
Payer: MEDICARE

## 2022-05-15 ENCOUNTER — APPOINTMENT (OUTPATIENT)
Dept: GENERAL RADIOLOGY | Age: 87
End: 2022-05-15
Payer: MEDICARE

## 2022-05-15 ENCOUNTER — HOSPITAL ENCOUNTER (EMERGENCY)
Age: 87
Discharge: HOME | End: 2022-05-16
Attending: EMERGENCY MEDICINE
Payer: MEDICARE

## 2022-05-15 DIAGNOSIS — N13.5 UPJ (URETEROPELVIC JUNCTION) OBSTRUCTION: ICD-10-CM

## 2022-05-15 DIAGNOSIS — N30.01 ACUTE CYSTITIS WITH HEMATURIA: ICD-10-CM

## 2022-05-15 DIAGNOSIS — J90 BILATERAL PLEURAL EFFUSION: ICD-10-CM

## 2022-05-15 DIAGNOSIS — S00.81XA ABRASION OF FACE, INITIAL ENCOUNTER: Primary | ICD-10-CM

## 2022-05-15 DIAGNOSIS — S60.519A ABRASION OF HAND, UNSPECIFIED LATERALITY, INITIAL ENCOUNTER: ICD-10-CM

## 2022-05-15 DIAGNOSIS — I67.1 SACCULAR ANEURYSM: ICD-10-CM

## 2022-05-15 DIAGNOSIS — S32.010A COMPRESSION FRACTURE OF L1 VERTEBRA, INITIAL ENCOUNTER (HCC): ICD-10-CM
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
## 2022-05-15 DIAGNOSIS — S09.90XA CLOSED HEAD INJURY, INITIAL ENCOUNTER: ICD-10-CM

## 2022-05-15 DIAGNOSIS — S80.812A ABRASION OF LEFT LOWER EXTREMITY, INITIAL ENCOUNTER: ICD-10-CM

## 2022-05-15 LAB
A/G RATIO: 0.7 (ref 1.1–2.2)
ALBUMIN SERPL-MCNC: 2.6 G/DL (ref 3.4–5)
ALP BLD-CCNC: 104 U/L (ref 40–129)
ALT SERPL-CCNC: 26 U/L (ref 10–40)
ANION GAP SERPL CALCULATED.3IONS-SCNC: 12 MMOL/L (ref 3–16)
APTT: 32.8 SEC (ref 26.2–38.6)
AST SERPL-CCNC: 37 U/L (ref 15–37)
BACTERIA: ABNORMAL /HPF
BASOPHILS ABSOLUTE: 0 K/UL (ref 0–0.2)
BASOPHILS RELATIVE PERCENT: 0.5 %
BILIRUB SERPL-MCNC: 0.5 MG/DL (ref 0–1)
BILIRUBIN URINE: NEGATIVE
BLOOD, URINE: ABNORMAL
BUN BLDV-MCNC: 20 MG/DL (ref 7–20)
CALCIUM SERPL-MCNC: 8.3 MG/DL (ref 8.3–10.6)
CHLORIDE BLD-SCNC: 103 MMOL/L (ref 99–110)
CLARITY: ABNORMAL
CO2: 20 MMOL/L (ref 21–32)
COLOR: ABNORMAL
CREAT SERPL-MCNC: 1.5 MG/DL (ref 0.8–1.3)
EOSINOPHILS ABSOLUTE: 0 K/UL (ref 0–0.6)
EOSINOPHILS RELATIVE PERCENT: 0.8 %
EPITHELIAL CELLS, UA: 1 /HPF (ref 0–5)
GFR AFRICAN AMERICAN: 52
GFR NON-AFRICAN AMERICAN: 43
GLUCOSE BLD-MCNC: 119 MG/DL (ref 70–99)
GLUCOSE URINE: NEGATIVE MG/DL
HCT VFR BLD CALC: 34.9 % (ref 40.5–52.5)
HEMOGLOBIN: 11.6 G/DL (ref 13.5–17.5)
HYALINE CASTS: 2 /LPF (ref 0–8)
INR BLD: 1.33 (ref 0.88–1.12)
KETONES, URINE: NEGATIVE MG/DL
LEUKOCYTE ESTERASE, URINE: ABNORMAL
LYMPHOCYTES ABSOLUTE: 0.6 K/UL (ref 1–5.1)
LYMPHOCYTES RELATIVE PERCENT: 11 %
MCH RBC QN AUTO: 30.2 PG (ref 26–34)
MCHC RBC AUTO-ENTMCNC: 33.3 G/DL (ref 31–36)
MCV RBC AUTO: 90.9 FL (ref 80–100)
MICROSCOPIC EXAMINATION: YES
MONOCYTES ABSOLUTE: 0.5 K/UL (ref 0–1.3)
MONOCYTES RELATIVE PERCENT: 9.2 %
NEUTROPHILS ABSOLUTE: 4.2 K/UL (ref 1.7–7.7)
NEUTROPHILS RELATIVE PERCENT: 78.5 %
NITRITE, URINE: NEGATIVE
PDW BLD-RTO: 14.4 % (ref 12.4–15.4)
PH UA: 6 (ref 5–8)
PLATELET # BLD: 348 K/UL (ref 135–450)
PMV BLD AUTO: 6.8 FL (ref 5–10.5)
POTASSIUM SERPL-SCNC: 4.1 MMOL/L (ref 3.5–5.1)
PRO-BNP: 2921 PG/ML (ref 0–449)
PROTEIN UA: 100 MG/DL
PROTHROMBIN TIME: 15.2 SEC (ref 9.9–12.7)
RBC # BLD: 3.84 M/UL (ref 4.2–5.9)
RBC UA: 1085 /HPF (ref 0–4)
SODIUM BLD-SCNC: 135 MMOL/L (ref 136–145)
SPECIFIC GRAVITY UA: 1.01 (ref 1–1.03)
TOTAL CK: 88 U/L (ref 39–308)
TOTAL PROTEIN: 6.4 G/DL (ref 6.4–8.2)
TROPONIN: 0.07 NG/ML
URINE REFLEX TO CULTURE: YES
URINE TYPE: ABNORMAL
UROBILINOGEN, URINE: 1 E.U./DL
WBC # BLD: 5.4 K/UL (ref 4–11)
WBC UA: 1562 /HPF (ref 0–5)

## 2022-05-15 PROCEDURE — 70450 CT HEAD/BRAIN W/O DYE: CPT

## 2022-05-15 PROCEDURE — 72131 CT LUMBAR SPINE W/O DYE: CPT

## 2022-05-15 PROCEDURE — 73130 X-RAY EXAM OF HAND: CPT

## 2022-05-15 PROCEDURE — 72128 CT CHEST SPINE W/O DYE: CPT

## 2022-05-15 PROCEDURE — 85730 THROMBOPLASTIN TIME PARTIAL: CPT

## 2022-05-15 PROCEDURE — 72192 CT PELVIS W/O DYE: CPT

## 2022-05-15 PROCEDURE — 93005 ELECTROCARDIOGRAM TRACING: CPT | Performed by: PHYSICIAN ASSISTANT

## 2022-05-15 PROCEDURE — 81001 URINALYSIS AUTO W/SCOPE: CPT

## 2022-05-15 PROCEDURE — 85610 PROTHROMBIN TIME: CPT

## 2022-05-15 PROCEDURE — 82550 ASSAY OF CK (CPK): CPT

## 2022-05-15 PROCEDURE — 84484 ASSAY OF TROPONIN QUANT: CPT

## 2022-05-15 PROCEDURE — 99285 EMERGENCY DEPT VISIT HI MDM: CPT

## 2022-05-15 PROCEDURE — 80053 COMPREHEN METABOLIC PANEL: CPT

## 2022-05-15 PROCEDURE — 36415 COLL VENOUS BLD VENIPUNCTURE: CPT

## 2022-05-15 PROCEDURE — 72125 CT NECK SPINE W/O DYE: CPT

## 2022-05-15 PROCEDURE — 71250 CT THORAX DX C-: CPT

## 2022-05-15 PROCEDURE — 87086 URINE CULTURE/COLONY COUNT: CPT

## 2022-05-15 PROCEDURE — 83880 ASSAY OF NATRIURETIC PEPTIDE: CPT

## 2022-05-15 PROCEDURE — 87186 SC STD MICRODIL/AGAR DIL: CPT

## 2022-05-15 PROCEDURE — 73590 X-RAY EXAM OF LOWER LEG: CPT

## 2022-05-15 PROCEDURE — 87077 CULTURE AEROBIC IDENTIFY: CPT

## 2022-05-15 PROCEDURE — 85025 COMPLETE CBC W/AUTO DIFF WBC: CPT

## 2022-05-15 RX ORDER — BACITRACIN, NEOMYCIN, POLYMYXIN B 400; 3.5; 5 [USP'U]/G; MG/G; [USP'U]/G
OINTMENT TOPICAL
Status: DISCONTINUED
Start: 2022-05-15 | End: 2022-05-16 | Stop reason: HOSPADM

## 2022-05-15 ASSESSMENT — ENCOUNTER SYMPTOMS
BACK PAIN: 1
VOMITING: 0
STRIDOR: 0
COLOR CHANGE: 0
WHEEZING: 0
COUGH: 0
NAUSEA: 0
CONSTIPATION: 0
ABDOMINAL PAIN: 0
SHORTNESS OF BREATH: 0
DIARRHEA: 0

## 2022-05-15 NOTE — ED PROVIDER NOTES
[facility]        Pt Name: Lowell Baig  MRN: [id number]  [place] 10/31/1925  Date of evaluation: 5/15/2022  Provider: Lino Shaw PA-C  PCP: Monster Garay MD  Note Started: 6:56 PM EDT        I have seen and evaluated this patient with my supervising physician Yamilex Perez, *. CHIEF COMPLAINT       Chief Complaint   Patient presents with    Fall     fell out of wheel chair, pt states that he was doen for about an hour. facility states that he was down for 10 to 15 mins. Pt is alert but not oriented, pt is on eliquis. Pt presents to triage with bandages on his head and hands. HISTORY OF PRESENT ILLNESS   (Location, Timing/Onset, Context/Setting, Quality, Duration, Modifying Factors, Severity, Associated Signs and Symptoms)  Note limiting factors. Chief Complaint: Injuries from fall    Lowell Baig is a 80 y.o. male who presents to the emergency department from [address] [place] [address] after a fall from his wheelchair. This was unwitnessed but apparently according to facility, the patient was only on the ground for 10-15 minutes. Patient has abrasions to his left hand, right hand, forehead and left knee. No witnessed or suspect seizure per the facility. Patient only reports headache. He has chronic back pain. Patient is alert to person but not to place or time. It is apparent that he has some discharge in both eyelids but states that he gets eye drops from the nursing home. Nursing Notes were all reviewed and agreed with or any disagreements were addressed in the HPI. REVIEW OF SYSTEMS    (2-9 systems for level 4, 10 or more for level 5)     Review of Systems   Constitutional: Negative for chills and fever. HENT: Negative. Eyes: Negative for visual disturbance. Respiratory: Negative for cough, shortness of breath, wheezing and stridor.     Cardiovascular: Negative for chest pain, palpitations and leg swelling. Gastrointestinal: Negative for abdominal pain, constipation, diarrhea, nausea and vomiting. Genitourinary: Negative. Musculoskeletal: Positive for back pain and myalgias. Negative for neck pain and neck stiffness. Skin: Positive for wound. Negative for color change, pallor and rash. Neurological: Positive for headaches. Negative for dizziness, tremors, seizures, syncope, facial asymmetry, speech difficulty, weakness, light-headedness and numbness. Psychiatric/Behavioral: Positive for confusion. All other systems reviewed and are negative. Positives and Pertinent negatives as per HPI. Except as noted above in the ROS, all other systems were reviewed and negative.        PAST MEDICAL HISTORY     Past Medical History:   Diagnosis Date    A-fib [facility])     Arthritis     Atrial fibrillation (HCC)     Blepharitis of both eyes     CAD (coronary artery disease)     stents 2005, x3    GERD (gastroesophageal reflux disease)     Gout     Hyperlipidemia     Hypertension     Macular degeneration     NSTEMI (non-ST elevated myocardial infarction) ([address]     Prostate enlargement     Psychiatric problem     anxiety; bipolar disorder    Tachycardia          SURGICAL HISTORY     Past Surgical History:   Procedure Laterality Date    CARDIAC SURGERY      CATARACT REMOVAL  1993    X5    COLONOSCOPY      CORONARY ANGIOPLASTY WITH STENT PLACEMENT  2005      X3    [facility]ON[facility]      [facility]    EYE SURGERY      HIP SURGERY Left 6/2/2020    LEFT BIPOLAR HIP HEMIARTHROPLASTY performed by Bill Cheng MD at [facility]      melanoma, basal    SKIN CANCER DESTRUCTION  2006    Melanoma  ()    TURP  2003         CURRENTMEDICATIONS       Previous Medications    ALUMINUM & MAGNESIUM HYDROXIDE-SIMETHICONE (MYLANTA) 400-400-40 MG/5ML SUSP    Take 30 mLs by mouth every 6 hours as needed APIXABAN (ELIQUIS) 2.5 MG TABS TABLET    Take 1 tablet by mouth 2 times daily    ARTIFICIAL TEAR OINTMENT (DRY EYES OP)    Apply 250 capsules to eye daily    CALCIUM CARBONATE (OSCAL) 500 MG TABS TABLET    Take 500 mg by mouth daily    DILTIAZEM (CARDIZEM) 30 MG TABLET    Take 1 tablet by mouth 3 times daily Hold the tablet if heart rate below 60 or blood pressure below 040 systolic    FERROUS SULFATE (IRON 325) 325 (65 FE) MG TABLET    Take 325 mg by mouth daily (with breakfast)    FINASTERIDE (PROSCAR) 5 MG TABLET    TAKE 1 TABLET EVERY NIGHT    GABAPENTIN (NEURONTIN) 100 MG CAPSULE    Take 1 capsule by mouth nightly for 30 days. LAMOTRIGINE (LAMICTAL) 150 MG TABLET    Take 1 tablet by mouth daily    LEVOTHYROXINE (SYNTHROID) 25 MCG TABLET    TAKE 1 TABLET EVERY DAY    LORAZEPAM (ATIVAN) 0.5 MG TABLET    Take 0.5 mg by mouth daily as needed for Anxiety. LORAZEPAM (ATIVAN) 0.5 MG TABLET    Take 0.5 mg by mouth 2 times daily.     MAGNESIUM 400 MG TABS    Take 1 tablet by mouth daily     MELATONIN 5 MG TABS TABLET    Take 5 mg by mouth daily as needed (Insomnia)    MULTIPLE VITAMINS-MINERALS (MULTIVITAMIN ADULT PO)    Take by mouth    NITROGLYCERIN (NITROSTAT) 0.4 MG SL TABLET    Place 1 tablet under the tongue every 5 minutes as needed for Chest pain    PANTOPRAZOLE (PROTONIX) 40 MG TABLET    TAKE 1 TABLET EVERY DAY    POLYETHYLENE GLYCOL (GLYCOLAX) POWDER    Take 17 g by mouth daily as needed    SENNOSIDES-DOCUSATE SODIUM (SENOKOT-S) 8.6-50 MG TABLET    Take 1 tablet by mouth 2 times daily    TAMSULOSIN (FLOMAX) 0.4 MG CAPSULE    Take 0.4 mg by mouth daily    TRIAMCINOLONE (KENALOG) 0.1 % CREAM    Apply to affected areas twice daily    ZIPRASIDONE (GEODON) 20 MG CAPSULE    Take 20 mg by mouth nightly         ALLERGIES     Lasix [furosemide], Zyprexa [olanzapine], and Citalopram    FAMILYHISTORY       Family History   Problem Relation Age of Onset    Heart Disease Mother     Heart Disease Brother     High Blood Pressure Neg Hx     High Cholesterol Neg Hx     Mental Illness Neg Hx           SOCIAL HISTORY       Social History     Tobacco Use    Smoking status: Never Smoker    Smokeless tobacco: Never Used   Vaping Use    Vaping Use: Never used   Substance Use Topics    Alcohol use: No    Drug use: Never       SCREENINGS             PHYSICAL EXAM    (up to 7 for level 4, 8 or more for level 5)     ED Triage Vitals   BP Temp Temp src Pulse Resp SpO2 Height Weight   -- -- -- -- -- -- -- --       Physical Exam  Vitals and nursing note reviewed. Constitutional:       Appearance: Normal appearance. He is well-developed. He is not toxic-appearing or diaphoretic. HENT:      Head: Normocephalic. Abrasion (skin avulsion mid forehead) and contusion present. No laceration. Jaw: There is normal jaw occlusion. Right Ear: External ear normal.      Left Ear: External ear normal.      Nose: Nose normal.      Mouth/Throat:      Mouth: Mucous membranes are moist.      Pharynx: Oropharynx is clear. Eyes:      General:         Right eye: Discharge present. No hordeolum. Left eye: Discharge present. No hordeolum. Extraocular Movements: Extraocular movements intact. Conjunctiva/sclera:      Right eye: Right conjunctiva is not injected. No chemosis or hemorrhage. Left eye: Left conjunctiva is not injected. No chemosis or hemorrhage. Cardiovascular:      Rate and Rhythm: Normal rate. Pulmonary:      Effort: Pulmonary effort is normal.      Breath sounds: Normal breath sounds. Abdominal:      General: Bowel sounds are normal.      Palpations: Abdomen is soft. Tenderness: There is no abdominal tenderness. Musculoskeletal:         General: Normal range of motion. Cervical back: Normal and normal range of motion. Thoracic back: Normal.      Lumbar back: Normal.      Comments: Abrasions to both hands, left proximal shin, and skin avulsion on the forehead.    Skin:     General: Skin is warm and dry. Coloration: Skin is not jaundiced or pale. Findings: No erythema, lesion or rash. Neurological:      Mental Status: He is alert. Mental status is at baseline. Comments: Oriented x1  Symmetrical upper and lower extremity strength with generalized weakness   Psychiatric:         Behavior: Behavior normal.         DIAGNOSTIC RESULTS   LABS:    Labs Reviewed   CBC WITH AUTO DIFFERENTIAL - Abnormal; Notable for the following components:       Result Value    RBC 3.84 (*)     Hemoglobin 11.6 (*)     Hematocrit 34.9 (*)     Lymphocytes Absolute 0.6 (*)     All other components within normal limits   PROTIME-INR - Abnormal; Notable for the following components:    Protime 15.2 (*)     INR 1.33 (*)     All other components within normal limits   URINALYSIS WITH REFLEX TO CULTURE - Abnormal; Notable for the following components:    Color, UA DARK YELLOW (*)     Clarity, UA TURBID (*)     Blood, Urine LARGE (*)     Protein,  (*)     Leukocyte Esterase, Urine LARGE (*)     All other components within normal limits   COMPREHENSIVE METABOLIC PANEL - Abnormal; Notable for the following components:    Sodium 135 (*)     CO2 20 (*)     Glucose 119 (*)     CREATININE 1.5 (*)     GFR Non- 43 (*)     GFR  52 (*)     Albumin 2.6 (*)     Albumin/Globulin Ratio 0.7 (*)     All other components within normal limits   TROPONIN - Abnormal; Notable for the following components:    Troponin 0.07 (*)     All other components within normal limits   BRAIN NATRIURETIC PEPTIDE - Abnormal; Notable for the following components:    Pro-BNP 2,921 (*)     All other components within normal limits   MICROSCOPIC URINALYSIS - Abnormal; Notable for the following components:    Bacteria, UA 4+ (*)     WBC, UA 1562 (*)     RBC, UA 1085 (*)     All other components within normal limits   CULTURE, URINE   APTT   CK       When ordered only abnormal lab results are displayed.  All other labs were within normal range or not returned as of this dictation. EKG: When ordered, EKG's are interpreted by the Emergency Department Physician in the absence of a cardiologist.  Please see their note for interpretation of EKG. RADIOLOGY:   Non-plain film images such as CT, Ultrasound and MRI are read by the radiologist. Plain radiographic images are visualized and preliminarily interpreted by the ED Provider with the below findings:        Interpretation per the Radiologist below, if available at the time of this note:    XR HAND RIGHT (MIN 3 VIEWS)   Final Result   Moderately severe osteoarthritic changes throughout the digits and diffuse   osteopenia with no acute bony abnormality seen. XR HAND LEFT (MIN 3 VIEWS)   Final Result   Severe osteoarthritic changes throughout the digits and diffuse osteopenia   with no acute bony abnormality         XR TIBIA FIBULA LEFT (2 VIEWS)   Final Result   Diffuse osteopenia with no acute bony abnormality. CT CHEST ABDOMEN PELVIS WO CONTRAST   Final Result   No acute traumatic findings of the chest abdomen or pelvis. Please see   separate spine CT dictations for spinal small and CT bony pelvis findings   including probable lumbar spine compression deformity fracture age   indeterminate. Bilateral pleural effusions including moderate volume left pleural effusion   and adjacent pleural calcifications. 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         CT PELVIS WO CONTRAST Additional Contrast? None   Final Result   No acute bony abnormality. Mild degree of nonspecific presacral nonhemorrhagic fluid and fat stranding.       Stable mild degenerative changes to the right hip joint. There is a small   right hip joint effusion. Stable appearance to left hip prosthesis. No evidence for hardware   complication. 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  Management   recommendations as below. Enlarged prostate. RECOMMENDATIONS:   3.1 cm saccular abdominal aortic aneurysm. Recommend vascular consultation. Reference: J Vasc Surg 2112;02:2-83. CT LUMBAR SPINE WO CONTRAST   Final Result   Age-indeterminate L1 compression deformity with 50% height loss anteriorly. No retropulsion or posterior element involvement no spinal canal stenosis of   significance at this level or elsewhere however moderate to severe   degenerative changes throughout the lower lumbar segments and lumbosacral   junction. CT CERVICAL SPINE WO CONTRAST   Final Result   No acute abnormality of the cervical spine. CT HEAD WO CONTRAST   Final Result   No acute intracranial abnormality.       Small laceration injury overlying the anterior frontal region without scalp   hematoma or calvarial defect         CT THORACIC SPINE WO CONTRAST   Final Result   No acute osseous findings of the thoracic spine however partially visualized   chest reveals bilateral pleural effusions left greater than right with a   least moderate volume left pleural effusion and adjacent atelectasis as well   as pleural calcifications and/or thickening partially imaged               PROCEDURES   Unless otherwise noted below, none     Procedures    CRITICAL CARE TIME   n/a    CONSULTS:  None      EMERGENCY DEPARTMENT COURSE and DIFFERENTIAL DIAGNOSIS/MDM:   Vitals:    Vitals:    05/15/22 1901   BP: (!) 141/83   Pulse: 80   Resp: 16   Temp: 98.5 °F (36.9 °C)   SpO2: 97%   Weight: 140 lb (63.5 kg)   Height: 5' 8\" (1.727 m)       Patient was given the following medications:  Medications neomycin-bacitracin-polymyxin (NEOSPORIN) 400-5-5000 ointment (has no administration in time range)         Is this patient to be included in the SEP-1 Core Measure due to severe sepsis or septic shock? No   Exclusion criteria - the patient is NOT to be included for SEP-1 Core Measure due to:  2+ SIRS criteria are not met    This patient presents to the emergency department with several areas of injury status post fall from wheelchair. This was unwitnessed but facility staff does not suspect syncope or seizure. Patient is alert and oriented to person. When daughter arrived at bedside, she states that this mentation is chronic. This is not acutely changed. Patient has had mildly but steadily worsening renal insufficiency but is currently being treated for UTI with Ceftin at nursing home. X-rays appear stable. Patient has an age indeterminant L1 compression fracture but does not report any acute worsening back pain from his baseline. He has no radicular symptoms. Daughter believes his tetanus is up-to-date. CT scan shows bilateral pleural effusions the patient is not hypoxic and not complaining of shortness of breath or chest pain. 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 Has incidental saccular aneurysm which has mildly grown in size. Patient is not wanting any surgical intervention and daughter agrees. However, will be provided with referrals to neurosurgery, vascular surgery, urology.      My suspicion is low for ACS, PE, myocarditis, pericarditis, endocarditis, acute pulmonary edema, pericardial effusion, cardiac tamponade,  thoracic aortic dissection, esophageal rupture, other life-threatening arrhythmia, hemothorax, pulmonary contusion, subcutaneous emphysema, flail chest, pneumo mediastinum, rib fracture, pneumonia, pneumothorax, ARDS, carotid dissection, sinus abscess, acute fracture, acute CVA, ICH, SAH, TIA, meningitis, encephalitis, pseudotumor cerebri, temporal arteritis, sentinel bleed from ruptured aneurysm, hypertensive urgency or emergency, subdural hematoma, epidural hematoma, cerebellar compromise, posterior stroke,  acute bacterial conjunctivitis, blepharitis, dacryocystitis, acute angle-closure glaucoma, corneal abrasion, ulcer, infiltrate, dendritic, rust ring, foreign body, MS, optic neuritis, periorbital or orbital cellulitis, stye, herpes ophthalmicus, Gillian Goel syndrome, retinal detachment, acute ophthalmic arterial or venous compromise, syphilis, covid19, central venous occlusion, kawasaki disease, sepsis, DKA, acute surgical abdomen, obstruction, perforation, abscess, mesenteric ischemia, AAA dissection, cholecystitis, cholangitis, pancreatitis, appendicitis, C. diff colitis, diverticulitis, volvulus, incarcerated hernia, necrotizing fasciitis, testicular torsion, epididymitis, varicocele, hydrocele, orchitis, incarcerated hernia, Calista gangrene, pyelonephritis, perinephric abscess, kidney stone, urosepsis, fistula, intussusception, preeclampsia, help syndrome, sialadenitis, parotiditis, trench mouth, shingles,  acute spine fracture or dislocation, epidural abscess or hematoma, discitis, meningitis, encephalitis, transverse myelitis, cauda quina, shingles, or other concerning pathology. FINAL IMPRESSION      1. Abrasion of face, initial encounter    2. Abrasion of hand, unspecified laterality, initial encounter    3. Abrasion of left lower extremity, initial encounter    4. Closed head injury, initial encounter    5. Fall from wheelchair, initial encounter    6. Compression fracture of L1 vertebra, initial encounter ([facility] [address]    7. Saccular aneurysm    8. UPJ (ureteropelvic junction) obstruction    9. Bilateral pleural effusion    10.  Acute cystitis with hematuria          DISPOSITION/PLAN   DISPOSITION Decision To Discharge 05/15/2022 09:58:55 PM      PATIENT REFERRED TO:  Naseem Martinez MD  [address] [place] [address]  [phone]    In 3 days      [facility] Emergency Department  [address] [facility]  [address] [facility] [facility] [address]  [phone]    If symptoms worsen    Alyssa Melendez MD  [address] [facility], [address]  [address]  [phone]      vascular consultation    Collette Squires, [address] [facility] [id number]      for urology consultation    [facility] [address]  [facility] [address] [place] [address]  [phone]    for spine consultation      DISCHARGE MEDICATIONS:  New Prescriptions    No medications on file       DISCONTINUED MEDICATIONS:  Discontinued Medications    No medications on file              (Please note that portions of this note were completed with a voice recognition program.  Efforts were made to edit the dictations but occasionally words are mis-transcribed.)    Cristela Morocho PA-C (electronically signed)            Cristela Morocho PA-C  05/15/22 [id number]

## 2022-05-16 ENCOUNTER — HOSPITAL ENCOUNTER (EMERGENCY)
Age: 87
Discharge: HOME | End: 2022-05-16
Payer: MEDICARE

## 2022-05-16 ENCOUNTER — APPOINTMENT (OUTPATIENT)
Dept: CT IMAGING | Age: 87
End: 2022-05-16
Payer: MEDICARE

## 2022-05-16 ENCOUNTER — APPOINTMENT (OUTPATIENT)
Dept: GENERAL RADIOLOGY | Age: 87
End: 2022-05-16
Payer: MEDICARE

## 2022-05-16 VITALS
DIASTOLIC BLOOD PRESSURE: 84 MMHG | OXYGEN SATURATION: 98 % | HEART RATE: 87 BPM | SYSTOLIC BLOOD PRESSURE: 142 MMHG | TEMPERATURE: 97.7 F | RESPIRATION RATE: 25 BRPM

## 2022-05-16 VITALS
BODY MASS INDEX: 21.22 KG/M2 | TEMPERATURE: 98.5 F | WEIGHT: 140 LBS | RESPIRATION RATE: 20 BRPM | HEART RATE: 97 BPM | SYSTOLIC BLOOD PRESSURE: 141 MMHG | OXYGEN SATURATION: 97 % | DIASTOLIC BLOOD PRESSURE: 83 MMHG | HEIGHT: 68 IN

## 2022-05-16 DIAGNOSIS — S60.419A ABRASION OF HAND AND FINGERS, LEFT, INITIAL ENCOUNTER: ICD-10-CM

## 2022-05-16 DIAGNOSIS — S09.90XA INJURY OF HEAD, INITIAL ENCOUNTER: ICD-10-CM

## 2022-05-16 DIAGNOSIS — S16.1XXA CERVICAL STRAIN, ACUTE, INITIAL ENCOUNTER: ICD-10-CM

## 2022-05-16 DIAGNOSIS — S60.512A ABRASION OF HAND AND FINGERS, LEFT, INITIAL ENCOUNTER: ICD-10-CM

## 2022-05-16 DIAGNOSIS — Z79.01 ANTICOAGULATED: ICD-10-CM
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
## 2022-05-16 DIAGNOSIS — S01.81XA FACIAL LACERATION, INITIAL ENCOUNTER: Primary | ICD-10-CM
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
## 2022-05-16 LAB
EKG ATRIAL RATE: 92 BPM
EKG DIAGNOSIS: NORMAL
EKG Q-T INTERVAL: 344 MS
EKG QRS DURATION: 98 MS
EKG QTC CALCULATION (BAZETT): 423 MS
EKG R AXIS: 9 DEGREES
EKG T AXIS: 150 DEGREES
EKG VENTRICULAR RATE: 91 BPM

## 2022-05-16 PROCEDURE — 12013 RPR F/E/E/N/L/M 2.6-5.0 CM: CPT

## 2022-05-16 PROCEDURE — 73130 X-RAY EXAM OF HAND: CPT

## 2022-05-16 PROCEDURE — 70450 CT HEAD/BRAIN W/O DYE: CPT

## 2022-05-16 PROCEDURE — 99284 EMERGENCY DEPT VISIT MOD MDM: CPT

## 2022-05-16 PROCEDURE — 93010 ELECTROCARDIOGRAM REPORT: CPT | Performed by: INTERNAL MEDICINE

## 2022-05-16 PROCEDURE — 72125 CT NECK SPINE W/O DYE: CPT

## 2022-05-16 RX ORDER — LIDOCAINE HYDROCHLORIDE AND EPINEPHRINE 10; 10 MG/ML; UG/ML
20 INJECTION, SOLUTION INFILTRATION; PERINEURAL ONCE
Status: DISCONTINUED | OUTPATIENT
Start: 2022-05-16 | End: 2022-05-16 | Stop reason: HOSPADM

## 2022-05-16 ASSESSMENT — LIFESTYLE VARIABLES: HOW OFTEN DO YOU HAVE A DRINK CONTAINING ALCOHOL: NEVER

## 2022-05-16 NOTE — ED TRIAGE NOTES
Pt in via [facility] from [facility], pt with mechanical fall, complains if neck and head pain, pt with laceration to forehead, bleeding controlled, pt on Eliquis.

## 2022-05-16 NOTE — ED PROVIDER NOTES
[address] [facility]        Pt Name: Rajinder Vang  MRN: [id number]  [place] 10/31/1925  Date of evaluation: 5/16/2022  Provider: MARIS Barger CNP  PCP: Kun Hernández MD  Note Started: 11:20 AM EDT       MICHELLE. I have evaluated this patient. My supervising physician was available for consultation. CHIEF COMPLAINT       Chief Complaint   Patient presents with    Fall     Pt in via [address] [facility] from [facility], pt with mechanical fall, complains if neck and head pain, pt with laceration to forehead, bleeding controlled, pt on Eliquis. HISTORY OF PRESENT ILLNESS   (Location, Timing/Onset, Context/Setting, Quality, Duration, Modifying Factors, Severity, Associated Signs and Symptoms)  Note limiting factors. Chief Complaint: Fall, head injury    Rajinder Vang is a 80 y.o. male who presents from [address] after a mechanical fall that was witnessed when he tripped over his feet and fell forward and landed on the concrete. He sustained an abrasion to his dorsal aspect of his left hand and forehead laceration. Patient was seen yesterday after a mechanical fall. He currently is complaining of head and neck pain. Patient is on Eliquis. He received extensive work-up yesterday to include CT imaging of the C, T, and L-spine along with chest, abdomen, and pelvis. It appeared yesterday he had an abrasion to his forehead and today he has a laceration which is new. He does have another abrasion to the dorsal aspect of his left hand. He is mental status is at baseline. DNR CC. Nursing Notes were all reviewed and agreed with or any disagreements were addressed in the HPI. REVIEW OF SYSTEMS    (2-9 systems for level 4, 10 or more for level 5)     Review of Systems    Positives and Pertinent negatives as per HPI. Except as noted above in the ROS, all other systems were reviewed and negative. PAST MEDICAL HISTORY     Past Medical History:   Diagnosis Date    A-fib [facility])     Arthritis     [facility]rial fibrillation [facility]     Blepharitis of both eyes     CAD (coronary artery disease)     stents 2005, x3    GERD (gastroesophageal reflux disease)     Gout     Hyperlipidemia     Hypertension     Macular degeneration     NSTEMI (non-ST elevated myocardial infarction) [facility] [address]     Prostate enlargement     Psychiatric problem     anxiety; bipolar disorder    Tachycardia          SURGICAL HISTORY     Past Surgical History:   Procedure Laterality Date    CARDIAC SURGERY      CATARACT REMOVAL  1993    X5    COLONOSCOPY      CORONARY ANGIOPLASTY WITH STENT PLACEMENT  2005      X3    [facility]ON[facility]      [facility]    EYE SURGERY      HIP SURGERY Left 6/2/2020    LEFT BIPOLAR HIP HEMIARTHROPLASTY performed by Merlinda Goring, MD at [address] [place] [address]    SKIN BIOPSY      melanoma, basal    SKIN CANCER DESTRUCTION  2006    Melanoma  ()    TURP  2003         CURRENTMEDICATIONS       Previous Medications    ALUMINUM & MAGNESIUM HYDROXIDE-SIMETHICONE (MYLANTA) 400-400-40 MG/5ML SUSP    Take 30 mLs by mouth every 6 hours as needed    APIXABAN (ELIQUIS) 2.5 MG TABS TABLET    Take 1 tablet by mouth 2 times daily    ARTIFICIAL TEAR OINTMENT (DRY EYES OP)    Apply 250 capsules to eye daily    CALCIUM CARBONATE (OSCAL) 500 MG TABS TABLET    Take 500 mg by mouth daily    DILTIAZEM (CARDIZEM) 30 MG TABLET    Take 1 tablet by mouth 3 times daily Hold the tablet if heart rate below 60 or blood pressure below 145 systolic    FERROUS SULFATE (IRON 325) 325 (65 FE) MG TABLET    Take 325 mg by mouth daily (with breakfast)    FINASTERIDE (PROSCAR) 5 MG TABLET    TAKE 1 TABLET EVERY NIGHT    GABAPENTIN (NEURONTIN) 100 MG CAPSULE    Take 1 capsule by mouth nightly for 30 days.     LAMOTRIGINE (LAMICTAL) 150 MG TABLET    Take 1 tablet by mouth daily LEVOTHYROXINE (SYNTHROID) 25 MCG TABLET    TAKE 1 TABLET EVERY DAY    LORAZEPAM (ATIVAN) 0.5 MG TABLET    Take 0.5 mg by mouth daily as needed for Anxiety. LORAZEPAM (ATIVAN) 0.5 MG TABLET    Take 0.5 mg by mouth 2 times daily. MAGNESIUM 400 MG TABS    Take 1 tablet by mouth daily     MELATONIN 5 MG TABS TABLET    Take 5 mg by mouth daily as needed (Insomnia)    MULTIPLE VITAMINS-MINERALS (MULTIVITAMIN ADULT PO)    Take by mouth    NITROGLYCERIN (NITROSTAT) 0.4 MG SL TABLET    Place 1 tablet under the tongue every 5 minutes as needed for Chest pain    PANTOPRAZOLE (PROTONIX) 40 MG TABLET    TAKE 1 TABLET EVERY DAY    POLYETHYLENE GLYCOL (GLYCOLAX) POWDER    Take 17 g by mouth daily as needed    SENNOSIDES-DOCUSATE SODIUM (SENOKOT-S) 8.6-50 MG TABLET    Take 1 tablet by mouth 2 times daily    TAMSULOSIN (FLOMAX) 0.4 MG CAPSULE    Take 0.4 mg by mouth daily    TRIAMCINOLONE (KENALOG) 0.1 % CREAM    Apply to affected areas twice daily    ZIPRASIDONE (GEODON) 20 MG CAPSULE    Take 20 mg by mouth nightly         ALLERGIES     Lasix [furosemide], Zyprexa [olanzapine], and Citalopram    FAMILYHISTORY       Family History   Problem Relation Age of Onset    Heart Disease Mother     Heart Disease Brother     High Blood Pressure Neg Hx     High Cholesterol Neg Hx     Mental Illness Neg Hx           SOCIAL HISTORY       Social History     Tobacco Use    Smoking status: Never Smoker    Smokeless tobacco: Never Used   Vaping Use    Vaping Use: Never used   Substance Use Topics    Alcohol use: No    Drug use: Never       SCREENINGS             PHYSICAL EXAM    (up to 7 for level 4, 8 or more for level 5)     ED Triage Vitals   Enc Vitals Group      BP 05/16/22 1115 (!) 142/84      Pulse 05/16/22 1115 87      Resp 05/16/22 1115 25      Temp 05/16/22 1117 97.7 °F (36.5 °C)      Temp Source 05/16/22 1117 Oral      SpO2 05/16/22 1115 98 %         Physical Exam  Vitals and nursing note reviewed.    Constitutional: General: He is awake. Appearance: Normal appearance. He is well-developed and normal weight. Interventions: Cervical collar in place. HENT:      Head: Normocephalic. Laceration present. Jaw: There is normal jaw occlusion. Right Ear: Decreased hearing noted. Left Ear: Decreased hearing noted. Nose: No nasal tenderness. Right Nostril: No epistaxis. Left Nostril: No epistaxis. Mouth/Throat:      Mouth: Mucous membranes are dry. Comments: Dried crusted deposits to lateral edges of mouth  Eyes:      General:         Right eye: No discharge. Left eye: No discharge. Cardiovascular:      Rate and Rhythm: Normal rate and regular rhythm. Pulses:           Radial pulses are 2+ on the right side. Heart sounds: Normal heart sounds. Pulmonary:      Effort: Pulmonary effort is normal. No respiratory distress. Breath sounds: Normal breath sounds. Abdominal:      General: Bowel sounds are normal.      Palpations: Abdomen is soft. Tenderness: There is no abdominal tenderness. Musculoskeletal:         General: Normal range of motion. Right hand: Swelling and tenderness present. Left hand: Normal.        Arms:       Cervical back: No pain with movement, spinous process tenderness or muscular tenderness. Right lower leg: No edema. Left lower leg: No edema. Skin:     General: Skin is warm and dry. Coloration: Skin is not pale. Neurological:      Mental Status: He is alert and oriented to person, place, and time. Psychiatric:         Behavior: Behavior normal. Behavior is cooperative. DIAGNOSTIC RESULTS   LABS:    Labs Reviewed - No data to display    When ordered only abnormal lab results are displayed. All other labs were within normal range or not returned as of this dictation. EKG:  When ordered, EKG's are interpreted by the Emergency Department Physician in the absence of a cardiologist.  Please see their note for interpretation of EKG. RADIOLOGY:   Non-plain film images such as CT, Ultrasound and MRI are read by the radiologist. Plain radiographic images are visualized and preliminarily interpreted by the ED Provider with the below findings:        Interpretation per the Radiologist below, if available at the time of this note:    CT Head WO Contrast   Final Result   No acute intracranial abnormality. No change from prior examination. CT Cervical Spine WO Contrast   Final Result   No acute abnormality of the cervical spine. XR HAND LEFT (MIN 3 VIEWS)   Final Result   No acute findings. Degenerative changes as described above           XR HAND LEFT (MIN 3 VIEWS)    Result Date: 5/15/2022  EXAMINATION: THREE XRAY VIEWS OF THE LEFT HAND 5/15/2022 7:47 pm COMPARISON: None. HISTORY: ORDERING SYSTEM PROVIDED HISTORY: injury TECHNOLOGIST PROVIDED HISTORY: Reason for exam:->injury Reason for Exam: Fall, skin tear FINDINGS: There is a metallic ring partially obscuring the proximal phalanx of the 4th digit. There is moderate joint space narrowing throughout the digits with prominent osteophytes throughout which is most severe along the D IP and PIP joints and base of the thumb. The carpal bones are intact and in good position. No acute fracture or dislocation is seen. Severe osteoarthritic changes throughout the digits and diffuse osteopenia with no acute bony abnormality     XR HAND RIGHT (MIN 3 VIEWS)    Result Date: 5/15/2022  EXAMINATION: THREE XRAY VIEWS OF THE RIGHT HAND 5/15/2022 7:48 pm COMPARISON: None. HISTORY: ORDERING SYSTEM PROVIDED HISTORY: injury TECHNOLOGIST PROVIDED HISTORY: Reason for exam:->injury Reason for Exam: Fall, skin tear FINDINGS: There is moderate joint space narrowing throughout the digits which is most severe along the PIP and DIP joints with prominent osteophytes throughout. The bones are osteopenic. No fracture or dislocation is seen.   The carpal bones are intact and in good position. Moderately severe osteoarthritic changes throughout the digits and diffuse osteopenia with no acute bony abnormality seen. XR TIBIA FIBULA LEFT (2 VIEWS)    Result Date: 5/15/2022  EXAMINATION: 4 XRAY VIEWS OF THE LEFT TIBIA AND FIBULA 5/15/2022 7:47 pm COMPARISON: None. HISTORY: ORDERING SYSTEM PROVIDED HISTORY: injury TECHNOLOGIST PROVIDED HISTORY: Reason for exam:->injury Reason for Exam: Fall, skin tear FINDINGS: The tibia and fibula are intact. No fracture or dislocation is seen. The bones are osteopenic. The joint spaces are intact. There are vascular calcifications posteriorly. Diffuse osteopenia with no acute bony abnormality. CT HEAD WO CONTRAST    Result Date: 5/15/2022  EXAMINATION: CT OF THE HEAD WITHOUT CONTRAST  5/15/2022 6:59 pm TECHNIQUE: CT of the head was performed without the administration of intravenous contrast. Automated exposure control, iterative reconstruction, and/or weight based adjustment of the mA/kV was utilized to reduce the radiation dose to as low as reasonably achievable. COMPARISON: CT head dated 07/04/2021 HISTORY: ORDERING SYSTEM PROVIDED HISTORY: fall/head injury TECHNOLOGIST PROVIDED HISTORY: Reason for exam:->fall/head injury Has a \"code stroke\" or \"stroke alert\" been called? ->No Decision Support Exception - unselect if not a suspected or confirmed emergency medical condition->Emergency Medical Condition (MA) Reason for Exam: Fall (fell out of wheel chair, pt states that he was doen for about an hour. facility states that he was down for 10 to 15 mins. Pt is alert but not oriented, pt is on eliquis. Pt presents to triage with bandages on his head and hands. ) FINDINGS: BRAIN/VENTRICLES: There is no acute intracranial hemorrhage, mass effect or midline shift. No abnormal extra-axial fluid collection. The gray-white differentiation is maintained without evidence of an acute infarct. There is no evidence of hydrocephalus.  Moderate low attenuation in the periventricular deep white matter suggesting chronic small vessel ischemic disease. ORBITS: The visualized portion of the orbits demonstrate no acute abnormality. SINUSES: The visualized paranasal sinuses and mastoid air cells demonstrate no acute abnormality. SOFT TISSUES/SKULL:  No acute abnormality of the visualized skull. Soft tissues reveal likely laceration injury without hematoma overlying the anterior frontal region     No acute intracranial abnormality. Small laceration injury overlying the anterior frontal region without scalp hematoma or calvarial defect     CT CERVICAL SPINE WO CONTRAST    Result Date: 5/15/2022  EXAMINATION: CT OF THE CERVICAL SPINE WITHOUT CONTRAST 5/15/2022 6:59 pm TECHNIQUE: CT of the cervical spine was performed without the administration of intravenous contrast. Multiplanar reformatted images are provided for review. Automated exposure control, iterative reconstruction, and/or weight based adjustment of the mA/kV was utilized to reduce the radiation dose to as low as reasonably achievable. COMPARISON: None. HISTORY: ORDERING SYSTEM PROVIDED HISTORY: fall trauma TECHNOLOGIST PROVIDED HISTORY: Reason for exam:->fall trauma Decision Support Exception - unselect if not a suspected or confirmed emergency medical condition->Emergency Medical Condition (MA) Reason for Exam: Fall (fell out of wheel chair, pt states that he was doen for about an hour. facility states that he was down for 10 to 15 mins. Pt is alert but not oriented, pt is on eliquis. Pt presents to triage with bandages on his head and hands. ) FINDINGS: BONES/ALIGNMENT: There is no acute fracture or traumatic malalignment. DEGENERATIVE CHANGES: Moderate to severe multilevel degenerative changes. SOFT TISSUES: There is no prevertebral soft tissue swelling. No acute abnormality of the cervical spine.      CT THORACIC SPINE WO CONTRAST    Result Date: 5/15/2022  EXAMINATION: CT OF THE THORACIC SPINE reconstruction, and/or weight based adjustment of the mA/kV was utilized to reduce the radiation dose to as low as reasonably achievable. COMPARISON: None HISTORY: ORDERING SYSTEM PROVIDED HISTORY: back pain/fall TECHNOLOGIST PROVIDED HISTORY: Reason for exam:->back pain/fall Decision Support Exception - unselect if not a suspected or confirmed emergency medical condition->Emergency Medical Condition (MA) Reason for Exam: Fall (fell out of wheel chair, pt states that he was doen for about an hour. facility states that he was down for 10 to 15 mins. Pt is alert but not oriented, pt is on eliquis. Pt presents to triage with bandages on his head and hands. ) FINDINGS: BONES/ALIGNMENT: Grossly normal lumbar lordosis with mild scoliotic curvature of the lumbar region apex L3 levo scoliotic curvature. Height loss of the L1 vertebral body with anterior wedging 50% height loss and mild cortical step-off age-indeterminate compression deformity without retropulsion or spinal canal stenosis. DEGENERATIVE CHANGES: Moderate to severe multilevel degenerative changes of the lumbar spine. SOFT TISSUES/RETROPERITONEUM: No paraspinal mass is seen. Age-indeterminate L1 compression deformity with 50% height loss anteriorly. No retropulsion or posterior element involvement no spinal canal stenosis of significance at this level or elsewhere however moderate to severe degenerative changes throughout the lower lumbar segments and lumbosacral junction. CT PELVIS WO CONTRAST Additional Contrast? None    Result Date: 5/15/2022  EXAMINATION: CT OF THE PELVIS WITHOUT CONTRAST 5/15/2022 7:19 pm TECHNIQUE: CT of the pelvis was performed without the administration of intravenous contrast.  Multiplanar reformatted images are provided for review. Adjustment of mA and/or kV according to patient size was utilized.   Automated exposure control, iterative reconstruction, and/or weight based adjustment of the mA/kV was utilized to reduce the radiation dose to as low as reasonably achievable. COMPARISON: CT abdomen and pelvis 11/16/2017. Multiple intervening pelvis/left hip x-ray exams with the most recent 07/04/2021, and the most remote 05/31/2020. HISTORY: ORDERING SYSTEM PROVIDED HISTORY: fall TECHNOLOGIST PROVIDED HISTORY: Reason for exam:->fall Additional Contrast?->None Decision Support Exception - unselect if not a suspected or confirmed emergency medical condition->Emergency Medical Condition (MA) Reason for Exam: Fall (fell out of wheel chair, pt states that he was doen for about an hour. facility states that he was down for 10 to 15 mins. Pt is alert but not oriented, pt is on eliquis. Pt presents to triage with bandages on his head and hands. ) FINDINGS: Streak artifact from left hip prosthesis somewhat limits evaluation. Within this limitation: Diffuse bone demineralization. Left hip prosthesis redemonstrated. This appears to be a hemiprosthesis. Orthopedic hardware appears intact and in stable position. No evidence for hardware complication is seen. No fractures or dislocations. No suspicious focal bony lesions. No bony erosions or bony destructive changes. Stable mild degenerative changes to the right hip joint. Small right hip joint effusion. No intra-articular loose bodies. Limited evaluation of the left hip joint secondary to the prosthesis without large joint effusion noted. Evaluation of the extrapelvic soft tissues demonstrates no acute or suspicious abnormality. Chronic postsurgical scarring laterally to the left hip. Atherosclerotic vascular calcifications. No lymphadenopathy. Evaluation of intrapelvic contents demonstrates mild degree of presacral nonhemorrhagic fluid and fat stranding. No focal fluid collections are identified. No lymphadenopathy. Enlarged prostate. Grossly unremarkable non-opacified large and small bowel. No lymphadenopathy is seen. Extensive atherosclerosis.   Saccular aneurysm of distal abdominal aorta posteriorly measuring 3.1 x 2.5 cm. This previously measured 2.7 x 2.3 cm. No acute bony abnormality. Mild degree of nonspecific presacral nonhemorrhagic fluid and fat stranding. Stable mild degenerative changes to the right hip joint. There is a small right hip joint effusion. Stable appearance to left hip prosthesis. No evidence for hardware complication. Saccular aneurysm of the visualized distal abdominal aorta measuring maximally 3.1 cm, previously 2.7 cm on prior exam.  Management recommendations as below. Enlarged prostate. RECOMMENDATIONS: 3.1 cm saccular abdominal aortic aneurysm. Recommend vascular consultation. Reference: J Vasc Surg 0592;63:5-04. CT CHEST ABDOMEN PELVIS WO CONTRAST    Result Date: 5/15/2022  EXAMINATION: CT OF THE CHEST, ABDOMEN, AND PELVIS WITHOUT CONTRAST 5/15/2022 7:25 pm TECHNIQUE: CT of the chest, abdomen and pelvis was performed without the administration of intravenous contrast. Multiplanar reformatted images are provided for review. Automated exposure control, iterative reconstruction, and/or weight based adjustment of the mA/kV was utilized to reduce the radiation dose to as low as reasonably achievable. COMPARISON: None HISTORY: ORDERING SYSTEM PROVIDED HISTORY: fall TECHNOLOGIST PROVIDED HISTORY: Reason for exam:->fall Additional Contrast?->None Decision Support Exception - unselect if not a suspected or confirmed emergency medical condition->Emergency Medical Condition (MA) Reason for Exam: fall Relevant Medical/Surgical History: Fall (fell out of wheel chair, pt states that he was doen for about an hour. facility states that he was down for 10 to 15 mins. Pt is alert but not oriented, pt is on eliquis. Pt presents to triage with bandages on his head and hands. ) FINDINGS: Chest: Mediastinum: Thyroid is homogeneous in attenuation. No bulky mediastinal adenopathy. Central airways are patent. Esophagus is normal course and caliber.  Patent nonaneurysmal thoracic aorta. Cardiac size within normal limits without pericardial effusion. Lungs/pleura: Smooth interlobular septal thickening with lower lobe predominance with small right and moderate left pleural effusions and adjacent atelectasis. 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 Soft Tissues/Bones: No soft tissue findings or acute osseous findings with dedicated thoracic imaging on separate CT thoracic dictation Abdomen/Pelvis: Organs: Liver without focal lesion. Gallbladder filled with numerous stones partially contracted cholelithiasis. .  Pancreas and spleen unremarkable. Adrenals without nodule. Kidneys demonstrate bilateral intrarenal stones and calcifications of nephrolithiasis with moderate to severe left hydroureteronephrosis in the proximal portion with abrupt caliber change in the proximal ureter likely at the ureteropelvic junction could represent a chronic left UPJ obstruction appearance with potential mild cortical thinning left kidney. GI/Bowel: No focal thickening or disproportion dilatation of bowel. No inflammatory findings. Appendix is unremarkable and visualized in the right lower quadrant. Pelvis: No suspicious pelvic lesion or bulky pelvic adenopathy/free fluid. Peritoneum/Retroperitoneum: Patent nonaneurysmal abdominal aorta. No bulky retroperitoneal adenopathy. Bones/Soft Tissues: No acute soft tissue findings with left hip arthroplasty in place. Please see separate CT lumbar spine and CT bony pelvis dictations for associated osseous findings on dedicated imaging. .  Small fat and minimally fluid containing right direct inguinal hernia     No acute traumatic findings of the chest abdomen or pelvis. Please see separate spine CT dictations for spinal small and CT bony pelvis findings including probable lumbar spine compression deformity fracture age indeterminate.  Bilateral pleural effusions including moderate volume left pleural effusion and adjacent pleural calcifications. 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           PROCEDURES   Unless otherwise noted below, none     Lac Repair    Date/Time: 5/16/2022 11:59 AM  Performed by: MARIS Fragoso CNP  Authorized by: MARIS Fragoso CNP     Consent:     Consent obtained:  Verbal    Consent given by:  Patient    Risks discussed:  Infection, pain, poor wound healing and poor cosmetic result    Alternatives discussed:  No treatment  Anesthesia (see MAR for exact dosages): Anesthesia method:  Local infiltration    Local anesthetic:  Lidocaine 1% WITH epi  Laceration details:     Location:  Face    Face location:  Forehead    Length (cm):  5  Repair type:     Repair type:  Simple  Pre-procedure details:     Preparation:  Imaging obtained to evaluate for foreign bodies and patient was prepped and draped in usual sterile fashion  Exploration:     Hemostasis achieved with:  Epinephrine    Wound exploration: wound explored through full range of motion and entire depth of wound probed and visualized      Contaminated: no    Treatment:     Area cleansed with:  Hibiclens and saline    Amount of cleaning:  Standard  Skin repair:     Repair method:  Sutures and Steri-Strips    Suture size:  5-0    Suture material:  Nylon    Suture technique:  Horizontal mattress    Number of sutures:  7    Number of Steri-Strips:  3  Approximation:     Approximation:  Close  Post-procedure details:     Dressing:  Antibiotic ointment, non-adherent dressing and bulky dressing    Patient tolerance of procedure:   Tolerated well, no immediate complications        CRITICAL CARE TIME       CONSULTS:  None      EMERGENCY DEPARTMENT COURSE and DIFFERENTIAL DIAGNOSIS/MDM:   Vitals:    Vitals:    05/16/22 1115 05/16/22 1117   BP: (!) 142/84    Pulse: 87    Resp: 25    Temp:  97.7 °F (36.5 °C)   TempSrc:  Oral   SpO2: 98%        Patient was given the following medications:  Medications   lidocaine-EPINEPHrine 1 %-1:[id number] injection 20 mL (has no administration in time range)         Is this patient to be included in the SEP-1 Core Measure due to severe sepsis or septic shock? No   Exclusion criteria - the patient is NOT to be included for SEP-1 Core Measure due to: Infection is not suspected    Care of this patient took place during the COVID-19 pandemic emergency. ED COURSE & MEDICAL DECISION MAKING    - The patient presented to the ER with complaints of fall, hit head. Vital signs were reviewed. Exam well-developed, well-nourished male who appears uncomfortable. Imaging ordered. - Pertinent Labs & Imaging studies reviewed. (See chart for details)   -  Patient seen and evaluated in the emergency department. -  Triage and nursing notes reviewed and incorporated. -  Old chart records reviewed and incorporated. -  MICHELLE. I have evaluated this patient. My supervising physician was available for consultation.  -  Differential diagnosis includes: CVA, TIA, ICH, SAH, aneurysm, dissection, meningitis, glioblastoma, meningioma, metabolic encephalopathy, VTE, SDH, dehydration, sepsis, COVID-19  -  Work-up included:  See above  -  ED treatment included:  wound care, lac repair, bacitracin  -  Results discussed with patient. Natalie Kaye is a 59-year-old male was brought into the emergency department for [facility] after a mechanical fall that occurred. He was seen yesterday for a fall and sustained abrasion to his forehead. Today he sustained a laceration. Daughter at bedside and said patient is at his baseline mental status.   Jeoffrey Mortimer he is not supposed to be walking without assistance, although he frequently will get up and try and ambulate. On exam he has a 5 cm horizontal laceration to the middle of his forehead. There is an abrasion to the dorsal aspect of his left hand, although unsure if this is from yesterday. No intraoral trauma noted. Abdomen soft nontender. Lungs clear to auscultate lateral.  Imaging is obtained. CT head without contrast shows no acute intercranial abnormality. No change from prior exam.  CT cervical spine without contrast no acute abnormality of the cervical spine. X-ray left hand shows no acute findings. Degenerative changes as described above. Patient was informed on these results and will be discharged back to the nursing home. Daughter at bedside. Patient requesting to eat prior to discharge. FINAL IMPRESSION      1. Facial laceration, initial encounter    2. Injury of head, initial encounter    3. Abrasion of hand and fingers, left, initial encounter    4. Cervical strain, acute, initial encounter    5. Fall at nursing home, initial encounter    6.  Anticoagulated          DISPOSITION/PLAN   DISPOSITION        PATIENT REFERRED TO:  Lorie Silva MD  [address] [place] [address],[address] [facility] [phone]    Call in 2 days  As needed, If symptoms worsen    [facility] Emergency Department  [address] [facility]  [phone]  Go to   As needed      DISCHARGE MEDICATIONS:  New Prescriptions    No medications on file       DISCONTINUED MEDICATIONS:  Discontinued Medications    No medications on file              (Please note that portions of this note were completed with a voice recognition program.  Efforts were made to edit the dictations but occasionally words are mis-transcribed.)    MARIS Peraza CNP (electronically signed)            MARIS Peraza CNP  05/16/22 1932

## 2022-05-16 NOTE — ED PROVIDER NOTES
[address] [facility] Emergency Department      Pt Name: Sarah Crow  MRN: [id number]  Mairagfjasmina 10/31/1925  Date of evaluation: 5/15/2022  Provider: Levi Franklin MD  I independently performed a history and physical on Bartolome Rudd. All diagnostic, treatment, and disposition decisions were made by myself in conjunction with the advanced practice provider. HPI: Sarah Crow presented with   Chief Complaint   Patient presents with   Blase Liming Fall     fell out of wheel chair, pt states that he was doen for about an hour. facility states that he was down for 10 to 15 mins. Pt is alert but not oriented, pt is on eliquis. Pt presents to triage with bandages on his head and hands. Sarah Crow has a past medical history of A-fib [facility]), Arthritis, Atrial fibrillation ([facility] [address] Blepharitis of both eyes, CAD (coronary artery disease), GERD (gastroesophageal reflux disease), Gout, Hyperlipidemia, Hypertension, Macular degeneration, NSTEMI (non-ST elevated myocardial infarction) [facility]), Prostate enlargement, Psychiatric problem, and Tachycardia. He has a past surgical history that includes Coronary angioplasty with stent (2005); skin biopsy; Kidney stone surgery (1980); Coronary artery bypass graft; Cataract removal (1993); TURP (2003); Skin cancer destruction (2006); Colonoscopy; eye surgery; Cardiac surgery; and hip surgery (Left, 6/2/2020). No current facility-administered medications on file prior to encounter. Current Outpatient Medications on File Prior to Encounter   Medication Sig Dispense Refill    aluminum & magnesium hydroxide-simethicone (MYLANTA) 400-400-40 MG/5ML SUSP Take 30 mLs by mouth every 6 hours as needed      LORazepam (ATIVAN) 0.5 MG tablet Take 0.5 mg by mouth daily as needed for Anxiety.  LORazepam (ATIVAN) 0.5 MG tablet Take 0.5 mg by mouth 2 times daily.       ziprasidone (GEODON) 20 MG capsule Take 20 mg by mouth nightly      Artificial Tear Ointment (DRY EYES OP) Apply 250 capsules to eye daily      dilTIAZem (CARDIZEM) 30 MG tablet Take 1 tablet by mouth 3 times daily Hold the tablet if heart rate below 60 or blood pressure below 935 systolic 128 tablet 1    Multiple Vitamins-Minerals (MULTIVITAMIN ADULT PO) Take by mouth      calcium carbonate (OSCAL) 500 MG TABS tablet Take 500 mg by mouth daily      ferrous sulfate (IRON 325) 325 (65 Fe) MG tablet Take 325 mg by mouth daily (with breakfast)      gabapentin (NEURONTIN) 100 MG capsule Take 1 capsule by mouth nightly for 30 days.  30 capsule 0    sennosides-docusate sodium (SENOKOT-S) 8.6-50 MG tablet Take 1 tablet by mouth 2 times daily      apixaban (ELIQUIS) 2.5 MG TABS tablet Take 1 tablet by mouth 2 times daily 180 tablet 3    finasteride (PROSCAR) 5 MG tablet TAKE 1 TABLET EVERY NIGHT 90 tablet 3    lamoTRIgine (LAMICTAL) 150 MG tablet Take 1 tablet by mouth daily 90 tablet 1    pantoprazole (PROTONIX) 40 MG tablet TAKE 1 TABLET EVERY DAY 90 tablet 3    levothyroxine (SYNTHROID) 25 MCG tablet TAKE 1 TABLET EVERY DAY 90 tablet 3    tamsulosin (FLOMAX) 0.4 MG capsule Take 0.4 mg by mouth daily      polyethylene glycol (GLYCOLAX) powder Take 17 g by mouth daily as needed      nitroGLYCERIN (NITROSTAT) 0.4 MG SL tablet Place 1 tablet under the tongue every 5 minutes as needed for Chest pain 25 tablet 3    melatonin 5 MG TABS tablet Take 5 mg by mouth daily as needed (Insomnia)      triamcinolone (KENALOG) 0.1 % cream Apply to affected areas twice daily 45 g 1    Magnesium 400 MG TABS Take 1 tablet by mouth daily        PHYSICAL EXAM  Vitals: BP (!) 141/83   Pulse 80   Temp 98.5 °F (36.9 °C)   Resp 16   Ht 5' 8\" (1.727 m)   Wt 140 lb (63.5 kg)   SpO2 97%   BMI 21.29 kg/m²   Constitutional:  80 y.o. male alert  HENT:  Sts, laceration to mid forehead, oral mucosa moist  Neck:  No visible JVD, supple  Chest/Lungs:  Respiratory effort normal  Abdomen:  Non-distended, soft, NT  Back:  No gross deformity  Extremities:  Normal tone and perfusion, abrasions present    Medical Decision Making and Plan: Briefly, this is an 80 y.o.male who presented with concern for fall from a chair. Soft tissue injury. Incidental findings noted. Pt lives at a nursing facility. Pt just started on abx for UTI at facility. Caregivers to be given appropriate discharge instructions through nursing report to the nursing home. Referral to nursing home provider for follow up care. For further details of Rozina Rudd's Emergency Department encounter, please see documentation by advanced practice provider Sherryll Lefort, PA.     Labs Reviewed   CBC WITH AUTO DIFFERENTIAL - Abnormal; Notable for the following components:       Result Value    RBC 3.84 (*)     Hemoglobin 11.6 (*)     Hematocrit 34.9 (*)     Lymphocytes Absolute 0.6 (*)     All other components within normal limits   PROTIME-INR - Abnormal; Notable for the following components:    Protime 15.2 (*)     INR 1.33 (*)     All other components within normal limits   URINALYSIS WITH REFLEX TO CULTURE - Abnormal; Notable for the following components:    Color, UA DARK YELLOW (*)     Clarity, UA TURBID (*)     Blood, Urine LARGE (*)     Protein,  (*)     Leukocyte Esterase, Urine LARGE (*)     All other components within normal limits   COMPREHENSIVE METABOLIC PANEL - Abnormal; Notable for the following components:    Sodium 135 (*)     CO2 20 (*)     Glucose 119 (*)     CREATININE 1.5 (*)     GFR Non- 43 (*)     GFR  52 (*)     Albumin 2.6 (*)     Albumin/Globulin Ratio 0.7 (*)     All other components within normal limits   TROPONIN - Abnormal; Notable for the following components:    Troponin 0.07 (*)     All other components within normal limits   BRAIN NATRIURETIC PEPTIDE - Abnormal; Notable for the following components:    Pro-BNP 2,921 (*)     All other components within normal limits   MICROSCOPIC URINALYSIS - Abnormal; Notable for the following components: Bacteria, UA 4+ (*)     WBC, UA 1562 (*)     RBC, UA 1085 (*)     All other components within normal limits   CULTURE, URINE   APTT   CK     RADIOLOGY:     Plain x-rays were viewed by me:   XR HAND RIGHT (MIN 3 VIEWS)   Final Result   Moderately severe osteoarthritic changes throughout the digits and diffuse   osteopenia with no acute bony abnormality seen. XR HAND LEFT (MIN 3 VIEWS)   Final Result   Severe osteoarthritic changes throughout the digits and diffuse osteopenia   with no acute bony abnormality         XR TIBIA FIBULA LEFT (2 VIEWS)   Final Result   Diffuse osteopenia with no acute bony abnormality. CT CHEST ABDOMEN PELVIS WO CONTRAST   Final Result   No acute traumatic findings of the chest abdomen or pelvis. Please see   separate spine CT dictations for spinal small and CT bony pelvis findings   including probable lumbar spine compression deformity fracture age   indeterminate. Bilateral pleural effusions including moderate volume left pleural effusion   and adjacent pleural calcifications. 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         CT PELVIS WO CONTRAST Additional Contrast? None   Final Result   No acute bony abnormality. Mild degree of nonspecific presacral nonhemorrhagic fluid and fat stranding. Stable mild degenerative changes to the right hip joint. There is a small   right hip joint effusion. Stable appearance to left hip prosthesis. No evidence for hardware   complication. 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  Management   recommendations as below. [place] [address]  [phone]      for urology consultation    [address] [facility]  [phone]    for spine consultation    FINAL IMPRESSION:    1. Abrasion of face, initial encounter    2. Abrasion of hand, unspecified laterality, initial encounter    3. Abrasion of left lower extremity, initial encounter    4. Closed head injury, initial encounter    5. Fall from wheelchair, initial encounter    6. Compression fracture of L1 vertebra, initial encounter ([facility] [address]    7. Saccular aneurysm    8. UPJ (ureteropelvic junction) obstruction    9. Bilateral pleural effusion    10.  Acute cystitis with hematuria       DISPOSITION Decision To Discharge 05/15/2022 09:58:55 PM       Charlene Mitcheal Brittle, MD  05/15/22 6700

## 2022-05-17 LAB
ORGANISM: ABNORMAL
URINE CULTURE, ROUTINE: ABNORMAL

## 2022-05-20 ENCOUNTER — APPOINTMENT (OUTPATIENT)
Dept: GENERAL RADIOLOGY | Age: 87
DRG: 871 | End: 2022-05-20
Payer: MEDICARE

## 2022-05-20 ENCOUNTER — HOSPITAL ENCOUNTER (INPATIENT)
Age: 87
LOS: 6 days | Discharge: NURSING, REHAB OR LONG-TERM CARE | DRG: 871 | End: 2022-05-26
Attending: EMERGENCY MEDICINE | Admitting: INTERNAL MEDICINE
Payer: MEDICARE

## 2022-05-20 DIAGNOSIS — J18.9 PNEUMONIA DUE TO INFECTIOUS ORGANISM, UNSPECIFIED LATERALITY, UNSPECIFIED PART OF LUNG: ICD-10-CM

## 2022-05-20 DIAGNOSIS — I21.4 NSTEMI (NON-ST ELEVATED MYOCARDIAL INFARCTION) (HCC): ICD-10-CM

## 2022-05-20 DIAGNOSIS — R65.21 SEPTIC SHOCK (HCC): Primary | ICD-10-CM

## 2022-05-20 DIAGNOSIS — I50.9 MULTI-ORGAN FAILURE WITH HEART FAILURE (HCC): ICD-10-CM

## 2022-05-20 DIAGNOSIS — A41.9 SEPTIC SHOCK (HCC): Primary | ICD-10-CM

## 2022-05-20 DIAGNOSIS — N17.9 AKI (ACUTE KIDNEY INJURY) (HCC): ICD-10-CM

## 2022-05-20 PROBLEM — N39.0 UTI (URINARY TRACT INFECTION): Status: ACTIVE | Noted: 2022-05-20

## 2022-05-20 LAB
A/G RATIO: 0.8 (ref 1.1–2.2)
ALBUMIN SERPL-MCNC: 2.6 G/DL (ref 3.4–5)
ALP BLD-CCNC: 108 U/L (ref 40–129)
ALT SERPL-CCNC: 21 U/L (ref 10–40)
AMORPHOUS: ABNORMAL /HPF
ANION GAP SERPL CALCULATED.3IONS-SCNC: 10 MMOL/L (ref 3–16)
AST SERPL-CCNC: 46 U/L (ref 15–37)
BACTERIA: ABNORMAL /HPF
BASOPHILS ABSOLUTE: 0 K/UL (ref 0–0.2)
BASOPHILS RELATIVE PERCENT: 0.1 %
BILIRUB SERPL-MCNC: 0.5 MG/DL (ref 0–1)
BILIRUBIN URINE: ABNORMAL
BLOOD, URINE: ABNORMAL
BUN BLDV-MCNC: 27 MG/DL (ref 7–20)
CALCIUM SERPL-MCNC: 8.3 MG/DL (ref 8.3–10.6)
CHLORIDE BLD-SCNC: 102 MMOL/L (ref 99–110)
CLARITY: ABNORMAL
CO2: 25 MMOL/L (ref 21–32)
COLOR: ABNORMAL
COMMENT UA: ABNORMAL
CREAT SERPL-MCNC: 2.1 MG/DL (ref 0.8–1.3)
EKG ATRIAL RATE: 72 BPM
EKG DIAGNOSIS: NORMAL
EKG Q-T INTERVAL: 362 MS
EKG QRS DURATION: 102 MS
EKG QTC CALCULATION (BAZETT): 409 MS
EKG R AXIS: -7 DEGREES
EKG T AXIS: -59 DEGREES
EKG VENTRICULAR RATE: 77 BPM
EOSINOPHILS ABSOLUTE: 0 K/UL (ref 0–0.6)
EOSINOPHILS RELATIVE PERCENT: 0 %
EPITHELIAL CELLS, UA: 5 /HPF (ref 0–5)
FINE CASTS, UA: ABNORMAL /LPF (ref 0–2)
GFR AFRICAN AMERICAN: 36
GFR NON-AFRICAN AMERICAN: 29
GLUCOSE BLD-MCNC: 134 MG/DL (ref 70–99)
GLUCOSE URINE: NEGATIVE MG/DL
HCT VFR BLD CALC: 28.5 % (ref 40.5–52.5)
HEMOGLOBIN: 9.3 G/DL (ref 13.5–17.5)
HYALINE CASTS: 34 /LPF (ref 0–8)
KETONES, URINE: ABNORMAL MG/DL
LACTIC ACID, SEPSIS: 1 MMOL/L (ref 0.4–1.9)
LEUKOCYTE ESTERASE, URINE: ABNORMAL
LYMPHOCYTES ABSOLUTE: 0.6 K/UL (ref 1–5.1)
LYMPHOCYTES RELATIVE PERCENT: 5.1 %
MCH RBC QN AUTO: 30 PG (ref 26–34)
MCHC RBC AUTO-ENTMCNC: 32.8 G/DL (ref 31–36)
MCV RBC AUTO: 91.7 FL (ref 80–100)
MICROSCOPIC EXAMINATION: YES
MONOCYTES ABSOLUTE: 0.5 K/UL (ref 0–1.3)
MONOCYTES RELATIVE PERCENT: 4.1 %
NEUTROPHILS ABSOLUTE: 11.1 K/UL (ref 1.7–7.7)
NEUTROPHILS RELATIVE PERCENT: 90.7 %
NITRITE, URINE: NEGATIVE
PDW BLD-RTO: 14.6 % (ref 12.4–15.4)
PH UA: 5 (ref 5–8)
PLATELET # BLD: 336 K/UL (ref 135–450)
PMV BLD AUTO: 7.6 FL (ref 5–10.5)
POTASSIUM REFLEX MAGNESIUM: 4.1 MMOL/L (ref 3.5–5.1)
PRO-BNP: 8402 PG/ML (ref 0–449)
PROTEIN UA: 100 MG/DL
RBC # BLD: 3.1 M/UL (ref 4.2–5.9)
RBC UA: 54 /HPF (ref 0–4)
SODIUM BLD-SCNC: 137 MMOL/L (ref 136–145)
SPECIFIC GRAVITY UA: 1.02 (ref 1–1.03)
TOTAL PROTEIN: 5.9 G/DL (ref 6.4–8.2)
TROPONIN: 1.17 NG/ML
URINE REFLEX TO CULTURE: YES
URINE TYPE: ABNORMAL
UROBILINOGEN, URINE: 1 E.U./DL
WBC # BLD: 12.3 K/UL (ref 4–11)
WBC UA: 362 /HPF (ref 0–5)

## 2022-05-20 PROCEDURE — 84484 ASSAY OF TROPONIN QUANT: CPT

## 2022-05-20 PROCEDURE — 6360000002 HC RX W HCPCS: Performed by: INTERNAL MEDICINE

## 2022-05-20 PROCEDURE — 80053 COMPREHEN METABOLIC PANEL: CPT

## 2022-05-20 PROCEDURE — 87040 BLOOD CULTURE FOR BACTERIA: CPT

## 2022-05-20 PROCEDURE — 96365 THER/PROPH/DIAG IV INF INIT: CPT

## 2022-05-20 PROCEDURE — 93005 ELECTROCARDIOGRAM TRACING: CPT | Performed by: EMERGENCY MEDICINE

## 2022-05-20 PROCEDURE — 1200000000 HC SEMI PRIVATE

## 2022-05-20 PROCEDURE — 81001 URINALYSIS AUTO W/SCOPE: CPT

## 2022-05-20 PROCEDURE — 94761 N-INVAS EAR/PLS OXIMETRY MLT: CPT

## 2022-05-20 PROCEDURE — 93010 ELECTROCARDIOGRAM REPORT: CPT | Performed by: INTERNAL MEDICINE

## 2022-05-20 PROCEDURE — 87181 SC STD AGAR DILUTION PER AGT: CPT

## 2022-05-20 PROCEDURE — 87077 CULTURE AEROBIC IDENTIFY: CPT

## 2022-05-20 PROCEDURE — 96367 TX/PROPH/DG ADDL SEQ IV INF: CPT

## 2022-05-20 PROCEDURE — 6360000002 HC RX W HCPCS: Performed by: EMERGENCY MEDICINE

## 2022-05-20 PROCEDURE — 83880 ASSAY OF NATRIURETIC PEPTIDE: CPT

## 2022-05-20 PROCEDURE — 6370000000 HC RX 637 (ALT 250 FOR IP): Performed by: INTERNAL MEDICINE

## 2022-05-20 PROCEDURE — 85025 COMPLETE CBC W/AUTO DIFF WBC: CPT

## 2022-05-20 PROCEDURE — 2700000000 HC OXYGEN THERAPY PER DAY

## 2022-05-20 PROCEDURE — 2580000003 HC RX 258: Performed by: INTERNAL MEDICINE

## 2022-05-20 PROCEDURE — 99285 EMERGENCY DEPT VISIT HI MDM: CPT

## 2022-05-20 PROCEDURE — 87186 SC STD MICRODIL/AGAR DIL: CPT

## 2022-05-20 PROCEDURE — 36415 COLL VENOUS BLD VENIPUNCTURE: CPT

## 2022-05-20 PROCEDURE — 71045 X-RAY EXAM CHEST 1 VIEW: CPT

## 2022-05-20 PROCEDURE — 2580000003 HC RX 258: Performed by: EMERGENCY MEDICINE

## 2022-05-20 PROCEDURE — 94640 AIRWAY INHALATION TREATMENT: CPT

## 2022-05-20 PROCEDURE — 83605 ASSAY OF LACTIC ACID: CPT

## 2022-05-20 PROCEDURE — 87086 URINE CULTURE/COLONY COUNT: CPT

## 2022-05-20 RX ORDER — IPRATROPIUM BROMIDE AND ALBUTEROL SULFATE 2.5; .5 MG/3ML; MG/3ML
1 SOLUTION RESPIRATORY (INHALATION) 2 TIMES DAILY
Status: DISCONTINUED | OUTPATIENT
Start: 2022-05-20 | End: 2022-05-26

## 2022-05-20 RX ORDER — 0.9 % SODIUM CHLORIDE 0.9 %
500 INTRAVENOUS SOLUTION INTRAVENOUS PRN
Status: DISCONTINUED | OUTPATIENT
Start: 2022-05-20 | End: 2022-05-26 | Stop reason: HOSPADM

## 2022-05-20 RX ORDER — HYDRALAZINE HYDROCHLORIDE 20 MG/ML
10 INJECTION INTRAMUSCULAR; INTRAVENOUS EVERY 6 HOURS PRN
Status: DISCONTINUED | OUTPATIENT
Start: 2022-05-20 | End: 2022-05-26 | Stop reason: HOSPADM

## 2022-05-20 RX ORDER — GABAPENTIN 100 MG/1
200 CAPSULE ORAL NIGHTLY
Status: ON HOLD | COMMUNITY
End: 2022-05-25 | Stop reason: HOSPADM

## 2022-05-20 RX ORDER — ZIPRASIDONE HYDROCHLORIDE 20 MG/1
20 CAPSULE ORAL NIGHTLY
Status: DISCONTINUED | OUTPATIENT
Start: 2022-05-20 | End: 2022-05-21 | Stop reason: CLARIF

## 2022-05-20 RX ORDER — SODIUM CHLORIDE 0.9 % (FLUSH) 0.9 %
5-40 SYRINGE (ML) INJECTION EVERY 12 HOURS SCHEDULED
Status: DISCONTINUED | OUTPATIENT
Start: 2022-05-20 | End: 2022-05-26 | Stop reason: HOSPADM

## 2022-05-20 RX ORDER — HYPOCHLOROUS ACID/SODIUM CHLOR 0.01 %
2 SPRAY, NON-AEROSOL (ML) TOPICAL 2 TIMES DAILY
COMMUNITY

## 2022-05-20 RX ORDER — TAMSULOSIN HYDROCHLORIDE 0.4 MG/1
0.4 CAPSULE ORAL DAILY
Status: DISCONTINUED | OUTPATIENT
Start: 2022-05-20 | End: 2022-05-26 | Stop reason: HOSPADM

## 2022-05-20 RX ORDER — SODIUM CHLORIDE 9 MG/ML
INJECTION, SOLUTION INTRAVENOUS PRN
Status: DISCONTINUED | OUTPATIENT
Start: 2022-05-20 | End: 2022-05-26 | Stop reason: HOSPADM

## 2022-05-20 RX ORDER — FERROUS SULFATE 325(65) MG
325 TABLET ORAL
Status: DISCONTINUED | OUTPATIENT
Start: 2022-05-21 | End: 2022-05-26 | Stop reason: HOSPADM

## 2022-05-20 RX ORDER — ACETAMINOPHEN 325 MG/1
650 TABLET ORAL EVERY 6 HOURS PRN
Status: DISCONTINUED | OUTPATIENT
Start: 2022-05-20 | End: 2022-05-26 | Stop reason: HOSPADM

## 2022-05-20 RX ORDER — IPRATROPIUM BROMIDE AND ALBUTEROL SULFATE 2.5; .5 MG/3ML; MG/3ML
1 SOLUTION RESPIRATORY (INHALATION)
Status: DISCONTINUED | OUTPATIENT
Start: 2022-05-20 | End: 2022-05-20

## 2022-05-20 RX ORDER — LORAZEPAM 0.5 MG/1
0.5 TABLET ORAL DAILY PRN
Status: DISCONTINUED | OUTPATIENT
Start: 2022-05-20 | End: 2022-05-20

## 2022-05-20 RX ORDER — ONDANSETRON 4 MG/1
4 TABLET, ORALLY DISINTEGRATING ORAL EVERY 8 HOURS PRN
Status: DISCONTINUED | OUTPATIENT
Start: 2022-05-20 | End: 2022-05-26 | Stop reason: HOSPADM

## 2022-05-20 RX ORDER — PANTOPRAZOLE SODIUM 40 MG/1
40 TABLET, DELAYED RELEASE ORAL DAILY
Status: DISCONTINUED | OUTPATIENT
Start: 2022-05-20 | End: 2022-05-21 | Stop reason: CLARIF

## 2022-05-20 RX ORDER — LEVOTHYROXINE SODIUM 0.03 MG/1
25 TABLET ORAL DAILY
Status: DISCONTINUED | OUTPATIENT
Start: 2022-05-20 | End: 2022-05-26 | Stop reason: HOSPADM

## 2022-05-20 RX ORDER — 0.9 % SODIUM CHLORIDE 0.9 %
1000 INTRAVENOUS SOLUTION INTRAVENOUS ONCE
Status: COMPLETED | OUTPATIENT
Start: 2022-05-20 | End: 2022-05-20

## 2022-05-20 RX ORDER — POTASSIUM CHLORIDE 7.45 MG/ML
10 INJECTION INTRAVENOUS PRN
Status: DISCONTINUED | OUTPATIENT
Start: 2022-05-20 | End: 2022-05-20

## 2022-05-20 RX ORDER — IPRATROPIUM BROMIDE AND ALBUTEROL SULFATE 2.5; .5 MG/3ML; MG/3ML
1 SOLUTION RESPIRATORY (INHALATION) EVERY 4 HOURS PRN
Status: DISCONTINUED | OUTPATIENT
Start: 2022-05-20 | End: 2022-05-26 | Stop reason: HOSPADM

## 2022-05-20 RX ORDER — SENNA AND DOCUSATE SODIUM 50; 8.6 MG/1; MG/1
1 TABLET, FILM COATED ORAL 2 TIMES DAILY
Status: DISCONTINUED | OUTPATIENT
Start: 2022-05-20 | End: 2022-05-26 | Stop reason: HOSPADM

## 2022-05-20 RX ORDER — LORAZEPAM 0.5 MG/1
0.5 TABLET ORAL 2 TIMES DAILY
Status: DISCONTINUED | OUTPATIENT
Start: 2022-05-20 | End: 2022-05-21 | Stop reason: CLARIF

## 2022-05-20 RX ORDER — SODIUM CHLORIDE 9 MG/ML
INJECTION, SOLUTION INTRAVENOUS CONTINUOUS
Status: DISCONTINUED | OUTPATIENT
Start: 2022-05-20 | End: 2022-05-20

## 2022-05-20 RX ORDER — NITROGLYCERIN 0.4 MG/1
0.4 TABLET SUBLINGUAL EVERY 5 MIN PRN
Status: DISCONTINUED | OUTPATIENT
Start: 2022-05-20 | End: 2022-05-26 | Stop reason: HOSPADM

## 2022-05-20 RX ORDER — FINASTERIDE 5 MG/1
5 TABLET, FILM COATED ORAL DAILY
Status: DISCONTINUED | OUTPATIENT
Start: 2022-05-20 | End: 2022-05-26 | Stop reason: HOSPADM

## 2022-05-20 RX ORDER — CEFUROXIME AXETIL 250 MG/1
250 TABLET ORAL 2 TIMES DAILY
Status: ON HOLD | COMMUNITY
End: 2022-05-25 | Stop reason: HOSPADM

## 2022-05-20 RX ORDER — ONDANSETRON 2 MG/ML
4 INJECTION INTRAMUSCULAR; INTRAVENOUS EVERY 6 HOURS PRN
Status: DISCONTINUED | OUTPATIENT
Start: 2022-05-20 | End: 2022-05-26 | Stop reason: HOSPADM

## 2022-05-20 RX ORDER — LANOLIN ALCOHOL/MO/W.PET/CERES
5 CREAM (GRAM) TOPICAL DAILY PRN
Status: DISCONTINUED | OUTPATIENT
Start: 2022-05-20 | End: 2022-05-26 | Stop reason: HOSPADM

## 2022-05-20 RX ORDER — POTASSIUM CHLORIDE 20 MEQ/1
40 TABLET, EXTENDED RELEASE ORAL PRN
Status: DISCONTINUED | OUTPATIENT
Start: 2022-05-20 | End: 2022-05-20

## 2022-05-20 RX ORDER — CALCIUM CARBONATE 500(1250)
500 TABLET ORAL DAILY
Status: DISCONTINUED | OUTPATIENT
Start: 2022-05-20 | End: 2022-05-20

## 2022-05-20 RX ORDER — ACETAMINOPHEN 650 MG/1
650 SUPPOSITORY RECTAL EVERY 6 HOURS PRN
Status: DISCONTINUED | OUTPATIENT
Start: 2022-05-20 | End: 2022-05-26 | Stop reason: HOSPADM

## 2022-05-20 RX ORDER — LANOLIN ALCOHOL/MO/W.PET/CERES
400 CREAM (GRAM) TOPICAL DAILY
Status: DISCONTINUED | OUTPATIENT
Start: 2022-05-20 | End: 2022-05-26 | Stop reason: HOSPADM

## 2022-05-20 RX ORDER — ENOXAPARIN SODIUM 100 MG/ML
30 INJECTION SUBCUTANEOUS DAILY
Status: DISCONTINUED | OUTPATIENT
Start: 2022-05-20 | End: 2022-05-26 | Stop reason: HOSPADM

## 2022-05-20 RX ORDER — SODIUM CHLORIDE 0.9 % (FLUSH) 0.9 %
5-40 SYRINGE (ML) INJECTION PRN
Status: DISCONTINUED | OUTPATIENT
Start: 2022-05-20 | End: 2022-05-26 | Stop reason: HOSPADM

## 2022-05-20 RX ORDER — SODIUM CHLORIDE 0.9 % (FLUSH) 0.9 %
10 SYRINGE (ML) INJECTION PRN
Status: DISCONTINUED | OUTPATIENT
Start: 2022-05-20 | End: 2022-05-26 | Stop reason: HOSPADM

## 2022-05-20 RX ADMIN — SODIUM CHLORIDE: 9 INJECTION, SOLUTION INTRAVENOUS at 23:36

## 2022-05-20 RX ADMIN — VANCOMYCIN HYDROCHLORIDE 1250 MG: 10 INJECTION, POWDER, LYOPHILIZED, FOR SOLUTION INTRAVENOUS at 12:26

## 2022-05-20 RX ADMIN — SODIUM CHLORIDE 1000 ML: 9 INJECTION, SOLUTION INTRAVENOUS at 11:05

## 2022-05-20 RX ADMIN — CEFEPIME HYDROCHLORIDE 1000 MG: 1 INJECTION, POWDER, FOR SOLUTION INTRAMUSCULAR; INTRAVENOUS at 23:38

## 2022-05-20 RX ADMIN — CEFEPIME HYDROCHLORIDE 2000 MG: 2 INJECTION, POWDER, FOR SOLUTION INTRAVENOUS at 11:16

## 2022-05-20 RX ADMIN — IPRATROPIUM BROMIDE AND ALBUTEROL SULFATE 1 AMPULE: 2.5; .5 SOLUTION RESPIRATORY (INHALATION) at 20:27

## 2022-05-20 RX ADMIN — Medication 10 ML: at 21:23

## 2022-05-20 RX ADMIN — Medication 10 ML: at 11:16

## 2022-05-20 ASSESSMENT — PAIN SCALES - GENERAL: PAINLEVEL_OUTOF10: 0

## 2022-05-20 ASSESSMENT — PAIN - FUNCTIONAL ASSESSMENT: PAIN_FUNCTIONAL_ASSESSMENT: NONE - DENIES PAIN

## 2022-05-20 NOTE — H&P
History and Physical  Dr. Contreras Person  5/20/2022    PCP: Linda Liu MD    Cc:   Chief Complaint   Patient presents with    Altered Mental Status     Pt to ED via EMS s/p AMS, increasing over last few days. NH sts that pt had falls on both Sunday and Monday and was sent to ED for evaluation both times. Pt also has UTI and being tx for that. Per NH, pt had increasing AMS over night requiring O2 as well. noticed L sided facial droop last night        HPI:  Suresh Vallejo is a 80 y.o. male who has a past medical history of A-fib ([facility] Arthritis, Atrial fibrillation ([facility] Blepharitis of both eyes, CAD (coronary artery disease), GERD (gastroesophageal reflux disease), Gout, Hyperlipidemia, Hypertension, Macular degeneration, NSTEMI (non-ST elevated myocardial infarction) ([facility] PNA (pneumonia), Prostate enlargement, Psychiatric problem, and Tachycardia. Patient presents with Sepsis due to pneumonia [facility]). HPI  (1-3 for expanded problem focused, ?4 for detailed/comprehensive)     80 y.o. male who presents to the ED for altered mental status. Worsening over the past few days. He did have falls on Sunday and Monday. Patient is able to tell me that he has no pain. Specifically no chest or abdominal pain. He has no numbness on his arms or legs. He does not know where he is. He cannot answer most open-ended questions. He feels weak. Per daughter at bedside, he is DNR comfort care. His currently receiving treatment for UTI. X-ray shows evidence of pneumonia.    Is in septic shock with multiorgan failure. Family at bedside agrees with DNR comfort care. At this time, they wish to keep him here to get some antibiotics, but they seem to understand that pt is overall not doing well. Problem list of hospitalization thus far:   Active Hospital Problems    Diagnosis     Hyperlipidemia [E78.5]      Priority: High    Essential hypertension [I10]      Priority: High    PNA (pneumonia) [J18.9]      Priority: Medium  Sepsis due to pneumonia ([facility] [J18.9, A41.9]      Priority: Medium    JUAN R (acute kidney injury) ([facility] [N17.9]      Priority: Medium    UTI (urinary tract infection) [N39.0]      Priority: Medium    GERD (gastroesophageal reflux disease) [K21.9]          Review of Systems: (1 system for EPF, 2-9 for detailed, 10+ for comprehensive)  Constitutional: Negative for chills and fever. HENT: Negative for dental problem, nosebleeds and rhinorrhea. Eyes: Negative for photophobia and visual disturbance. Respiratory: Negative for cough, chest tightness and shortness of breath. Cardiovascular: Negative for chest pain and leg swelling. Gastrointestinal: Negative for diarrhea, nausea and vomiting. Endocrine: Negative for polydipsia and polyphagia. Genitourinary: Negative for frequency, hematuria and urgency. Musculoskeletal: Negative for back pain and myalgias. Skin: Negative for rash. Allergic/Immunologic: Negative for food allergies. Neurological: Negative for dizziness, seizures, syncope and facial asymmetry. Hematological: Negative for adenopathy. Psychiatric/Behavioral: Negative for dysphoric mood. The patient is not nervous/anxious.              Past Medical History:   Past Medical History:   Diagnosis Date    A-fib ([facility]     Arthritis     Atrial fibrillation (HCC)     Blepharitis of both eyes     CAD (coronary artery disease)     stents 2005, x3    GERD (gastroesophageal reflux disease)     Gout     Hyperlipidemia     Hypertension     Macular degeneration     [facility]STEMI [facility]non-ST elevated [facility]) ([facility]     PNA (pneumonia) 5/20/2022    Prostate enlargement     Psychiatric problem     anxiety; bipolar disorder    Tachycardia        Past Surgical History:   Past Surgical History:   Procedure Laterality Date    CARDIAC SURGERY      CATARACT REMOVAL  1993    X5    COLONOSCOPY      CORONARY ANGIOPLASTY WITH STENT PLACEMENT  2005      X3  CORONARY ARTERY BYPASS GRAFT      1986, x4, kellie Marte)    EYE SURGERY      HIP SURGERY Left 6/2/2020    LEFT BIPOLAR HIP HEMIARTHROPLASTY performed by Ferny Donohue MD at [address] [place] [address]    SKIN BIOPSY      melanoma, basal    SKIN CANCER DESTRUCTION  2006    Melanoma  ()    JACOBO  2003       Social History:   Social History     Tobacco History     Smoking Status  Never Smoker    Smokeless Tobacco Use  Never Used          Alcohol History     Alcohol Use Status  No          Drug Use     Drug Use Status  Never          Sexual Activity     Sexually Active  Not Asked                Fam History:   Family History   Problem Relation Age of Onset    Heart Disease Mother     Heart Disease Brother     High Blood Pressure Neg Hx     High Cholesterol Neg Hx     Mental Illness Neg Hx        PFSH: The above PMHx, PSHx, SocHx, FamHx has been reviewed by myself. (1 area for detailed, 2-3 for comprehensive)      Code Status: Prior    Meds - following list of home medications fromelectronic chart has been reviewed by myself  Prior to Admission medications    Medication Sig Start Date End Date Taking? Authorizing Provider   aluminum & magnesium hydroxide-simethicone (MYLANTA) 400-400-40 MG/5ML SUSP Take 30 mLs by mouth every 6 hours as needed    Historical Provider, MD   LORazepam (ATIVAN) 0.5 MG tablet Take 0.5 mg by mouth daily as needed for Anxiety. Historical Provider, MD   LORazepam (ATIVAN) 0.5 MG tablet Take 0.5 mg by mouth 2 times daily.     Historical Provider, MD   ziprasidone (GEODON) 20 MG capsule Take 20 mg by mouth nightly    Historical Provider, MD   Artificial Tear Ointment (DRY EYES OP) Apply 250 capsules to eye daily    Historical Provider, MD   dilTIAZem (CARDIZEM) 30 MG tablet Take 1 tablet by mouth 3 times daily Hold the tablet if heart rate below 60 or blood pressure below 246 systolic 4/91/84   Renzo Krause PA-C   Multiple Vitamins-Minerals (MULTIVITAMIN ADULT PO) Take by mouth    Historical Provider, MD   calcium carbonate (OSCAL) 500 MG TABS tablet Take 500 mg by mouth daily    Historical Provider, MD   ferrous sulfate (IRON 325) 325 (65 Fe) MG tablet Take 325 mg by mouth daily (with breakfast)    Historical Provider, MD   gabapentin (NEURONTIN) 100 MG capsule Take 1 capsule by mouth nightly for 30 days. 6/6/20 4/15/22  Moreno Loredo MD   sennosides-docusate sodium (SENOKOT-S) 8.6-50 MG tablet Take 1 tablet by mouth 2 times daily 6/6/20   Moreno Loredo MD   apixaban Franklyn Host) 2.5 MG TABS tablet Take 1 tablet by mouth 2 times daily 5/1/20   Clearance MD Naren   finasteride (PROSCAR) 5 MG tablet TAKE 1 TABLET EVERY NIGHT 1/15/20   MARIS Starks CNP   lamoTRIgine (LAMICTAL) 150 MG tablet Take 1 tablet by mouth daily 12/30/19   MARIS Starks CNP   pantoprazole (PROTONIX) 40 MG tablet TAKE 1 TABLET EVERY DAY 9/25/19   MARIS Starks CNP   levothyroxine (SYNTHROID) 25 MCG tablet TAKE 1 TABLET EVERY DAY 9/25/19   MARIS Starks CNP   tamsulosin (FLOMAX) 0.4 MG capsule Take 0.4 mg by mouth daily    Historical Provider, MD   polyethylene glycol (GLYCOLAX) powder Take 17 g by mouth daily as needed    Historical Provider, MD   nitroGLYCERIN (NITROSTAT) 0.4 MG SL tablet Place 1 tablet under the tongue every 5 minutes as needed for Chest pain 3/28/19   MARIS Helm CNP   melatonin 5 MG TABS tablet Take 5 mg by mouth daily as needed (Insomnia)    Historical Provider, MD   triamcinolone (KENALOG) 0.1 % cream Apply to affected areas twice daily 1/11/18   MARIS Starks CNP   Magnesium 400 MG TABS Take 1 tablet by mouth daily     Historical Provider, MD         Allergies   Allergen Reactions    Lasix [Furosemide] Other (See Comments)     Pt passed out     Zyprexa [Olanzapine]      Pacing.      Citalopram Anxiety     сергей             EXAM: (2-7 system for EPF/Detailed, ?8 for Comprehensive)  BP (!) 94/53   Pulse 64   Temp 97.4 °F (36.3 °C) (Oral)   Resp 26   SpO2 99%   Constitutional: vitals as above: alert, appears stated age and cooperative    Psychiatric: normal insight and judgment, oriented to person, place, time, and general circumstances    Head: Normocephalic, without obvious abnormality, atraumatic    Eyes:lids and lashes normal, conjunctivae and sclerae normal and pupils equal, round, reactive to light and accomodation    EMNT: external ears normal, nares midline    Neck: no carotid bruit, supple, symmetrical, trachea midline and thyroid not enlarged, symmetric, no tenderness/mass/nodules     Respiratory: clear to auscultation and percussion bilaterally with normal respiratory effort    Cardiovascular: irreg, irreg normal S1 and S2 and no murmurs    Gastrointestinal: soft, non-tender, non-distended, normal bowel sounds, no masses or organomegaly    Extremities: no clubbing, no edema    Skin:No rashes or nodules noted.     Neurologic:negative         LABS:  Labs Reviewed   CBC WITH AUTO DIFFERENTIAL - Abnormal; Notable for the following components:       Result Value    WBC 12.3 (*)     RBC 3.10 (*)     Hemoglobin 9.3 (*)     Hematocrit 28.5 (*)     Neutrophils Absolute 11.1 (*)     Lymphocytes Absolute 0.6 (*)     All other components within normal limits   COMPREHENSIVE METABOLIC PANEL W/ REFLEX TO MG FOR LOW K - Abnormal; Notable for the following components:    Glucose 134 (*)     BUN 27 (*)     CREATININE 2.1 (*)     GFR Non- 29 (*)     GFR  36 (*)     Total Protein 5.9 (*)     Albumin 2.6 (*)     Albumin/Globulin Ratio 0.8 (*)     AST 46 (*)     All other components within normal limits    Narrative:     CALL  [facility] tel. [phone],  Chemistry results called to and read back by valerio singh, 05/20/2022  11:59, by [facility]    TROPONIN - Abnormal; Notable for the following components:    Troponin 1.17 (*)     All other components within normal limits    Narrative:     [facility] [phone],  Chemistry results called to and read back by valerio singh, 05/20/2022  11:59, by Claudia Helton - Abnormal; Notable for the following components:    Pro-BNP 8,402 (*)     All other components within normal limits    Narrative:     Maisha Thomas  [facility] tel. [phone],  Chemistry results called to and read back by valerio singh, 05/20/2022  11:59, by [id number] - Abnormal; Notable for the following components:    Color, UA DARK YELLOW (*)     Clarity, UA TURBID (*)     Bilirubin Urine SMALL (*)     Ketones, Urine TRACE (*)     Blood, Urine LARGE (*)     Protein,  (*)     Leukocyte Esterase, Urine LARGE (*)     All other components within normal limits   CULTURE, BLOOD 1   CULTURE, BLOOD 2   LACTATE, SEPSIS   LACTATE, SEPSIS   MICROSCOPIC URINALYSIS         IMAGING:  Imaging results from the ER have been reviewed in the computerized chart. XR HAND LEFT (MIN 3 VIEWS)    Result Date: 5/16/2022  EXAMINATION: THREE XRAY VIEWS OF THE LEFT HAND 5/16/2022 11:21 am COMPARISON: Left hand x-ray dated 15 May 2022 HISTORY: ORDERING SYSTEM PROVIDED HISTORY: fall TECHNOLOGIST PROVIDED HISTORY: Reason for exam:->fall Reason for Exam: Fall (Pt in via [facility] from [facility] in [place], pt with mechanical fall, complains if neck and head pain, pt with laceration to forehead, bleeding controlled, pt on Eliquis. ) FINDINGS: No acute fracture or dislocation. Severe osteoarthritis of the interphalangeal joints and triscaphe joint. No acute findings. Degenerative changes as described above     XR HAND LEFT (MIN 3 VIEWS)    Result Date: 5/15/2022  EXAMINATION: THREE XRAY VIEWS OF THE LEFT HAND 5/15/2022 7:47 pm COMPARISON: None.  HISTORY: ORDERING SYSTEM PROVIDED HISTORY: injury TECHNOLOGIST PROVIDED HISTORY: Reason for exam:->injury Reason for Exam: Fall, skin tear FINDINGS: There is a metallic ring partially obscuring the proximal phalanx of the 4th digit. There is moderate joint space narrowing throughout the digits with prominent osteophytes throughout which is most severe along the D IP and PIP joints and base of the thumb. The carpal bones are intact and in good position. No acute fracture or dislocation is seen. Severe osteoarthritic changes throughout the digits and diffuse osteopenia with no acute bony abnormality     XR HAND RIGHT (MIN 3 VIEWS)    Result Date: 5/15/2022  EXAMINATION: THREE XRAY VIEWS OF THE RIGHT HAND 5/15/2022 7:48 pm COMPARISON: None. HISTORY: ORDERING SYSTEM PROVIDED HISTORY: injury TECHNOLOGIST PROVIDED HISTORY: Reason for exam:->injury Reason for Exam: Fall, skin tear FINDINGS: There is moderate joint space narrowing throughout the digits which is most severe along the PIP and DIP joints with prominent osteophytes throughout. The bones are osteopenic. No fracture or dislocation is seen. The carpal bones are intact and in good position. Moderately severe osteoarthritic changes throughout the digits and diffuse osteopenia with no acute bony abnormality seen. XR TIBIA FIBULA LEFT (2 VIEWS)    Result Date: 5/15/2022  EXAMINATION: 4 XRAY VIEWS OF THE LEFT TIBIA AND FIBULA 5/15/2022 7:47 pm COMPARISON: None. HISTORY: ORDERING SYSTEM PROVIDED HISTORY: injury TECHNOLOGIST PROVIDED HISTORY: Reason for exam:->injury Reason for Exam: Fall, skin tear FINDINGS: The tibia and fibula are intact. No fracture or dislocation is seen. The bones are osteopenic. The joint spaces are intact. There are vascular calcifications posteriorly. Diffuse osteopenia with no acute bony abnormality.      CT Head WO Contrast    Result Date: 5/16/2022  EXAMINATION: CT OF THE HEAD WITHOUT CONTRAST  5/16/2022 11:32 am TECHNIQUE: CT of the head was performed without the administration of intravenous contrast. Automated exposure control, iterative reconstruction, and/or weight based adjustment of the mA/kV was utilized to reduce the radiation dose to as low as reasonably achievable. COMPARISON: 05/15/2022 HISTORY: ORDERING SYSTEM PROVIDED HISTORY: fall, hit head TECHNOLOGIST PROVIDED HISTORY: Reason for exam:->fall, hit head Has a \"code stroke\" or \"stroke alert\" been called? ->No Decision Support Exception - unselect if not a suspected or confirmed emergency medical condition->Emergency Medical Condition (MA) Reason for Exam: fall, hit head FINDINGS: BRAIN/VENTRICLES: There is moderate cerebral atrophy, with bilateral white matter hypodensities in keeping with microvascular disease. There is no acute intracranial hemorrhage, mass effect or midline shift. No abnormal extra-axial fluid collection. The gray-white differentiation is maintained without evidence of an acute infarct. There is no evidence of hydrocephalus. ORBITS: The visualized portion of the orbits demonstrate no acute abnormality. SINUSES: The visualized paranasal sinuses and mastoid air cells demonstrate no acute abnormality. SOFT TISSUES/SKULL:  There is soft tissue laceration overlying the right frontal scalp. No acute intracranial abnormality. No change from prior examination. CT HEAD WO CONTRAST    Result Date: 5/15/2022  EXAMINATION: CT OF THE HEAD WITHOUT CONTRAST  5/15/2022 6:59 pm TECHNIQUE: CT of the head was performed without the administration of intravenous contrast. Automated exposure control, iterative reconstruction, and/or weight based adjustment of the mA/kV was utilized to reduce the radiation dose to as low as reasonably achievable. COMPARISON: CT head dated 07/04/2021 HISTORY: ORDERING SYSTEM PROVIDED HISTORY: fall/head injury TECHNOLOGIST PROVIDED HISTORY: Reason for exam:->fall/head injury Has a \"code stroke\" or \"stroke alert\" been called? ->No Decision Support Exception - unselect if not a suspected or confirmed emergency medical condition->Emergency Medical Condition (MA) Reason for Exam: Fall (fell out of wheel chair, pt states that he was doen for about an hour. facility states that he was down for 10 to 15 mins. Pt is alert but not oriented, pt is on eliquis. Pt presents to triage with bandages on his head and hands. ) FINDINGS: BRAIN/VENTRICLES: There is no acute intracranial hemorrhage, mass effect or midline shift. No abnormal extra-axial fluid collection. The gray-white differentiation is maintained without evidence of an acute infarct. There is no evidence of hydrocephalus. Moderate low attenuation in the periventricular deep white matter suggesting chronic small vessel ischemic disease. ORBITS: The visualized portion of the orbits demonstrate no acute abnormality. SINUSES: The visualized paranasal sinuses and mastoid air cells demonstrate no acute abnormality. SOFT TISSUES/SKULL:  No acute abnormality of the visualized skull. Soft tissues reveal likely laceration injury without hematoma overlying the anterior frontal region     No acute intracranial abnormality. Small laceration injury overlying the anterior frontal region without scalp hematoma or calvarial defect     CT Cervical Spine WO Contrast    Result Date: 5/16/2022  EXAMINATION: CT OF THE CERVICAL SPINE WITHOUT CONTRAST 5/16/2022 11:32 am TECHNIQUE: CT of the cervical spine was performed without the administration of intravenous contrast. Multiplanar reformatted images are provided for review. Automated exposure control, iterative reconstruction, and/or weight based adjustment of the mA/kV was utilized to reduce the radiation dose to as low as reasonably achievable. COMPARISON: None. HISTORY: ORDERING SYSTEM PROVIDED HISTORY: fall, hit head TECHNOLOGIST PROVIDED HISTORY: Reason for exam:->fall, hit head Decision Support Exception - unselect if not a suspected or confirmed emergency medical condition->Emergency Medical Condition (MA) Reason for Exam: fall, hit head FINDINGS: BONES/ALIGNMENT: There is no acute fracture or traumatic malalignment.  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 DEGENERATIVE CHANGES: There are moderate to severe degenerative changes from the C3/4 C7/T1 levels. Findings are most pronounced at C5/6. SOFT TISSUES: There is no prevertebral soft tissue swelling. No acute abnormality of the cervical spine. CT CERVICAL SPINE WO CONTRAST    Result Date: 5/15/2022  EXAMINATION: CT OF THE CERVICAL SPINE WITHOUT CONTRAST 5/15/2022 6:59 pm TECHNIQUE: CT of the cervical spine was performed without the administration of intravenous contrast. Multiplanar reformatted images are provided for review. Automated exposure control, iterative reconstruction, and/or weight based adjustment of the mA/kV was utilized to reduce the radiation dose to as low as reasonably achievable. COMPARISON: None. HISTORY: ORDERING SYSTEM PROVIDED HISTORY: fall trauma TECHNOLOGIST PROVIDED HISTORY: Reason for exam:->fall trauma Decision Support Exception - unselect if not a suspected or confirmed emergency medical condition->Emergency Medical Condition (MA) Reason for Exam: Fall (fell out of wheel chair, pt states that he was doen for about an hour. facility states that he was down for 10 to 15 mins. Pt is alert but not oriented, pt is on eliquis. Pt presents to triage with bandages on his head and hands. ) FINDINGS: BONES/ALIGNMENT: There is no acute fracture or traumatic malalignment. DEGENERATIVE CHANGES: Moderate to severe multilevel degenerative changes. SOFT TISSUES: There is no prevertebral soft tissue swelling. No acute abnormality of the cervical spine. CT THORACIC SPINE WO CONTRAST    Result Date: 5/15/2022  EXAMINATION: CT OF THE THORACIC SPINE WITHOUT CONTRAST  5/15/2022 7:18 pm: TECHNIQUE: CT of the thoracic spine was performed without the administration of intravenous contrast. Multiplanar reformatted images are provided for review.  Automated exposure control, iterative reconstruction, and/or weight based adjustment of the mA/kV was utilized to reduce the radiation dose to as low as reasonably achievable. COMPARISON: None. HISTORY: ORDERING SYSTEM PROVIDED HISTORY: back pain TECHNOLOGIST PROVIDED HISTORY: Reason for exam:->back pain Reason for Exam: Fall (fell out of wheel chair, pt states that he was doen for about an hour. facility states that he was down for 10 to 15 mins. Pt is alert but not oriented, pt is on eliquis. Pt presents to triage with bandages on his head and hands. ) FINDINGS: BONES/ALIGNMENT: There is normal alignment of the spine. The vertebral body heights are maintained. No osseous destructive lesion is seen. DEGENERATIVE CHANGES: No gross spinal canal stenosis or bony neural foraminal narrowing of the thoracic spine. SOFT TISSUES: No paraspinal mass is seen. Partially visualized lung parenchyma reveals bilateral pleural effusions left greater than right with at least moderate volume left pleural effusion adjacent atelectasis along with pleural calcifications     No acute osseous findings of the thoracic spine however partially visualized chest reveals bilateral pleural effusions left greater than right with a least moderate volume left pleural effusion and adjacent atelectasis as well as pleural calcifications and/or thickening partially imaged     CT LUMBAR SPINE WO CONTRAST    Result Date: 5/15/2022  EXAMINATION: CT OF THE LUMBAR SPINE WITHOUT CONTRAST  5/15/2022 TECHNIQUE: CT of the lumbar spine was performed without the administration of intravenous contrast. Multiplanar reformatted images are provided for review. Adjustment of mA and/or kV according to patient size was utilized. Automated exposure control, iterative reconstruction, and/or weight based adjustment of the mA/kV was utilized to reduce the radiation dose to as low as reasonably achievable.  COMPARISON: None HISTORY: ORDERING SYSTEM PROVIDED HISTORY: back pain/fall TECHNOLOGIST PROVIDED HISTORY: Reason for exam:->back pain/fall Decision Support Exception - unselect if not a suspected or confirmed emergency medical condition->Emergency Medical Condition (MA) Reason for Exam: Fall (fell out of wheel chair, pt states that he was doen for about an hour. facility states that he was down for 10 to 15 mins. Pt is alert but not oriented, pt is on eliquis. Pt presents to triage with bandages on his head and hands. ) FINDINGS: BONES/ALIGNMENT: Grossly normal lumbar lordosis with mild scoliotic curvature of the lumbar region apex L3 levo scoliotic curvature. Height loss of the L1 vertebral body with anterior wedging 50% height loss and mild cortical step-off age-indeterminate compression deformity without retropulsion or spinal canal stenosis. DEGENERATIVE CHANGES: Moderate to severe multilevel degenerative changes of the lumbar spine. SOFT TISSUES/RETROPERITONEUM: No paraspinal mass is seen. Age-indeterminate L1 compression deformity with 50% height loss anteriorly. No retropulsion or posterior element involvement no spinal canal stenosis of significance at this level or elsewhere however moderate to severe degenerative changes throughout the lower lumbar segments and lumbosacral junction. CT PELVIS WO CONTRAST Additional Contrast? None    Result Date: 5/15/2022  EXAMINATION: CT OF THE PELVIS WITHOUT CONTRAST 5/15/2022 7:19 pm TECHNIQUE: CT of the pelvis was performed without the administration of intravenous contrast.  Multiplanar reformatted images are provided for review. Adjustment of mA and/or kV according to patient size was utilized. Automated exposure control, iterative reconstruction, and/or weight based adjustment of the mA/kV was utilized to reduce the radiation dose to as low as reasonably achievable. COMPARISON: CT abdomen and pelvis 11/16/2017. Multiple intervening pelvis/left hip x-ray exams with the most recent 07/04/2021, and the most remote 05/31/2020.  HISTORY: ORDERING SYSTEM PROVIDED HISTORY: fall TECHNOLOGIST PROVIDED HISTORY: Reason for exam:->fall Additional Contrast?->None Decision Support Exception - unselect if not a suspected or confirmed emergency medical condition->Emergency Medical Condition (MA) Reason for Exam: Fall (fell out of wheel chair, pt states that he was doen for about an hour. facility states that he was down for 10 to 15 mins. Pt is alert but not oriented, pt is on eliquis. Pt presents to triage with bandages on his head and hands. ) FINDINGS: Streak artifact from left hip prosthesis somewhat limits evaluation. Within this limitation: Diffuse bone demineralization. Left hip prosthesis redemonstrated. This appears to be a hemiprosthesis. Orthopedic hardware appears intact and in stable position. No evidence for hardware complication is seen. No fractures or dislocations. No suspicious focal bony lesions. No bony erosions or bony destructive changes. Stable mild degenerative changes to the right hip joint. Small right hip joint effusion. No intra-articular loose bodies. Limited evaluation of the left hip joint secondary to the prosthesis without large joint effusion noted. Evaluation of the extrapelvic soft tissues demonstrates no acute or suspicious abnormality. Chronic postsurgical scarring laterally to the left hip. Atherosclerotic vascular calcifications. No lymphadenopathy. Evaluation of intrapelvic contents demonstrates mild degree of presacral nonhemorrhagic fluid and fat stranding. No focal fluid collections are identified. No lymphadenopathy. Enlarged prostate. Grossly unremarkable non-opacified large and small bowel. No lymphadenopathy is seen. Extensive atherosclerosis. Saccular aneurysm of distal abdominal aorta posteriorly measuring 3.1 x 2.5 cm. This previously measured 2.7 x 2.3 cm. No acute bony abnormality. Mild degree of nonspecific presacral nonhemorrhagic fluid and fat stranding. Stable mild degenerative changes to the right hip joint. There is a small right hip joint effusion. Stable appearance to left hip prosthesis.   No evidence for hardware complication. Saccular aneurysm of the visualized distal abdominal aorta measuring maximally 3.1 cm, previously 2.7 cm on prior exam.  Management recommendations as below. Enlarged prostate. RECOMMENDATIONS: 3.1 cm saccular abdominal aortic aneurysm. Recommend vascular consultation. Reference: J Vasc Surg 5989;66:5-48. XR CHEST PORTABLE    Result Date: 5/20/2022  EXAMINATION: ONE XRAY VIEW OF THE CHEST 5/20/2022 10:54 am COMPARISON: 06/25/2021 HISTORY: ORDERING SYSTEM PROVIDED HISTORY: fever, hypotension, cough TECHNOLOGIST PROVIDED HISTORY: Reason for exam:->fever, hypotension, cough FINDINGS: Status post median sternotomy. Heart size stable. Left mid and lower lung opacities. No pneumothorax. Small bilateral pleural effusions. Left mid and lower lung opacities could represent atelectatic changes or infection     CT CHEST ABDOMEN PELVIS WO CONTRAST    Result Date: 5/15/2022  EXAMINATION: CT OF THE CHEST, ABDOMEN, AND PELVIS WITHOUT CONTRAST 5/15/2022 7:25 pm TECHNIQUE: CT of the chest, abdomen and pelvis was performed without the administration of intravenous contrast. Multiplanar reformatted images are provided for review. Automated exposure control, iterative reconstruction, and/or weight based adjustment of the mA/kV was utilized to reduce the radiation dose to as low as reasonably achievable. COMPARISON: None HISTORY: ORDERING SYSTEM PROVIDED HISTORY: fall TECHNOLOGIST PROVIDED HISTORY: Reason for exam:->fall Additional Contrast?->None Decision Support Exception - unselect if not a suspected or confirmed emergency medical condition->Emergency Medical Condition (MA) Reason for Exam: fall Relevant Medical/Surgical History: Fall (fell out of wheel chair, pt states that he was doen for about an hour. facility states that he was down for 10 to 15 mins. Pt is alert but not oriented, pt is on eliquis.  Pt presents to triage with bandages on his head and hands. ) FINDINGS: Chest: Mediastinum: Thyroid is homogeneous in attenuation. No bulky mediastinal adenopathy. Central airways are patent. Esophagus is normal course and caliber. Patent nonaneurysmal thoracic aorta. Cardiac size within normal limits without pericardial effusion. Lungs/pleura: Smooth interlobular septal thickening with lower lobe predominance with small right and moderate left pleural effusions and adjacent atelectasis. 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 Soft Tissues/Bones: No soft tissue findings or acute osseous findings with dedicated thoracic imaging on separate CT thoracic dictation Abdomen/Pelvis: Organs: Liver without focal lesion. Gallbladder filled with numerous stones partially contracted cholelithiasis. .  Pancreas and spleen unremarkable. Adrenals without nodule. Kidneys demonstrate bilateral intrarenal stones and calcifications of nephrolithiasis with moderate to severe left hydroureteronephrosis in the proximal portion with abrupt caliber change in the proximal ureter likely at the ureteropelvic junction could represent a chronic left UPJ obstruction appearance with potential mild cortical thinning left kidney. GI/Bowel: No focal thickening or disproportion dilatation of bowel. No inflammatory findings. Appendix is unremarkable and visualized in the right lower quadrant. Pelvis: No suspicious pelvic lesion or bulky pelvic adenopathy/free fluid. Peritoneum/Retroperitoneum: Patent nonaneurysmal abdominal aorta. No bulky retroperitoneal adenopathy. Bones/Soft Tissues: No acute soft tissue findings with left hip arthroplasty in place. Please see separate CT lumbar spine and CT bony pelvis dictations for associated osseous findings on dedicated imaging. .  Small fat and minimally fluid containing right direct inguinal hernia     No acute traumatic findings of the chest abdomen or pelvis.   Please see separate spine CT dictations for spinal small and CT bony pelvis findings including probable lumbar spine compression deformity fracture age indeterminate. Bilateral pleural effusions including moderate volume left pleural effusion and adjacent pleural calcifications. 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         EKG:   EKG from ER, reviewed by self - it shows afib at 68  Old chart reviewed, EKG dated 5/15/22 is reviewed, there is not difference noted. Old study shows afib at 80    Lab Results   Component Value Date    GLUCOSE 134 05/20/2022     Lab Results   Component Value Date    POCGLU 99 01/18/2017     BP (!) 94/53   Pulse 64   Temp 97.4 °F (36.3 °C) (Oral)   Resp 26   SpO2 99%     MEDICAL DECISION MAKING:    Principal Problem:    Sepsis due to pneumonia ([address] -New Problem to me. Pt with pna, uti, borderline bp  Plan: iv abx started - hcap coverage. DuoNeb HHN ordered. O2 as needed. Will check serial CXR. WBC ordered for AM.  Respiratory therapy asked to see. Monitor for signs of antibiotic toxicity with serial exam and lab studies   Active Problems:    Essential hypertension -Established problem. Stable. 94/53  Plan: Pt home BP meds reviewed and will be continued. hold parameters set. IV Hydralazine ordered for control of extremely high blood pressures. Will monitor labs to assess Creat/K for possible complications of medications. Hyperlipidemia -Established problem. Stable. Plan: Continue present orders/plan. Atrial fib - Established problem. Stable. chronic  Plan: Continue present orders/plan. JUAN R (acute kidney injury) ([facility] -Established problem. Stable.   Could be 2/2 uti, dehydration  Plan: tx uti, ivf ordered    UTI (urinary tract infection)  Plan: cont empiric abx    GERD (gastroesophageal reflux disease)  Plan: Continue present orders/plan. I have discussed with the daughter the rationale for blood component transfusion; its benefits in treating or preventing fatigue, organ damage, or death; and its risk which includes mild transfusion reactions, rare risk of blood borne infection, or more serious but rare reactions. I have discussed the alternatives to transfusion, including the risk and consequences of not receiving transfusion. The daughter had an opportunity to ask questions and had agreed to proceed with transfusion of blood components. Diagnoses as listed above, designated as new or established and plan outlined for each. Data Reviewed:   (1) Lab tests were reviewed or ordered. (1) Radiology tests were reviewed or ordered. (1) Medical test (Echo, EKG, PFT/jered) were ordered. (1)History was not obtained from someone other than patient  (1) Old records were reviewed - see HPI/MDM for pertinent details if review done. (2) Case was discussed with another health care provider: Dr Lorre Rubinstein  (2) Imaging was viewed by myself. (2) EKG  was viewed by myself. The patient is being placed in inpatient status with the expectation of requiring a hospital stay spanning at least two midnights for care and treatment of the problems noted in the problem list.  This determination is also based on thepatients comorbidities and past medical history, the severity and timing of the signs and symptoms upon presentation.     Total critical care time caring for this patient with life threatening illness, including direct patient contact, management of life support systems, review of data including imaging and labs, discussions with other team members and physicians at least 32 minutes today          (Please note that portions of this note were completed with a voice recognition program.  Efforts were made

## 2022-05-20 NOTE — ED PROVIDER NOTES
[address] PROVIDER NOTE    Patient Identification  Pt Name: Gayle Rodriguez  MRN: [id number]  Farrah 10/31/1925  Date of evaluation: 5/20/2022  Provider: Kelly Mansfield MD  PCP: Cheng Fernandez MD    Chief Complaint  Altered Mental Status (Pt to ED via EMS s/p AMS, increasing over last few days. NH sts that pt had falls on both Sunday and Monday and was sent to ED for evaluation both times. Pt also has UTI and being tx for that. Per NH, pt had increasing AMS over night requiring O2 as well. noticed L sided facial droop last night )      HPI  History provided by patient   This is a 80 y.o. male who presents to the ED for altered mental status. Worsening over the past few days. He did have falls on Sunday and Monday. Patient is able to tell me that he has no pain. Specifically no chest or abdominal pain. He has no numbness on his arms or legs. He does not know where he is. He cannot answer most open-ended questions. He feels weak. Per daughter at bedside, he is DNR comfort care. His currently receiving treatment for UTI. ROS  12 systems reviewed, pertinent positives/negatives per HPI otherwise noted to be negative.     I have reviewed the following nursing documentation:  Allergies: Lasix [furosemide], Zyprexa [olanzapine], and Citalopram    Past medical history:   Past Medical History:   Diagnosis Date    [facility]     Arthritis     Atrial fibrillation (HCC)     Blepharitis of both eyes     CAD (coronary artery disease)     stents 2005, x3    GERD (gastroesophageal reflux disease)     Gout     Hyperlipidemia     Hypertension     Macular degeneration     NSTEMI (non-ST elevated myocardial infarction) ([facility]     Prostate enlargement     Psychiatric problem     anxiety; bipolar disorder    Tachycardia      Past surgical history:   Past Surgical History:   Procedure Laterality Date    CARDIAC SURGERY      CATARACT REMOVAL  1993    X5    COLONOSCOPY      CORONARY ANGIOPLASTY WITH STENT PLACEMENT  2005      X3    [facility]ON[facility] GRAFT      [facility]    EYE SURGERY      HIP SURGERY Left 6/2/2020    LEFT BIPOLAR HIP HEMIARTHROPLASTY performed by Albania Spencer MD at [address] [place] [address]    SKIN BIOPSY      melanoma, basal    SKIN CANCER DESTRUCTION  2006    Melanoma  ()    TURP  2003       Home medications:   Previous Medications    ALUMINUM & MAGNESIUM HYDROXIDE-SIMETHICONE (MYLANTA) 400-400-40 MG/5ML SUSP    Take 30 mLs by mouth every 6 hours as needed    APIXABAN (ELIQUIS) 2.5 MG TABS TABLET    Take 1 tablet by mouth 2 times daily    ARTIFICIAL TEAR OINTMENT (DRY EYES OP)    Apply 250 capsules to eye daily    CALCIUM CARBONATE (OSCAL) 500 MG TABS TABLET    Take 500 mg by mouth daily    DILTIAZEM (CARDIZEM) 30 MG TABLET    Take 1 tablet by mouth 3 times daily Hold the tablet if heart rate below 60 or blood pressure below 204 systolic    FERROUS SULFATE (IRON 325) 325 (65 FE) MG TABLET    Take 325 mg by mouth daily (with breakfast)    FINASTERIDE (PROSCAR) 5 MG TABLET    TAKE 1 TABLET EVERY NIGHT    GABAPENTIN (NEURONTIN) 100 MG CAPSULE    Take 1 capsule by mouth nightly for 30 days. LAMOTRIGINE (LAMICTAL) 150 MG TABLET    Take 1 tablet by mouth daily    LEVOTHYROXINE (SYNTHROID) 25 MCG TABLET    TAKE 1 TABLET EVERY DAY    LORAZEPAM (ATIVAN) 0.5 MG TABLET    Take 0.5 mg by mouth daily as needed for Anxiety. LORAZEPAM (ATIVAN) 0.5 MG TABLET    Take 0.5 mg by mouth 2 times daily.     MAGNESIUM 400 MG TABS    Take 1 tablet by mouth daily     MELATONIN 5 MG TABS TABLET    Take 5 mg by mouth daily as needed (Insomnia)    MULTIPLE VITAMINS-MINERALS (MULTIVITAMIN ADULT PO)    Take by mouth    NITROGLYCERIN (NITROSTAT) 0.4 MG SL TABLET    Place 1 tablet under the tongue every 5 minutes as needed for Chest pain    PANTOPRAZOLE (PROTONIX) 40 MG TABLET    TAKE 1 TABLET EVERY DAY    POLYETHYLENE GLYCOL (GLYCOLAX) POWDER    Take 17 g by mouth daily as needed    SENNOSIDES-DOCUSATE SODIUM (SENOKOT-S) 8.6-50 MG TABLET    Take 1 tablet by mouth 2 times daily    TAMSULOSIN (FLOMAX) 0.4 MG CAPSULE    Take 0.4 mg by mouth daily    TRIAMCINOLONE (KENALOG) 0.1 % CREAM    Apply to affected areas twice daily    ZIPRASIDONE (GEODON) 20 MG CAPSULE    Take 20 mg by mouth nightly       Social history:  reports that he has never smoked. He has never used smokeless tobacco. He reports that he does not drink alcohol and does not use drugs. Family history:    Family History   Problem Relation Age of Onset    Heart Disease Mother     Heart Disease Brother     High Blood Pressure Neg Hx     High Cholesterol Neg Hx     Mental Illness Neg Hx          Exam  ED Triage Vitals   BP Temp Temp Source Pulse Resp SpO2 Height Weight   05/20/22 1027 05/20/22 1025 05/20/22 1025 05/20/22 1026 05/20/22 1026 05/20/22 1026 -- --   (!) 95/56 97.4 °F (36.3 °C) Oral 71 16 94 %       Nursing note and vitals reviewed. Constitutional: Ill appearing  HENT:      Head: Normocephalic      Ears: External ears normal.      Nose: Nose normal.     Mouth: Membrane mucosa dry   Eyes: No discharge. Neck: Supple. Trachea midline. Cardiovascular: Regular rate. Warm extremities  Pulmonary/Chest: Effort normal. No respiratory distress. B/l rales   Abdominal: Soft. No distension. Nontender  : Deferred  Rectal: Deferred   Musculoskeletal: Moves all extremities. No gross deformity. Neurological: Alert and oriented to person. Face symmetric. Speech is clear. Can lift arms and wiggle toes. Skin: Warm and dry. Psychiatric: Normal mood and affect.  Behavior is normal.    Procedures        Radiology  XR CHEST PORTABLE   Final Result   Left mid and lower lung opacities could represent atelectatic changes or   infection             Labs  Results for orders placed or performed during the hospital encounter of 05/20/22   CBC with Auto Differential   Result Value Ref Range    WBC 12.3 (H) 4.0 - 11.0 K/uL    RBC 3.10 (L) 4.20 - 5.90 M/uL    Hemoglobin 9.3 (L) 13.5 - 17.5 g/dL    Hematocrit 28.5 (L) 40.5 - 52.5 %    MCV 91.7 80.0 - 100.0 fL    MCH 30.0 26.0 - 34.0 pg    MCHC 32.8 31.0 - 36.0 g/dL    RDW 14.6 12.4 - 15.4 %    Platelets 713 348 - 569 K/uL    MPV 7.6 5.0 - 10.5 fL    Neutrophils % 90.7 %    Lymphocytes % 5.1 %    Monocytes % 4.1 %    Eosinophils % 0.0 %    Basophils % 0.1 %    Neutrophils Absolute 11.1 (H) 1.7 - 7.7 K/uL    Lymphocytes Absolute 0.6 (L) 1.0 - 5.1 K/uL    Monocytes Absolute 0.5 0.0 - 1.3 K/uL    Eosinophils Absolute 0.0 0.0 - 0.6 K/uL    Basophils Absolute 0.0 0.0 - 0.2 K/uL   Comprehensive Metabolic Panel w/ Reflex to MG   Result Value Ref Range    Sodium 137 136 - 145 mmol/L    Potassium reflex Magnesium 4.1 3.5 - 5.1 mmol/L    Chloride 102 99 - 110 mmol/L    CO2 25 21 - 32 mmol/L    Anion Gap 10 3 - 16    Glucose 134 (H) 70 - 99 mg/dL    BUN 27 (H) 7 - 20 mg/dL    CREATININE 2.1 (H) 0.8 - 1.3 mg/dL    GFR Non-African American 29 (A) >60    GFR  36 (A) >60    Calcium 8.3 8.3 - 10.6 mg/dL    Total Protein 5.9 (L) 6.4 - 8.2 g/dL    Albumin 2.6 (L) 3.4 - 5.0 g/dL    Albumin/Globulin Ratio 0.8 (L) 1.1 - 2.2    Total Bilirubin 0.5 0.0 - 1.0 mg/dL    Alkaline Phosphatase 108 40 - 129 U/L    ALT 21 10 - 40 U/L    AST 46 (H) 15 - 37 U/L   Troponin   Result Value Ref Range    Troponin 1.17 (HH) <0.01 ng/mL   Brain Natriuretic Peptide   Result Value Ref Range    Pro-BNP 8,402 (H) 0 - 449 pg/mL   Urinalysis with Reflex to Culture    Specimen: Urine   Result Value Ref Range    Color, UA DARK YELLOW (A) Straw/Yellow    Clarity, UA TURBID (A) Clear    Glucose, Ur Negative Negative mg/dL    Bilirubin Urine SMALL (A) Negative    Ketones, Urine TRACE (A) Negative mg/dL    Specific Gravity, UA 1.020 1.005 - 1.030    Blood, Urine LARGE (A) Negative    pH, UA 5.0 5.0 - 8.0    Protein,  (A) Negative mg/dL    Urobilinogen, Urine 1.0 <2.0 E.U./dL Nitrite, Urine Negative Negative    Leukocyte Esterase, Urine LARGE (A) Negative    Microscopic Examination YES     Urine Type NotGiven    Lactate, Sepsis   Result Value Ref Range    Lactic Acid, Sepsis 1.0 0.4 - 1.9 mmol/L   EKG 12 Lead   Result Value Ref Range    Ventricular Rate 77 BPM    Atrial Rate 72 BPM    QRS Duration 102 ms    Q-T Interval 362 ms    QTc Calculation (Bazett) 409 ms    R Axis -7 degrees    T Axis -59 degrees    Diagnosis       Atrial fibrillationNonspecific ST and T wave abnormality , probably digitalis effectAbnormal ECGConfirmed by Cassandra Glasgow MD, Beverly Mojica ([id number]) on 5/20/2022 12:29:30 PM       Screenings   Vinson Coma Scale  Eye Opening: To speech  Best Verbal Response: Confused  Best Motor Response: Obeys commands  Lavon Coma Scale Score: 13       MDM and ED Course  This is a 80 y.o. male who presents to the ED for generalized weakness, altered mental status. Found to be febrile at 101 at his facility. Recently treated for urinary tract infection. Will check for signs of an infection including chest x-ray and UTI. He has no chest or abdominal pain. He is hypotensive and likely in septic shock. He is DNR comfort care. Because of this, we are foregoing head CT because it would not  and he has not had another fall since his last head CT. Furthermore, not obtaining lumbar puncture because of this.    -----------    X-ray shows evidence of pneumonia. Started on broad-spectrum antibiotics. Giving less than 30 cc/kg IV fluids because of risk of failure. BNP is also elevated. Has significant troponin elevation as well. No chest pain. No stemi on ekg. Has JUAN R. Is altered. Is in septic shock with multiorgan failure. Family at bedside agrees with DNR comfort care. At this time, they wish to keep him here to get some antibiotics. The total critical care time spent while evaluating and treating this patient was at least 35 minutes.   This excludes time spent doing 1217 (!) 94/53 -- 64 26 99 %      Recent Labs     05/20/22  1100   WBC 12.3*   CREATININE 2.1*   BILITOT 0.5            Time Severe Sepsis / Septic Shock Identified: 12pm    Fluid Resuscitation Rational: less than 30mL/kg because of concern for fluid overload and patient/patient advocate made an informed decision to decline more aggressive fluid resuscitation    Infection Source: Pulmonary - Community Acquired    Repeat lactate level: not indicated due to initial lactate < 2    Reassessment Exam:   I have reassessed tissue perfusion and hemodynamic status after fluid bolus at this time: 1230p        Final Impression  1. Septic shock ([facility]    2. Multi-organ failure with heart failure ([facility]    3. NSTEMI (non-ST elevated myocardial infarction) ([facility]    4. JUAN R [facility]) [facility]    5. Pneumonia due to infectious organism, unspecified laterality, unspecified part of lung        Blood pressure (!) 94/53, pulse 64, temperature 97.4 °F (36.3 °C), temperature source Oral, resp. rate 26, SpO2 99 %. Disposition:  DISPOSITION        Patient Referrals:  No follow-up provider specified. Discharge Medications:  New Prescriptions    No medications on file       Discontinued Medications:  Discontinued Medications    No medications on file       This chart was generated using the [address] [place], [place] [address]  [employer] system. I created this record but it may contain dictation errors given the limitations of this technology.         Charis Perez MD  05/20/22 3677

## 2022-05-20 NOTE — PROGRESS NOTES
05/20/22 1723   RT Protocol   History Pulmonary Disease 0   Respiratory pattern 0   Breath sounds 2   Cough 2   Bronchodilator Assessment Score 4

## 2022-05-20 NOTE — RT PROTOCOL NOTE
RT Nebulizer Bronchodilator Protocol Note    There is a bronchodilator order in the chart from a provider indicating to follow the RT Bronchodilator Protocol and there is an Initiate RT Bronchodilator Protocol order as well (see protocol at bottom of note). CXR Findings:  XR CHEST PORTABLE    Result Date: 5/20/2022  Left mid and lower lung opacities could represent atelectatic changes or infection       The findings from the last RT Protocol Assessment were as follows:  Smoking: None or smoker <15 pack years  Respiratory Pattern: Regular pattern and RR 12-20 bpm  Breath Sounds: Slightly diminished and/or crackles  Cough: Weak, productive  Indication for Bronchodilator Therapy:    Bronchodilator Assessment Score: 4    Aerosolized bronchodilator medication orders have been revised according to the RT Nebulizer Bronchodilator Protocol below. Respiratory Therapist to perform RT Therapy Protocol Assessment initially then follow the protocol. Repeat RT Therapy Protocol Assessment PRN for score 0-3 or on second treatment, BID, and PRN for scores above 3. No Indications - adjust the frequency to every 6 hours PRN wheezing or bronchospasm, if no treatments needed after 48 hours then discontinue using Per Protocol order mode. If indication present, adjust the RT bronchodilator orders based on the Bronchodilator Assessment Score as indicated below. If a patient is on this medication at home then do not decrease Frequency below that used at home. 0-3 - enter or revise RT bronchodilator order(s) to equivalent RT Bronchodilator order with Frequency of every 4 hours PRN for wheezing or increased work of breathing using Per Protocol order mode. 4-6 - enter or revise RT Bronchodilator order(s) to two equivalent RT bronchodilator orders with one order with BID Frequency and one order with Frequency of every 4 hours PRN wheezing or increased work of breathing using Per Protocol order mode.          7-10 - enter or revise RT Bronchodilator order(s) to two equivalent RT bronchodilator orders with one order with TID Frequency and one order with Frequency of every 4 hours PRN wheezing or increased work of breathing using Per Protocol order mode. 11-13 - enter or revise RT Bronchodilator order(s) to one equivalent RT bronchodilator order with QID Frequency and an Albuterol order with Frequency of every 4 hours PRN wheezing or increased work of breathing using Per Protocol order mode. Greater than 13 - enter or revise RT Bronchodilator order(s) to one equivalent RT bronchodilator order with every 4 hours Frequency and an Albuterol order with Frequency of every 2 hours PRN wheezing or increased work of breathing using Per Protocol order mode. RT to enter RT Home Evaluation for COPD & MDI Assessment order using Per Protocol order mode.     Electronically signed by Ronald Love RCP on 5/20/2022 at 5:24 PM

## 2022-05-20 NOTE — ED NOTES
Pt calm and resting quietly with equal rise and fall of chest. Pt in NAD, RR even and unlabored. Side rails up, bed locked and in lowest position. Pt family updated on plan of care. No needs at this time. Pt family instructed on use of call light if needed.           Carmen Dunaway RN  05/20/22 1551

## 2022-05-20 NOTE — PROGRESS NOTES
Day #1 of Cefepime  Indication:  pneumonia  Current regimen:  2 gm IV Q8  Abx regimen: monotherapy  Recent Labs     05/20/22  1100   WBC 12.3*   BUN 27*     Est CrCl: ~18 ml/min    Cultures: no current    Plan: Change to 2 gm IV X 1, then 1 gm IV Q12H

## 2022-05-20 NOTE — ED NOTES
Pt had small amount of dark green formed stool in depends. Pt cleaned up. Tolerated well. RN notified pt and family on plan of care.  Awaiting bed to be cleaned upstairs     [facility]Well[facility]  05/20/22 3100

## 2022-05-20 NOTE — PROGRESS NOTES
Direct oral anticoagulant (DOAC) - Initial Pharmacy Review  New start? No, home med  DOAC/dose: Apixaban 2.5 mg bid  Indication: A fib    Recent Labs     05/20/22  1100   HGB 9.3*   HCT 28.5*        No results for input(s): INR in the last 72 hours. Recent Labs     05/20/22  1100   CREATININE 2.1*     Estimated Creatinine Clearance: 18 mL/min (A) (based on SCr of 2.1 mg/dL (H)). Significant Drug-Drug Interactions:  enoxaparin, diltiazem . Notes: No obvious intervention needed.

## 2022-05-20 NOTE — CONSULTS
Clinical Pharmacy Note: Pharmacy to Dose Vancomycin    Sangita Harris is a 80 y.o. male started on Vancomycin for CAP; consult received from Dr. Harry Hall to manage therapy. Also receiving the following antibiotics: Cefepime. Goal AUC: 400-600 mg/L*hr  Goal Trough Level: 15-20 mcg/mL    Assessment/Plan:  A 1250 mg loading dose was given on 5/20 at 1226  Initiate vancomycin 500 mg IV every 24 hours. Bayesian modeling predicts an AUC of 495 mg/L*hr and a trough of 17.7 mcg/mL at steady state concentration. A vancomycin random level has been ordered for 5/21 at 0600  Changes in regimen will be determined based on culture results, renal function, and clinical response. Pharmacy will continue to monitor and adjust regimen as necessary. Allergies:  Lasix [furosemide], Zyprexa [olanzapine], and Citalopram     Recent Labs     05/20/22  1100   CREATININE 2.1*       Recent Labs     05/20/22  1100   WBC 12.3*       Ht Readings from Last 1 Encounters:   05/20/22 5' 8\" (1.727 m)        Wt Readings from Last 1 Encounters:   05/20/22 135 lb 11.2 oz (61.6 kg)         Estimated Creatinine Clearance: 18 mL/min (A) (based on SCr of 2.1 mg/dL (H)).       Thank you for the consult,    Anel Wyatt, SammyD

## 2022-05-20 NOTE — ED NOTES
Small run of v-tach noted on monitor with 8 beat run. Pt in NAD, RR even and unlabored at this time. Pt still on [facility]. Will cont to monitor.      Amy Vázquez RN  05/20/22 8512

## 2022-05-21 ENCOUNTER — APPOINTMENT (OUTPATIENT)
Dept: GENERAL RADIOLOGY | Age: 87
DRG: 871 | End: 2022-05-21
Payer: MEDICARE

## 2022-05-21 LAB
A/G RATIO: 0.8 (ref 1.1–2.2)
ALBUMIN SERPL-MCNC: 2.4 G/DL (ref 3.4–5)
ALP BLD-CCNC: 106 U/L (ref 40–129)
ALT SERPL-CCNC: 21 U/L (ref 10–40)
ANION GAP SERPL CALCULATED.3IONS-SCNC: 10 MMOL/L (ref 3–16)
AST SERPL-CCNC: 39 U/L (ref 15–37)
BASOPHILS ABSOLUTE: 0 K/UL (ref 0–0.2)
BASOPHILS RELATIVE PERCENT: 0.2 %
BILIRUB SERPL-MCNC: 0.4 MG/DL (ref 0–1)
BUN BLDV-MCNC: 27 MG/DL (ref 7–20)
C-REACTIVE PROTEIN: 91.8 MG/L (ref 0–5.1)
CALCIUM SERPL-MCNC: 8.2 MG/DL (ref 8.3–10.6)
CHLORIDE BLD-SCNC: 104 MMOL/L (ref 99–110)
CO2: 26 MMOL/L (ref 21–32)
CREAT SERPL-MCNC: 1.8 MG/DL (ref 0.8–1.3)
EOSINOPHILS ABSOLUTE: 0.1 K/UL (ref 0–0.6)
EOSINOPHILS RELATIVE PERCENT: 0.9 %
GFR AFRICAN AMERICAN: 42
GFR NON-AFRICAN AMERICAN: 35
GLUCOSE BLD-MCNC: 98 MG/DL (ref 70–99)
HCT VFR BLD CALC: 29.3 % (ref 40.5–52.5)
HEMOGLOBIN: 9.8 G/DL (ref 13.5–17.5)
LACTIC ACID: 0.9 MMOL/L (ref 0.4–2)
LYMPHOCYTES ABSOLUTE: 0.4 K/UL (ref 1–5.1)
LYMPHOCYTES RELATIVE PERCENT: 5.6 %
MCH RBC QN AUTO: 30.4 PG (ref 26–34)
MCHC RBC AUTO-ENTMCNC: 33.4 G/DL (ref 31–36)
MCV RBC AUTO: 91 FL (ref 80–100)
MONOCYTES ABSOLUTE: 0.3 K/UL (ref 0–1.3)
MONOCYTES RELATIVE PERCENT: 5.1 %
NEUTROPHILS ABSOLUTE: 6 K/UL (ref 1.7–7.7)
NEUTROPHILS RELATIVE PERCENT: 88.2 %
PDW BLD-RTO: 14.2 % (ref 12.4–15.4)
PLATELET # BLD: 308 K/UL (ref 135–450)
PMV BLD AUTO: 6.8 FL (ref 5–10.5)
POTASSIUM REFLEX MAGNESIUM: 4.4 MMOL/L (ref 3.5–5.1)
PROCALCITONIN: 2.72 NG/ML (ref 0–0.15)
RBC # BLD: 3.22 M/UL (ref 4.2–5.9)
SODIUM BLD-SCNC: 140 MMOL/L (ref 136–145)
TOTAL PROTEIN: 5.6 G/DL (ref 6.4–8.2)
VANCOMYCIN RANDOM: 9.9 UG/ML
WBC # BLD: 6.8 K/UL (ref 4–11)

## 2022-05-21 PROCEDURE — 85025 COMPLETE CBC W/AUTO DIFF WBC: CPT

## 2022-05-21 PROCEDURE — 86140 C-REACTIVE PROTEIN: CPT

## 2022-05-21 PROCEDURE — 84145 PROCALCITONIN (PCT): CPT

## 2022-05-21 PROCEDURE — 2580000003 HC RX 258: Performed by: INTERNAL MEDICINE

## 2022-05-21 PROCEDURE — 6370000000 HC RX 637 (ALT 250 FOR IP): Performed by: INTERNAL MEDICINE

## 2022-05-21 PROCEDURE — 6360000002 HC RX W HCPCS: Performed by: INTERNAL MEDICINE

## 2022-05-21 PROCEDURE — 1200000000 HC SEMI PRIVATE

## 2022-05-21 PROCEDURE — 80053 COMPREHEN METABOLIC PANEL: CPT

## 2022-05-21 PROCEDURE — C9113 INJ PANTOPRAZOLE SODIUM, VIA: HCPCS | Performed by: INTERNAL MEDICINE

## 2022-05-21 PROCEDURE — 2700000000 HC OXYGEN THERAPY PER DAY

## 2022-05-21 PROCEDURE — 80202 ASSAY OF VANCOMYCIN: CPT

## 2022-05-21 PROCEDURE — 83605 ASSAY OF LACTIC ACID: CPT

## 2022-05-21 PROCEDURE — 71045 X-RAY EXAM CHEST 1 VIEW: CPT

## 2022-05-21 PROCEDURE — 94761 N-INVAS EAR/PLS OXIMETRY MLT: CPT

## 2022-05-21 PROCEDURE — 94640 AIRWAY INHALATION TREATMENT: CPT

## 2022-05-21 PROCEDURE — 92526 ORAL FUNCTION THERAPY: CPT

## 2022-05-21 PROCEDURE — 2580000003 HC RX 258: Performed by: EMERGENCY MEDICINE

## 2022-05-21 PROCEDURE — 92610 EVALUATE SWALLOWING FUNCTION: CPT

## 2022-05-21 RX ORDER — SODIUM CHLORIDE 9 MG/ML
INJECTION, SOLUTION INTRAVENOUS CONTINUOUS
Status: DISCONTINUED | OUTPATIENT
Start: 2022-05-21 | End: 2022-05-22

## 2022-05-21 RX ORDER — ZIPRASIDONE MESYLATE 20 MG/ML
20 INJECTION, POWDER, LYOPHILIZED, FOR SOLUTION INTRAMUSCULAR NIGHTLY
Status: DISCONTINUED | OUTPATIENT
Start: 2022-05-21 | End: 2022-05-23

## 2022-05-21 RX ORDER — PANTOPRAZOLE SODIUM 40 MG/10ML
40 INJECTION, POWDER, LYOPHILIZED, FOR SOLUTION INTRAVENOUS DAILY
Status: DISCONTINUED | OUTPATIENT
Start: 2022-05-21 | End: 2022-05-23

## 2022-05-21 RX ORDER — LORAZEPAM 2 MG/ML
0.5 INJECTION INTRAMUSCULAR 2 TIMES DAILY
Status: DISCONTINUED | OUTPATIENT
Start: 2022-05-21 | End: 2022-05-25

## 2022-05-21 RX ADMIN — Medication 10 ML: at 20:25

## 2022-05-21 RX ADMIN — TAMSULOSIN HYDROCHLORIDE 0.4 MG: 0.4 CAPSULE ORAL at 09:27

## 2022-05-21 RX ADMIN — ACETAMINOPHEN 650 MG: 325 TABLET ORAL at 11:55

## 2022-05-21 RX ADMIN — LORAZEPAM 0.5 MG: 0.5 TABLET ORAL at 09:28

## 2022-05-21 RX ADMIN — Medication 10 ML: at 09:30

## 2022-05-21 RX ADMIN — CEFEPIME HYDROCHLORIDE 1000 MG: 1 INJECTION, POWDER, FOR SOLUTION INTRAMUSCULAR; INTRAVENOUS at 09:51

## 2022-05-21 RX ADMIN — LAMOTRIGINE 150 MG: 100 TABLET ORAL at 09:28

## 2022-05-21 RX ADMIN — PANTOPRAZOLE SODIUM 40 MG: 40 INJECTION, POWDER, FOR SOLUTION INTRAVENOUS at 14:18

## 2022-05-21 RX ADMIN — VANCOMYCIN HYDROCHLORIDE 500 MG: 500 INJECTION, POWDER, LYOPHILIZED, FOR SOLUTION INTRAVENOUS at 11:59

## 2022-05-21 RX ADMIN — FERROUS SULFATE TAB 325 MG (65 MG ELEMENTAL FE) 325 MG: 325 (65 FE) TAB at 09:36

## 2022-05-21 RX ADMIN — IPRATROPIUM BROMIDE AND ALBUTEROL SULFATE 1 AMPULE: 2.5; .5 SOLUTION RESPIRATORY (INHALATION) at 07:52

## 2022-05-21 RX ADMIN — IPRATROPIUM BROMIDE AND ALBUTEROL SULFATE 1 AMPULE: 2.5; .5 SOLUTION RESPIRATORY (INHALATION) at 19:36

## 2022-05-21 RX ADMIN — FINASTERIDE 5 MG: 5 TABLET, FILM COATED ORAL at 09:28

## 2022-05-21 RX ADMIN — Medication 600 MG: at 09:36

## 2022-05-21 RX ADMIN — LEVOTHYROXINE SODIUM 25 MCG: 0.03 TABLET ORAL at 09:28

## 2022-05-21 RX ADMIN — Medication 400 MG: at 09:27

## 2022-05-21 RX ADMIN — DILTIAZEM HYDROCHLORIDE 30 MG: 30 TABLET, FILM COATED ORAL at 09:27

## 2022-05-21 RX ADMIN — LORAZEPAM 0.5 MG: 2 INJECTION INTRAMUSCULAR; INTRAVENOUS at 22:07

## 2022-05-21 RX ADMIN — CEFEPIME HYDROCHLORIDE 1000 MG: 1 INJECTION, POWDER, FOR SOLUTION INTRAMUSCULAR; INTRAVENOUS at 23:09

## 2022-05-21 RX ADMIN — STANDARDIZED SENNA CONCENTRATE AND DOCUSATE SODIUM 1 TABLET: 8.6; 5 TABLET ORAL at 09:28

## 2022-05-21 RX ADMIN — SODIUM CHLORIDE, PRESERVATIVE FREE 10 ML: 5 INJECTION INTRAVENOUS at 22:07

## 2022-05-21 RX ADMIN — APIXABAN 2.5 MG: 5 TABLET, FILM COATED ORAL at 09:28

## 2022-05-21 RX ADMIN — SODIUM CHLORIDE: 9 INJECTION, SOLUTION INTRAVENOUS at 18:03

## 2022-05-21 RX ADMIN — Medication 10 ML: at 09:29

## 2022-05-21 ASSESSMENT — PAIN SCALES - WONG BAKER
WONGBAKER_NUMERICALRESPONSE: 0

## 2022-05-21 ASSESSMENT — PAIN SCALES - GENERAL
PAINLEVEL_OUTOF10: 0

## 2022-05-21 NOTE — PLAN OF CARE
Problem: Discharge Planning  Goal: Discharge to home or other facility with appropriate resources  Outcome: Progressing     Problem: Pain  Goal: Verbalizes/displays adequate comfort level or baseline comfort level  Outcome: Progressing     Problem: Safety - Adult  Goal: Free from fall injury  Outcome: Progressing  Flowsheets (Taken 5/21/2022 0443 by Sal Buckley RN)  Free From Fall Injury: Instruct family/caregiver on patient safety     Problem: Skin/Tissue Integrity  Goal: Absence of new skin breakdown  Description: 1. Monitor for areas of redness and/or skin breakdown  2. Assess vascular access sites hourly  3. Every 4-6 hours minimum:  Change oxygen saturation probe site  4. Every 4-6 hours:  If on nasal continuous positive airway pressure, respiratory therapy assess nares and determine need for appliance change or resting period. Outcome: Progressing  Note: Pt being turned Q 2 hours     Problem: Confusion  Goal: Confusion, delirium, dementia, or psychosis is improved or at baseline  Description: INTERVENTIONS:  1. Assess for possible contributors to thought disturbance, including medications, impaired vision or hearing, underlying metabolic abnormalities, dehydration, psychiatric diagnoses, and notify attending LIP  2. [place] high risk fall precautions, as indicated  3. Provide frequent short contacts to provide reality reorientation, refocusing and direction  4. Decrease environmental stimuli, including noise as appropriate  5. Monitor and intervene to maintain adequate nutrition, hydration, elimination, sleep and activity  6. If unable to ensure safety without constant attention obtain sitter and review sitter guidelines with assigned personnel  7.  Initiate Psychosocial CNS and Spiritual Care consult, as indicated  Outcome: Progressing  Note: Pt seems more alert today     Problem: ABCDS Injury Assessment  Goal: Absence of physical injury  Outcome: Progressing

## 2022-05-21 NOTE — PROGRESS NOTES
Progress Note - Dr. Heydi Stack - Internal Medicine  PCP: Jock Hodgkin, MD [address] / [address] [facility] [phone]    Hospital Day: 1  Code Status: DNR-CC  Current Diet: ADULT DIET; Regular        CC: follow up on medical issues    Subjective:   Shivani Garay is a 80 y.o. male. Pt seen and examined  Chart reviewed since last visit, labs and imaging below        Doing ok  More alert  Family present, all questions answered  slp eval pending  Still high suspicion of aspiration as cause of PNA    He denies chest pain, denies shortness of breath, denies nausea,  denies emesis. 10 system Review of Systems is reviewed with patient, and pertinent positives are noted in HPI above . Otherwise, Review of systems is negative. I have reviewed the patient's medical and social history in detail and updated the computerized patient record. To recap: He  has a past medical history of A-fib ([address] Arthritis, Atrial fibrillation ([address] Blepharitis of both eyes, CAD (coronary artery disease), GERD (gastroesophageal reflux disease), Gout, Hyperlipidemia, Hypertension, Macular degeneration, NSTEMI (non-ST elevated myocardial infarction) ([address] PNA (pneumonia), Prostate enlargement, Psychiatric problem, and Tachycardia. . He  has a past surgical history that includes Coronary angioplasty with stent (2005); skin biopsy; Kidney stone surgery (1980); Coronary artery bypass graft; Cataract removal (1993); TURP (2003); Skin cancer destruction (2006); Colonoscopy; eye surgery; Cardiac surgery; and hip surgery (Left, 6/2/2020). Ester Salguero He  reports that he has never smoked. He has never used smokeless tobacco. He reports that he does not drink alcohol and does not use drugs. .        Active Hospital Problems    Diagnosis Date Noted    Hyperlipidemia [E78.5] 08/11/2011     Priority: High    Essential hypertension [I10] 06/10/2011     Priority: High    PNA (pneumonia) [J18.9] 05/20/2022     Priority: Medium    Sepsis due to pneumonia ([facility] [J18.9, A41.9] 05/20/2022     Priority: Medium    JUAN R (acute kidney injury) ([facility] [N17.9] 05/20/2022     Priority: Medium    UTI (urinary tract infection) [N39.0] 05/20/2022     Priority: Medium    Sepsis ([facility] [A41.9] 05/20/2022     Priority: Medium    GERD (gastroesophageal reflux disease) [K21.9] 06/22/2012       Current Facility-Administered Medications: sodium chloride flush 0.9 % injection 5-40 mL, 5-40 mL, IntraVENous, 2 times per day  sodium chloride flush 0.9 % injection 5-40 mL, 5-40 mL, IntraVENous, PRN  0.9 % sodium chloride infusion, , IntraVENous, PRN  magnesium oxide (MAG-OX) tablet 400 mg, 400 mg, Oral, Daily  melatonin tablet 4.5 mg, 4.5 mg, Oral, Daily PRN  nitroGLYCERIN (NITROSTAT) SL tablet 0.4 mg, 0.4 mg, SubLINGual, Q5 Min PRN  tamsulosin (FLOMAX) capsule 0.4 mg, 0.4 mg, Oral, Daily  pantoprazole (PROTONIX) tablet 40 mg, 40 mg, Oral, Daily  levothyroxine (SYNTHROID) tablet 25 mcg, 25 mcg, Oral, Daily  lamoTRIgine (LAMICTAL) tablet 150 mg, 150 mg, Oral, Daily  finasteride (PROSCAR) tablet 5 mg, 5 mg, Oral, Daily  apixaban (ELIQUIS) tablet 2.5 mg, 2.5 mg, Oral, BID  sennosides-docusate sodium (SENOKOT-S) 8.6-50 MG tablet 1 tablet, 1 tablet, Oral, BID  ferrous sulfate (IRON 325) tablet 325 mg, 325 mg, Oral, Daily with breakfast  dilTIAZem (CARDIZEM) tablet 30 mg, 30 mg, Oral, TID  ziprasidone (GEODON) capsule 20 mg, 20 mg, Oral, Nightly  LORazepam (ATIVAN) tablet 0.5 mg, 0.5 mg, Oral, BID  sodium chloride flush 0.9 % injection 5-40 mL, 5-40 mL, IntraVENous, 2 times per day  sodium chloride flush 0.9 % injection 10 mL, 10 mL, IntraVENous, PRN  0.9 % sodium chloride infusion, , IntraVENous, PRN  enoxaparin Sodium (LOVENOX) injection 30 mg, 30 mg, SubCUTAneous, Daily  ondansetron (ZOFRAN-ODT) disintegrating tablet 4 mg, 4 mg, Oral, Q8H PRN **OR** ondansetron (ZOFRAN) injection 4 mg, 4 mg, IntraVENous, Q6H PRN  magnesium hydroxide (MILK OF MAGNESIA) 400 MG/5ML suspension 30 mL, 30 mL, Oral, Daily PRN  acetaminophen (TYLENOL) tablet 650 mg, 650 mg, Oral, Q6H PRN **OR** acetaminophen (TYLENOL) suppository 650 mg, 650 mg, Rectal, Q6H PRN  cefepime (MAXIPIME) 1000 mg IVPB minibag, 1,000 mg, IntraVENous, Q12H  hydrALAZINE (APRESOLINE) injection 10 mg, 10 mg, IntraVENous, Q6H PRN  0.9 % sodium chloride bolus, 500 mL, IntraVENous, PRN  ipratropium-albuterol (DUONEB) nebulizer solution 1 ampule, 1 ampule, Inhalation, BID  ipratropium-albuterol (DUONEB) nebulizer solution 1 ampule, 1 ampule, Inhalation, Q4H PRN  vancomycin (VANCOCIN) 500 mg in dextrose 5 % 100 mL IVPB (mini-bag), 500 mg, IntraVENous, Q24H  calcium carbonate tablet 600 mg, 600 mg, Oral, Daily         Objective:  /78   Pulse 93   Temp 97.8 °F (36.6 °C) (Oral)   Resp 16   Ht 5' 8\" (1.727 m)   Wt 135 lb 8 oz (61.5 kg)   SpO2 95%   BMI 20.60 kg/m²      Patient Vitals for the past 24 hrs:   BP Temp Temp src Pulse Resp SpO2 Height Weight   05/21/22 0752 -- -- -- -- 16 95 % -- --   05/21/22 0715 121/78 97.8 °F (36.6 °C) Oral 93 18 95 % -- --   05/21/22 0349 -- -- -- -- -- 95 % -- --   05/21/22 0256 (!) 124/59 97.4 °F (36.3 °C) Temporal 91 18 95 % -- 135 lb 8 oz (61.5 kg)   05/21/22 0042 103/65 96.4 °F (35.8 °C) Axillary 74 20 96 % -- --   05/20/22 2027 -- -- -- -- 20 96 % -- --   05/20/22 1925 111/64 97.3 °F (36.3 °C) Oral 64 18 95 % -- --   05/20/22 1718 -- -- -- -- -- -- 5' 8\" (1.727 m) 135 lb 11.2 oz (61.6 kg)   05/20/22 1704 100/65 97.4 °F (36.3 °C) Axillary 72 24 97 % -- --   05/20/22 1630 -- -- -- 71 25 99 % -- --   05/20/22 1615 108/62 -- -- 81 29 98 % -- --   05/20/22 1600 (!) 102/52 -- -- 76 20 98 % -- --   05/20/22 1545 (!) 91/45 -- -- 66 25 97 % -- --   05/20/22 1530 (!) 99/50 -- -- 66 22 99 % -- --   05/20/22 1515 (!) 102/53 -- -- 70 25 98 % -- --   05/20/22 1500 104/67 -- -- 74 (!) 32 98 % -- --   05/20/22 1445 -- -- -- 71 29 97 % -- --   05/20/22 1430 102/62 -- -- 73 (!) 31 96 % -- --   05/20/22 1415 104/64 -- -- 74 29 98 % -- --   05/20/22 1400 (!) 91/51 -- -- 65 23 97 % -- --   05/20/22 1345 (!) 88/51 -- -- 66 22 98 % -- --   05/20/22 1330 92/62 -- -- 68 23 100 % -- --   05/20/22 1315 (!) 99/59 -- -- 73 27 98 % -- --   05/20/22 1245 (!) 98/48 -- -- 71 15 98 % -- --   05/20/22 1230 (!) 112/54 -- -- 84 18 97 % -- --   05/20/22 1217 (!) 94/53 -- -- 64 26 99 % -- --   05/20/22 1147 (!) 85/54 -- -- 60 23 97 % -- --   05/20/22 1117 (!) 93/53 -- -- 72 25 97 % -- --   05/20/22 1100 (!) 90/51 -- -- 65 27 96 % -- --   05/20/22 1047 85/71 -- -- 70 30 96 % -- --   05/20/22 1027 (!) 95/56 -- -- -- -- -- -- --   05/20/22 1026 -- -- -- 71 16 94 % -- --   05/20/22 1025 -- 97.4 °F (36.3 °C) Oral -- -- -- -- --     Patient Vitals for the past 96 hrs (Last 3 readings):   Weight   05/21/22 0256 135 lb 8 oz (61.5 kg)   05/20/22 1718 135 lb 11.2 oz (61.6 kg)           Intake/Output Summary (Last 24 hours) at 5/21/2022 1007  Last data filed at 5/21/2022 0715  Gross per 24 hour   Intake 1309.96 ml   Output 600 ml   Net 709.96 ml         Physical Exam:   Vitals as above  General appearance: alert, appears stated age and cooperative    Head: Normocephalic, without obvious abnormality, atraumatic    Lungs: rales bilaterally    Heart: irregular rate and rhythm, S1, S2 normal, no murmur    Abdomen: soft, non-tender; bowel sounds normal; no masses, no organomegaly    Extremities: extremities normal, atraumatic, no cyanosis, no edema      Labs:  Lab Results   Component Value Date    WBC 6.8 05/21/2022    HGB 9.8 (L) 05/21/2022    HCT 29.3 (L) 05/21/2022     05/21/2022    CHOL 85 11/03/2016    TRIG 38 11/03/2016    HDL 39 (L) 11/03/2016    ALT 21 05/21/2022    AST 39 (H) 05/21/2022     05/21/2022    K 4.4 05/21/2022     05/21/2022    CREATININE 1.8 (H) 05/21/2022    BUN 27 (H) 05/21/2022    CO2 26 05/21/2022    TSH 5.09 (H) 05/04/2015    PSA 0.93 05/04/2015    INR 1.33 (H) 05/15/2022    LABA1C 5.6 06/26/2021 LABMICR YES 05/20/2022     Lab Results   Component Value Date    CKTOTAL 88 05/15/2022    TROPONINI 1.17 ([facility]) 05/20/2022       Recent Imaging Results are Reviewed:  XR HAND LEFT (MIN 3 VIEWS)    Result Date: 5/16/2022  EXAMINATION: THREE XRAY VIEWS OF THE LEFT HAND 5/16/2022 11:21 am COMPARISON: Left hand x-ray dated 15 May 2022 HISTORY: ORDERING SYSTEM PROVIDED HISTORY: fall TECHNOLOGIST PROVIDED HISTORY: Reason for exam:->fall Reason for Exam: Fall (Pt in via [facility] from [facility], pt with mechanical fall, complains if neck and head pain, pt with laceration to forehead, bleeding controlled, pt on Eliquis. ) FINDINGS: No acute fracture or dislocation. Severe osteoarthritis of the interphalangeal joints and triscaphe joint. No acute findings. Degenerative changes as described above     XR HAND LEFT (MIN 3 VIEWS)    Result Date: 5/15/2022  EXAMINATION: THREE XRAY VIEWS OF THE LEFT HAND 5/15/2022 7:47 pm COMPARISON: None. HISTORY: ORDERING SYSTEM PROVIDED HISTORY: injury TECHNOLOGIST PROVIDED HISTORY: Reason for exam:->injury Reason for Exam: Fall, skin tear FINDINGS: There is a metallic ring partially obscuring the proximal phalanx of the 4th digit. There is moderate joint space narrowing throughout the digits with prominent osteophytes throughout which is most severe along the D IP and PIP joints and base of the thumb. The carpal bones are intact and in good position. No acute fracture or dislocation is seen. Severe osteoarthritic changes throughout the digits and diffuse osteopenia with no acute bony abnormality     XR HAND RIGHT (MIN 3 VIEWS)    Result Date: 5/15/2022  EXAMINATION: THREE XRAY VIEWS OF THE RIGHT HAND 5/15/2022 7:48 pm COMPARISON: None.  HISTORY: ORDERING SYSTEM PROVIDED HISTORY: injury TECHNOLOGIST PROVIDED HISTORY: Reason for exam:->injury Reason for Exam: Fall, skin tear FINDINGS: There is moderate joint space narrowing throughout the digits which is most severe along the PIP and DIP joints with prominent osteophytes throughout. The bones are osteopenic. No fracture or dislocation is seen. The carpal bones are intact and in good position. Moderately severe osteoarthritic changes throughout the digits and diffuse osteopenia with no acute bony abnormality seen. XR TIBIA FIBULA LEFT (2 VIEWS)    Result Date: 5/15/2022  EXAMINATION: 4 XRAY VIEWS OF THE LEFT TIBIA AND FIBULA 5/15/2022 7:47 pm COMPARISON: None. HISTORY: ORDERING SYSTEM PROVIDED HISTORY: injury TECHNOLOGIST PROVIDED HISTORY: Reason for exam:->injury Reason for Exam: Fall, skin tear FINDINGS: The tibia and fibula are intact. No fracture or dislocation is seen. The bones are osteopenic. The joint spaces are intact. There are vascular calcifications posteriorly. Diffuse osteopenia with no acute bony abnormality. CT Head WO Contrast    Result Date: 5/16/2022  EXAMINATION: CT OF THE HEAD WITHOUT CONTRAST  5/16/2022 11:32 am TECHNIQUE: CT of the head was performed without the administration of intravenous contrast. Automated exposure control, iterative reconstruction, and/or weight based adjustment of the mA/kV was utilized to reduce the radiation dose to as low as reasonably achievable. COMPARISON: 05/15/2022 HISTORY: ORDERING SYSTEM PROVIDED HISTORY: fall, hit head TECHNOLOGIST PROVIDED HISTORY: Reason for exam:->fall, hit head Has a \"code stroke\" or \"stroke alert\" been called? ->No Decision Support Exception - unselect if not a suspected or confirmed emergency medical condition->Emergency Medical Condition (MA) Reason for Exam: fall, hit head FINDINGS: BRAIN/VENTRICLES: There is moderate cerebral atrophy, with bilateral white matter hypodensities in keeping with microvascular disease. There is no acute intracranial hemorrhage, mass effect or midline shift. No abnormal extra-axial fluid collection. The gray-white differentiation is maintained without evidence of an acute infarct.   There is no evidence of hydrocephalus. ORBITS: The visualized portion of the orbits demonstrate no acute abnormality. SINUSES: The visualized paranasal sinuses and mastoid air cells demonstrate no acute abnormality. SOFT TISSUES/SKULL:  There is soft tissue laceration overlying the right frontal scalp. No acute intracranial abnormality. No change from prior examination. CT HEAD WO CONTRAST    Result Date: 5/15/2022  EXAMINATION: CT OF THE HEAD WITHOUT CONTRAST  5/15/2022 6:59 pm TECHNIQUE: CT of the head was performed without the administration of intravenous contrast. Automated exposure control, iterative reconstruction, and/or weight based adjustment of the mA/kV was utilized to reduce the radiation dose to as low as reasonably achievable. COMPARISON: CT head dated 07/04/2021 HISTORY: ORDERING SYSTEM PROVIDED HISTORY: fall/head injury TECHNOLOGIST PROVIDED HISTORY: Reason for exam:->fall/head injury Has a \"code stroke\" or \"stroke alert\" been called? ->No Decision Support Exception - unselect if not a suspected or confirmed emergency medical condition->Emergency Medical Condition (MA) Reason for Exam: Fall (fell out of wheel chair, pt states that he was doen for about an hour. facility states that he was down for 10 to 15 mins. Pt is alert but not oriented, pt is on eliquis. Pt presents to triage with bandages on his head and hands. ) FINDINGS: BRAIN/VENTRICLES: There is no acute intracranial hemorrhage, mass effect or midline shift. No abnormal extra-axial fluid collection. The gray-white differentiation is maintained without evidence of an acute infarct. There is no evidence of hydrocephalus. Moderate low attenuation in the periventricular deep white matter suggesting chronic small vessel ischemic disease. ORBITS: The visualized portion of the orbits demonstrate no acute abnormality. SINUSES: The visualized paranasal sinuses and mastoid air cells demonstrate no acute abnormality.  SOFT TISSUES/SKULL:  No acute abnormality of the visualized skull. Soft tissues reveal likely laceration injury without hematoma overlying the anterior frontal region     No acute intracranial abnormality. Small laceration injury overlying the anterior frontal region without scalp hematoma or calvarial defect     CT Cervical Spine WO Contrast    Result Date: 5/16/2022  EXAMINATION: CT OF THE CERVICAL SPINE WITHOUT CONTRAST 5/16/2022 11:32 am TECHNIQUE: CT of the cervical spine was performed without the administration of intravenous contrast. Multiplanar reformatted images are provided for review. Automated exposure control, iterative reconstruction, and/or weight based adjustment of the mA/kV was utilized to reduce the radiation dose to as low as reasonably achievable. COMPARISON: None. HISTORY: ORDERING SYSTEM PROVIDED HISTORY: fall, hit head TECHNOLOGIST PROVIDED HISTORY: Reason for exam:->fall, hit head Decision Support Exception - unselect if not a suspected or confirmed emergency medical condition->Emergency Medical Condition (MA) Reason for Exam: fall, hit head FINDINGS: BONES/ALIGNMENT: There is no acute fracture or traumatic malalignment. 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 DEGENERATIVE CHANGES: There are moderate to severe degenerative changes from the C3/4 C7/T1 levels. Findings are most pronounced at C5/6. SOFT TISSUES: There is no prevertebral soft tissue swelling. No acute abnormality of the cervical spine. CT CERVICAL SPINE WO CONTRAST    Result Date: 5/15/2022  EXAMINATION: CT OF THE CERVICAL SPINE WITHOUT CONTRAST 5/15/2022 6:59 pm TECHNIQUE: CT of the cervical spine was performed without the administration of intravenous contrast. Multiplanar reformatted images are provided for review. Automated exposure control, iterative reconstruction, and/or weight based adjustment of the mA/kV was utilized to reduce the radiation dose to as low as reasonably achievable. COMPARISON: None.  HISTORY: ORDERING SYSTEM PROVIDED HISTORY: fall trauma TECHNOLOGIST PROVIDED HISTORY: Reason for exam:->fall trauma Decision Support Exception - unselect if not a suspected or confirmed emergency medical condition->Emergency Medical Condition (MA) Reason for Exam: Fall (fell out of wheel chair, pt states that he was doen for about an hour. facility states that he was down for 10 to 15 mins. Pt is alert but not oriented, pt is on eliquis. Pt presents to triage with bandages on his head and hands. ) FINDINGS: BONES/ALIGNMENT: There is no acute fracture or traumatic malalignment. DEGENERATIVE CHANGES: Moderate to severe multilevel degenerative changes. SOFT TISSUES: There is no prevertebral soft tissue swelling. No acute abnormality of the cervical spine. CT THORACIC SPINE WO CONTRAST    Result Date: 5/15/2022  EXAMINATION: CT OF THE THORACIC SPINE WITHOUT CONTRAST  5/15/2022 7:18 pm: TECHNIQUE: CT of the thoracic spine was performed without the administration of intravenous contrast. Multiplanar reformatted images are provided for review. Automated exposure control, iterative reconstruction, and/or weight based adjustment of the mA/kV was utilized to reduce the radiation dose to as low as reasonably achievable. COMPARISON: None. HISTORY: ORDERING SYSTEM PROVIDED HISTORY: back pain TECHNOLOGIST PROVIDED HISTORY: Reason for exam:->back pain Reason for Exam: Fall (fell out of wheel chair, pt states that he was doen for about an hour. facility states that he was down for 10 to 15 mins. Pt is alert but not oriented, pt is on eliquis. Pt presents to triage with bandages on his head and hands. ) FINDINGS: BONES/ALIGNMENT: There is normal alignment of the spine. The vertebral body heights are maintained. No osseous destructive lesion is seen. DEGENERATIVE CHANGES: No gross spinal canal stenosis or bony neural foraminal narrowing of the thoracic spine. SOFT TISSUES: No paraspinal mass is seen.   Partially visualized lung SOFT TISSUES/RETROPERITONEUM: No paraspinal mass is seen. Age-indeterminate L1 compression deformity with 50% height loss anteriorly. No retropulsion or posterior element involvement no spinal canal stenosis of significance at this level or elsewhere however moderate to severe degenerative changes throughout the lower lumbar segments and lumbosacral junction. CT PELVIS WO CONTRAST Additional Contrast? None    Result Date: 5/15/2022  EXAMINATION: CT OF THE PELVIS WITHOUT CONTRAST 5/15/2022 7:19 pm TECHNIQUE: CT of the pelvis was performed without the administration of intravenous contrast.  Multiplanar reformatted images are provided for review. Adjustment of mA and/or kV according to patient size was utilized. Automated exposure control, iterative reconstruction, and/or weight based adjustment of the mA/kV was utilized to reduce the radiation dose to as low as reasonably achievable. COMPARISON: CT abdomen and pelvis 11/16/2017. Multiple intervening pelvis/left hip x-ray exams with the most recent 07/04/2021, and the most remote 05/31/2020. HISTORY: ORDERING SYSTEM PROVIDED HISTORY: fall TECHNOLOGIST PROVIDED HISTORY: Reason for exam:->fall Additional Contrast?->None Decision Support Exception - unselect if not a suspected or confirmed emergency medical condition->Emergency Medical Condition (MA) Reason for Exam: Fall (fell out of wheel chair, pt states that he was doen for about an hour. facility states that he was down for 10 to 15 mins. Pt is alert but not oriented, pt is on eliquis. Pt presents to triage with bandages on his head and hands. ) FINDINGS: Streak artifact from left hip prosthesis somewhat limits evaluation. Within this limitation: Diffuse bone demineralization. Left hip prosthesis redemonstrated. This appears to be a hemiprosthesis. Orthopedic hardware appears intact and in stable position. No evidence for hardware complication is seen. No fractures or dislocations.   No suspicious focal bony lesions. No bony erosions or bony destructive changes. Stable mild degenerative changes to the right hip joint. Small right hip joint effusion. No intra-articular loose bodies. Limited evaluation of the left hip joint secondary to the prosthesis without large joint effusion noted. Evaluation of the extrapelvic soft tissues demonstrates no acute or suspicious abnormality. Chronic postsurgical scarring laterally to the left hip. Atherosclerotic vascular calcifications. No lymphadenopathy. Evaluation of intrapelvic contents demonstrates mild degree of presacral nonhemorrhagic fluid and fat stranding. No focal fluid collections are identified. No lymphadenopathy. Enlarged prostate. Grossly unremarkable non-opacified large and small bowel. No lymphadenopathy is seen. Extensive atherosclerosis. Saccular aneurysm of distal abdominal aorta posteriorly measuring 3.1 x 2.5 cm. This previously measured 2.7 x 2.3 cm. No acute bony abnormality. Mild degree of nonspecific presacral nonhemorrhagic fluid and fat stranding. Stable mild degenerative changes to the right hip joint. There is a small right hip joint effusion. Stable appearance to left hip prosthesis. No evidence for hardware complication. Saccular aneurysm of the visualized distal abdominal aorta measuring maximally 3.1 cm, previously 2.7 cm on prior exam.  Management recommendations as below. Enlarged prostate. RECOMMENDATIONS: 3.1 cm saccular abdominal aortic aneurysm. Recommend vascular consultation. Reference: J Vasc Surg 8556;99:4-88. XR CHEST PORTABLE    Result Date: 5/20/2022  EXAMINATION: ONE XRAY VIEW OF THE CHEST 5/20/2022 10:54 am COMPARISON: 06/25/2021 HISTORY: ORDERING SYSTEM PROVIDED HISTORY: fever, hypotension, cough TECHNOLOGIST PROVIDED HISTORY: Reason for exam:->fever, hypotension, cough FINDINGS: Status post median sternotomy. Heart size stable. Left mid and lower lung opacities. No pneumothorax.   Small bilateral pleural effusions. Left mid and lower lung opacities could represent atelectatic changes or infection     CT CHEST ABDOMEN PELVIS WO CONTRAST    Result Date: 5/15/2022  EXAMINATION: CT OF THE CHEST, ABDOMEN, AND PELVIS WITHOUT CONTRAST 5/15/2022 7:25 pm TECHNIQUE: CT of the chest, abdomen and pelvis was performed without the administration of intravenous contrast. Multiplanar reformatted images are provided for review. Automated exposure control, iterative reconstruction, and/or weight based adjustment of the mA/kV was utilized to reduce the radiation dose to as low as reasonably achievable. COMPARISON: None HISTORY: ORDERING SYSTEM PROVIDED HISTORY: fall TECHNOLOGIST PROVIDED HISTORY: Reason for exam:->fall Additional Contrast?->None Decision Support Exception - unselect if not a suspected or confirmed emergency medical condition->Emergency Medical Condition (MA) Reason for Exam: fall Relevant Medical/Surgical History: Fall (fell out of wheel chair, pt states that he was doen for about an hour. facility states that he was down for 10 to 15 mins. Pt is alert but not oriented, pt is on eliquis. Pt presents to triage with bandages on his head and hands. ) FINDINGS: Chest: Mediastinum: Thyroid is homogeneous in attenuation. No bulky mediastinal adenopathy. Central airways are patent. Esophagus is normal course and caliber. Patent nonaneurysmal thoracic aorta. Cardiac size within normal limits without pericardial effusion. Lungs/pleura: Smooth interlobular septal thickening with lower lobe predominance with small right and moderate left pleural effusions and adjacent atelectasis. 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 Soft Tissues/Bones: No soft tissue findings or acute osseous findings with dedicated thoracic imaging on separate CT thoracic dictation Abdomen/Pelvis: Organs: Liver without focal lesion.   Gallbladder filled with numerous stones partially contracted cholelithiasis. .  Pancreas and spleen unremarkable. Adrenals without nodule. Kidneys demonstrate bilateral intrarenal stones and calcifications of nephrolithiasis with moderate to severe left hydroureteronephrosis in the proximal portion with abrupt caliber change in the proximal ureter likely at the ureteropelvic junction could represent a chronic left UPJ obstruction appearance with potential mild cortical thinning left kidney. GI/Bowel: No focal thickening or disproportion dilatation of bowel. No inflammatory findings. Appendix is unremarkable and visualized in the right lower quadrant. Pelvis: No suspicious pelvic lesion or bulky pelvic adenopathy/free fluid. Peritoneum/Retroperitoneum: Patent nonaneurysmal abdominal aorta. No bulky retroperitoneal adenopathy. Bones/Soft Tissues: No acute soft tissue findings with left hip arthroplasty in place. Please see separate CT lumbar spine and CT bony pelvis dictations for associated osseous findings on dedicated imaging. .  Small fat and minimally fluid containing right direct inguinal hernia     No acute traumatic findings of the chest abdomen or pelvis. Please see separate spine CT dictations for spinal small and CT bony pelvis findings including probable lumbar spine compression deformity fracture age indeterminate. Bilateral pleural effusions including moderate volume left pleural effusion and adjacent pleural calcifications.  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       Lab Results   Component Value Date    GLUCOSE 98 05/21/2022     Lab Results   Component Value Date    POCGLU 99 01/18/2017     /78   Pulse 93   Temp 97.8 °F (36.6 °C) (Oral)   Resp 16   Ht 5' 8\" (1.727 m)   Wt 135 lb 8 oz (61.5 kg)   SpO2 95%   BMI 20.60 kg/m²     Assessment and Plan:  Principal Problem:    Sepsis due to pneumonia ([facility] -Established problem. Stable. Plan: cont empiric abx. Repeat cxr today. Cont to trend labs  Active Problems:    Essential hypertension -Established problem. Stable. 121/78  Plan: Continue present orders/plan. Hyperlipidemia -Established problem. Stable. Plan: Continue present orders/plan. JUAN R (acute kidney injury) ([facility] -Established problem. Improving. Creat 1.8  Plan: cont fluidds    UTI (urinary tract infection)  Plan: cont abx. Await cx    Atrial fib ([facility] -Established problem. Stable. chronic  Plan: Continue present orders/plan.      GERD (gastroesophageal reflux disease)  Plan: cont ppi          (Please note that portions of this note were completed with a voice recognition program.  Efforts were made to edit the dictations but occasionally words are mis-transcribed.)        Antonietta Palumbo MD  5/21/2022

## 2022-05-21 NOTE — PROGRESS NOTES
Facility/Department: [address]  Initial Assessment  DYSPHAGIA BEDSIDE SWALLOW EVALUATION     Patient: Carlene Storey   : 10/31/1925   MRN: [id number]      Evaluation Date: 2022   Admitting Diagnosis: NSTEMI (non-ST elevated myocardial infarction) ([facility] [I21.4]  JUAN R (acute kidney injury) ([facility] [N17.9]  Multi-organ failure with heart failure ([facility] [I50.9]  Septic shock ([facility] [A41.9, R65.21]  Sepsis ([facility] [A41.9]  Pneumonia due to infectious organism, unspecified laterality, unspecified part of lung [J18.9]  Pain: Denies                                                       H&P: 2022  \"89 y. o. male who presents to the ED for altered mental status.  Worsening over the past few days. Conchis Baird did have falls on  and Monday. Ty Kaye is able to tell me that he has no pain.  Specifically no chest or abdominal pain.  He has no numbness on his arms or legs.  He does not know where he is. Conchis Baird cannot answer most open-ended questions. Conchis Baird feels weak.  Per daughter at bedside, he is DNR comfort care.  His currently receiving treatment for UTI. X-ray shows evidence of pneumonia.    Is in septic shock with multiorgan failure.  Family at bedside agrees with DNR comfort care.  At this time, they wish to keep him here to get some antibiotics, but they seem to understand that pt is overall not doing well. \"    Imaging:  Chest X-ray: 2022  Impression   Left mid and lower lung opacities could represent atelectatic changes or   infection     Modified Barium Swallow Study: None per chart review    History/Prior Level of Function:   Living Status: Nursing home  Prior Dysphagia History: Pt seen previously at this facility by speech services, most recently 2021 with recommendation for regular textured diet and thin liquids (no straws). RN reports pt coughing with thin liquids and with meds in puree today.   Reason for referral: SLP evaluation orders received due to new diagnosis of PNA     Dysphagia Impressions/Diagnosis: Oropharyngeal Dysphagia   Pt lethargic but receptive, verbally responsive and followed simple verbal commands. Positioned upright in bed with HOB elevated, daughters present at bedside. On 2L NC with RR <20/min. Oral motor exam revealed reduced lingual, labial, and buccal ROM and coordination. Adequate dentition for mastication, gross facial symmetry. Laryngeal function assessment revealed present volitional swallow, weak and congested volitional cough, and weak vocal quality at baseline. Pt assessed with PO presentations, fed with support from SLP: ice chips, thin liquids (cup, straw), puree, minced and moist, and soft and bite-sized. Oral phase characterized by prolonged and inconsistently complete oral manipulation and A-P transit. Mastication of minced and soft solids was extensive (several minutes) and disorganized. Clinical s/s delayed swallow initiation, multiple swallows observed for bolus clearance. Suspected pharyngeal impairment as evidenced by wet vocal quality intermittently throughout PO intake, immediate throat clearing with thin liquids, and delayed cough after PO intake. Pt with clinical s/s oropharyngeal dysphagia at bedside. Contributing risk factors include deficits in alertness and respiratory status, advanced age, and weak, ineffective cough for airway protection. At this time, pt does not appear to safely or functionally tolerate a PO diet, recommend NPO with ongoing assessment of swallowing. Instrumental swallow study via FEES may be appropriate to visualize the oropharyngeal swallow function, determine least restrictive diet and appropriate treatment plan.       Recommended Diet and Intervention 5/21/2022:  Diet Solids Recommendation:  NPO  Liquid Consistency Recommendation:  NPO Recommended form of Meds: Via alternative means      Recommend completion of Fiberoptic Endoscopic Evaluation of Swallowing (FEES) to further assess pharyngeal phase of swallow as clinically appropriate    Compensatory Swallowing Strategies: TBD    SHORT TERM DYSPHAGIA GOALS/PLAN OF CARE: Speech therapy for dysphagia tx 3-5 times per week during acute care stay.     Pt will functionally tolerate ongoing assessment of swallow function with diet to be determined as indicated   Pt will advance to least restrictive diet as indicated   Pt will participate in Fiberoptic Endoscopic Evaluation of Swallowing (FEES) to further assess pharyngeal phase of swallow, assist with diet recommendations and to further direct plan of care     Dysphagia Therapeutic Intervention:  Oral Care, Patient/Family Education , Therapeutic Trials with SLP     Discharge Recommendations: Discharge recommendations to be determined pending ongoing follow-up during acute care stay    Patient Positioning: Upright in bed     Current Diet Level (prior to evaluation): Regular texture diet  Thin liquids      Respiratory Status:   []Room Air   [x]O2 via nasal cannula   []Other:    Dentition:  [x]Adequate  []Dentures   []Missing Many Teeth  []Edentulous  []Other:    Baseline Vocal Quality:  []Normal  []Dysphonic   []Aphonic   []Hoarse  []Wet  [x]Weak  []Other:    Volitional Cough:  []Strong  [x]Weak  []Wet  []Absent  [x]Congested  []Other:    Volitional Swallow:   []Absent   [x]Delayed     []Adequate     []Required use of drink     Oral Mechanism Exam:  []WFL []Mild   [x] Moderate  [x]Severe  []To be assessed  Impaired:   []Left side      []Right side    [x]Labial ROM/Coordination    []Labial Symmetry   [x]Lingual ROM/Coordination   []Lingual Symmetry  []Gag  []Other:     Oral Phase: []WFL []Mild   [x] Moderate  [x]Severe  []To be assessed   [x]Impaired/Prolonged Mastication:   []Oral Holding:   []Spillage Left:   []Spillage Right:  []Pocketing Left:   []Pocketing Right:   [x]Decreased Anterior to Posterior Transit:   [x]Suspected Premature Bolus Loss:   [x]Lingual/Palatal Residue:   []Other:     Pharyngeal Phase: []WFL []Mild   [x] Moderate  [x]Severe  []To be assessed   [x]Delayed Swallow:   [x]Suspected Pharyngeal Pooling:   [x]Decreased Laryngeal Elevation:   []Absent Swallow:  [x]Wet Vocal Quality:   [x]Throat Clearing-Immediate:   []Throat Clearing-Delayed:   []Cough-Immediate:   [x]Cough-Delayed:  []Change in Vital Signs:  []Suspected Delayed Pharyngeal Clearing:  []Other:       Eating Assistance:  []Independent  []Setup or clean-up assistance   [] Supervision or touching assistance   [] Partial or moderate assistance   [x] Substantial or maximal assistance  [] Dependent       EDUCATION:   Provided education regarding role of SLP, results of assessment, recommendations and general speech pathology plan of care. [] Pt verbalized understanding and agreement   [x] Pt requires ongoing learning   [] No evidence of comprehension     If patient discharges prior to next visit, this note will serve as discharge.      Timed Code Minutes: 0  Total Treatment Minutes: 32    Electronically signed by:    Huber Dallas, [address] [facility],[address] Floor Pathologist  [phone]

## 2022-05-21 NOTE — PROGRESS NOTES
Clinical Pharmacy Note: Pharmacy to Dose Vancomcyin    Vancomycin Day: 2  Current Dosing Regimen: 500 mg Q24H   Dosing Method: Bayesian Modeling    Random: 9.9    Recent Labs     05/20/22  1100 05/21/22  0621   BUN 27* 27*       Recent Labs     05/20/22  1100 05/21/22  0621   CREATININE 2.1* 1.8*       Recent Labs     05/20/22  1100 05/21/22  0621   WBC 12.3* 6.8         Intake/Output Summary (Last 24 hours) at 5/21/2022 0725  Last data filed at 5/21/2022 0715  Gross per 24 hour   Intake 1309.96 ml   Output 600 ml   Net 709.96 ml         Ht Readings from Last 1 Encounters:   05/20/22 5' 8\" (1.727 m)        Wt Readings from Last 1 Encounters:   05/21/22 135 lb 8 oz (61.5 kg)         Body mass index is 20.6 kg/m². Estimated Creatinine Clearance: 21 mL/min (A) (based on SCr of 1.8 mg/dL (H)). Assessment/Plan:  Vancomycin level is therapeutic. Level was drawn appropriately in respect to last dose given. Continue vancomycin regimen to 500 mg every 24 hours. Bayesian Modeling predicts an AUC of 427 mg/L*hr and trough of 15.2 mg/L. Changes in regimen will be determined based on culture results, renal function, and clinical response. Pharmacy will continue to monitor and adjust regimen as necessary.     Thank you for the consult,    Carmelo Mendiola, PharmD  Pharmacy Resident   [id number]

## 2022-05-21 NOTE — PROGRESS NOTES
Speech Language Pathology  Clinical Swallow Screening        Name: Lyubov Cook  : 10/31/1925  Medical Diagnosis: NSTEMI (non-ST elevated [facility] infarction[facility] [I21.4]  JUAN R (acute kidney injury) ([facility] [N17.9]  Multi-organ failure with heart failure ([facility] [I50.9]  Septic shock ([facility] [A41.9, R65.21]  Sepsis ([facility] [A41.9]  Pneumonia due to infectious organism, unspecified laterality, unspecified part of lung [J18.9]    Swallow screen completed. Patient demonstrates some risk indicators for potential dysphagia / aspiration per swallow screen. RECOMMEND: Clinical Swallow Evaluation at bedside to assess swallowing function, evaluate risk of aspiration, and determine appropriate diet level. Please place order for SLP Eval and Treat if in agreement.     Hadley Shepherd, [id number] [facility]  Speech-Language Pathologist  [phone]

## 2022-05-22 ENCOUNTER — APPOINTMENT (OUTPATIENT)
Dept: GENERAL RADIOLOGY | Age: 87
DRG: 871 | End: 2022-05-22
Payer: MEDICARE

## 2022-05-22 LAB
ANION GAP SERPL CALCULATED.3IONS-SCNC: 12 MMOL/L (ref 3–16)
BASOPHILS ABSOLUTE: 0 K/UL (ref 0–0.2)
BASOPHILS RELATIVE PERCENT: 0.5 %
BUN BLDV-MCNC: 24 MG/DL (ref 7–20)
CALCIUM SERPL-MCNC: 8.1 MG/DL (ref 8.3–10.6)
CHLORIDE BLD-SCNC: 104 MMOL/L (ref 99–110)
CO2: 22 MMOL/L (ref 21–32)
CREAT SERPL-MCNC: 1.5 MG/DL (ref 0.8–1.3)
EOSINOPHILS ABSOLUTE: 0 K/UL (ref 0–0.6)
EOSINOPHILS RELATIVE PERCENT: 0.9 %
GFR AFRICAN AMERICAN: 52
GFR NON-AFRICAN AMERICAN: 43
GLUCOSE BLD-MCNC: 91 MG/DL (ref 70–99)
HCT VFR BLD CALC: 28.2 % (ref 40.5–52.5)
HEMOGLOBIN: 9.5 G/DL (ref 13.5–17.5)
LYMPHOCYTES ABSOLUTE: 0.3 K/UL (ref 1–5.1)
LYMPHOCYTES RELATIVE PERCENT: 5.7 %
MCH RBC QN AUTO: 30.5 PG (ref 26–34)
MCHC RBC AUTO-ENTMCNC: 33.6 G/DL (ref 31–36)
MCV RBC AUTO: 90.6 FL (ref 80–100)
MONOCYTES ABSOLUTE: 0.3 K/UL (ref 0–1.3)
MONOCYTES RELATIVE PERCENT: 6 %
NEUTROPHILS ABSOLUTE: 4.9 K/UL (ref 1.7–7.7)
NEUTROPHILS RELATIVE PERCENT: 86.9 %
ORGANISM: ABNORMAL
PDW BLD-RTO: 14.2 % (ref 12.4–15.4)
PLATELET # BLD: 320 K/UL (ref 135–450)
PMV BLD AUTO: 6.8 FL (ref 5–10.5)
POTASSIUM SERPL-SCNC: 4.1 MMOL/L (ref 3.5–5.1)
RBC # BLD: 3.12 M/UL (ref 4.2–5.9)
SODIUM BLD-SCNC: 138 MMOL/L (ref 136–145)
URINE CULTURE, ROUTINE: ABNORMAL
WBC # BLD: 5.6 K/UL (ref 4–11)

## 2022-05-22 PROCEDURE — 2700000000 HC OXYGEN THERAPY PER DAY

## 2022-05-22 PROCEDURE — 94640 AIRWAY INHALATION TREATMENT: CPT

## 2022-05-22 PROCEDURE — 36415 COLL VENOUS BLD VENIPUNCTURE: CPT

## 2022-05-22 PROCEDURE — 80048 BASIC METABOLIC PNL TOTAL CA: CPT

## 2022-05-22 PROCEDURE — C9113 INJ PANTOPRAZOLE SODIUM, VIA: HCPCS | Performed by: INTERNAL MEDICINE

## 2022-05-22 PROCEDURE — 92526 ORAL FUNCTION THERAPY: CPT

## 2022-05-22 PROCEDURE — 6370000000 HC RX 637 (ALT 250 FOR IP): Performed by: INTERNAL MEDICINE

## 2022-05-22 PROCEDURE — 94761 N-INVAS EAR/PLS OXIMETRY MLT: CPT

## 2022-05-22 PROCEDURE — 6360000002 HC RX W HCPCS: Performed by: INTERNAL MEDICINE

## 2022-05-22 PROCEDURE — 2580000003 HC RX 258: Performed by: INTERNAL MEDICINE

## 2022-05-22 PROCEDURE — 1200000000 HC SEMI PRIVATE

## 2022-05-22 PROCEDURE — 71046 X-RAY EXAM CHEST 2 VIEWS: CPT

## 2022-05-22 PROCEDURE — 2580000003 HC RX 258: Performed by: EMERGENCY MEDICINE

## 2022-05-22 PROCEDURE — 85025 COMPLETE CBC W/AUTO DIFF WBC: CPT

## 2022-05-22 RX ORDER — SODIUM CHLORIDE 9 MG/ML
INJECTION, SOLUTION INTRAVENOUS CONTINUOUS
Status: DISCONTINUED | OUTPATIENT
Start: 2022-05-22 | End: 2022-05-25

## 2022-05-22 RX ADMIN — CEFEPIME HYDROCHLORIDE 1000 MG: 1 INJECTION, POWDER, FOR SOLUTION INTRAMUSCULAR; INTRAVENOUS at 22:57

## 2022-05-22 RX ADMIN — LORAZEPAM 0.5 MG: 2 INJECTION INTRAMUSCULAR; INTRAVENOUS at 08:15

## 2022-05-22 RX ADMIN — SODIUM CHLORIDE: 9 INJECTION, SOLUTION INTRAVENOUS at 16:46

## 2022-05-22 RX ADMIN — LORAZEPAM 0.5 MG: 2 INJECTION INTRAMUSCULAR; INTRAVENOUS at 22:41

## 2022-05-22 RX ADMIN — ENOXAPARIN SODIUM 30 MG: 30 INJECTION SUBCUTANEOUS at 08:15

## 2022-05-22 RX ADMIN — Medication 10 ML: at 08:16

## 2022-05-22 RX ADMIN — IPRATROPIUM BROMIDE AND ALBUTEROL SULFATE 1 AMPULE: 2.5; .5 SOLUTION RESPIRATORY (INHALATION) at 19:25

## 2022-05-22 RX ADMIN — CEFEPIME HYDROCHLORIDE 1000 MG: 1 INJECTION, POWDER, FOR SOLUTION INTRAMUSCULAR; INTRAVENOUS at 12:13

## 2022-05-22 RX ADMIN — Medication 10 ML: at 08:15

## 2022-05-22 RX ADMIN — Medication 10 ML: at 22:42

## 2022-05-22 RX ADMIN — PANTOPRAZOLE SODIUM 40 MG: 40 INJECTION, POWDER, FOR SOLUTION INTRAVENOUS at 08:15

## 2022-05-22 RX ADMIN — VANCOMYCIN HYDROCHLORIDE 500 MG: 500 INJECTION, POWDER, LYOPHILIZED, FOR SOLUTION INTRAVENOUS at 12:12

## 2022-05-22 RX ADMIN — SODIUM CHLORIDE: 9 INJECTION, SOLUTION INTRAVENOUS at 19:19

## 2022-05-22 ASSESSMENT — PAIN SCALES - WONG BAKER

## 2022-05-22 ASSESSMENT — PAIN SCALES - GENERAL
PAINLEVEL_OUTOF10: 0

## 2022-05-22 NOTE — PROGRESS NOTES
Facility/Department: [address]  Speech Language Pathology   Dysphagia Treatment Note    Patient: Hay Pepper   : 10/31/1925   MRN: [id number]      Evaluation Date: 2022      Admitting Dx: NSTEMI (non-ST elevated myocardial infarction) ([facility] [I21.4]  JUAN R (acute kidney injury) ([facility] [N17.9]  Multi-organ failure with heart failure ([facility] [I50.9]  Septic shock ([facility] [A41.9, R65.21]  Sepsis ([facility] [A41.9]  Pneumonia due to infectious organism, unspecified laterality, unspecified part of lung [J18.9]  Treatment Diagnosis: Oropharyngeal Dysphagia   Pain: Denies                                              Diet and Treatment Recommendations 2022:  Diet Solids Recommendation:  NPO  Liquid Consistency Recommendation:  NPO Recommended form of Meds: Via alternative means      Recommend completion of Fiberoptic Endoscopic Evaluation of Swallowing (FEES) to further assess pharyngeal phase of swallow     Compensatory strategies:   TBD    Assessment of Texture Tolerance:  Diet level prior to treatment: NPO  Tolerance of Current Diet Level:N/A    Impressions: Pt was positioned Upright in bed , awake and alert. Currently on 2L of O2. Trials of ice chips  were provided to assess swallow function. Pt demonstrated reduced oral manipulation and A-P propulsion with premature bolus loss to the pharynx prior to the initiation of a swallow. Multiple swallows noted per trial with immediate coughing noted following Pt demonstrates increased risk for aspiration due to respiratory status . Based on today's assessment recommend NPO  with meds Via alternative means . FEES order has been received, will completed tmw  with further recommendations following.     Dysphagia Goals:   Pt will functionally tolerate ongoing assessment of swallow function with diet to be determined as indicated ongoing   Pt will advance to least restrictive diet as indicated ongoing,   Pt will participate in Fiberoptic Endoscopic Evaluation of Swallowing (FEES) to further assess pharyngeal phase of swallow, assist with diet recommendations and to further direct plan of care ongoing, 5/22    Plan:   3-5 times per week during acute care stay. Patient/Family Education:  Provided education regarding role of SLP, recommendations and general speech pathology plan of care. [] Pt verbalized understanding and agreement   [x] Pt requires ongoing learning   [] No evidence of comprehension     Discharge Recommendations:    Recommend ongoing SLP for dysphagia therapy upon discharge from hospital     Timed Code Treatment: 0 minutes    Total Treatment Time:  20 minutes    If patient discharges prior to next session this note will serve as a discharge summary.      Annelise Garcia MS, CCC-SLP #[id number]   Speech-Language Pathologist

## 2022-05-22 NOTE — PROGRESS NOTES
Pt alert and becoming increasingly anxious throughout shift. Pt insisting on getting pants to demonstrate how to put them on. Pt easily redirectable at this time after ativan given per [facility]. Vital signs stable and telemetry on. Will continue to monitor.

## 2022-05-22 NOTE — PROGRESS NOTES
Progress Note - Dr. Taras Lopez - Internal Medicine  PCP: MD Deanna Sotomayorconstance  / [facility] [phone]    Hospital Day: 2  Code Status: DNR-CC  Current Diet: Diet NPO        CC: follow up on medical issues    Subjective:   Dillan Alex is a 80 y.o. male. Pt seen and examined  Chart reviewed since last visit, labs and imaging below        Doing ok  More alert  Family present, all questions answered  slp eval pending - FEES ordered   high suspicion of aspiration as cause of PNA    He denies chest pain, denies shortness of breath, denies nausea,  denies emesis. 10 system Review of Systems is reviewed with patient, and pertinent positives are noted in HPI above . Otherwise, Review of systems is negative. I have reviewed the patient's medical and social history in detail and updated the computerized patient record. To recap: He  has a past medical history of A-fib ([address] Arthritis, Atrial fibrillation ([address] Blepharitis of both eyes, CAD (coronary artery disease), GERD (gastroesophageal reflux disease), Gout, Hyperlipidemia, Hypertension, Macular degeneration, NSTEMI (non-ST elevated myocardial infarction) ([address] PNA (pneumonia), Prostate enlargement, Psychiatric problem, and Tachycardia. . He  has a past surgical history that includes Coronary angioplasty with stent (2005); skin biopsy; Kidney stone surgery (1980); Coronary artery bypass graft; Cataract removal (1993); TURP (2003); Skin cancer destruction (2006); Colonoscopy; eye surgery; Cardiac surgery; and hip surgery (Left, 6/2/2020). Barrie Raymond He  reports that he has never smoked. He has never used smokeless tobacco. He reports that he does not drink alcohol and does not use drugs. .        Active Hospital Problems    Diagnosis Date Noted    Hyperlipidemia [E78.5] 08/11/2011     Priority: High    Essential hypertension [I10] 06/10/2011     Priority: High    PNA (pneumonia) [J18.9] 05/20/2022     Priority: Medium    Sepsis due to pneumonia ([facility] [J18.9, A41.9] 05/20/2022     Priority: Medium    JUAN R (acute kidney injury) ([facility] [N17.9] 05/20/2022     Priority: Medium    UTI (urinary tract infection) [N39.0] 05/20/2022     Priority: Medium    Sepsis ([facility] [A41.9] 05/20/2022     Priority: Medium    GERD (gastroesophageal reflux disease) [K21.9] 06/22/2012       Current Facility-Administered Medications: LORazepam (ATIVAN) injection 0.5 mg, 0.5 mg, IntraVENous, BID  pantoprazole (PROTONIX) injection 40 mg, 40 mg, IntraVENous, Daily  ziprasidone (GEODON) injection 20 mg, 20 mg, IntraMUSCular, Nightly  sodium chloride flush 0.9 % injection 5-40 mL, 5-40 mL, IntraVENous, 2 times per day  sodium chloride flush 0.9 % injection 5-40 mL, 5-40 mL, IntraVENous, PRN  0.9 % sodium chloride infusion, , IntraVENous, PRN  magnesium oxide (MAG-OX) tablet 400 mg, 400 mg, Oral, Daily  melatonin tablet 4.5 mg, 4.5 mg, Oral, Daily PRN  nitroGLYCERIN (NITROSTAT) SL tablet 0.4 mg, 0.4 mg, SubLINGual, Q5 Min PRN  tamsulosin (FLOMAX) capsule 0.4 mg, 0.4 mg, Oral, Daily  levothyroxine (SYNTHROID) tablet 25 mcg, 25 mcg, Oral, Daily  lamoTRIgine (LAMICTAL) tablet 150 mg, 150 mg, Oral, Daily  finasteride (PROSCAR) tablet 5 mg, 5 mg, Oral, Daily  apixaban (ELIQUIS) tablet 2.5 mg, 2.5 mg, Oral, BID  sennosides-docusate sodium (SENOKOT-S) 8.6-50 MG tablet 1 tablet, 1 tablet, Oral, BID  ferrous sulfate (IRON 325) tablet 325 mg, 325 mg, Oral, Daily with breakfast  dilTIAZem (CARDIZEM) tablet 30 mg, 30 mg, Oral, TID  sodium chloride flush 0.9 % injection 5-40 mL, 5-40 mL, IntraVENous, 2 times per day  sodium chloride flush 0.9 % injection 10 mL, 10 mL, IntraVENous, PRN  0.9 % sodium chloride infusion, , IntraVENous, PRN  enoxaparin Sodium (LOVENOX) injection 30 mg, 30 mg, SubCUTAneous, Daily  ondansetron (ZOFRAN-ODT) disintegrating tablet 4 mg, 4 mg, Oral, Q8H PRN **OR** ondansetron (ZOFRAN) injection 4 mg, 4 mg, IntraVENous, Q6H PRN  magnesium hydroxide (MILK OF MAGNESIA) 400 MG/5ML suspension 30 mL, 30 mL, Oral, Daily PRN  acetaminophen (TYLENOL) tablet 650 mg, 650 mg, Oral, Q6H PRN **OR** acetaminophen (TYLENOL) suppository 650 mg, 650 mg, Rectal, Q6H PRN  cefepime (MAXIPIME) 1000 mg IVPB minibag, 1,000 mg, IntraVENous, Q12H  hydrALAZINE (APRESOLINE) injection 10 mg, 10 mg, IntraVENous, Q6H PRN  0.9 % sodium chloride bolus, 500 mL, IntraVENous, PRN  ipratropium-albuterol (DUONEB) nebulizer solution 1 ampule, 1 ampule, Inhalation, BID  ipratropium-albuterol (DUONEB) nebulizer solution 1 ampule, 1 ampule, Inhalation, Q4H PRN  vancomycin (VANCOCIN) 500 mg in dextrose 5 % 100 mL IVPB (mini-bag), 500 mg, IntraVENous, Q24H  calcium carbonate tablet 600 mg, 600 mg, Oral, Daily         Objective:  /74   Pulse 97   Temp 98.6 °F (37 °C) (Oral)   Resp 16   Ht 5' 8\" (1.727 m)   Wt 134 lb 12.8 oz (61.1 kg)   SpO2 95%   BMI 20.50 kg/m²      Patient Vitals for the past 24 hrs:   BP Temp Temp src Pulse Resp SpO2 Weight   05/22/22 0814 -- -- -- -- 16 95 % --   05/22/22 0757 -- -- -- -- -- -- 134 lb 12.8 oz (61.1 kg)   05/22/22 0715 131/74 98.6 °F (37 °C) Oral 97 18 96 % --   05/22/22 0320 131/74 97.5 °F (36.4 °C) Temporal 94 18 96 % --   05/22/22 0050 118/70 97.6 °F (36.4 °C) Oral 101 16 98 % --   05/21/22 2205 (!) 142/84 -- -- 115 18 96 % --   05/21/22 1937 -- -- -- -- -- 98 % --   05/21/22 1915 119/70 97.5 °F (36.4 °C) Oral 83 16 98 % --   05/21/22 1634 116/72 98 °F (36.7 °C) Oral 81 16 96 % --   05/21/22 1215 -- 98.8 °F (37.1 °C) Oral -- -- -- --   05/21/22 1115 111/70 100.2 °F (37.9 °C) Oral 85 16 93 % --     Patient Vitals for the past 96 hrs (Last 3 readings):   Weight   05/22/22 0757 134 lb 12.8 oz (61.1 kg)   05/21/22 0256 135 lb 8 oz (61.5 kg)   05/20/22 1718 135 lb 11.2 oz (61.6 kg)           Intake/Output Summary (Last 24 hours) at 5/22/2022 3092  Last data filed at 5/21/2022 2317  Gross per 24 hour   Intake --   Output 300 ml   Net -300 ml         Physical Exam: Vitals as above  General appearance: alert, appears stated age and cooperative    Head: Normocephalic, without obvious abnormality, atraumatic    Lungs: rales bilaterally    Heart: irregular rate and rhythm, S1, S2 normal, no murmur    Abdomen: soft, non-tender; bowel sounds normal; no masses, no organomegaly    Extremities: extremities normal, atraumatic, no cyanosis, no edema      Labs:  Lab Results   Component Value Date    WBC 5.6 05/22/2022    HGB 9.5 (L) 05/22/2022    HCT 28.2 (L) 05/22/2022     05/22/2022    CHOL 85 11/03/2016    TRIG 38 11/03/2016    HDL 39 (L) 11/03/2016    ALT 21 05/21/2022    AST 39 (H) 05/21/2022     05/22/2022    K 4.1 05/22/2022     05/22/2022    CREATININE 1.5 (H) 05/22/2022    BUN 24 (H) 05/22/2022    CO2 22 05/22/2022    TSH 5.09 (H) 05/04/2015    PSA 0.93 05/04/2015    INR 1.33 (H) 05/15/2022    LABA1C 5.6 06/26/2021    LABMICR YES 05/20/2022     Lab Results   Component Value Date    CKTOTAL 88 05/15/2022    TROPONINI 1.17 ([facility]) 05/20/2022       Recent Imaging Results are Reviewed:  XR HAND LEFT (MIN 3 VIEWS)    Result Date: 5/16/2022  EXAMINATION: THREE XRAY VIEWS OF THE LEFT HAND 5/16/2022 11:21 am COMPARISON: Left hand x-ray dated 15 May 2022 HISTORY: ORDERING SYSTEM PROVIDED HISTORY: fall TECHNOLOGIST PROVIDED HISTORY: Reason for exam:->fall Reason for Exam: Fall (Pt in via [facility] from [facility], pt with mechanical fall, complains if neck and head pain, pt with laceration to forehead, bleeding controlled, pt on Eliquis. ) FINDINGS: No acute fracture or dislocation. Severe osteoarthritis of the interphalangeal joints and triscaphe joint. No acute findings. Degenerative changes as described above     XR HAND LEFT (MIN 3 VIEWS)    Result Date: 5/15/2022  EXAMINATION: THREE XRAY VIEWS OF THE LEFT HAND 5/15/2022 7:47 pm COMPARISON: None.  HISTORY: ORDERING SYSTEM PROVIDED HISTORY: injury TECHNOLOGIST PROVIDED HISTORY: Reason for exam:->injury Reason for Exam: Fall, skin tear FINDINGS: There is a metallic ring partially obscuring the proximal phalanx of the 4th digit. There is moderate joint space narrowing throughout the digits with prominent osteophytes throughout which is most severe along the D IP and PIP joints and base of the thumb. The carpal bones are intact and in good position. No acute fracture or dislocation is seen. Severe osteoarthritic changes throughout the digits and diffuse osteopenia with no acute bony abnormality     XR HAND RIGHT (MIN 3 VIEWS)    Result Date: 5/15/2022  EXAMINATION: THREE XRAY VIEWS OF THE RIGHT HAND 5/15/2022 7:48 pm COMPARISON: None. HISTORY: ORDERING SYSTEM PROVIDED HISTORY: injury TECHNOLOGIST PROVIDED HISTORY: Reason for exam:->injury Reason for Exam: Fall, skin tear FINDINGS: There is moderate joint space narrowing throughout the digits which is most severe along the PIP and DIP joints with prominent osteophytes throughout. The bones are osteopenic. No fracture or dislocation is seen. The carpal bones are intact and in good position. Moderately severe osteoarthritic changes throughout the digits and diffuse osteopenia with no acute bony abnormality seen. XR TIBIA FIBULA LEFT (2 VIEWS)    Result Date: 5/15/2022  EXAMINATION: 4 XRAY VIEWS OF THE LEFT TIBIA AND FIBULA 5/15/2022 7:47 pm COMPARISON: None. HISTORY: ORDERING SYSTEM PROVIDED HISTORY: injury TECHNOLOGIST PROVIDED HISTORY: Reason for exam:->injury Reason for Exam: Fall, skin tear FINDINGS: The tibia and fibula are intact. No fracture or dislocation is seen. The bones are osteopenic. The joint spaces are intact. There are vascular calcifications posteriorly. Diffuse osteopenia with no acute bony abnormality.      CT Head WO Contrast    Result Date: 5/16/2022  EXAMINATION: CT OF THE HEAD WITHOUT CONTRAST  5/16/2022 11:32 am TECHNIQUE: CT of the head was performed without the administration of intravenous contrast. Automated exposure control, iterative reconstruction, and/or weight based adjustment of the mA/kV was utilized to reduce the radiation dose to as low as reasonably achievable. COMPARISON: 05/15/2022 HISTORY: ORDERING SYSTEM PROVIDED HISTORY: fall, hit head TECHNOLOGIST PROVIDED HISTORY: Reason for exam:->fall, hit head Has a \"code stroke\" or \"stroke alert\" been called? ->No Decision Support Exception - unselect if not a suspected or confirmed emergency medical condition->Emergency Medical Condition ([place]) Reason for Exam: fall, hit head FINDINGS: BRAIN/VENTRICLES: There is moderate cerebral atrophy, with bilateral white matter hypodensities in keeping with microvascular disease. There is no acute intracranial hemorrhage, mass effect or midline shift. No abnormal extra-axial fluid collection. The gray-white differentiation is maintained without evidence of an acute infarct. There is no evidence of hydrocephalus. ORBITS: The visualized portion of the orbits demonstrate no acute abnormality. SINUSES: The visualized paranasal sinuses and mastoid air cells demonstrate no acute abnormality. SOFT TISSUES/SKULL:  There is soft tissue laceration overlying the right frontal scalp. No acute intracranial abnormality. No change from prior examination. CT HEAD WO CONTRAST    Result Date: 5/15/2022  EXAMINATION: CT OF THE HEAD WITHOUT CONTRAST  5/15/2022 6:59 pm TECHNIQUE: CT of the head was performed without the administration of intravenous contrast. Automated exposure control, iterative reconstruction, and/or weight based adjustment of the mA/kV was utilized to reduce the radiation dose to as low as reasonably achievable. COMPARISON: CT head dated 07/04/2021 HISTORY: ORDERING SYSTEM PROVIDED HISTORY: fall/head injury TECHNOLOGIST PROVIDED HISTORY: Reason for exam:->fall/head injury Has a \"code stroke\" or \"stroke alert\" been called? ->No Decision Support Exception - unselect if not a suspected or confirmed emergency medical condition->Emergency Medical Condition (MA) Reason for Exam: Fall (fell out of wheel chair, pt states that he was doen for about an hour. facility states that he was down for 10 to 15 mins. Pt is alert but not oriented, pt is on eliquis. Pt presents to triage with bandages on his head and hands. ) FINDINGS: BRAIN/VENTRICLES: There is no acute intracranial hemorrhage, mass effect or midline shift. No abnormal extra-axial fluid collection. The gray-white differentiation is maintained without evidence of an acute infarct. There is no evidence of hydrocephalus. Moderate low attenuation in the periventricular deep white matter suggesting chronic small vessel ischemic disease. ORBITS: The visualized portion of the orbits demonstrate no acute abnormality. SINUSES: The visualized paranasal sinuses and mastoid air cells demonstrate no acute abnormality. SOFT TISSUES/SKULL:  No acute abnormality of the visualized skull. Soft tissues reveal likely laceration injury without hematoma overlying the anterior frontal region     No acute intracranial abnormality. Small laceration injury overlying the anterior frontal region without scalp hematoma or calvarial defect     CT Cervical Spine WO Contrast    Result Date: 5/16/2022  EXAMINATION: CT OF THE CERVICAL SPINE WITHOUT CONTRAST 5/16/2022 11:32 am TECHNIQUE: CT of the cervical spine was performed without the administration of intravenous contrast. Multiplanar reformatted images are provided for review. Automated exposure control, iterative reconstruction, and/or weight based adjustment of the mA/kV was utilized to reduce the radiation dose to as low as reasonably achievable. COMPARISON: None.  HISTORY: ORDERING SYSTEM PROVIDED HISTORY: fall, hit head TECHNOLOGIST PROVIDED HISTORY: Reason for exam:->fall, hit head Decision Support Exception - unselect if not a suspected or confirmed emergency medical condition->Emergency Medical Condition (MA) Reason for Exam: fall, hit head FINDINGS: BONES/ALIGNMENT: There is no acute fracture or traumatic malalignment. 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 DEGENERATIVE CHANGES: There are moderate to severe degenerative changes from the C3/4 C7/T1 levels. Findings are most pronounced at C5/6. SOFT TISSUES: There is no prevertebral soft tissue swelling. No acute abnormality of the cervical spine. CT CERVICAL SPINE WO CONTRAST    Result Date: 5/15/2022  EXAMINATION: CT OF THE CERVICAL SPINE WITHOUT CONTRAST 5/15/2022 6:59 pm TECHNIQUE: CT of the cervical spine was performed without the administration of intravenous contrast. Multiplanar reformatted images are provided for review. Automated exposure control, iterative reconstruction, and/or weight based adjustment of the mA/kV was utilized to reduce the radiation dose to as low as reasonably achievable. COMPARISON: None. HISTORY: ORDERING SYSTEM PROVIDED HISTORY: fall trauma TECHNOLOGIST PROVIDED HISTORY: Reason for exam:->fall trauma Decision Support Exception - unselect if not a suspected or confirmed emergency medical condition->Emergency Medical Condition (MA) Reason for Exam: Fall (fell out of wheel chair, pt states that he was doen for about an hour. facility states that he was down for 10 to 15 mins. Pt is alert but not oriented, pt is on eliquis. Pt presents to triage with bandages on his head and hands. ) FINDINGS: BONES/ALIGNMENT: There is no acute fracture or traumatic malalignment. DEGENERATIVE CHANGES: Moderate to severe multilevel degenerative changes. SOFT TISSUES: There is no prevertebral soft tissue swelling. No acute abnormality of the cervical spine. CT THORACIC SPINE WO CONTRAST    Result Date: 5/15/2022  EXAMINATION: CT OF THE THORACIC SPINE WITHOUT CONTRAST  5/15/2022 7:18 pm: TECHNIQUE: CT of the thoracic spine was performed without the administration of intravenous contrast. Multiplanar reformatted images are provided for review.  Automated exposure control, iterative reconstruction, and/or weight based adjustment of the mA/kV was utilized to reduce the radiation dose to as low as reasonably achievable. COMPARISON: None. HISTORY: ORDERING SYSTEM PROVIDED HISTORY: back pain TECHNOLOGIST PROVIDED HISTORY: Reason for exam:->back pain Reason for Exam: Fall (fell out of wheel chair, pt states that he was doen for about an hour. facility states that he was down for 10 to 15 mins. Pt is alert but not oriented, pt is on eliquis. Pt presents to triage with bandages on his head and hands. ) FINDINGS: BONES/ALIGNMENT: There is normal alignment of the spine. The vertebral body heights are maintained. No osseous destructive lesion is seen. DEGENERATIVE CHANGES: No gross spinal canal stenosis or bony neural foraminal narrowing of the thoracic spine. SOFT TISSUES: No paraspinal mass is seen. Partially visualized lung parenchyma reveals bilateral pleural effusions left greater than right with at least moderate volume left pleural effusion adjacent atelectasis along with pleural calcifications     No acute osseous findings of the thoracic spine however partially visualized chest reveals bilateral pleural effusions left greater than right with a least moderate volume left pleural effusion and adjacent atelectasis as well as pleural calcifications and/or thickening partially imaged     CT LUMBAR SPINE WO CONTRAST    Result Date: 5/15/2022  EXAMINATION: CT OF THE LUMBAR SPINE WITHOUT CONTRAST  5/15/2022 TECHNIQUE: CT of the lumbar spine was performed without the administration of intravenous contrast. Multiplanar reformatted images are provided for review. Adjustment of mA and/or kV according to patient size was utilized. Automated exposure control, iterative reconstruction, and/or weight based adjustment of the mA/kV was utilized to reduce the radiation dose to as low as reasonably achievable.  COMPARISON: None HISTORY: ORDERING SYSTEM PROVIDED HISTORY: back pain/fall TECHNOLOGIST PROVIDED HISTORY: Reason for exam:->back pain/fall Decision Support Exception - unselect if not a suspected or confirmed emergency medical condition->Emergency Medical Condition (MA) Reason for Exam: Fall (fell out of wheel chair, pt states that he was doen for about an hour. facility states that he was down for 10 to 15 mins. Pt is alert but not oriented, pt is on eliquis. Pt presents to triage with bandages on his head and hands. ) FINDINGS: BONES/ALIGNMENT: Grossly normal lumbar lordosis with mild scoliotic curvature of the lumbar region apex L3 levo scoliotic curvature. Height loss of the L1 vertebral body with anterior wedging 50% height loss and mild cortical step-off age-indeterminate compression deformity without retropulsion or spinal canal stenosis. DEGENERATIVE CHANGES: Moderate to severe multilevel degenerative changes of the lumbar spine. SOFT TISSUES/RETROPERITONEUM: No paraspinal mass is seen. Age-indeterminate L1 compression deformity with 50% height loss anteriorly. No retropulsion or posterior element involvement no spinal canal stenosis of significance at this level or elsewhere however moderate to severe degenerative changes throughout the lower lumbar segments and lumbosacral junction. CT PELVIS WO CONTRAST Additional Contrast? None    Result Date: 5/15/2022  EXAMINATION: CT OF THE PELVIS WITHOUT CONTRAST 5/15/2022 7:19 pm TECHNIQUE: CT of the pelvis was performed without the administration of intravenous contrast.  Multiplanar reformatted images are provided for review. Adjustment of mA and/or kV according to patient size was utilized. Automated exposure control, iterative reconstruction, and/or weight based adjustment of the mA/kV was utilized to reduce the radiation dose to as low as reasonably achievable. COMPARISON: CT abdomen and pelvis 11/16/2017. Multiple intervening pelvis/left hip x-ray exams with the most recent 07/04/2021, and the most remote 05/31/2020. HISTORY: ORDERING SYSTEM PROVIDED HISTORY: fall TECHNOLOGIST PROVIDED HISTORY: Reason for exam:->fall Additional Contrast?->None Decision Support Exception - unselect if not a suspected or confirmed emergency medical condition->Emergency Medical Condition (MA) Reason for Exam: Fall (fell out of wheel chair, pt states that he was doen for about an hour. facility states that he was down for 10 to 15 mins. Pt is alert but not oriented, pt is on eliquis. Pt presents to triage with bandages on his head and hands. ) FINDINGS: Streak artifact from left hip prosthesis somewhat limits evaluation. Within this limitation: Diffuse bone demineralization. Left hip prosthesis redemonstrated. This appears to be a hemiprosthesis. Orthopedic hardware appears intact and in stable position. No evidence for hardware complication is seen. No fractures or dislocations. No suspicious focal bony lesions. No bony erosions or bony destructive changes. Stable mild degenerative changes to the right hip joint. Small right hip joint effusion. No intra-articular loose bodies. Limited evaluation of the left hip joint secondary to the prosthesis without large joint effusion noted. Evaluation of the extrapelvic soft tissues demonstrates no acute or suspicious abnormality. Chronic postsurgical scarring laterally to the left hip. Atherosclerotic vascular calcifications. No lymphadenopathy. Evaluation of intrapelvic contents demonstrates mild degree of presacral nonhemorrhagic fluid and fat stranding. No focal fluid collections are identified. No lymphadenopathy. Enlarged prostate. Grossly unremarkable non-opacified large and small bowel. No lymphadenopathy is seen. Extensive atherosclerosis. Saccular aneurysm of distal abdominal aorta posteriorly measuring 3.1 x 2.5 cm. This previously measured 2.7 x 2.3 cm. No acute bony abnormality. Mild degree of nonspecific presacral nonhemorrhagic fluid and fat stranding.  Stable mild degenerative changes to the right hip joint. There is a small right hip joint effusion. Stable appearance to left hip prosthesis. No evidence for hardware complication. Saccular aneurysm of the visualized distal abdominal aorta measuring maximally 3.1 cm, previously 2.7 cm on prior exam.  Management recommendations as below. Enlarged prostate. RECOMMENDATIONS: 3.1 cm saccular abdominal aortic aneurysm. Recommend vascular consultation. Reference: J Vasc Surg 6380;63:0-66. XR CHEST PORTABLE    Result Date: 5/20/2022  EXAMINATION: ONE XRAY VIEW OF THE CHEST 5/20/2022 10:54 am COMPARISON: 06/25/2021 HISTORY: ORDERING SYSTEM PROVIDED HISTORY: fever, hypotension, cough TECHNOLOGIST PROVIDED HISTORY: Reason for exam:->fever, hypotension, cough FINDINGS: Status post median sternotomy. Heart size stable. Left mid and lower lung opacities. No pneumothorax. Small bilateral pleural effusions. Left mid and lower lung opacities could represent atelectatic changes or infection     CT CHEST ABDOMEN PELVIS WO CONTRAST    Result Date: 5/15/2022  EXAMINATION: CT OF THE CHEST, ABDOMEN, AND PELVIS WITHOUT CONTRAST 5/15/2022 7:25 pm TECHNIQUE: CT of the chest, abdomen and pelvis was performed without the administration of intravenous contrast. Multiplanar reformatted images are provided for review. Automated exposure control, iterative reconstruction, and/or weight based adjustment of the mA/kV was utilized to reduce the radiation dose to as low as reasonably achievable. COMPARISON: None HISTORY: ORDERING SYSTEM PROVIDED HISTORY: fall TECHNOLOGIST PROVIDED HISTORY: Reason for exam:->fall Additional Contrast?->None Decision Support Exception - unselect if not a suspected or confirmed emergency medical condition->Emergency Medical Condition (MA) Reason for Exam: fall Relevant Medical/Surgical History: Fall (fell out of wheel chair, pt states that he was doen for about an hour.  facility states that he was down for 10 to 15 mins. Pt is alert but not oriented, pt is on eliquis. Pt presents to triage with bandages on his head and hands. ) FINDINGS: Chest: Mediastinum: Thyroid is homogeneous in attenuation. No bulky mediastinal adenopathy. Central airways are patent. Esophagus is normal course and caliber. Patent nonaneurysmal thoracic aorta. Cardiac size within normal limits without pericardial effusion. Lungs/pleura: Smooth interlobular septal thickening with lower lobe predominance with small right and moderate left pleural effusions and adjacent atelectasis. 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 Soft Tissues/Bones: No soft tissue findings or acute osseous findings with dedicated thoracic imaging on separate CT thoracic dictation Abdomen/Pelvis: Organs: Liver without focal lesion. Gallbladder filled with numerous stones partially contracted cholelithiasis. .  Pancreas and spleen unremarkable. Adrenals without nodule. Kidneys demonstrate bilateral intrarenal stones and calcifications of nephrolithiasis with moderate to severe left hydroureteronephrosis in the proximal portion with abrupt caliber change in the proximal ureter likely at the ureteropelvic junction could represent a chronic left UPJ obstruction appearance with potential mild cortical thinning left kidney. GI/Bowel: No focal thickening or disproportion dilatation of bowel. No inflammatory findings. Appendix is unremarkable and visualized in the right lower quadrant. Pelvis: No suspicious pelvic lesion or bulky pelvic adenopathy/free fluid. Peritoneum/Retroperitoneum: Patent nonaneurysmal abdominal aorta. No bulky retroperitoneal adenopathy. Bones/Soft Tissues: No acute soft tissue findings with left hip arthroplasty in place. Please see separate CT lumbar spine and CT bony pelvis dictations for associated osseous findings on dedicated imaging. .  Small fat and minimally fluid containing right direct inguinal hernia     No acute traumatic findings of the chest abdomen or pelvis. Please see separate spine CT dictations for spinal small and CT bony pelvis findings including probable lumbar spine compression deformity fracture age indeterminate. Bilateral pleural effusions including moderate volume left pleural effusion and adjacent pleural calcifications. 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       Lab Results   Component Value Date    GLUCOSE 91 05/22/2022     Lab Results   Component Value Date    POCGLU 99 01/18/2017     /74   Pulse 97   Temp 98.6 °F (37 °C) (Oral)   Resp 16   Ht 5' 8\" (1.727 m)   Wt 134 lb 12.8 oz (61.1 kg)   SpO2 95%   BMI 20.50 kg/m²     Assessment and Plan:  Principal Problem:    Sepsis due to pneumonia ([address] -Established problem. Stable. Plan: cont empiric abx. Repeat cxr today. Cont to trend labs  Active Problems:    Essential hypertension -Established problem. Stable. 131/74  Plan: Continue present orders/plan. Hyperlipidemia -Established problem. Stable. Plan: Continue present orders/plan. JUAN R (acute kidney injury) ([address] -Established problem. Improving. Creat 1.5  Plan: cont fluidds    UTI (urinary tract infection) -Established problem. Stable. Pan sens psuedomonas  Plan: cont abx. Atrial fib ([address] -Established problem. Stable. chronic  Plan: Continue present orders/plan. GERD (gastroesophageal reflux disease)  Plan: cont ppi      Disp - await swallow eval. Remain NPO.   Dr Orlando Form to  Monday 5/23 as he normally covers this PCP        (Please note that portions of this note were completed with a voice recognition program.  Efforts were made to edit the dictations but occasionally words are mis-transcribed.)        Leonides Ford MD  5/22/2022

## 2022-05-22 NOTE — PROGRESS NOTES
Physician Progress Note      PATIENT:               Mimi Modi  CSN #:                  [id number]  :                       10/31/1925  ADMIT DATE:       2022 10:17 AM  DISCH DATE:  RESPONDING  PROVIDER #:        Berto Sandhu MD          QUERY TEXT:    Pt admitted with Sepsis and PNA. Noted documentation of NSTEMI in ED. If   possible, please document in progress notes and/or discharge summary whether: The medical record reflects the following:  Risk Factors: Elderly patient with Sepsis, Septic Shock, PNA and JUANR   Clinical Indicators: Troponin 1.17, Pro BNP 8,402. .. Per EKG: Atrial   fibrillation, Nonspecific ST and T wave abnormality. Per ED:  \"Multi-organ   failure with heart failure. .. NSTEMI (non-ST elevated myocardial infarction)\". Treatment: EKG, Eliquis, Lovenox, Cardizem  Options provided:  -- NSTEMI confirmed present on admission  -- NSTEMI ruled out  -- Other - I will add my own diagnosis  -- Disagree - Not applicable / Not valid  -- Disagree - Clinically unable to determine / Unknown  -- Refer to Clinical Documentation Reviewer    PROVIDER RESPONSE TEXT:    The diagnosis of NSTEMI was ruled out.     Query created by: Clark Guzman on 2022 1:18 PM      Electronically signed by:  Berto Sandhu MD 2022 9:09 AM

## 2022-05-23 PROBLEM — S80.01XA CONTUSION OF RIGHT KNEE: Status: RESOLVED | Noted: 2020-09-15 | Resolved: 2022-05-23

## 2022-05-23 PROBLEM — S72.012A SUBCAPITAL FRACTURE OF FEMUR, LEFT, CLOSED, INITIAL ENCOUNTER (HCC): Status: RESOLVED | Noted: 2020-05-31 | Resolved: 2022-05-23

## 2022-05-23 PROBLEM — I48.91 ATRIAL FIBRILLATION (HCC): Status: RESOLVED | Noted: 2021-06-25 | Resolved: 2022-05-23

## 2022-05-23 PROBLEM — I21.4 NSTEMI (NON-ST ELEVATED MYOCARDIAL INFARCTION) (HCC): Status: RESOLVED | Noted: 2021-06-25 | Resolved: 2022-05-23

## 2022-05-23 PROBLEM — F31.9 BIPOLAR 1 DISORDER (HCC): Status: RESOLVED | Noted: 2018-05-10 | Resolved: 2022-05-23

## 2022-05-23 PROBLEM — S80.02XA CONTUSION OF LEFT KNEE: Status: RESOLVED | Noted: 2020-09-15 | Resolved: 2022-05-23

## 2022-05-23 PROBLEM — M25.562 LEFT KNEE PAIN: Status: RESOLVED | Noted: 2020-09-15 | Resolved: 2022-05-23

## 2022-05-23 PROBLEM — N47.1 PHIMOSIS: Status: RESOLVED | Noted: 2021-06-26 | Resolved: 2022-05-23

## 2022-05-23 LAB
ANION GAP SERPL CALCULATED.3IONS-SCNC: 12 MMOL/L (ref 3–16)
BASOPHILS ABSOLUTE: 0 K/UL (ref 0–0.2)
BASOPHILS RELATIVE PERCENT: 0.6 %
BUN BLDV-MCNC: 22 MG/DL (ref 7–20)
CALCIUM SERPL-MCNC: 8.2 MG/DL (ref 8.3–10.6)
CHLORIDE BLD-SCNC: 107 MMOL/L (ref 99–110)
CO2: 22 MMOL/L (ref 21–32)
CREAT SERPL-MCNC: 1.5 MG/DL (ref 0.8–1.3)
EOSINOPHILS ABSOLUTE: 0.1 K/UL (ref 0–0.6)
EOSINOPHILS RELATIVE PERCENT: 1 %
GFR AFRICAN AMERICAN: 52
GFR NON-AFRICAN AMERICAN: 43
GLUCOSE BLD-MCNC: 81 MG/DL (ref 70–99)
HCT VFR BLD CALC: 31.6 % (ref 40.5–52.5)
HEMOGLOBIN: 10.2 G/DL (ref 13.5–17.5)
LYMPHOCYTES ABSOLUTE: 0.4 K/UL (ref 1–5.1)
LYMPHOCYTES RELATIVE PERCENT: 7.2 %
MCH RBC QN AUTO: 29.8 PG (ref 26–34)
MCHC RBC AUTO-ENTMCNC: 32.3 G/DL (ref 31–36)
MCV RBC AUTO: 92.3 FL (ref 80–100)
MONOCYTES ABSOLUTE: 0.3 K/UL (ref 0–1.3)
MONOCYTES RELATIVE PERCENT: 6.7 %
NEUTROPHILS ABSOLUTE: 4.4 K/UL (ref 1.7–7.7)
NEUTROPHILS RELATIVE PERCENT: 84.5 %
PDW BLD-RTO: 14.5 % (ref 12.4–15.4)
PLATELET # BLD: 314 K/UL (ref 135–450)
PMV BLD AUTO: 7 FL (ref 5–10.5)
POTASSIUM SERPL-SCNC: 4.2 MMOL/L (ref 3.5–5.1)
RBC # BLD: 3.42 M/UL (ref 4.2–5.9)
SODIUM BLD-SCNC: 141 MMOL/L (ref 136–145)
WBC # BLD: 5.2 K/UL (ref 4–11)

## 2022-05-23 PROCEDURE — 80048 BASIC METABOLIC PNL TOTAL CA: CPT

## 2022-05-23 PROCEDURE — 94761 N-INVAS EAR/PLS OXIMETRY MLT: CPT

## 2022-05-23 PROCEDURE — 1200000000 HC SEMI PRIVATE

## 2022-05-23 PROCEDURE — 97161 PT EVAL LOW COMPLEX 20 MIN: CPT

## 2022-05-23 PROCEDURE — 97530 THERAPEUTIC ACTIVITIES: CPT

## 2022-05-23 PROCEDURE — 6370000000 HC RX 637 (ALT 250 FOR IP): Performed by: INTERNAL MEDICINE

## 2022-05-23 PROCEDURE — 2580000003 HC RX 258: Performed by: EMERGENCY MEDICINE

## 2022-05-23 PROCEDURE — C9113 INJ PANTOPRAZOLE SODIUM, VIA: HCPCS | Performed by: INTERNAL MEDICINE

## 2022-05-23 PROCEDURE — 36415 COLL VENOUS BLD VENIPUNCTURE: CPT

## 2022-05-23 PROCEDURE — 6360000002 HC RX W HCPCS: Performed by: INTERNAL MEDICINE

## 2022-05-23 PROCEDURE — 92612 ENDOSCOPY SWALLOW (FEES) VID: CPT

## 2022-05-23 PROCEDURE — 2580000003 HC RX 258: Performed by: INTERNAL MEDICINE

## 2022-05-23 PROCEDURE — 2700000000 HC OXYGEN THERAPY PER DAY

## 2022-05-23 PROCEDURE — 97535 SELF CARE MNGMENT TRAINING: CPT

## 2022-05-23 PROCEDURE — 85025 COMPLETE CBC W/AUTO DIFF WBC: CPT

## 2022-05-23 PROCEDURE — 94640 AIRWAY INHALATION TREATMENT: CPT

## 2022-05-23 PROCEDURE — 97166 OT EVAL MOD COMPLEX 45 MIN: CPT

## 2022-05-23 PROCEDURE — 92526 ORAL FUNCTION THERAPY: CPT

## 2022-05-23 RX ORDER — PANTOPRAZOLE SODIUM 40 MG/1
40 TABLET, DELAYED RELEASE ORAL
Status: DISCONTINUED | OUTPATIENT
Start: 2022-05-24 | End: 2022-05-26 | Stop reason: HOSPADM

## 2022-05-23 RX ORDER — LANSOPRAZOLE 15 MG/1
30 CAPSULE, DELAYED RELEASE ORAL
Status: DISCONTINUED | OUTPATIENT
Start: 2022-05-24 | End: 2022-05-23 | Stop reason: CLARIF

## 2022-05-23 RX ADMIN — LAMOTRIGINE 150 MG: 100 TABLET ORAL at 12:46

## 2022-05-23 RX ADMIN — CEFEPIME HYDROCHLORIDE 1000 MG: 1 INJECTION, POWDER, FOR SOLUTION INTRAMUSCULAR; INTRAVENOUS at 23:04

## 2022-05-23 RX ADMIN — PANTOPRAZOLE SODIUM 40 MG: 40 INJECTION, POWDER, FOR SOLUTION INTRAVENOUS at 11:42

## 2022-05-23 RX ADMIN — IPRATROPIUM BROMIDE AND ALBUTEROL SULFATE 1 AMPULE: 2.5; .5 SOLUTION RESPIRATORY (INHALATION) at 08:23

## 2022-05-23 RX ADMIN — Medication 10 ML: at 11:41

## 2022-05-23 RX ADMIN — FINASTERIDE 5 MG: 5 TABLET, FILM COATED ORAL at 12:48

## 2022-05-23 RX ADMIN — STANDARDIZED SENNA CONCENTRATE AND DOCUSATE SODIUM 1 TABLET: 8.6; 5 TABLET ORAL at 21:24

## 2022-05-23 RX ADMIN — ENOXAPARIN SODIUM 30 MG: 30 INJECTION SUBCUTANEOUS at 11:41

## 2022-05-23 RX ADMIN — LORAZEPAM 0.5 MG: 2 INJECTION INTRAMUSCULAR; INTRAVENOUS at 21:25

## 2022-05-23 RX ADMIN — DILTIAZEM HYDROCHLORIDE 30 MG: 30 TABLET, FILM COATED ORAL at 21:22

## 2022-05-23 RX ADMIN — Medication 400 MG: at 12:47

## 2022-05-23 RX ADMIN — APIXABAN 2.5 MG: 5 TABLET, FILM COATED ORAL at 21:23

## 2022-05-23 RX ADMIN — CEFEPIME HYDROCHLORIDE 1000 MG: 1 INJECTION, POWDER, FOR SOLUTION INTRAMUSCULAR; INTRAVENOUS at 11:44

## 2022-05-23 RX ADMIN — FERROUS SULFATE TAB 325 MG (65 MG ELEMENTAL FE) 325 MG: 325 (65 FE) TAB at 12:47

## 2022-05-23 RX ADMIN — ONDANSETRON 4 MG: 2 INJECTION INTRAMUSCULAR; INTRAVENOUS at 19:17

## 2022-05-23 RX ADMIN — SODIUM CHLORIDE: 9 INJECTION, SOLUTION INTRAVENOUS at 21:33

## 2022-05-23 RX ADMIN — Medication 10 ML: at 19:18

## 2022-05-23 RX ADMIN — SODIUM CHLORIDE, PRESERVATIVE FREE 10 ML: 5 INJECTION INTRAVENOUS at 21:24

## 2022-05-23 RX ADMIN — APIXABAN 2.5 MG: 5 TABLET, FILM COATED ORAL at 12:47

## 2022-05-23 RX ADMIN — DILTIAZEM HYDROCHLORIDE 30 MG: 30 TABLET, FILM COATED ORAL at 14:08

## 2022-05-23 RX ADMIN — LEVOTHYROXINE SODIUM 25 MCG: 0.03 TABLET ORAL at 12:53

## 2022-05-23 RX ADMIN — IPRATROPIUM BROMIDE AND ALBUTEROL SULFATE 1 AMPULE: 2.5; .5 SOLUTION RESPIRATORY (INHALATION) at 20:19

## 2022-05-23 ASSESSMENT — PAIN SCALES - WONG BAKER
WONGBAKER_NUMERICALRESPONSE: 0

## 2022-05-23 ASSESSMENT — PAIN SCALES - GENERAL
PAINLEVEL_OUTOF10: 0
PAINLEVEL_OUTOF10: 0
PAINLEVEL_OUTOF10: 7

## 2022-05-23 ASSESSMENT — ENCOUNTER SYMPTOMS
SHORTNESS OF BREATH: 1
EYES NEGATIVE: 1
TROUBLE SWALLOWING: 1
ALLERGIC/IMMUNOLOGIC NEGATIVE: 1
ABDOMINAL DISTENTION: 1

## 2022-05-23 NOTE — DISCHARGE INSTR - COC
Continuity of Care Form    Patient Name: Dillan Alex   :  10/31/1925  MRN:  [id number]    Admit date:  2022  Discharge date:  2022    Code Status Order: DNR-CC   Advance Directives:      Admitting Physician:  Blanca Blanca MD  PCP: Marilyn Arreola MD    Discharging Nurse:  Brenna KNOX, [facility] Unit/Room#: 4CS-8116/3823-99  Discharging Unit Phone Number: [phone]    Emergency Contact:   Extended Emergency Contact Information  Primary Emergency Contact: [address] [place] [address] Phone: [phone]  Mobile Phone: [phone]  Relation: Child  Secondary Emergency Contact: Raj Arcos [address] Phone: [phone]  Relation: Child    Past Surgical History:  Past Surgical History:   Procedure Laterality Date    [facility] WITH STENT PLACEMENT  2005      X3    CORONARY ARTERY BYPASS GRAFT      , x4, [facility] [place] [employer])    EYE SURGERY      HIP SURGERY Left 2020    LEFT BIPOLAR HIP HEMIARTHROPLASTY performed by Breanna Abreu MD at [address] [facility]    SKIN CANCER DESTRUCTION  2006    Melanoma  ()    TUR         Immunization History:   Immunization History   Administered Date(s) Administered    COVID-19, [employer] top, DO NOT Dilute, Lizandro-Sucrose, 12+ yrs, PF, 30 mcg/0.3 mL dose 2022    COVID-19, Pfizer Purple top, DILUTE for use, 12+ yrs, 30mcg/0.3mL dose 2021, 2021, 10/08/2021    Influenza 2011, 2013    Influenza Virus Vaccine 2011, 10/08/2012, 2013, 10/23/2014, 2015, 10/20/2016    Influenza, High Dose (Fluzone 65 yrs and older) 2015, 10/20/2016, 2017, 10/18/2018    Influenza, Triv, inactivated, subunit, adjuvanted, IM (Fluad 65 yrs and older) 10/01/2019    Pneumococcal Conjugate 13-valent ([id number]) 2014    Pneumococcal Polysaccharide ([id number]) 01/01/2016    Td, unspecified formulation 10/23/2014    Zoster Live (Zostavax) 10/08/2012       Active Problems:  Patient Active Problem List   Diagnosis Code    Essential hypertension I10    Atherosclerosis of native coronary artery of native heart without angina pectoris I25.10    Hyperlipidemia E78.5    GERD (gastroesophageal reflux disease) K21.9    Bilateral carotid artery disease (HCC) I77.9    Aortic stenosis I35.0    Psychophysiological insomnia F51.04    Anxiety disorder F41.9    RLS (restless legs syndrome) G25.81    Chronic idiopathic constipation K59.04    Paroxysmal atrial fibrillation (HCC) I48.0    Moderate protein-calorie malnutrition (HCC) E44.0    Chronic diastolic heart failure (HCC) I50.32    Chronic atrial fibrillation (HCC) I48.20    PNA (pneumonia) J18.9    Sepsis due to pneumonia (HCC) J18.9, A41.9    JUAN R (acute kidney injury) ([facility] N17.9    UTI (urinary tract infection) N39.0    Sepsis (HCC) A41.9    Nonrheumatic mitral valve regurgitation I34.0       Isolation/Infection:   Isolation            No Isolation          Patient Infection Status       Infection Onset Added Last Indicated Last Indicated By Review Planned Expiration Resolved Resolved By    None active    Resolved    COVID-19 (Rule Out) 05/31/20 05/31/20 05/31/20 COVID-19 (Ordered)   06/02/20 Rule-Out Test Resulted            Nurse Assessment:  Last Vital Signs: BP (!) 140/88   Pulse 92   Temp 97.2 °F (36.2 °C) (Oral)   Resp 18   Ht 5' 8\" (1.727 m)   Wt 138 lb 3.2 oz (62.7 kg)   SpO2 93%   BMI 21.01 kg/m²     Last documented pain score (0-10 scale): Pain Level: 0  Last Weight:   Wt Readings from Last 1 Encounters:   05/24/22 138 lb 3.2 oz (62.7 kg)     Mental Status:  alert and Oriented with moments of confusion    IV Access:  - None    Nursing Mobility/ADLs:  Walking   Dependent  Transfer  Dependent  Bathing  Dependent  Dressing  Dependent  Toileting  Dependent  Feeding  Assisted/dependent  Med Admin  Dependent crushed in pudding  Med Delivery   crushed    Wound Care Documentation and Therapy:  Wound 06/02/20 Ankle Left; Outer; Inner Purple, non-blanching on both inner and outer bony prominences (Active)   Number of days: 721       Wound 06/25/21 Leg Lower; Left laceration to shin (Active)   Number of days: 332       Wound 05/20/22 Face Mid laceration to the forehead - sutures in place (Active)   Wound Etiology Traumatic 05/24/22 0900   Dressing/Treatment Open to air 05/24/22 0900   Drainage Amount None 05/24/22 0900   Number of days: 3        Elimination:  Continence: Bowel: No  Bladder: No  Urinary Catheter: None   Colostomy/Ileostomy/Ileal Conduit: No       Date of Last BM: 5/24/2022    Intake/Output Summary (Last 24 hours) at 5/24/2022 0945  Last data filed at 5/23/2022 2056  Gross per 24 hour   Intake 979.07 ml   Output 900 ml   Net 79.07 ml     I/O last 3 completed shifts: In: 979.1 [P.O.:240; I.V.:739.1]  Out: 1200 [Urine:1200]    Safety Concerns: At Risk for Falls    Impairments/Disabilities:      Stiff/ridged. Total assist    Nutrition Therapy:  Current Nutrition Therapy:   - Oral Diet:  Dysphagia 1 pureed    Routes of Feeding: Oral  Liquids: Nectar Thick Liquids  Daily Fluid Restriction: no  Last Modified Barium Swallow with Video (Video Swallowing Test): not done    Treatments at the Time of Hospital Discharge:   Respiratory Treatments: n/a  Oxygen Therapy:  is not on home oxygen therapy.   Ventilator:    - No ventilator support    Patient's personal belongings (please select all that are sent with patient):  Pt discharged from unit with all beloinings     RN SIGNATURE:  Electronically signed by Luis Gomez RN on 5/26/22 at 2:02 PM EDT    CASE MANAGEMENT/SOCIAL WORK SECTION    Inpatient Status Date:  5-20- 22    Readmission Risk Assessment Score:  Readmission Risk              Risk of Unplanned Readmission:  30         Discharging to Facility/ Agency   Name: [facility] AT [address] [place], [address]  Phone: [phone]  Fax: [phone]      / signature: Electronically signed by IRASEMA Marino on 5/23/22 at 9:14 AM EDT    PHYSICIAN SECTION    Prognosis: Poor    Condition at Discharge: Stable    Rehab Potential (if transferring to Rehab): Poor    Recommended Labs or Other Treatments After Discharge: patient is DNR CC ,  has significant CHF  mitral regurg  and aortic stenosis is at a high risk of frequent readmission , frequent ER visits and Hospital admission would be of very poor yield to the patient and are not encouraged     Physician Certification: I certify the above information and transfer of Shana Floyd  is necessary for the continuing treatment of the diagnosis listed and that he requires Palliative Care for greater 30 days.      Update Admission H&P: No change in H&P    PHYSICIAN SIGNATURE:  Electronically signed by Lino Clark MD on 5/25/22 at 9:33 AM EDT

## 2022-05-23 NOTE — CONSULTS
PALLIATIVE MEDICINE CONSULTATION     Patient name:Bartolome Lake   FMA:[id number]    :10/31/1925  Room/Bed:[facility]3359/3359-01   LOS: 3 days         Date of consult:2022    Consult Information  Palliative Medicine Consult performed by: MARIS Madrid CNP, CNP    Inpatient consult to Palliative Care  Consult performed by: MARIS Madrid CNP  Consult ordered by: Harriet Maharaj MD  Reason for consult: [address]             ASSESSMENT/RECOMMENDATIONS     80 y.o. male with lethargy and dysphagia      Symptom Management:  Lethargy- pt drowsy arouses for short period then dozes back off  Dysphagia- pt unsafe to swallow on BSE and CXR consistent with aspiration. FEES planned for today  Goals of Care-talked to pt and daughter Sierra Dalton at bedside pt states that he is ready to go if its his time he is [facility] at baseline. He is c/o being thirsty. Talked to daughter about the likelihood that he is aspirating and that Hospice care with comfort feeding sounds like the patients goal. Daughter is upset that pt has taken a down trend she is hopeful that she will recover and have a couple more years. She is awaiting FEES and SLP input prior to making any decisions regarding Hospice care. Patient/Family Goals of Care :    talked to pt and daughter Sierra Dalton at bedside pt states that he is ready to go if its his time he is [facility] at baseline. He is c/o being thirsty. Talked to daughter about the likelihood that he is aspirating and that Hospice care with comfort feeding sounds like the patients goal. Daughter is upset that pt has taken a down trend she is hopeful that she will recover and have a couple more years. She is awaiting FEES and SLP input prior to making any decisions regarding Hospice care.     Disposition/Discharge Plan:   pending    Advance Directives:  Surrogate Decision Maker: Oscar-daughter  Code status:  DNR-CC    Case discussed with: patient, floor RN  Thank you for allowing us to participate in the care of this patient. HISTORY     CC: Lethargy  HPI: The patient is a 80 y.o. male with PMH A-fib, CAD, NSTEMI, PNA, Bipolar, CHF. Presented to ED with falls and AMS. Palliative Medicine SymptomScreening/ROS:    Review of Systems   Constitutional: Positive for activity change and fatigue. HENT: Positive for trouble swallowing. Eyes: Negative. Respiratory: Positive for shortness of breath. Cardiovascular: Positive for leg swelling. Gastrointestinal: Positive for abdominal distention. Endocrine: Negative. Genitourinary: Negative. Musculoskeletal: Positive for arthralgias. Skin: Positive for pallor. Allergic/Immunologic: Negative. Neurological: Positive for weakness. Psychiatric/Behavioral: Positive for confusion. Patient unable to complete full ROS due to current cognitive status. Information that is obtained from nursing and chart. Palliative Performance Scale:     [] 60%  Amb reduced; Sig dz. Can't do hobbies/housework; Intake normal or reduced, Occasional assist; LOC full/confusion   [] 50%  Mainly sit/lie; Extensive disease. Mainly assist, Intake normal or reduced; Occasional assist; LOC full/confusion   [] 40%  Mainly in bed; Extensive disease; Mainly assist; Intake normal or reduced; Occasional assist; LOC full/confusion   [] 30%  Bed bound, Extensive disease; Total care; Intake reduced; LOC full/confusion   [x] 20%  Bed bound; Extensive disease; Total care; Intake minimal; Drowsy/coma   [] 10%  Bed bound; Extensive disease; Total care; Mouth care only; Drowsy/coma   []  0%   Death       Home med list and hospital medications reviewed in chart as of 5/23/2022     EXAM     Vitals:    05/23/22 0722   BP: (!) 159/85   Pulse: 94   Resp: 18   Temp: 96.6 °F (35.9 °C)   SpO2: 97%       Physical Exam  Constitutional:       Appearance: He is ill-appearing. Comments: drowsy   HENT:      Head: Normocephalic and atraumatic.       Nose: Nose normal. Mouth/Throat:      Mouth: Mucous membranes are dry. Eyes:      Pupils: Pupils are equal, round, and reactive to light. Cardiovascular:      Rate and Rhythm: Normal rate. Pulses: Normal pulses. Heart sounds: No murmur heard. No gallop. Pulmonary:      Effort: Pulmonary effort is normal.   Chest:      Chest wall: Tenderness present. Abdominal:      General: There is distension. Musculoskeletal:      Cervical back: Neck supple. Right lower leg: Edema present. Left lower leg: Edema present. Skin:     General: Skin is dry. Coloration: Skin is pale. Neurological:      Mental Status: Mental status is at baseline.       Comments: Drowsy and unable to assess orientation                Current labs in the epic chart reviewed as of 5/23/2022   Review of previous notes, admits, labs, radiology and testing relevant to this consult done in this chart today 5/23/2022      Total time: 70 minutes  >50% of time spent counseling patient at bedside or POA/family member if applicable , reviewing information and discussing care, coordinating with care team  Signed By: Electronically signed by MARIS Marroquin CNP on 5/23/2022 at 7:53 AM  Palliative Medicine   [phone]    May 23, 2022

## 2022-05-23 NOTE — PROCEDURES
[facility]  Facility/Department: [address]  Fiberoptic Endoscopic Evaluation of Swallowing  (FEES)    Patient: Nitza Mills   : 10/31/1925   MRN: [id number]    Account #: [de-identified]   Referring Physician: Dr. Linh Early   Evaluation Date: 2022   Admitting Diagnosis: NSTEMI (non-ST elevated myocardial infarction) ([address] [I21.4]  JUAN R (acute kidney injury) ([address] [N17.9]  Multi-organ failure with heart failure ([address] [I50.9]  Septic shock ([address] [A41.9, R65.21]  Sepsis ([address] [A41.9]  Pneumonia due to infectious organism, unspecified laterality, unspecified part of lung [J18.9]  Treatment Diagnosis: Dysphagia   Diet prior to assessment:Diet NPO  Pain: Denies                        Impressions:   Assessment: Patient presents with oropharyngeal dysphagia secondary to decreased bolus control, premature bolus loss, pharyngeal pooling, delayed swallow initiation, decreased hyolaryngeal movement, decreased epiglottic movement, decreased tongue base retraction, complicated by advanced age, comorbidities, cognitive status resulting in observed aspiration of thin liquids, laryngeal penetration of thin liquids, impaired pharyngeal clearing of soft solids and trace to mild pharyngeal stasis with all textures. With thin liquids laryngeal penetration and aspiration was assessed during the swallow and with subsequent clearing swallows, appeared to be due to delayed swallow initiation and decreased airway closure. In response to gross aspiration prolonged coughing episode was noted, this appeared effective in clearing material from the trachea but no the laryngeal inlet. Strategy of 3 second bolus hold appeared effective in eliminating laryngeal penetration with thin liquids. Strategy of hard cough and re-swallow appeared effective. With all textures use of 2-4 swallows were noted with clear with mild to trace pharyngeal stasis remaining. Patient is at an increased risk of aspiration.  Based on today's assessment recommend Dysphagia I Puree  with Mildly (nectar) thick liquids , Meds in puree  or Crushed as able in puree . As pt is DNR-CC if diet modifications are declined or do not align with goals of care recommend diet liberalization with use of aspiration precautions to minimize impact of aspiration (frequent oral care, mobilize as able, encourage self feed with use of small sips and bolus hold with thin, cough and re-swallow every 5-6 bites, see below for more)     Diet and Treatment Recommendations 5/23/2022:  1) If aggressive means of care are desired recommend Dysphagia I Pureed with  Mildly Thick (Nectar) Liquids, meds with puree   2) As pt is DNR-CC if diet modifications are declined or do not align with goals of care recommend diet liberalization with use of aspiration precautions to minimize impact of aspiration (frequent oral care, mobilize as able, encourage self feed with use of small sips and bolus hold with thin, cough and re-swallow every 5-6 bites, see below for more)     Compensatory strategies: Alternate solids/liquids , Upright as possible with all PO intake , Assist Feed , Small bites/sips , Swallow 2 times per bite , Eat/feed slowly, Remain upright 30-45 min , Cough/re-swallow     Dysphagia Intervention: Laryngeal Exercises , Pharyngeal Exercise, Patient/Family Education , Therapeutic Trials with SLP     Images:    Initial view of prior to PO trials     Vocal fold adduction, bowed (consistent with age)      Aspiration of thin liquids, trace pharyngeal residue       After the swallow with Mildly Thick (Nectar) Liquids, no penetration/aspiration. Remaining penetration/aspiration of thin liquids    Pre Swallow Assessment:    Nasopharyngoscope: Slim Scope    Alert :  Yes   Functional Comm : intact     Voice Quality : Weak     Velopharyngeal Closure: intact     Base of tongue contact with posterior pharyngeal wall: intact     Pharyngeal and longitudinal constrictors oh high pitched /i/: intact     Left true vocal fold adduction: impaired     Right true vocal fold adduction: impaired     Interarytenoid/Post-com Edema : No   Arytenoid Edema : No   Arytenoid Erythema : No   Vocal Fold Edema : No   Epiglottic inversion/retrovision on dry swallow:impaired     Secretions:   [place] Secretion Scale Emil Buenrostro et al. 2017)    Location  0 (No significant pooled secretions in pyriform fossae or laryngeal vestibule)   Amount in pyriform fossae  0 (No significant pooled secretions in pyriform fossae 0-20%)    Response  0 (Secretions in the pyriform fossae laryngeal vestibule effectively cleared)   Score: (out of 7)  0      Reason for referral:  Pt was referred for a FEES to assess the efficiency of his swallow function, assess for aspiration, make recommendations regarding safe dietary consistency, effective compensatory and safe eating environment. A flexible endoscope was passed transnasally to obtain a superior view of the hypopharynx for the purpose of functional evaluation of the patient's swallowing ability. The purpose and description of the procedure was explained in addition to the risks and options available to the patient and verbal informed consent was obtained prior to initiation of thin exam. The patient tolerated the procedure well and good views were obtained. A gel based topical lubricant was applied to the insertion tube of the endoscope prior to insertion and the scope was advanced through the left nares.     Swallowing Evaluation:  Testing position :Upright in bed   Consistencies provided: thin liquids, mildly (nectar) thick liquids , puree , soft solids and mixed consistency     Oral Phase:  Prolonged/impaired mastication   Premature bolus loss   Impaired A-P bolus transport     Pharyngeal Phase:  Pharyngeal pooling prior to swallow initiation   Delayed swallow initiation   Decreased hyolaryngeal excursion   Decreased laryngeal elevation   Decreased epiglottic inversion Decreased tongue base retraction   Pharyngeal residue     Penetration/Aspiration Scores across consistencies (Rowan et. al. 56)    CONSISTENCY  Pen/Asp rating Description    Thin   3) Material enters the airway, remains above the vocal folds, and is not ejected from the airway  5) Material enters the airway, contacts the vocal folds, and is not ejected from the airway  7) Material enters the airway, passes below the vocal folds, and is not ejected from the trachea despite effort    Mildly (nectar) thick   1) Material does not enter the airway     Puree   1) Material does not enter the airway     Soft Solid   1) Material does not enter the airway         The [place] Pharyngeal Residue Severity Rating Scale (Floyd et. al. 2015)     CONSISTENCY  Vallecular residue  Pyriform residue    Thin   Mild (5-25%, Epiglottic ligament visible)  Trace (1-5%, trace coating of mucosa)   Mildly (nectar) thick   Trace (1-5%, Trace coating of mucosa)  None (0%, No residue)   Puree   None (0%, No residue)  None (0%, No residue)   Soft Solid   Mild (5-25%, Epiglottic ligament visible)  None (0%, No residue)     Increased risk of aspiration due to:  Poor Oral control and/or copious oral residue   Redcued laryngopharyngeal sensation   Decreased airway closure     Dysphagia Treatment Goals  Pt will functionally tolerate recommended diet with no overt clinical s/s of aspiration   Pt/family will demonstrate understanding of results FEES, risk for aspiration, rationale for diet level and compensatory strategies   Pt will demonstrate improved sensory motor function via targeted exercises and appropriate treatment modalities     Endoscope was removed without incident, no adverse reactions. Education:  Reviewed results and recommendations of this evaluation, signs, symptoms, and risk of aspiration, as well as diet recommendations and strategies. Reviewed and discussed goals and plan of care.      Total Treatment Time / Charges   Time code minutes: 0 minutes   Total treatment time: 45 minutes     Signature:  Clyde Jensen CCC-SLP #[id number]  Speech Language Pathologist     Josie Dasilva, [address] [place] [address]  Speech Language Pathologist

## 2022-05-23 NOTE — PROGRESS NOTES
05/23/22 1208   RT Protocol   History Pulmonary Disease 0   Respiratory pattern 4   Breath sounds 0   Cough 0   Bronchodilator Assessment Score 4     Electronically signed by Bebeto Matias RCP on 5/23/2022 at 12:08 PM

## 2022-05-23 NOTE — RT PROTOCOL NOTE
RT Inhaler-Nebulizer Bronchodilator Protocol Note    There is a bronchodilator order in the chart from a provider indicating to follow the RT Bronchodilator Protocol and there is an Initiate RT Inhaler-Nebulizer Bronchodilator Protocol order as well (see protocol at bottom of note). CXR Findings:  XR CHEST PORTABLE    Result Date: 5/21/2022  Vascular congestion. Bilateral pleural effusions with airspace disease, greater on the left, pulmonary edema versus pneumonia. The progressive pattern favors edema. The findings from the last RT Protocol Assessment were as follows:   History Pulmonary Disease: None or smoker <15 pack years  Respiratory Pattern: Mild dyspnea at rest, irregular pattern, or RR 21-25 bpm  Breath Sounds: Clear breath sounds  Cough: Strong, spontaneous, non-productive  Indication for Bronchodilator Therapy:    Bronchodilator Assessment Score: 4    Aerosolized bronchodilator medication orders have been revised according to the RT Inhaler-Nebulizer Bronchodilator Protocol below. Respiratory Therapist to perform RT Therapy Protocol Assessment initially then follow the protocol. Repeat RT Therapy Protocol Assessment PRN for score 0-3 or on second treatment, BID, and PRN for scores above 3. No Indications - adjust the frequency to every 6 hours PRN wheezing or bronchospasm, if no treatments needed after 48 hours then discontinue using Per Protocol order mode. If indication present, adjust the RT bronchodilator orders based on the Bronchodilator Assessment Score as indicated below. Use Inhaler orders unless patient has one or more of the following: on home nebulizer, not able to hold breath for 10 seconds, is not alert and oriented, cannot activate and use MDI correctly, or respiratory rate 25 breaths per minute or more, then use the equivalent nebulizer order(s) with same Frequency and PRN reasons based on the score.   If a patient is on this medication at home then do not decrease Frequency below that used at home. 0-3 - enter or revise RT bronchodilator order(s) to equivalent RT Bronchodilator order with Frequency of every 4 hours PRN for wheezing or increased work of breathing using Per Protocol order mode. 4-6 - enter or revise RT Bronchodilator order(s) to two equivalent RT bronchodilator orders with one order with BID Frequency and one order with Frequency of every 4 hours PRN wheezing or increased work of breathing using Per Protocol order mode. 7-10 - enter or revise RT Bronchodilator order(s) to two equivalent RT bronchodilator orders with one order with TID Frequency and one order with Frequency of every 4 hours PRN wheezing or increased work of breathing using Per Protocol order mode. 11-13 - enter or revise RT Bronchodilator order(s) to one equivalent RT bronchodilator order with QID Frequency and an Albuterol order with Frequency of every 4 hours PRN wheezing or increased work of breathing using Per Protocol order mode. Greater than 13 - enter or revise RT Bronchodilator order(s) to one equivalent RT bronchodilator order with every 4 hours Frequency and an Albuterol order with Frequency of every 2 hours PRN wheezing or increased work of breathing using Per Protocol order mode. RT to enter RT Home Evaluation for COPD & MDI Assessment order using Per Protocol order mode.     Electronically signed by Lora Manley RCP on 5/23/2022 at 12:08 PM

## 2022-05-23 NOTE — CARE COORDINATION
According to [facility], [phone]  patient is a long term care bed hold from [address] [place] [address]. According to Palliative care, Daughter is awaiting speech recommendations prior to considering hospice. Please complete & sign the LUCÍA/AVS/Prescriptions,  RN please print LUCÍA/AVS once completed.  Thank you, Teofilo Rawls, MSELEAZAR, [phone]

## 2022-05-23 NOTE — PROGRESS NOTES
[facility]   Phone: [phone]    Occupational Therapy    [x] Initial Evaluation            [] Daily Treatment Note         [] Discharge Summary      Patient: Pam Sheffield   : 10/31/1925   MRN: [id number]   Date of Service:  2022    Admitting Diagnosis: Sepsis due to pneumonia [facility])  Current Admission Summary: This is a 80 y. o. male who presents to the ED for altered mental status.  Worsening over the past few days. [facility] did have falls on  and Monday. Annamaria Armas is able to tell me that he has no pain.  Specifically no chest or abdominal pain.  He has no numbness on his arms or legs.  He does not know where he is. [facility] cannot answer most open-ended questions. [facility] feels weak.  Per daughter at bedside, he is DNR comfort care.  His currently receiving treatment for UTI. Past Medical History:  has a past medical history of A-fib ([address] Arthritis, Atrial fibrillation ([address] Blepharitis of both eyes, CAD (coronary artery disease), GERD (gastroesophageal reflux disease), Gout, Hyperlipidemia, Hypertension, Macular degeneration, NSTEMI (non-ST elevated myocardial infarction) ([address] PNA (pneumonia), Prostate enlargement, Psychiatric problem, and Tachycardia. Past Surgical History:  has a past surgical history that includes Coronary angioplasty with stent (); skin biopsy; Kidney stone surgery (); Coronary artery bypass graft; Cataract removal (); TURP (); Skin cancer destruction (); Colonoscopy; eye surgery; Cardiac surgery; and hip surgery (Left, 2020). Discharge Recommendations: Pam Sheffield scored a 10/24 on the AM-PAC ADL Inpatient form. Current research shows that an AM-PAC score of 17 or less is typically not associated with a discharge to the patient's home setting.  Based on the patient's AM-PAC score and their current ADL deficits, it is recommended that the patient have 3-5 sessions per week of Occupational Therapy at d/c to increase the patient's independence. Please see assessment section for further patient specific details. If patient discharges prior to next session this note will serve as a discharge summary. Please see below for the latest assessment towards goals.        DME Required For Discharge: no DME required at discharge  Precautions/Restrictions: high fall risk  Weight Bearing Restrictions: no restrictions  []? Right Upper Extremity        []? Left Upper Extremity         []? Right Lower Extremity         []? Left Lower Extremity             Required Braces/Orthotics: no braces required              []? Right            []? Left  Positional Restrictions:no positional restrictions     Pre-Admission Information   Lives With: alone                     Type of Home: long term care facility  Home Layout: one level  Home Access: elevator  Bathroom Layout: walker accessible, wheelchair accessible, walk in shower  Toilet Height: elevated height  Bathroom Equipment: grab bars around toilet  Home Equipment: standard walker, manual wheelchair  Transfer Assistance: requires assistance  Ambulation Assistance:modified independent with use of W/c mostly and sometimes a little with a standard walker  ADL Assistance: requires assistance with bathing, requires assistance with dressing, requires assistance with grooming, requires assistance with toileting  IADL Assistance: requires assistance with all homemaking tasks, requires assistance with meal prep, requires assistance with laundry, requires assistance with vacuuming, requires assistance with cleaning, requires assistance with gardening, requires assistance with yard work, requires assistance with driving/transportation, requires assistance with shopping, requires assistance with , requires assistance for medication management, requires assistance for financial management  Active :        []? Yes                 [x]? No  Hand Dominance: []?  Left                 []? Right  Current Employment: retired. Occupation:  at [employer]: A couple times per daughter recently other than the two that brought him in     Examination   Vision:   Vision Corrective Device: wears glasses for distance  Hearing:   WFL  Observation:   General Observation:  Wound on forehead  Posture:    Flexed posture (rigidity and L hand tremor)  Coordination Testing:   Coordination and Movement Description: (R) UE, (L) UE, fine motor impairments, tremors, decreased speed, decreased accuracy    ROM:   (B) UE AROM WFL  Strength:   (B) UE strength grossly WFL    Decision Making: medium complexity  Clinical Presentation: unstable      Subjective  General: Patient supine in bed, confused but responsive to commands. Daughter present, states she is feeling \"very overwhelmed\" with patient's condition and decision making  Pain: 0/10  Pain Interventions: not applicable        Activities of Daily Living  Basic Activities of Daily Living  Grooming: moderate assistance  Grooming Comments: assist to wash and comb hair, maximal cues to carry through to wash face after setup  Upper Extremity Bathing: minimal assistance. Equipment: none  Lower Extremity Bathing: dependent. Equipment: none  Upper Extremity Dressing: minimal assistance. Equipment: none  Lower Extremity Dressing: dependent. Equipment: none  Dressing Comments: donning shoes  Toileting: dependent. Equipment: none  Toileting Comments: Purwick in place, no expressed need for BM during session  Instrumental Activities of Daily Living  No IADL completed on this date. Functional Mobility  Bed Mobility  Supine to Sit: maximum assistance  Scooting: maximum assistance  Comments:  Transfers  Sit to stand transfer:contact guard assistance  Stand to sit transfer: 2 person assistance with Swathi of 2 (stand<>sit with Nell Jewel.  With grab bar, patient able to bring self to stand with cues, increased difficulty initiating stand>sit)   Bed / Chair Transfer: stedy utilized requiring Kavon of 2 and cues for motor planning . Mobility technique: amari CHOI. Equipment utilized: no device  Comments:  Functional Mobility:  No functional mobility completed on this date. Other Therapeutic Interventions    Functional Outcomes  AM-PAC Inpatient Daily Activity Raw Score: 10    Cognition  Overall Cognitive Status: Impaired  Arousal/Alterness: delayed responses to stimuli  Following Commands: follows one step commands with increased time, inconsistently follows commands  Attention Span: difficulty attending to directions  Memory: decreased recall of recent events  Safety Judgement: decreased awareness of need for assistance, decreased awareness of need for safety  Problem Solving: assistance required to generate solutions, assistance required to implement solutions, decreased awareness of errors  Insights: decreased awareness of deficits  Initiation: requires cues for some  Sequencing: requires cues for some  Orientation:    oriented to person  Command Following:   impaired     Education  Barriers To Learning: cognition  Patient Education: patient educated on goals, OT role and benefits, plan of care, family education, discharge recommendations  Learning Assessment:  patient verbalizes understanding, would benefit from continued reinforcement, patient will require reinforcement due to cognitive deficits    Assessment  Activity Tolerance: Fair  Impairments Requiring Therapeutic Intervention: decreased functional mobility, decreased ADL status, decreased safety awareness, decreased cognition, decreased endurance, decreased balance  Prognosis: fair  Clinical Assessment: Patient presents with the above deficits, which are below baseline, and would benefit from continued skilled OT to address.  Patient's daughter reports patient has not ambulated in \"a few weeks\" and required a LIFT and w/c for mobility recently  Safety Interventions: patient left in chair, chair alarm in place, call light within reach, patient at risk for falls, nurse notified and family/caregiver present    Plan  Frequency: 3-5 x/per week  Current Treatment Recommendations: strengthening, balance training, transfer training, endurance training, patient/caregiver education, ADL/self-care training, cognitive reorientation and safety education    Goals  Patient Goals:  To return to facility   Short Term Goals:  Time Frame: Upon discharge  Patient will complete upper body ADL at stand by assistance   Patient will complete grooming at stand by assistance   Patient will complete functional transfers at moderate assistance   Patient will increase functional standing balance to Swathi 2-3 minutes for improved ADL completion  Patient will complete bed mobility at minimal assistance     Therapy Session Time     Individual Group Co-treatment   Time In    0840   Time Out    0950   Minutes    70        Timed Code Treatment Minutes:   55  Total Treatment Minutes:  70       Electronically Signed By: RIA Mccray/L [id number]

## 2022-05-23 NOTE — PROGRESS NOTES
Physical Therapy    [facility] [address]   Phone: [phone]    Physical Therapy    [x] Initial Evaluation            [] Daily Treatment Note         [] Discharge Summary      Patient: Moy Gamble   : 10/31/1925   MRN: [id number]   Date of Service:  2022  Admitting Diagnosis: Sepsis due to pneumonia [facility])  Current Admission Summary: This is a 80 y.o. male who presents to the ED for altered mental status. Worsening over the past few days. He did have falls on  and Monday. Patient is able to tell me that he has no pain. Specifically no chest or abdominal pain. He has no numbness on his arms or legs. He does not know where he is. He cannot answer most open-ended questions. He feels weak. Per daughter at bedside, he is DNR comfort care. His currently receiving treatment for UTI. Past Medical History:  has a past medical history of A-fib ([address] Arthritis, Atrial fibrillation ([address] Blepharitis of both eyes, CAD (coronary artery disease), GERD (gastroesophageal reflux disease), Gout, Hyperlipidemia, Hypertension, Macular degeneration, NSTEMI (non-ST elevated myocardial infarction) ([address] PNA (pneumonia), Prostate enlargement, Psychiatric problem, and Tachycardia. Past Surgical History:  has a past surgical history that includes Coronary angioplasty with stent (); skin biopsy; Kidney stone surgery (); Coronary artery bypass graft; Cataract removal (); TURP (); Skin cancer destruction (); Colonoscopy; eye surgery; Cardiac surgery; and hip surgery (Left, 2020). Discharge Recommendations: Moy Gamble scored a 7/ on the AM-PAC short mobility form. Current research shows that an AM-PAC score of 17 or less is typically not associated with a discharge to the patient's home setting.  Based on the patient's AM-PAC score and their current functional mobility deficits, it is recommended that the patient have 3-5 sessions per week of Physical Therapy at d/c to increase the patient's independence. Please see assessment section for further patient specific details. If patient discharges prior to next session this note will serve as a discharge summary. Please see below for the latest assessment towards goals. DME Required For Discharge: no DME required at discharge  Precautions/Restrictions: high fall risk  Weight Bearing Restrictions: no restrictions  [] Right Upper Extremity  [] Left Upper Extremity [] Right Lower Extremity  [] Left Lower Extremity     Required Braces/Orthotics: no braces required   [] Right  [] Left  Positional Restrictions:no positional restrictions    Pre-Admission Information   Lives With: alone    Type of Home: long term care facility  Home Layout: one level  Home Access: elevator  Bathroom Layout: walker accessible, wheelchair accessible, walk in shower  Toilet Height: elevated height  Bathroom Equipment: grab bars around toilet  Home Equipment: standard walker, manual wheelchair  Transfer Assistance: requires assistance  Ambulation Assistance:modified independent with use of W/c mostly and sometimes a little with a standard walker  ADL Assistance: requires assistance with bathing, requires assistance with dressing, requires assistance with grooming, requires assistance with toileting  IADL Assistance: requires assistance with all homemaking tasks, requires assistance with meal prep, requires assistance with laundry, requires assistance with vacuuming, requires assistance with cleaning, requires assistance with gardening, requires assistance with yard work, requires assistance with driving/transportation, requires assistance with shopping, requires assistance with , requires assistance for medication management, requires assistance for financial management  Active :        [] Yes  [x] No  Hand Dominance: [] Left  [] Right  Current Employment: retired.   Occupation:  at [employer]:   Recent Falls: A couple times per daughter recently other than the two that brought him in    Examination   Vision:   Vision Corrective Device: wears glasses for distance  Hearing:   [facility]  Observation:   General Observation:  Wound on forehead  Posture:   Flexed posture  Tone:   Hypertonic in (B) LE    ROM:   (L) Hip: 50% of full range, unable to fully extend     (R) Hip: 50% of full range, unable to fully extend  (L) Knee: 50% of full range, unable to fully extend     (R) Knee: 50% of full range, unable to fully extend  (L) Ankle: 80% of full range     (R) Ankle: 80% of full range  Strength:   (B) LE strength grossly WFL  (L) Hip: +3        (R) Hip: +3  Decision Making: low complexity  Clinical Presentation: stable      Subjective  General: Pt supine in bed with head elevated. Pt agreeable to PT/OT. Daughter present entire therapy session. Pain: 5/10. Location: mouth and throat likely from the lack of water  Pain Interventions: RN notified       Functional Mobility  Bed Mobility  Supine to Sit: maximum assistance  Rolling Right: maximum assistance  Scooting: maximum assistance  Comments: Supine to sit progressed from min ax1 to max ax2. Pt was able to lift legs independently but required max a to roll and to shift hips toward EOB. Transfers  Sit to stand transfer: 2 person assistance with CGA   Stand to sit transfer: 2 person assistance with min a   Comments: Transfer completed using a amari stedy. Pt was able to pull himself up to stand. Pt required min a to lower to chair, requiring increased time to do so. Pt knees remained in ~120 degrees of flexion the entire time while standing. Ambulation  Ambulation not tested on this date. Distance:   Gait Mechanics:  Comments:    Stair Mobility  Stair mobility not completed on this date. Comments:  Wheelchair Mobility:  No w/c mobility completed on this date.   Comments:  Balance  Static Sitting Balance: fair: maintains balance at CGA without use of UE support  Dynamic Sitting Balance: fair (-): maintains balance at CGA with use of UE support  Static Standing Balance: fair (-): maintains balance at CGA with use of UE support  Comments: Pt sat EOB ~6-8 minutes while rearranging lines and completing ADL's with SBA. Pt stood ~1-2 minutes with use of amari stedy and then semi-stood ~1-2 using the seat on the amari stedy with CGA. Pt sat supported in chair ~6-8 minutes while completing ADL's. Other Therapeutic Interventions    Functional Outcomes  AM-PAC Inpatient Mobility Raw Score : 7              Cognition  WFL  Arousal/Alterness: delayed responses to stimuli  Orientation:    alert and oriented x 4  Command Following:   [facility] Pt demonstrated delayed responses to commands or questions but was able to answer/respond appropriately. Education  Barriers To Learning: none  Patient Education: patient educated on goals, PT role and benefits, plan of care, general safety, functional mobility training, family education, injury prevention, discharge recommendations  Learning Assessment:  patient verbalizes understanding, would benefit from continued reinforcement    Assessment  Activity Tolerance: Pt activity tolerance limited by decreased balance, strength, endurance and ROM. Impairments Requiring Therapeutic Intervention: decreased functional mobility, decreased ADL status, decreased ROM, decreased strength, decreased endurance, decreased balance  Prognosis: fair  Clinical Assessment: Pt limited in functional mobility by decreased strength, endurance, ROM and balance. Pt PLOF was requiring some assistance with transfers and ambulation. Pt could self-mobilize a wheelchair using feet and could occasionally walk with walker. Pt required some assistance with most self-care tasks except feeding and also required full assistance with household tasks.  Pt would benefit from skilled PT services to address deficits in strength, endurance, ROM balance to facilitate safe functional mobility and return to [facility]. Safety Interventions: patient left in chair, chair alarm in place, call light within reach, gait belt, patient at risk for falls, telesitter in use, nurse notified and family/caregiver present    Plan  Frequency: 3-5 x/per week  Current Treatment Recommendations: strengthening, ROM, balance training, functional mobility training, gait training, endurance training, wheelchair mobility training and safety education    Goals  Patient Goals: Pt did not state   Short Term Goals:  Time Frame: By discharge  Patient will complete bed mobility at minimal assistance   Patient will complete transfers at minimal assistance   Patient will ambulate 25 ft with use of rolling walker at minimal assistance    Therapy Session Time      Individual Group Co-treatment   Time In     0840   Time Out     0950   Minutes     70     Timed Code Treatment Minutes:  55 Minutes  Total Treatment Minutes:  70 minutes     [address] [facility]  Electronically Signed By:   PT observed and directed the above evaluation/treatment.   [address] [place], DPT [id number]

## 2022-05-23 NOTE — PROGRESS NOTES
Facility/Department: [address]  Speech Language Pathology   Dysphagia Treatment Note    Patient: Sarah Crow   : 10/31/1925   MRN: [id number]      Evaluation Date: 2022      Admitting Dx: NSTEMI (non-ST elevated myocardial infarction) ([facility] [I21.4]  JUAN R (acute kidney injury) ([facility] [N17.9]  Multi-organ failure with heart failure ([facility] [I50.9]  Septic shock ([facility] [A41.9, R65.21]  Sepsis ([facility] [A41.9]  Pneumonia due to infectious organism, unspecified laterality, unspecified part of lung [J18.9]  Treatment Diagnosis: Oropharyngeal Dysphagia   Pain: Denies                                              Diet and Treatment Recommendations 2022:  Diet Solids Recommendation:  NPO  Liquid Consistency Recommendation:  NPO Recommended form of Meds: Via alternative means      Recommend completion of Fiberoptic Endoscopic Evaluation of Swallowing (FEES) to further assess pharyngeal phase of swallow     Compensatory strategies:   TBD     Assessment of Texture Tolerance:  Diet level prior to treatment: NPO ,    Tolerance of Current Diet Level:N/A    Impressions: Pt was positioned Upright in bed , awake and alert. Currently on 2L O2 via nasal cannula . Trials of thin liquids and puree  were provided to assess swallow function. Pt was seen at bedside to assess appropriateness for FEES this date due to prior lethargy. Pt more awake and alert, daughter at bedside. Pt demonstrated suspected pharyngeal pooling with delayed swallow initiation and use of multiple swallows to clear. Slight vocal quality change was noted following thin liquids. Pt reported increased difficulty swallowing from baseline. Due to s/s of pharyngeal phase dysphagia at bedside and concerns for aspiration PNA recommend completion of FEES for further assessment.      Dysphagia Goals:   Pt will functionally tolerate recommended diet with no overt clinical s/s of aspiration (Ongoing 22)  Pt will advance to least restrictive diet as indicated (Ongoing 05/23/22)  Pt will participate in Fiberoptic Endoscopic Evaluation of Swallowing (FEES) to further assess pharyngeal phase of swallow, assist with diet recommendations and to further direct plan of care (Ongoing 05/23/22)    Plan:   3-5 times per week during acute care stay. Patient/Family Education:  Provided education regarding role of SLP, recommendations and general speech pathology plan of care. [] Pt verbalized understanding and agreement   [x] Pt requires ongoing learning   [] No evidence of comprehension     Discharge Recommendations:    Recommend ongoing SLP for dysphagia therapy upon discharge from hospital     Timed Code Treatment:  0 minutes     Total Treatment Time: 15 minutes     If patient discharges prior to next session this note will serve as a discharge summary.      Stefan Ramos, [facility]  Speech Language Pathologist

## 2022-05-24 PROBLEM — I34.0 NONRHEUMATIC MITRAL VALVE REGURGITATION: Status: ACTIVE | Noted: 2022-05-24

## 2022-05-24 LAB
ANION GAP SERPL CALCULATED.3IONS-SCNC: 11 MMOL/L (ref 3–16)
BLOOD CULTURE, ROUTINE: NORMAL
BUN BLDV-MCNC: 19 MG/DL (ref 7–20)
CALCIUM SERPL-MCNC: 8.3 MG/DL (ref 8.3–10.6)
CHLORIDE BLD-SCNC: 108 MMOL/L (ref 99–110)
CO2: 23 MMOL/L (ref 21–32)
CREAT SERPL-MCNC: 1.3 MG/DL (ref 0.8–1.3)
CULTURE, BLOOD 2: NORMAL
GFR AFRICAN AMERICAN: >60
GFR NON-AFRICAN AMERICAN: 51
GLUCOSE BLD-MCNC: 104 MG/DL (ref 70–99)
HCT VFR BLD CALC: 31.7 % (ref 40.5–52.5)
HEMOGLOBIN: 10.2 G/DL (ref 13.5–17.5)
MCH RBC QN AUTO: 29.6 PG (ref 26–34)
MCHC RBC AUTO-ENTMCNC: 32.3 G/DL (ref 31–36)
MCV RBC AUTO: 91.8 FL (ref 80–100)
PDW BLD-RTO: 14.7 % (ref 12.4–15.4)
PLATELET # BLD: 314 K/UL (ref 135–450)
PMV BLD AUTO: 6.9 FL (ref 5–10.5)
POTASSIUM SERPL-SCNC: 4.2 MMOL/L (ref 3.5–5.1)
RBC # BLD: 3.45 M/UL (ref 4.2–5.9)
SODIUM BLD-SCNC: 142 MMOL/L (ref 136–145)
WBC # BLD: 4.5 K/UL (ref 4–11)

## 2022-05-24 PROCEDURE — 2580000003 HC RX 258: Performed by: INTERNAL MEDICINE

## 2022-05-24 PROCEDURE — 85027 COMPLETE CBC AUTOMATED: CPT

## 2022-05-24 PROCEDURE — 2580000003 HC RX 258: Performed by: EMERGENCY MEDICINE

## 2022-05-24 PROCEDURE — 80048 BASIC METABOLIC PNL TOTAL CA: CPT

## 2022-05-24 PROCEDURE — 97530 THERAPEUTIC ACTIVITIES: CPT

## 2022-05-24 PROCEDURE — 6370000000 HC RX 637 (ALT 250 FOR IP): Performed by: INTERNAL MEDICINE

## 2022-05-24 PROCEDURE — 97535 SELF CARE MNGMENT TRAINING: CPT

## 2022-05-24 PROCEDURE — 92526 ORAL FUNCTION THERAPY: CPT

## 2022-05-24 PROCEDURE — 36415 COLL VENOUS BLD VENIPUNCTURE: CPT

## 2022-05-24 PROCEDURE — 94761 N-INVAS EAR/PLS OXIMETRY MLT: CPT

## 2022-05-24 PROCEDURE — 1200000000 HC SEMI PRIVATE

## 2022-05-24 PROCEDURE — 6360000002 HC RX W HCPCS: Performed by: INTERNAL MEDICINE

## 2022-05-24 PROCEDURE — 94640 AIRWAY INHALATION TREATMENT: CPT

## 2022-05-24 RX ORDER — TORSEMIDE 20 MG/1
20 TABLET ORAL DAILY
Status: DISCONTINUED | OUTPATIENT
Start: 2022-05-24 | End: 2022-05-26 | Stop reason: HOSPADM

## 2022-05-24 RX ADMIN — CEFEPIME HYDROCHLORIDE 1000 MG: 1 INJECTION, POWDER, FOR SOLUTION INTRAMUSCULAR; INTRAVENOUS at 11:18

## 2022-05-24 RX ADMIN — LORAZEPAM 0.5 MG: 2 INJECTION INTRAMUSCULAR; INTRAVENOUS at 21:31

## 2022-05-24 RX ADMIN — IPRATROPIUM BROMIDE AND ALBUTEROL SULFATE 1 AMPULE: 2.5; .5 SOLUTION RESPIRATORY (INHALATION) at 20:13

## 2022-05-24 RX ADMIN — IPRATROPIUM BROMIDE AND ALBUTEROL SULFATE 1 AMPULE: 2.5; .5 SOLUTION RESPIRATORY (INHALATION) at 08:54

## 2022-05-24 RX ADMIN — Medication 600 MG: at 14:35

## 2022-05-24 RX ADMIN — FINASTERIDE 5 MG: 5 TABLET, FILM COATED ORAL at 09:30

## 2022-05-24 RX ADMIN — DILTIAZEM HYDROCHLORIDE 30 MG: 30 TABLET, FILM COATED ORAL at 21:30

## 2022-05-24 RX ADMIN — FERROUS SULFATE TAB 325 MG (65 MG ELEMENTAL FE) 325 MG: 325 (65 FE) TAB at 09:30

## 2022-05-24 RX ADMIN — STANDARDIZED SENNA CONCENTRATE AND DOCUSATE SODIUM 1 TABLET: 8.6; 5 TABLET ORAL at 21:31

## 2022-05-24 RX ADMIN — DILTIAZEM HYDROCHLORIDE 30 MG: 30 TABLET, FILM COATED ORAL at 14:35

## 2022-05-24 RX ADMIN — DILTIAZEM HYDROCHLORIDE 30 MG: 30 TABLET, FILM COATED ORAL at 09:30

## 2022-05-24 RX ADMIN — Medication 400 MG: at 09:30

## 2022-05-24 RX ADMIN — APIXABAN 2.5 MG: 5 TABLET, FILM COATED ORAL at 21:30

## 2022-05-24 RX ADMIN — LORAZEPAM 0.5 MG: 2 INJECTION INTRAMUSCULAR; INTRAVENOUS at 09:30

## 2022-05-24 RX ADMIN — LAMOTRIGINE 150 MG: 100 TABLET ORAL at 09:30

## 2022-05-24 RX ADMIN — ENOXAPARIN SODIUM 30 MG: 30 INJECTION SUBCUTANEOUS at 09:30

## 2022-05-24 RX ADMIN — Medication 10 ML: at 09:30

## 2022-05-24 RX ADMIN — APIXABAN 2.5 MG: 5 TABLET, FILM COATED ORAL at 09:30

## 2022-05-24 RX ADMIN — Medication 10 ML: at 23:29

## 2022-05-24 RX ADMIN — Medication 10 ML: at 21:31

## 2022-05-24 RX ADMIN — CEFEPIME HYDROCHLORIDE 1000 MG: 1 INJECTION, POWDER, FOR SOLUTION INTRAMUSCULAR; INTRAVENOUS at 23:29

## 2022-05-24 RX ADMIN — TORSEMIDE 20 MG: 20 TABLET ORAL at 09:30

## 2022-05-24 RX ADMIN — STANDARDIZED SENNA CONCENTRATE AND DOCUSATE SODIUM 1 TABLET: 8.6; 5 TABLET ORAL at 09:30

## 2022-05-24 RX ADMIN — LEVOTHYROXINE SODIUM 25 MCG: 0.03 TABLET ORAL at 09:30

## 2022-05-24 ASSESSMENT — ENCOUNTER SYMPTOMS
TROUBLE SWALLOWING: 1
EYES NEGATIVE: 1
SHORTNESS OF BREATH: 1
ABDOMINAL DISTENTION: 1
ALLERGIC/IMMUNOLOGIC NEGATIVE: 1

## 2022-05-24 ASSESSMENT — PAIN SCALES - GENERAL
PAINLEVEL_OUTOF10: 0

## 2022-05-24 ASSESSMENT — PAIN SCALES - WONG BAKER
WONGBAKER_NUMERICALRESPONSE: 0

## 2022-05-24 NOTE — PROGRESS NOTES
Physical Therapy    [facility] [address]   Phone: [phone]    Physical Therapy    [] Initial Evaluation            [x] Daily Treatment Note         [] Discharge Summary      Patient: Ermalinda Severin   : 10/31/1925   MRN: [id number]   Date of Service:  2022  Admitting Diagnosis: Sepsis due to pneumonia [facility])  Current Admission Summary: This is a 80 y.o. male who presents to the ED for altered mental status. Worsening over the past few days. He did have falls on  and Monday. Patient is able to tell me that he has no pain. Specifically no chest or abdominal pain. He has no numbness on his arms or legs. He does not know where he is. He cannot answer most open-ended questions. He feels weak. Per daughter at bedside, he is DNR comfort care. His currently receiving treatment for UTI. Past Medical History:  has a past medical history of A-fib ([address] Arthritis, Atrial fibrillation ([address] Blepharitis of both eyes, CAD (coronary artery disease), GERD (gastroesophageal reflux disease), Gout, Hyperlipidemia, Hypertension, Macular degeneration, NSTEMI (non-ST elevated myocardial infarction) ([address] PNA (pneumonia), Prostate enlargement, Psychiatric problem, and Tachycardia. Past Surgical History:  has a past surgical history that includes Coronary angioplasty with stent (); skin biopsy; Kidney stone surgery (); Coronary artery bypass graft; Cataract removal (); TURP (); Skin cancer destruction (); Colonoscopy; eye surgery; Cardiac surgery; and hip surgery (Left, 2020). Discharge Recommendations: Ermalinda Severin scored a 7/ on the AM-PAC short mobility form. Current research shows that an AM-PAC score of 17 or less is typically not associated with a discharge to the patient's home setting.  Based on the patient's AM-PAC score and their current functional mobility deficits, it is recommended that the patient have 3-5 sessions per week of Physical Therapy at d/c to increase the patient's independence. Please see assessment section for further patient specific details. If patient discharges prior to next session this note will serve as a discharge summary. Please see below for the latest assessment towards goals. DME Required For Discharge: no DME required at discharge  Precautions/Restrictions: high fall risk  Weight Bearing Restrictions: no restrictions  [] Right Upper Extremity  [] Left Upper Extremity [] Right Lower Extremity  [] Left Lower Extremity     Required Braces/Orthotics: no braces required   [] Right  [] Left  Positional Restrictions:no positional restrictions    Pre-Admission Information   Lives With: alone    Type of Home: long term care facility  Home Layout: one level  Home Access: elevator  Bathroom Layout: walker accessible, wheelchair accessible, walk in shower  Toilet Height: elevated height  Bathroom Equipment: grab bars around toilet  Home Equipment: standard walker, manual wheelchair  Transfer Assistance: requires assistance  Ambulation Assistance:modified independent with use of W/c mostly and sometimes a little with a standard walker  ADL Assistance: requires assistance with bathing, requires assistance with dressing, requires assistance with grooming, requires assistance with toileting  IADL Assistance: requires assistance with all homemaking tasks, requires assistance with meal prep, requires assistance with laundry, requires assistance with vacuuming, requires assistance with cleaning, requires assistance with gardening, requires assistance with yard work, requires assistance with driving/transportation, requires assistance with shopping, requires assistance with , requires assistance for medication management, requires assistance for financial management  Active :        [] Yes  [x] No  Hand Dominance: [] Left  [] Right  Current Employment: retired.   Occupation:  at [employer]:   Recent Falls: A couple times per daughter recently other than the two that brought him in      Subjective  General: Pt supine in bed with head elevated. Pt agreeable to PT/OT. Pain: 5/10. Location: mouth and throat likely from the lack of water  Pain Interventions: RN notified       Functional Mobility  Bed Mobility  Supine to Sit: dependent assistance, 2 person assistance with Max A of 2   Rolling Right: dependent assistance, 2 person assistance with Max a of 2   Scooting: dependent assistance, 2 person assistance with Max A of 2   Comments:  Transfers  Sit to stand transfer: dependent assistance, 2 person assistance with Max A of 2   Stand to sit transfer: dependent assistance, 2 person assistance with Min A   Comments: all transfers with [place]. TC and VC for B hand and foot placement. Ambulation  Ambulation not tested on this date. Distance:   Gait Mechanics:  Comments:    Stair Mobility  Stair mobility not completed on this date. Comments:  Wheelchair Mobility:  No w/c mobility completed on this date. Comments:  Balance  Static Sitting Balance: fair: maintains balance at CGA without use of UE support  Dynamic Sitting Balance: fair (-): maintains balance at CGA-Mod A with use of UE support  Comments: Pt sat EOB SBA-Min A ~25 min for ADL's. Poor motor planning, rigid  Other Therapeutic Interventions    Functional Outcomes  AM-PAC Inpatient Mobility Raw Score : 7              Cognition  WFL  Arousal/Alterness: delayed responses to stimuli  Orientation:    alert and oriented x 4  Command Following:   [facility] Pt demonstrated delayed responses to commands or questions but was able to answer/respond appropriately.     Education  Barriers To Learning: none  Patient Education: patient educated on goals, PT role and benefits, plan of care, general safety, functional mobility training, family education, injury prevention, discharge recommendations  Learning Assessment:  patient verbalizes understanding, would benefit from continued reinforcement    Assessment  Activity Tolerance: Pt activity tolerance limited by decreased balance, strength, endurance and ROM. Impairments Requiring Therapeutic Intervention: decreased functional mobility, decreased ADL status, decreased ROM, decreased strength, decreased endurance, decreased balance  Prognosis: fair  Clinical Assessment: Pt limited in functional mobility by decreased strength, endurance, ROM and balance. Currently, pt requires Max A of 2 for functional bed mobility and transfers. Poor motor planning and initiation require increased assistance. Pt PLOF was requiring some assistance with transfers and ambulation. Pt could self-mobilize a wheelchair using feet and could occasionally walk with walker. Pt required some assistance with most self-care tasks except feeding and also required full assistance with household tasks. Pt would benefit from skilled PT services to address deficits in strength, endurance, ROM balance to facilitate safe functional mobility and return to PLOF.    Safety Interventions: patient left in chair, chair alarm in place, call light within reach, gait belt, patient at risk for falls, telesitter in use, nurse notified and family/caregiver present    Plan  Frequency: 3-5 x/per week  Current Treatment Recommendations: strengthening, ROM, balance training, functional mobility training, gait training, endurance training, wheelchair mobility training and safety education    Goals  Patient Goals: Pt did not state   Short Term Goals:  Time Frame: By discharge  Patient will complete bed mobility at minimal assistance   Patient will complete transfers at minimal assistance   Patient will ambulate 25 ft with use of rolling walker at minimal assistance  *no goals met    Therapy Session Time      Individual Group Co-treatment   Time In     0946   Time Out     1039   Minutes     53     Timed Code Treatment Minutes:   53   Total Treatment Minutes:  53        Electronically Signed By: Surjit Gaffney PTA  I have read the above note and agree with the documentation. Goals addressed by PT. Rudell Opitz, [place], [place] [id number]

## 2022-05-24 NOTE — PROGRESS NOTES
[facility] [address]   Phone: [phone]    Occupational Therapy    [] Initial Evaluation            [x] Daily Treatment Note         [] Discharge Summary      Patient: Rema Gentile   : 10/31/1925   MRN: [id number]   Date of Service:  2022    Admitting Diagnosis: Sepsis due to pneumonia [facility])  Current Admission Summary: This is a 80 y. o. male who presents to the ED for altered mental status.  Worsening over the past few days. [facility] did have falls on  and Monday. Cira Cuello is able to tell me that he has no pain.  Specifically no chest or abdominal pain.  He has no numbness on his arms or legs.  He does not know where he is. [facility] cannot answer most open-ended questions. [facility] feels weak.  Per daughter at bedside, he is DNR comfort care.  His currently receiving treatment for UTI. Past Medical History:  has a past medical history of A-fib ([facility] [address] Arthritis, Atrial fibrillation ([facility] [address] Blepharitis of both eyes, CAD (coronary artery disease), GERD (gastroesophageal reflux disease), Gout, Hyperlipidemia, Hypertension, Macular degeneration, NSTEMI (non-ST elevated myocardial infarction) ([facility] [address] PNA (pneumonia), Prostate enlargement, Psychiatric problem, and Tachycardia. Past Surgical History:  has a past surgical history that includes Coronary angioplasty with stent (); skin biopsy; Kidney stone surgery (); Coronary artery bypass graft; Cataract removal (); TURP (); Skin cancer destruction (); Colonoscopy; eye surgery; Cardiac surgery; and hip surgery (Left, 2020). Discharge Recommendations: Rema Gentile scored a  on the AM-PAC ADL Inpatient form. Current research shows that an AM-PAC score of 17 or less is typically not associated with a discharge to the patient's home setting.  Based on the patient's AM-PAC score and their current ADL deficits, it is recommended that the patient have 3-5 sessions per week of Occupational Therapy at d/c to increase the patient's independence. Please see assessment section for further patient specific details. If patient discharges prior to next session this note will serve as a discharge summary. Please see below for the latest assessment towards goals.        DME Required For Discharge: no DME required at discharge  Precautions/Restrictions: high fall risk  Weight Bearing Restrictions: no restrictions  []? Right Upper Extremity        []? Left Upper Extremity         []? Right Lower Extremity         []? Left Lower Extremity             Required Braces/Orthotics: no braces required              []? Right            []? Left  Positional Restrictions:no positional restrictions     Pre-Admission Information   Lives With: alone                     Type of Home: long term care facility  Home Layout: one level  Home Access: elevator  Bathroom Layout: walker accessible, wheelchair accessible, walk in shower  Toilet Height: elevated height  Bathroom Equipment: grab bars around toilet  Home Equipment: standard walker, manual wheelchair  Transfer Assistance: requires assistance  Ambulation Assistance:modified independent with use of W/c mostly and sometimes a little with a standard walker  ADL Assistance: requires assistance with bathing, requires assistance with dressing, requires assistance with grooming, requires assistance with toileting  IADL Assistance: requires assistance with all homemaking tasks, requires assistance with meal prep, requires assistance with laundry, requires assistance with vacuuming, requires assistance with cleaning, requires assistance with gardening, requires assistance with yard work, requires assistance with driving/transportation, requires assistance with shopping, requires assistance with , requires assistance for medication management, requires assistance for financial management  Active :        []? Yes                 [x]? No  Hand Dominance: []?  Left                 []? Right  Current Employment: retired. Occupation:  at [employer]: A couple times per daughter recently other than the two that brought him in         Subjective  General: Patient supine in bed, confused but responsive to commands, decreased command following with fatigue during session. PT agreeable to OT/PT co-treatment. Pain: 0/10 and 4/10. Location: abdomin  Pain Interventions: patient denies pain interventions, RN notified and repositioned         Activities of Daily Living  Basic Activities of Daily Living  Grooming: maximum assistance dependent requires verbal cueing Increased time to complete task  Grooming Comments: assist to wash and comb hair, maximal cues to carry through to wash face after setup, A for thoroughness  Upper Extremity Bathing: maximum assistance dependent. Equipment: none  Lower Extremity Bathing: dependent. Equipment: none  Upper Extremity Dressing: maximum assistance. Equipment: none  Lower Extremity Dressing: . Comment: none performed. Equipment: none  Dressing Comments: not performed  Toileting: dependent. Equipment: none  Toileting Comments: Male external catheter present, no needs expressed for BM  Instrumental Activities of Daily Living  No IADL completed on this date. Functional Mobility  Bed Mobility  Supine to Sit: 2 person assistance with Max A of 2   Scooting: maximum assistance, towards EOB  Comments: cueing for initiation and sequence  Transfers  Sit to stand transfer:2 person assistance with max A of 2   Stand to sit transfer: 2 person assistance with Max A of 2   Bed / Chair Transfer: stedy utilized requiring max A of 2, cueing for motor planning all transitional movements, pt very rigid . Mobility technique: amari CHOI. Equipment utilized: no device  Comments:  Functional Mobility:  Sitting Balance: minimal assistance, .   Comment Max A progressing to Min A, max verbal and tactile cueing for hand and foot placement, decreased proprioceptive awareness seated EOB. Sitting Balance Comment: pt tolerated sitting EOB ~15-20 minutes for ADL completion  No functional mobility completed on this date. Other Therapeutic Interventions    Functional Outcomes  AM-PAC Inpatient Daily Activity Raw Score: 9    Cognition  Overall Cognitive Status: Impaired  Arousal/Alterness: delayed responses to stimuli  Following Commands: follows one step commands with increased time, inconsistently follows commands  Attention Span: difficulty attending to directions  Memory: decreased recall of recent events  Safety Judgement: decreased awareness of need for assistance, decreased awareness of need for safety  Problem Solving: assistance required to generate solutions, assistance required to implement solutions, decreased awareness of errors  Insights: decreased awareness of deficits  Initiation: requires cues for some  Sequencing: requires cues for some  Orientation:    oriented to person, aware of being in a hospital, disoriented to situation and time  Command Following:   impaired, inconsistently follows commands      Education  Barriers To Learning: cognition, physical deficits, lethargy  Patient Education: patient educated on goals, OT role and benefits, plan of care, ADL adaptive strategies, proper use of assistive device/equipment, orientation, discharge recommendations  Learning Assessment:  patient verbalizes understanding, would benefit from continued reinforcement, patient will require reinforcement due to cognitive deficits    Assessment  Activity Tolerance: Fair  Impairments Requiring Therapeutic Intervention: decreased functional mobility, decreased ADL status, decreased safety awareness, decreased cognition, decreased endurance, decreased balance  Prognosis: fair  Clinical Assessment: Patient presents with the above deficits, which are below baseline, and would benefit from continued skilled OT to address.  Pt with increased lethargy this session 5/24, requiring 2 person assist for all transfers and bed mobility tasks this date. Max/dependent assist to perform all ADL tasks d/t lethargy, decreased initiation and motor planning of tasks. Patient's daughter reports patient has not ambulated in \"a few weeks\" and required a LIFT and w/c for mobility recently, stated 5/23. Safety Interventions: patient left in chair, chair alarm in place, call light within reach, patient at risk for falls and nurse notified    Plan  Frequency: 3-5 x/per week  Current Treatment Recommendations: strengthening, balance training, transfer training, endurance training, patient/caregiver education, ADL/self-care training, cognitive reorientation and safety education    Goals  Patient Goals:  To return to facility   Short Term Goals:  Time Frame: Upon discharge (no goals met this date 5/24)  Patient will complete upper body ADL at stand by assistance   Patient will complete grooming at stand by assistance   Patient will complete functional transfers at moderate assistance   Patient will increase functional standing balance to Swathi 2-3 minutes for improved ADL completion  Patient will complete bed mobility at minimal assistance     Therapy Session Time     Individual Group Co-treatment   Time In    0946   Time Out    1039   Minutes    53        Timed Code Treatment Minutes:   53 Minutes  Total Treatment Minutes:  53 Minutes       Electronically Signed By: [facility], [address] [place] [address]

## 2022-05-24 NOTE — PROGRESS NOTES
PALLIATIVE MEDICINE PROGRESS NOTE     Patient name:Bartolome Moreland    TCQ:[id number] :10/31/1925  Room/Bed:[facility]3359/3359-01    LOS: 4 days        ASSESSMENT/RECOMMENDATIONS     80 y.o. male with lethargy and dysphagia        Symptom Management:  Lethargy- pt drowsy arouses for short period then dozes back off  Dysphagia- pt unsafe to swallow on BSE and CXR consistent with aspiration. FEES planned for today  Goals of Care-talked to pt and daughter Rangel High as well as pts two other children at bedside regarding Hospice care. They are all in agreement that they want pt to continue IV antibiotics and to improve as much as possible but that they understand that at his age aspiration will be a continued issue and they want HOC at DC with the goal of not bringing pt back to hospital and allowing him to eat for comfort regardless of risk. Referral to [facility] faxed.      Patient/Family Goals of Care :      talked to pt and daughter Rangel High at bedside pt states that he is ready to go if its his time he is [facility] at baseline. He is c/o being thirsty. Talked to daughter about the likelihood that he is aspirating and that Hospice care with comfort feeding sounds like the patients goal. Daughter is upset that pt has taken a down trend she is hopeful that she will recover and have a couple more years. She is awaiting FEES and SLP input prior to making any decisions regarding Hospice care.   talked to pt and daughter Rangel High as well as pts two other children at bedside regarding Hospice care. They are all in agreement that they want pt to continue IV antibiotics and to improve as much as possible but that they understand that at his age aspiration will be a continued issue and they want HOC at DC with the goal of not bringing pt back to hospital and allowing him to eat for comfort regardless of risk.  Referral to [facility] faxed.      Disposition/Discharge Plan:   pending     Advance Directives:  Surrogate Decision Maker: Oscar-daughter  Code status:  DNR-CC     Case discussed with: patient, floor RN  Thank you for allowing us to participate in the care of this patient. SUBJECTIVE     Chief Complaint: Lethergy    Last 24 hours:   Pt alert and up in chair this am but increased lethargy in the afternoon. ROS:    Review of Systems   Constitutional: Positive for activity change and fatigue. HENT: Positive for trouble swallowing. Eyes: Negative. Respiratory: Positive for shortness of breath. Cardiovascular: Positive for leg swelling. Gastrointestinal: Positive for abdominal distention. Endocrine: Negative. Genitourinary: Negative. Musculoskeletal: Positive for arthralgias. Skin: Positive for pallor. Allergic/Immunologic: Negative. Neurological: Positive for weakness. Psychiatric/Behavioral: Positive for confusion. OBJECTIVE   /72   Pulse 82   Temp 97.7 °F (36.5 °C) (Oral)   Resp 18   Ht 5' 8\" (1.727 m)   Wt 138 lb 3.2 oz (62.7 kg)   SpO2 90%   BMI 21.01 kg/m²   I/O last 3 completed shifts: In: 979.1 [P.O.:240; I.V.:739.1]  Out: 1200 [Urine:1200]  No intake/output data recorded. Physical Exam  Constitutional:       Appearance: He is ill-appearing. Comments: drowsy   HENT:      Head: Normocephalic and atraumatic. Nose: Nose normal.      Mouth/Throat:      Mouth: Mucous membranes are dry. Eyes:      Pupils: Pupils are equal, round, and reactive to light. Cardiovascular:      Rate and Rhythm: Normal rate. Pulses: Normal pulses. Heart sounds: No murmur heard. No gallop. Pulmonary:      Effort: Pulmonary effort is normal.   Chest:      Chest wall: Tenderness present. Abdominal:      General: There is distension. Musculoskeletal:      Cervical back: Neck supple. Right lower leg: Edema present. Left lower leg: Edema present. Skin:     General: Skin is dry. Coloration: Skin is pale.    Neurological:      Mental Status: Mental status is at baseline.       Comments: Drowsy and unable to assess orientation               Total time: 60 minutes  >50% of time spent counseling patient at bedside or POA/family member if applicable , reviewing information and discussing care, coordinating with care team       Signed By: Electronically signed by MARIS Blanchard CNP on 5/24/2022 at 12:55 PM   Palliative Medicine   [phone]    May 24, 2022

## 2022-05-24 NOTE — PROGRESS NOTES
1. Continue current plan. Stable on current medication without adverse events. 2. Refill  Norco 10/325 mg. Take 1 tablet up to 4 times daily as needed. May take an occasional extra tablet not to exceed 135 per month. Plan to taper down to #120 next visit  3. Continue with MiraLAX as needed  4. Please discuss alternative treatments for Xanax with PCP as soon as possible as we were no longer be able to prescribe opioid medications while you are concurrently using benzodiazepines. Naloxone 4 mg nasal spray for opioid induced respiratory depression emergency only. Discussed risks of addiction, dependency, and opioid induced hyperalgesia.   Return to clinic in 3 months Speech Language Pathology  Attempt  Gayle Rodriguezcinthia   10/31/1925    Attempted to see pt for dysphagia therapy follow-up. Working with PT/OT at time of attempt. Will re-attempt to follow-up as able.     Thanks,  Sagrario Damian, [address] [facility]  Speech Language Pathologist

## 2022-05-24 NOTE — PROGRESS NOTES
Department of Internal Medicine  General Internal Medicine   Progress Note      SUBJECTIVE: mental status cleared some what breathing easier , on 2 L O2 still daughter present  And has lot of questions and concerns   Past Medical History:   Diagnosis Date    [facility]     Arthritis     Atrial fibrillation (HCC)     Blepharitis of both eyes     CAD (coronary artery disease)     stents 2005, x3    GERD (gastroesophageal reflux disease)     Gout     Hyperlipidemia     Hypertension     Macular degeneration     NSTEMI (non-ST elevated myocardial infarction) ([facility]     PNA (pneumonia) 5/20/2022    Prostate enlargement     Psychiatric problem     anxiety; bipolar disorder    Tachycardia      Past Surgical History:   Procedure Laterality Date    CARDIAC SURGERY      CATARACT REMOVAL  1993    X5    COLONOSCOPY      CORONARY ANGIOPLASTY WITH STENT PLACEMENT  2005      X3    [facility]    EYE SURGERY      HIP SURGERY Left 6/2/2020    LEFT BIPOLAR HIP HEMIARTHROPLASTY performed by Dionicia Frankel, MD at [address] [place] [address]    SKIN BIOPSY      melanoma, basal    SKIN CANCER DESTRUCTION  2006    Melanoma  ()    TURP  2003         History obtained from chart review, the patient and family members   General ROS: positive for  - fatigue, malaise, sleep disturbance and weight loss  negative for - chills, fever or night sweats  Psychological ROS: positive for - memory difficulties  negative for - anxiety, behavioral disorder, hallucinations, hostility or mood swings  Ophthalmic ROS: negative  Respiratory ROS: positive for - shortness of breath  negative for - cough, hemoptysis, tachypnea or wheezing  Cardiovascular ROS: positive for - dyspnea on exertion  negative for - chest pain  Gastrointestinal ROS: no abdominal pain, change in bowel habits, or black or bloody stools  Genito-Urinary ROS: no dysuria, trouble voiding, or hematuria  Musculoskeletal ROS: left hip pain , unable to ambulate   Neurological ROS: no TIA or stroke symptoms  Dermatological ROS: negative    OBJECTIVE      Medications      Current Facility-Administered Medications: pantoprazole (PROTONIX) tablet 40 mg, 40 mg, Oral, QAM AC  0.9 % sodium chloride infusion, , IntraVENous, Continuous  LORazepam (ATIVAN) injection 0.5 mg, 0.5 mg, IntraVENous, BID  sodium chloride flush 0.9 % injection 5-40 mL, 5-40 mL, IntraVENous, 2 times per day  sodium chloride flush 0.9 % injection 5-40 mL, 5-40 mL, IntraVENous, PRN  0.9 % sodium chloride infusion, , IntraVENous, PRN  magnesium oxide (MAG-OX) tablet 400 mg, 400 mg, Oral, Daily  melatonin tablet 4.5 mg, 4.5 mg, Oral, Daily PRN  nitroGLYCERIN (NITROSTAT) SL tablet 0.4 mg, 0.4 mg, SubLINGual, Q5 Min PRN  tamsulosin (FLOMAX) capsule 0.4 mg, 0.4 mg, Oral, Daily  levothyroxine (SYNTHROID) tablet 25 mcg, 25 mcg, Oral, Daily  lamoTRIgine (LAMICTAL) tablet 150 mg, 150 mg, Oral, Daily  finasteride (PROSCAR) tablet 5 mg, 5 mg, Oral, Daily  apixaban (ELIQUIS) tablet 2.5 mg, 2.5 mg, Oral, BID  sennosides-docusate sodium (SENOKOT-S) 8.6-50 MG tablet 1 tablet, 1 tablet, Oral, BID  ferrous sulfate (IRON 325) tablet 325 mg, 325 mg, Oral, Daily with breakfast  dilTIAZem (CARDIZEM) tablet 30 mg, 30 mg, Oral, TID  sodium chloride flush 0.9 % injection 5-40 mL, 5-40 mL, IntraVENous, 2 times per day  sodium chloride flush 0.9 % injection 10 mL, 10 mL, IntraVENous, PRN  0.9 % sodium chloride infusion, , IntraVENous, PRN  enoxaparin Sodium (LOVENOX) injection 30 mg, 30 mg, SubCUTAneous, Daily  ondansetron (ZOFRAN-ODT) disintegrating tablet 4 mg, 4 mg, Oral, Q8H PRN **OR** ondansetron (ZOFRAN) injection 4 mg, 4 mg, IntraVENous, Q6H PRN  magnesium hydroxide (MILK OF MAGNESIA) 400 MG/5ML suspension 30 mL, 30 mL, Oral, Daily PRN  acetaminophen (TYLENOL) tablet 650 mg, 650 mg, Oral, Q6H PRN **OR** acetaminophen (TYLENOL) suppository 650 mg, 650 mg, Rectal, Q6H PRN  cefepime (MAXIPIME) 1000 mg IVPB minibag, 1,000 mg, IntraVENous, Q12H  hydrALAZINE (APRESOLINE) injection 10 mg, 10 mg, IntraVENous, Q6H PRN  0.9 % sodium chloride bolus, 500 mL, IntraVENous, PRN  ipratropium-albuterol (DUONEB) nebulizer solution 1 ampule, 1 ampule, Inhalation, BID  ipratropium-albuterol (DUONEB) nebulizer solution 1 ampule, 1 ampule, Inhalation, Q4H PRN  calcium carbonate tablet 600 mg, 600 mg, Oral, Daily    Physical      Vitals: BP (!) 170/49   Pulse 86   Temp 98.8 °F (37.1 °C) (Axillary)   Resp 16   Ht 5' 8\" (1.727 m)   Wt 138 lb 3.2 oz (62.7 kg)   SpO2 90%   BMI 21.01 kg/m²   Temp: Temp: 98.8 °F (37.1 °C)  Max: Temp  Av.2 °F (36.8 °C)  Min: 97.3 °F (36.3 °C)  Max: 99 °F (37.2 °C)  Respiration range:  Resp  Av.3  Min: 14  Max: 20  Pulse Range:  Pulse  Av.1  Min: 76  Max: 101  Blood pressure range:  Systolic (74OTC), VXT:483 , Min:115 , VYB:364   , Diastolic (66RXP), YMZ:16, Min:49, Max:84    SpO2  Av.2 %  Min: 90 %  Max: 98 %    Intake/Output Summary (Last 24 hours) at 2022 7829  Last data filed at 2022  Gross per 24 hour   Intake 979.07 ml   Output 900 ml   Net 79.07 ml       Vent settings:  Pulse  Av.5  Min: 60  Max: 115  Resp  Av.4  Min: 14  Max: 32  SpO2  Av.3 %  Min: 90 %  Max: 100 %    CONSTITUTIONAL:  fatigued, alert, cooperative, mild distress, appears stated age and thin  EYES:  Unremarkable   NECK:  No JVD  and supple, symmetrical, trachea midline  BACK:  symmetric and no curvature  LUNGS:  No increased work of breathing, good air exchange, clear to auscultation bilaterally, no crackles or wheezing  CARDIOVASCULAR:  normal apical pulses, irregular rate and rhythm, normal S1 and S2 and no S3  ABDOMEN:  Soft scaphoid BS + non tender   MUSCULOSKELETAL:  Tenderness and restricted ROM left hip , trace edema kate Legs , left hip post op site looks ok no hemorrhage or infection   NEUROLOGIC:  Mental Status Exam:  Level of Alertness:   awake  Cranial Nerves:  cranial nerves II-XII are grossly intact  Motor Exam:  Motor grade 3/5   Sensory:  Sensory intact  SKIN:  Warm and moist  and no bruising or bleeding    Data      Recent Results (from the past 96 hour(s))   EKG 12 Lead    Collection Time: 05/20/22 10:38 AM   Result Value Ref Range    Ventricular Rate 77 BPM    Atrial Rate 72 BPM    QRS Duration 102 ms    Q-T Interval 362 ms    QTc Calculation (Bazett) 409 ms    R Axis -7 degrees    T Axis -59 degrees    Diagnosis       Atrial fibrillationNonspecific ST and T wave abnormality , probably digitalis effectAbnormal ECGConfirmed by Celio Moses MD, Fausto Heaton ([id number]) on 5/20/2022 12:29:30 PM   Culture, Blood 1    Collection Time: 05/20/22 10:59 AM    Specimen: Blood   Result Value Ref Range    Blood Culture, Routine       No Growth to date. Any change in status will be called.    CBC with Auto Differential    Collection Time: 05/20/22 11:00 AM   Result Value Ref Range    WBC 12.3 (H) 4.0 - 11.0 K/uL    RBC 3.10 (L) 4.20 - 5.90 M/uL    Hemoglobin 9.3 (L) 13.5 - 17.5 g/dL    Hematocrit 28.5 (L) 40.5 - 52.5 %    MCV 91.7 80.0 - 100.0 fL    MCH 30.0 26.0 - 34.0 pg    MCHC 32.8 31.0 - 36.0 g/dL    RDW 14.6 12.4 - 15.4 %    Platelets 125 481 - 117 K/uL    MPV 7.6 5.0 - 10.5 fL    Neutrophils % 90.7 %    Lymphocytes % 5.1 %    Monocytes % 4.1 %    Eosinophils % 0.0 %    Basophils % 0.1 %    Neutrophils Absolute 11.1 (H) 1.7 - 7.7 K/uL    Lymphocytes Absolute 0.6 (L) 1.0 - 5.1 K/uL    Monocytes Absolute 0.5 0.0 - 1.3 K/uL    Eosinophils Absolute 0.0 0.0 - 0.6 K/uL    Basophils Absolute 0.0 0.0 - 0.2 K/uL   Comprehensive Metabolic Panel w/ Reflex to MG    Collection Time: 05/20/22 11:00 AM   Result Value Ref Range    Sodium 137 136 - 145 mmol/L    Potassium reflex Magnesium 4.1 3.5 - 5.1 mmol/L    Chloride 102 99 - 110 mmol/L    CO2 25 21 - 32 mmol/L    Anion Gap 10 3 - 16    Glucose 134 (H) 70 - 99 mg/dL    BUN 27 (H) 7 - 20 mg/dL    CREATININE 2.1 (H) 0.8 - 1.3 mg/dL    GFR Non-African American 29 (A) >60    GFR  36 (A) >60    Calcium 8.3 8.3 - 10.6 mg/dL    Total Protein 5.9 (L) 6.4 - 8.2 g/dL    Albumin 2.6 (L) 3.4 - 5.0 g/dL    Albumin/Globulin Ratio 0.8 (L) 1.1 - 2.2    Total Bilirubin 0.5 0.0 - 1.0 mg/dL    Alkaline Phosphatase 108 40 - 129 U/L    ALT 21 10 - 40 U/L    AST 46 (H) 15 - 37 U/L   Troponin    Collection Time: 05/20/22 11:00 AM   Result Value Ref Range    Troponin 1.17 (HH) <0.01 ng/mL   Brain Natriuretic Peptide    Collection Time: 05/20/22 11:00 AM   Result Value Ref Range    Pro-BNP 8,402 (H) 0 - 449 pg/mL   Urinalysis with Reflex to Culture    Collection Time: 05/20/22 11:00 AM    Specimen: Urine   Result Value Ref Range    Color, UA DARK YELLOW (A) Straw/Yellow    Clarity, UA TURBID (A) Clear    Glucose, Ur Negative Negative mg/dL    Bilirubin Urine SMALL (A) Negative    Ketones, Urine TRACE (A) Negative mg/dL    Specific Gravity, UA 1.020 1.005 - 1.030    Blood, Urine LARGE (A) Negative    pH, UA 5.0 5.0 - 8.0    Protein,  (A) Negative mg/dL    Urobilinogen, Urine 1.0 <2.0 E.U./dL    Nitrite, Urine Negative Negative    Leukocyte Esterase, Urine LARGE (A) Negative    Microscopic Examination YES     Urine Type NotGiven     Urine Reflex to Culture Yes    Lactate, Sepsis    Collection Time: 05/20/22 11:00 AM   Result Value Ref Range    Lactic Acid, Sepsis 1.0 0.4 - 1.9 mmol/L   Microscopic Urinalysis    Collection Time: 05/20/22 11:00 AM   Result Value Ref Range    Fine Casts, UA 0-2 0 - 2 /LPF    Bacteria, UA None Seen None Seen /HPF    Amorphous, UA 1+ /HPF    Urinalysis Comments see below     Hyaline Casts, UA 34 (H) 0 - 8 /LPF    WBC,  (H) 0 - 5 /HPF    RBC, UA 54 (H) 0 - 4 /HPF    Epithelial Cells, UA 5 0 - 5 /HPF   Culture, Urine    Collection Time: 05/20/22 11:00 AM    Specimen: Urine, clean catch   Result Value Ref Range    Organism Pseudomonas aeruginosa (A)     Urine Culture, Routine >100,000 CFU/ml Susceptibility    Pseudomonas aeruginosa - BACTERIAL SUSCEPTIBILITY PANEL BY EVANGELISTA     cefepime <=2 Sensitive mcg/mL     ciprofloxacin <=1 Sensitive mcg/mL     gentamicin <=4 Sensitive mcg/mL     meropenem <=1 Sensitive mcg/mL     piperacillin-tazobactam <=16 Sensitive mcg/mL     tobramycin <=4 Sensitive mcg/mL    Pseudomonas aeruginosa - BACTERIAL SUSCEPTIBILITY PANEL BY E-TEST      levofloxacin 0.5 Sensitive ug/ml   Culture, Blood 2    Collection Time: 05/20/22 11:15 AM    Specimen: Blood   Result Value Ref Range    Culture, Blood 2       No Growth to date. Any change in status will be called.    Comprehensive Metabolic Panel w/ Reflex to MG    Collection Time: 05/21/22  6:21 AM   Result Value Ref Range    Sodium 140 136 - 145 mmol/L    Potassium reflex Magnesium 4.4 3.5 - 5.1 mmol/L    Chloride 104 99 - 110 mmol/L    CO2 26 21 - 32 mmol/L    Anion Gap 10 3 - 16    Glucose 98 70 - 99 mg/dL    BUN 27 (H) 7 - 20 mg/dL    CREATININE 1.8 (H) 0.8 - 1.3 mg/dL    GFR Non-African American 35 (A) >60    GFR  42 (A) >60    Calcium 8.2 (L) 8.3 - 10.6 mg/dL    Total Protein 5.6 (L) 6.4 - 8.2 g/dL    Albumin 2.4 (L) 3.4 - 5.0 g/dL    Albumin/Globulin Ratio 0.8 (L) 1.1 - 2.2    Total Bilirubin 0.4 0.0 - 1.0 mg/dL    Alkaline Phosphatase 106 40 - 129 U/L    ALT 21 10 - 40 U/L    AST 39 (H) 15 - 37 U/L   Procalcitonin    Collection Time: 05/21/22  6:21 AM   Result Value Ref Range    Procalcitonin 2.72 (H) 0.00 - 0.15 ng/mL   CBC auto differential    Collection Time: 05/21/22  6:21 AM   Result Value Ref Range    WBC 6.8 4.0 - 11.0 K/uL    RBC 3.22 (L) 4.20 - 5.90 M/uL    Hemoglobin 9.8 (L) 13.5 - 17.5 g/dL    Hematocrit 29.3 (L) 40.5 - 52.5 %    MCV 91.0 80.0 - 100.0 fL    MCH 30.4 26.0 - 34.0 pg    MCHC 33.4 31.0 - 36.0 g/dL    RDW 14.2 12.4 - 15.4 %    Platelets 470 406 - 855 K/uL    MPV 6.8 5.0 - 10.5 fL    Neutrophils % 88.2 %    Lymphocytes % 5.6 %    Monocytes % 5.1 %    Eosinophils % 0.9 %    Basophils % 0.2 % Neutrophils Absolute 6.0 1.7 - 7.7 K/uL    Lymphocytes Absolute 0.4 (L) 1.0 - 5.1 K/uL    Monocytes Absolute 0.3 0.0 - 1.3 K/uL    Eosinophils Absolute 0.1 0.0 - 0.6 K/uL    Basophils Absolute 0.0 0.0 - 0.2 K/uL   Lactic Acid    Collection Time: 05/21/22  6:21 AM   Result Value Ref Range    Lactic Acid 0.9 0.4 - 2.0 mmol/L   C-Reactive Protein    Collection Time: 05/21/22  6:21 AM   Result Value Ref Range    CRP 91.8 (H) 0.0 - 5.1 mg/L   Vancomycin Level, Random    Collection Time: 05/21/22  6:21 AM   Result Value Ref Range    Vancomycin Rm 9.9 ug/mL   Basic Metabolic Panel    Collection Time: 05/22/22  7:58 AM   Result Value Ref Range    Sodium 138 136 - 145 mmol/L    Potassium 4.1 3.5 - 5.1 mmol/L    Chloride 104 99 - 110 mmol/L    CO2 22 21 - 32 mmol/L    Anion Gap 12 3 - 16    Glucose 91 70 - 99 mg/dL    BUN 24 (H) 7 - 20 mg/dL    CREATININE 1.5 (H) 0.8 - 1.3 mg/dL    GFR Non- 43 (A) >60    GFR  52 (A) >60    Calcium 8.1 (L) 8.3 - 10.6 mg/dL   CBC with Auto Differential    Collection Time: 05/22/22  7:58 AM   Result Value Ref Range    WBC 5.6 4.0 - 11.0 K/uL    RBC 3.12 (L) 4.20 - 5.90 M/uL    Hemoglobin 9.5 (L) 13.5 - 17.5 g/dL    Hematocrit 28.2 (L) 40.5 - 52.5 %    MCV 90.6 80.0 - 100.0 fL    MCH 30.5 26.0 - 34.0 pg    MCHC 33.6 31.0 - 36.0 g/dL    RDW 14.2 12.4 - 15.4 %    Platelets 960 346 - 088 K/uL    MPV 6.8 5.0 - 10.5 fL    Neutrophils % 86.9 %    Lymphocytes % 5.7 %    Monocytes % 6.0 %    Eosinophils % 0.9 %    Basophils % 0.5 %    Neutrophils Absolute 4.9 1.7 - 7.7 K/uL    Lymphocytes Absolute 0.3 (L) 1.0 - 5.1 K/uL    Monocytes Absolute 0.3 0.0 - 1.3 K/uL    Eosinophils Absolute 0.0 0.0 - 0.6 K/uL    Basophils Absolute 0.0 0.0 - 0.2 K/uL   EKG 12 Lead    Collection Time: 05/22/22  9:23 AM   Result Value Ref Range    Ventricular Rate 94 BPM    Atrial Rate 94 BPM    QRS Duration 104 ms    Q-T Interval 348 ms    QTc Calculation (Bazett) 435 ms    R Axis 6 degrees    T Axis -63 degrees    Diagnosis       Atrial fibrillationNonspecific ST and T wave abnormalityAbnormal ECGWhen compared with ECG of 20-MAY-2022 10:38,No significant change was found   Basic Metabolic Panel    Collection Time: 05/23/22  6:55 AM   Result Value Ref Range    Sodium 141 136 - 145 mmol/L    Potassium 4.2 3.5 - 5.1 mmol/L    Chloride 107 99 - 110 mmol/L    CO2 22 21 - 32 mmol/L    Anion Gap 12 3 - 16    Glucose 81 70 - 99 mg/dL    BUN 22 (H) 7 - 20 mg/dL    CREATININE 1.5 (H) 0.8 - 1.3 mg/dL    GFR Non- 43 (A) >60    GFR  52 (A) >60    Calcium 8.2 (L) 8.3 - 10.6 mg/dL   CBC with Auto Differential    Collection Time: 05/23/22  6:55 AM   Result Value Ref Range    WBC 5.2 4.0 - 11.0 K/uL    RBC 3.42 (L) 4.20 - 5.90 M/uL    Hemoglobin 10.2 (L) 13.5 - 17.5 g/dL    Hematocrit 31.6 (L) 40.5 - 52.5 %    MCV 92.3 80.0 - 100.0 fL    MCH 29.8 26.0 - 34.0 pg    MCHC 32.3 31.0 - 36.0 g/dL    RDW 14.5 12.4 - 15.4 %    Platelets 625 330 - 497 K/uL    MPV 7.0 5.0 - 10.5 fL    Neutrophils % 84.5 %    Lymphocytes % 7.2 %    Monocytes % 6.7 %    Eosinophils % 1.0 %    Basophils % 0.6 %    Neutrophils Absolute 4.4 1.7 - 7.7 K/uL    Lymphocytes Absolute 0.4 (L) 1.0 - 5.1 K/uL    Monocytes Absolute 0.3 0.0 - 1.3 K/uL    Eosinophils Absolute 0.1 0.0 - 0.6 K/uL    Basophils Absolute 0.0 0.0 - 0.2 K/uL       ASSESSMENT AND PLAN     Principal Problem:    Sepsis due to pneumonia [facility])  Active Problems:    Essential hypertension    Atherosclerosis of native coronary artery of native heart without angina pectoris    Hyperlipidemia    Bilateral carotid artery disease (HCC)    PNA (pneumonia)    JUAN R (acute kidney injury) (HCC)    UTI (urinary tract infection)    Sepsis (HCC)    Nonrheumatic mitral valve regurgitation    GERD (gastroesophageal reflux disease)    Aortic stenosis    Psychophysiological insomnia    Anxiety disorder    RLS (restless legs syndrome)    Chronic idiopathic constipation    Paroxysmal atrial fibrillation (HCC)    Moderate protein-calorie malnutrition (HCC)    Chronic diastolic heart failure (HCC)    Chronic atrial fibrillation ([address]  Resolved Problems:    * No resolved hospital problems.  *     palliative care appropriate    IV cefepime effective against proteus Mirabilis and Pseudomonas  Will d/c vanc    ct Lasix  Will need Home O2    renal function improved    repeat CXR  Shows combined CHF and Pneumonia pattern

## 2022-05-24 NOTE — PLAN OF CARE
Problem: Pain  Goal: Verbalizes/displays adequate comfort level or baseline comfort level  5/24/2022 0934 by Suresh Stanley RN  Outcome: Progressing  Note: Patient denies any pain at this time. Will continue to monitor. Problem: Safety - Adult  Goal: Free from fall injury  5/24/2022 0934 by Suresh Stanley RN  Outcome: Progressing  Note: Patient remains absent from falls at this time. Remains in bed with eyes closed, call light and belongings in reach. Non-slip footwear on and 2/4 siderails raised. Bed remains in lowest/locked position at all times with alarm activated. Fall precautions in place. Camera in room. Will continue to monitor.

## 2022-05-24 NOTE — PROGRESS NOTES
Nutrition Note    RECOMMENDATIONS  1. PO Diet: Dysphagia/Pureed with nectar thick liquids  2. ONS: Magic cup BID     NUTRITION ASSESSMENT   Pt is at risk for nutrition compromise AEB poor appetite reported on admission. PO intake 1-25% of meals on Dysphagia pureed, with nectar thick liquids. Per hx, wt has been overall stable. Will offer Magic cup supplements to help increase po intake. Will monitor progress for improved nutrition status.  Nutrition Related Findings: LBM 5/22; no edema noted. NS @ 50 ml/hr   Wounds:  (laceration on forehead)   Nutrition Education:  Education not indicated    Nutrition Goals: PO intake 50% or greater     MALNUTRITION ASSESSMENT   Chronic Illness  Malnutrition Status: At risk for malnutrition (Comment)    NUTRITION DIAGNOSIS   · Inadequate oral intake related to inadequate protein-energy intake as evidenced by intake 0-25%,poor intake prior to admission    CURRENT NUTRITION THERAPIES  ADULT DIET; Dysphagia - Pureed; Mildly Thick ([place])     PO Intake: 1-25%   PO Supplement Intake:None Ordered    ANTHROPOMETRICS   Current Height: 5' 8\" (172.7 cm)   Current Weight: 138 lb 3.2 oz (62.7 kg)     Ideal Body Weight (IBW): 154 lbs  (70 kg)        BMI: 21      COMPARATIVE STANDARDS  Energy (kcal):  1575- 1890     Protein (g):  76- 95g       Fluid (mL/day):  1 ml/kcal    The patient will be monitored per nutrition standards of care. Consult dietitian if additional nutrition interventions are needed prior to RD reassessment.      Jun Rocha RD, LD    Contact: [phone]

## 2022-05-24 NOTE — PROGRESS NOTES
Facility/Department: [address]  Speech Language Pathology   Dysphagia Treatment Note    Patient: Shivani Garay   : 10/31/1925   MRN: [id number]      Evaluation Date: 2022      Admitting Dx: NSTEMI (non-ST elevated myocardial infarction) ([facility] [I21.4]  JUAN R (acute kidney injury) ([facility] [N17.9]  Multi-organ failure with heart failure ([facility] [I50.9]  Septic shock ([facility] [A41.9, R65.21]  Sepsis ([facility] [A41.9]  Pneumonia due to infectious organism, unspecified laterality, unspecified part of lung [J18.9]  Treatment Diagnosis: Oropharyngeal Dysphagia   Pain: Denies                                              Diet and Treatment Recommendations 2022:  Diet Solids Recommendation:  Dysphagia I Puree  Liquid Consistency Recommendation:  Mildly (nectar) thick liquids  Recommended form of Meds: Meds in puree  or Crushed as able in puree      Compensatory strategies: Alternate solids/liquids , Upright as possible with all PO intake , Assist Feed , Small bites/sips , Swallow 2 times per bite , Eat/feed slowly, Remain upright 30-45 min , Cough/re-swallow     Assessment of Texture Tolerance:  Diet level prior to treatment: Dysphagia I Puree , Mildly (nectar) thick liquids   Tolerance of Current Diet Level:Poor oral intake     Impressions: Pt was positioned Upright in bed , awake and alert. Currently on 2L O2 via nasal cannula . Trials of mildly (nectar) thick liquids  and puree  were provided to assess swallow function. Pt verbally declined intake but was willing when presented. He required total feeding assistance. His daughter and son were at the bedside. Timing and strength of laryngeal elevation was moderately reduced with both textures. Pt elicited 2-3 swallows per presentation. Vocal quality became more wet as trials progressed. This is consistent with result of FEES that was completed yesterday. Reviewed results with pt and pt's family.     Pt continues to present at risk for aspiration due to deconditioning, respiratory status and dysphagia history. Recommend to continue with dysphagia diet - puree and mildly thick liquids, aspiration precautions, and dysphagia intervention. Dysphagia Goals:   Pt will functionally tolerate recommended diet with no overt clinical s/s of aspiration (Ongoing 05/24/22)  Pt will advance to least restrictive diet as indicated (Ongoing 05/24/22)  Pt will participate in Fiberoptic Endoscopic Evaluation of Swallowing (FEES) to further assess pharyngeal phase of swallow, assist with diet recommendations and to further direct plan of care (GOAL MET 05/24/22)    Plan:   3-5 times per week during acute care stay. Patient/Family Education:  Provided education regarding role of SLP, recommendations and general speech pathology plan of care. Provided education to pt and pt's family regarding ways to encourage intake (temperature, natural purees) and importance of encouraging intake to minimize deconditioning. [] Pt verbalized understanding and agreement   [x] Pt requires ongoing learning   [] No evidence of comprehension     Discharge Recommendations:    Recommend ongoing SLP for dysphagia therapy upon discharge from hospital     Timed Code Treatment:  0 minutes     Total Treatment Time: 18 minutes     If patient discharges prior to next session this note will serve as a discharge summary. Ariana Lopes M.A.  [facility] [id number]  Speech-Language Pathologist   5/24/2022 2:40 PM

## 2022-05-24 NOTE — PLAN OF CARE
Problem: Discharge Planning  Goal: Discharge to home or other facility with appropriate resources  Outcome: Progressing     Problem: Pain  Goal: Verbalizes/displays adequate comfort level or baseline comfort level  Outcome: Progressing     Problem: Safety - Adult  Goal: Free from fall injury  Outcome: Progressing     Problem: Skin/Tissue Integrity  Goal: Absence of new skin breakdown  Description: 1. Monitor for areas of redness and/or skin breakdown  2. Assess vascular access sites hourly  3. Every 4-6 hours minimum:  Change oxygen saturation probe site  4. Every 4-6 hours:  If on nasal continuous positive airway pressure, respiratory therapy assess nares and determine need for appliance change or resting period. Outcome: Progressing     Problem: Confusion  Goal: Confusion, delirium, dementia, or psychosis is improved or at baseline  Description: INTERVENTIONS:  1. Assess for possible contributors to thought disturbance, including medications, impaired vision or hearing, underlying metabolic abnormalities, dehydration, psychiatric diagnoses, and notify attending LIP  2. [place] high risk fall precautions, as indicated  3. Provide frequent short contacts to provide reality reorientation, refocusing and direction  4. Decrease environmental stimuli, including noise as appropriate  5. Monitor and intervene to maintain adequate nutrition, hydration, elimination, sleep and activity  6. If unable to ensure safety without constant attention obtain sitter and review sitter guidelines with assigned personnel  7.  Initiate Psychosocial CNS and Spiritual Care consult, as indicated  Outcome: Progressing     Problem: ABCDS Injury Assessment  Goal: Absence of physical injury  Outcome: Progressing

## 2022-05-25 PROCEDURE — 2580000003 HC RX 258: Performed by: INTERNAL MEDICINE

## 2022-05-25 PROCEDURE — 94761 N-INVAS EAR/PLS OXIMETRY MLT: CPT

## 2022-05-25 PROCEDURE — 6370000000 HC RX 637 (ALT 250 FOR IP): Performed by: INTERNAL MEDICINE

## 2022-05-25 PROCEDURE — 2580000003 HC RX 258: Performed by: EMERGENCY MEDICINE

## 2022-05-25 PROCEDURE — 6360000002 HC RX W HCPCS: Performed by: INTERNAL MEDICINE

## 2022-05-25 PROCEDURE — 94640 AIRWAY INHALATION TREATMENT: CPT

## 2022-05-25 PROCEDURE — 97530 THERAPEUTIC ACTIVITIES: CPT

## 2022-05-25 PROCEDURE — 92526 ORAL FUNCTION THERAPY: CPT

## 2022-05-25 PROCEDURE — 1200000000 HC SEMI PRIVATE

## 2022-05-25 RX ORDER — IPRATROPIUM BROMIDE AND ALBUTEROL SULFATE 2.5; .5 MG/3ML; MG/3ML
3 SOLUTION RESPIRATORY (INHALATION) EVERY 4 HOURS PRN
Qty: 360 ML | Refills: 0 | Status: SHIPPED | OUTPATIENT
Start: 2022-05-25 | End: 2022-10-24

## 2022-05-25 RX ORDER — LORAZEPAM 0.5 MG/1
0.25 TABLET ORAL EVERY 6 HOURS PRN
Status: DISCONTINUED | OUTPATIENT
Start: 2022-05-25 | End: 2022-05-26 | Stop reason: HOSPADM

## 2022-05-25 RX ORDER — ONDANSETRON 4 MG/1
4 TABLET, ORALLY DISINTEGRATING ORAL EVERY 8 HOURS PRN
Qty: 20 TABLET | Refills: 0 | Status: SHIPPED | OUTPATIENT
Start: 2022-05-25 | End: 2022-10-24

## 2022-05-25 RX ORDER — CIPROFLOXACIN 250 MG/1
250 TABLET, FILM COATED ORAL EVERY 12 HOURS SCHEDULED
Qty: 14 TABLET | Refills: 0 | Status: SHIPPED | OUTPATIENT
Start: 2022-05-25 | End: 2022-06-01

## 2022-05-25 RX ORDER — TORSEMIDE 20 MG/1
20 TABLET ORAL
Qty: 15 TABLET | Refills: 0 | Status: SHIPPED | OUTPATIENT
Start: 2022-05-25 | End: 2022-10-24

## 2022-05-25 RX ORDER — CIPROFLOXACIN 500 MG/1
250 TABLET, FILM COATED ORAL EVERY 12 HOURS SCHEDULED
Status: DISCONTINUED | OUTPATIENT
Start: 2022-05-25 | End: 2022-05-26 | Stop reason: HOSPADM

## 2022-05-25 RX ADMIN — Medication 10 ML: at 21:19

## 2022-05-25 RX ADMIN — TORSEMIDE 20 MG: 20 TABLET ORAL at 09:50

## 2022-05-25 RX ADMIN — IPRATROPIUM BROMIDE AND ALBUTEROL SULFATE 1 AMPULE: 2.5; .5 SOLUTION RESPIRATORY (INHALATION) at 08:01

## 2022-05-25 RX ADMIN — Medication 10 ML: at 09:50

## 2022-05-25 RX ADMIN — ONDANSETRON 4 MG: 2 INJECTION INTRAMUSCULAR; INTRAVENOUS at 09:50

## 2022-05-25 RX ADMIN — STANDARDIZED SENNA CONCENTRATE AND DOCUSATE SODIUM 1 TABLET: 8.6; 5 TABLET ORAL at 21:19

## 2022-05-25 RX ADMIN — LEVOTHYROXINE SODIUM 25 MCG: 0.03 TABLET ORAL at 09:50

## 2022-05-25 RX ADMIN — APIXABAN 2.5 MG: 5 TABLET, FILM COATED ORAL at 09:50

## 2022-05-25 RX ADMIN — DILTIAZEM HYDROCHLORIDE 30 MG: 30 TABLET, FILM COATED ORAL at 21:19

## 2022-05-25 RX ADMIN — FINASTERIDE 5 MG: 5 TABLET, FILM COATED ORAL at 09:50

## 2022-05-25 RX ADMIN — DILTIAZEM HYDROCHLORIDE 30 MG: 30 TABLET, FILM COATED ORAL at 09:50

## 2022-05-25 RX ADMIN — CIPROFLOXACIN HYDROCHLORIDE 250 MG: 500 TABLET, FILM COATED ORAL at 21:18

## 2022-05-25 RX ADMIN — IPRATROPIUM BROMIDE AND ALBUTEROL SULFATE 1 AMPULE: 2.5; .5 SOLUTION RESPIRATORY (INHALATION) at 19:14

## 2022-05-25 RX ADMIN — LAMOTRIGINE 150 MG: 100 TABLET ORAL at 09:50

## 2022-05-25 RX ADMIN — CIPROFLOXACIN HYDROCHLORIDE 250 MG: 500 TABLET, FILM COATED ORAL at 09:50

## 2022-05-25 RX ADMIN — APIXABAN 2.5 MG: 5 TABLET, FILM COATED ORAL at 21:18

## 2022-05-25 ASSESSMENT — PAIN SCALES - GENERAL
PAINLEVEL_OUTOF10: 0

## 2022-05-25 ASSESSMENT — ENCOUNTER SYMPTOMS
EYES NEGATIVE: 1
ALLERGIC/IMMUNOLOGIC NEGATIVE: 1
TROUBLE SWALLOWING: 1
ABDOMINAL DISTENTION: 1
SHORTNESS OF BREATH: 1

## 2022-05-25 ASSESSMENT — PAIN SCALES - WONG BAKER
WONGBAKER_NUMERICALRESPONSE: 0

## 2022-05-25 NOTE — PROGRESS NOTES
[facility]                                            Advanced Care Planning Note. Purpose of Encounter: Advanced care planning in light of severe CHF advanced age , poor nutrition , frequent readmission risk  Parties In Attendance: Patient, Daughter Ricardo Kirkpatrick present in the room     Decisional Capacity: Yes  Subjective: Patient/family understand that this conversation is to address long term care goal  Objective:    CPR- No   Defibrillation-No   Mechanical vent- No   Hemodialysis-no  Gastrostomy- No   Goals of Care Determination: Patient/POA   Code Status:  DNR CC  Time spent on Advanced care Plannin minutes in Patient's room with his daughter   [address] Completed advanced directives on chart, Daughter Ricardo Kirkpatrick  is the [facility] AND [facility].     Blanca Blanca MD  2022 9:24 AM

## 2022-05-25 NOTE — PROGRESS NOTES
PALLIATIVE MEDICINE PROGRESS NOTE     Patient name:Bartolome Hull    HOV:[id number] :10/31/1925  Room/Bed:[facility]3359/3359-01    LOS: 5 days        ASSESSMENT/RECOMMENDATIONS     80 y.o. male with lethargy and dysphagia        Symptom Management:  Lethargy- pt drowsy arouses for short period then dozes back off  Dysphagia- pt unsafe to swallow on BSE and CXR consistent with aspiration. FEES planned for today  Goals of Care-talked to pt and daughter Lakesha Stoll as well as pts two other children at bedside regarding Hospice care. They are all in agreement that they want pt to continue IV antibiotics and to improve as much as possible but that they understand that at his age aspiration will be a continued issue and they want HOC at DC with the goal of not bringing pt back to hospital and allowing him to eat for comfort regardless of risk. Referral to [facility] faxed.      Patient/Family Goals of Care :      talked to pt and daughter Lakesha Stoll at bedside pt states that he is ready to go if its his time he is [facility] at baseline. He is c/o being thirsty. Talked to daughter about the likelihood that he is aspirating and that Hospice care with comfort feeding sounds like the patients goal. Daughter is upset that pt has taken a down trend she is hopeful that she will recover and have a couple more years. She is awaiting FEES and SLP input prior to making any decisions regarding Hospice care.   talked to pt and daughter Lakesha Stoll as well as pts two other children at bedside regarding Hospice care. They are all in agreement that they want pt to continue IV antibiotics and to improve as much as possible but that they understand that at his age aspiration will be a continued issue and they want HOC at DC with the goal of not bringing pt back to hospital and allowing him to eat for comfort regardless of risk. Referral to [facility] faxed.   Pt increased drowsiness today. Left message for daughter to discuss Hospice care more today.    Disposition/Discharge Plan:   pending     Advance Directives:  Surrogate Decision Maker: Oscar-daughter  Code status:  DNR-CC     Case discussed with: patient, floor RN  Thank you for allowing us to participate in the care of this patient. SUBJECTIVE     Chief Complaint: Lethergy    Last 24 hours:   Pt drowsy today no family at bedside. ROS:    Review of Systems   Constitutional: Positive for activity change and fatigue. HENT: Positive for trouble swallowing. Eyes: Negative. Respiratory: Positive for shortness of breath. Cardiovascular: Positive for leg swelling. Gastrointestinal: Positive for abdominal distention. Endocrine: Negative. Genitourinary: Negative. Musculoskeletal: Positive for arthralgias. Skin: Positive for pallor. Allergic/Immunologic: Negative. Neurological: Positive for weakness. Psychiatric/Behavioral: Positive for confusion. OBJECTIVE   /73   Pulse 85   Temp 97.3 °F (36.3 °C) (Oral)   Resp 20   Ht 5' 8\" (1.727 m)   Wt 138 lb (62.6 kg)   SpO2 94%   BMI 20.98 kg/m²   I/O last 3 completed shifts:  In: -   Out: 3100 [Urine:3100]  No intake/output data recorded. Physical Exam  Constitutional:       Appearance: He is ill-appearing. Comments: drowsy   HENT:      Head: Normocephalic and atraumatic. Nose: Nose normal.      Mouth/Throat:      Mouth: Mucous membranes are dry. Eyes:      Pupils: Pupils are equal, round, and reactive to light. Cardiovascular:      Rate and Rhythm: Normal rate. Pulses: Normal pulses. Heart sounds: No murmur heard. No gallop. Pulmonary:      Effort: Pulmonary effort is normal.   Chest:      Chest wall: Tenderness present. Abdominal:      General: There is distension. Musculoskeletal:      Cervical back: Neck supple. Right lower leg: Edema present. Left lower leg: Edema present. Skin:     General: Skin is dry. Coloration: Skin is pale.    Neurological: Mental Status: Mental status is at baseline.       Comments: Drowsy and unable to assess orientation               Total time: 35 minutes  >50% of time spent counseling patient at bedside or POA/family member if applicable , reviewing information and discussing care, coordinating with care team       Signed By: Electronically signed by Merline Sham, APRN - CNP on 5/25/2022 at 11:15 AM   Palliative Medicine   [phone]    May 25, 2022

## 2022-05-25 NOTE — CARE COORDINATION
Perfect serve inquiry to MD,  Is  patient medically ready to return to [facility] later today?     Daughter is meeting with [facility] at 1:00pm.  Thank you

## 2022-05-25 NOTE — PROGRESS NOTES
Department of Internal Medicine  General Internal Medicine   Progress Note      SUBJECTIVE: no respiratory distress , some what somnolent   Past Medical History:   Diagnosis Date    A-fib ([address]     Arthritis     Atrial fibrillation (HCC)     Blepharitis of both eyes     CAD (coronary artery disease)     stents 2005, x3    GERD (gastroesophageal reflux disease)     Gout     Hyperlipidemia     Hypertension     Macular degeneration     NSTEMI (non-ST elevated myocardial infarction) (HCC)     PNA (pneumonia) 5/20/2022    Prostate enlargement     Psychiatric problem     anxiety; bipolar disorder    Tachycardia      Past Surgical History:   Procedure Laterality Date    CARDIAC SURGERY      CATARACT REMOVAL  1993    X5    COLONOSCOPY      CORONARY ANGIOPLASTY WITH STENT PLACEMENT  2005      X3    CORONARY ARTERY [facility]PASS GRA[facility]      [facility]    EYE SURGERY      HIP SURGERY Left 6/2/2020    LEFT BIPOLAR HIP HEMIARTHROPLASTY performed by Albania Spencer MD at [address] [place] [address]    SKIN BIOPSY      melanoma, basal    SKIN CANCER DESTRUCTION  2006    Melanoma  ()    TURP  2003         History obtained from chart review, the patient and family members   General ROS: positive for  - fatigue, malaise, sleep disturbance and weight loss  negative for - chills, fever or night sweats  Psychological ROS: positive for - memory difficulties  negative for - anxiety, behavioral disorder, hallucinations, hostility or mood swings  Ophthalmic ROS: negative  Respiratory ROS: positive for - shortness of breath  negative for - cough, hemoptysis, tachypnea or wheezing  Cardiovascular ROS: positive for - dyspnea on exertion  negative for - chest pain  Gastrointestinal ROS: no abdominal pain, change in bowel habits, or black or bloody stools  Genito-Urinary ROS: no dysuria, trouble voiding, or hematuria  Musculoskeletal ROS: left hip pain , unable to ambulate   Neurological ROS: no TIA or stroke symptoms  Dermatological ROS: negative    OBJECTIVE      Medications      Current Facility-Administered Medications: ciprofloxacin (CIPRO) tablet 250 mg, 250 mg, Oral, 2 times per day  torsemide (DEMADEX) tablet 20 mg, 20 mg, Oral, Daily  pantoprazole (PROTONIX) tablet 40 mg, 40 mg, Oral, QAM AC  sodium chloride flush 0.9 % injection 5-40 mL, 5-40 mL, IntraVENous, 2 times per day  sodium chloride flush 0.9 % injection 5-40 mL, 5-40 mL, IntraVENous, PRN  0.9 % sodium chloride infusion, , IntraVENous, PRN  magnesium oxide (MAG-OX) tablet 400 mg, 400 mg, Oral, Daily  melatonin tablet 4.5 mg, 4.5 mg, Oral, Daily PRN  nitroGLYCERIN (NITROSTAT) SL tablet 0.4 mg, 0.4 mg, SubLINGual, Q5 Min PRN  tamsulosin (FLOMAX) capsule 0.4 mg, 0.4 mg, Oral, Daily  levothyroxine (SYNTHROID) tablet 25 mcg, 25 mcg, Oral, Daily  lamoTRIgine (LAMICTAL) tablet 150 mg, 150 mg, Oral, Daily  finasteride (PROSCAR) tablet 5 mg, 5 mg, Oral, Daily  apixaban (ELIQUIS) tablet 2.5 mg, 2.5 mg, Oral, BID  sennosides-docusate sodium (SENOKOT-S) 8.6-50 MG tablet 1 tablet, 1 tablet, Oral, BID  ferrous sulfate (IRON 325) tablet 325 mg, 325 mg, Oral, Daily with breakfast  dilTIAZem (CARDIZEM) tablet 30 mg, 30 mg, Oral, TID  sodium chloride flush 0.9 % injection 5-40 mL, 5-40 mL, IntraVENous, 2 times per day  sodium chloride flush 0.9 % injection 10 mL, 10 mL, IntraVENous, PRN  0.9 % sodium chloride infusion, , IntraVENous, PRN  enoxaparin Sodium (LOVENOX) injection 30 mg, 30 mg, SubCUTAneous, Daily  ondansetron (ZOFRAN-ODT) disintegrating tablet 4 mg, 4 mg, Oral, Q8H PRN **OR** ondansetron (ZOFRAN) injection 4 mg, 4 mg, IntraVENous, Q6H PRN  magnesium hydroxide (MILK OF MAGNESIA) 400 MG/5ML suspension 30 mL, 30 mL, Oral, Daily PRN  acetaminophen (TYLENOL) tablet 650 mg, 650 mg, Oral, Q6H PRN **OR** acetaminophen (TYLENOL) suppository 650 mg, 650 mg, Rectal, Q6H PRN  hydrALAZINE (APRESOLINE) injection 10 mg, 10 mg, IntraVENous, Q6H PRN  0.9 % sodium chloride bolus, 500 mL, IntraVENous, PRN  ipratropium-albuterol (DUONEB) nebulizer solution 1 ampule, 1 ampule, Inhalation, BID  ipratropium-albuterol (DUONEB) nebulizer solution 1 ampule, 1 ampule, Inhalation, Q4H PRN  calcium carbonate tablet 600 mg, 600 mg, Oral, Daily    Physical      Vitals: /69   Pulse (!) 110   Temp 98 °F (36.7 °C) (Oral)   Resp 26   Ht 5' 8\" (1.727 m)   Wt 138 lb (62.6 kg)   SpO2 95%   BMI 20.98 kg/m²   Temp: Temp: 98 °F (36.7 °C)  Max: Temp  Av.3 °F (36.3 °C)  Min: 96.8 °F (36 °C)  Max: 98 °F (36.7 °C)  Respiration range:  Resp  Av  Min: 18  Max: 26  Pulse Range:  Pulse  Av.4  Min: 82  Max: 110  Blood pressure range:  Systolic (89JJV), IFC:741 , Min:117 , AFZ:254   , Diastolic (41NYX), YEIMI:18, Min:66, Max:80    SpO2  Av.4 %  Min: 90 %  Max: 95 %    Intake/Output Summary (Last 24 hours) at 2022 0920  Last data filed at 2022 0249  Gross per 24 hour   Intake --   Output 2300 ml   Net -2300 ml       Vent settings:  Pulse  Av.5  Min: 60  Max: 115  Resp  Av.1  Min: 14  Max: 32  SpO2  Av.7 %  Min: 90 %  Max: 100 %    CONSTITUTIONAL:  fatigued, alert, cooperative, mild distress, appears stated age and thin  EYES:  Unremarkable   NECK:  No JVD  and supple, symmetrical, trachea midline  BACK:  symmetric and no curvature  LUNGS:  No increased work of breathing, good air exchange, clear to auscultation bilaterally, no crackles or wheezing  CARDIOVASCULAR:  normal apical pulses, irregular rate and rhythm, normal S1 and S2 and no S3  ABDOMEN:  Soft scaphoid BS + non tender   MUSCULOSKELETAL:  Tenderness and restricted ROM left hip , trace edema kate Legs , left hip post op site looks ok no hemorrhage or infection   NEUROLOGIC:  Mental Status Exam:  Level of Alertness:   awake  Cranial Nerves:  cranial nerves II-XII are grossly intact  Motor Exam:  Motor grade 3/5   Sensory:  Sensory intact  SKIN:  Warm and moist  and no bruising - 1.3 mg/dL    GFR Non- 43 (A) >60    GFR  52 (A) >60    Calcium 8.2 (L) 8.3 - 10.6 mg/dL   CBC with Auto Differential    Collection Time: 05/23/22  6:55 AM   Result Value Ref Range    WBC 5.2 4.0 - 11.0 K/uL    RBC 3.42 (L) 4.20 - 5.90 M/uL    Hemoglobin 10.2 (L) 13.5 - 17.5 g/dL    Hematocrit 31.6 (L) 40.5 - 52.5 %    MCV 92.3 80.0 - 100.0 fL    MCH 29.8 26.0 - 34.0 pg    MCHC 32.3 31.0 - 36.0 g/dL    RDW 14.5 12.4 - 15.4 %    Platelets 939 949 - 136 K/uL    MPV 7.0 5.0 - 10.5 fL    Neutrophils % 84.5 %    Lymphocytes % 7.2 %    Monocytes % 6.7 %    Eosinophils % 1.0 %    Basophils % 0.6 %    Neutrophils Absolute 4.4 1.7 - 7.7 K/uL    Lymphocytes Absolute 0.4 (L) 1.0 - 5.1 K/uL    Monocytes Absolute 0.3 0.0 - 1.3 K/uL    Eosinophils Absolute 0.1 0.0 - 0.6 K/uL    Basophils Absolute 0.0 0.0 - 0.2 K/uL   CBC    Collection Time: 05/24/22  9:31 AM   Result Value Ref Range    WBC 4.5 4.0 - 11.0 K/uL    RBC 3.45 (L) 4.20 - 5.90 M/uL    Hemoglobin 10.2 (L) 13.5 - 17.5 g/dL    Hematocrit 31.7 (L) 40.5 - 52.5 %    MCV 91.8 80.0 - 100.0 fL    MCH 29.6 26.0 - 34.0 pg    MCHC 32.3 31.0 - 36.0 g/dL    RDW 14.7 12.4 - 15.4 %    Platelets 825 327 - 587 K/uL    MPV 6.9 5.0 - 10.5 fL   Basic Metabolic Panel    Collection Time: 05/24/22  9:31 AM   Result Value Ref Range    Sodium 142 136 - 145 mmol/L    Potassium 4.2 3.5 - 5.1 mmol/L    Chloride 108 99 - 110 mmol/L    CO2 23 21 - 32 mmol/L    Anion Gap 11 3 - 16    Glucose 104 (H) 70 - 99 mg/dL    BUN 19 7 - 20 mg/dL    CREATININE 1.3 0.8 - 1.3 mg/dL    GFR Non- 51 (A) >60    GFR African American >60 >60    Calcium 8.3 8.3 - 10.6 mg/dL       ASSESSMENT AND PLAN     Principal Problem:    Sepsis due to pneumonia [facility])  Active Problems:    Essential hypertension    Atherosclerosis of native coronary artery of native heart without angina pectoris    Hyperlipidemia    Bilateral carotid artery disease (HCC)    PNA (pneumonia)    JUAN R (acute kidney injury) ([facility]    UTI (urinary tract infection)    Sepsis (HCC)    Nonrheumatic mitral valve regurgitation    GERD (gastroesophageal reflux disease)    Aortic stenosis    Psychophysiological insomnia    Anxiety disorder    RLS (restless legs syndrome)    Chronic idiopathic constipation    Paroxysmal atrial fibrillation (HCC)    Moderate protein-calorie malnutrition (HCC)    Chronic diastolic heart failure (HCC)    Chronic atrial fibrillation ([facility] [address]  Resolved Problems:    * No resolved hospital problems.  *     palliative care  Consult noted    daughter repeatedly approaching  Me  With a request \" Keep him here as long as you can \"     Not a realistic expectation , ideally he should be on hospice , will de-escalate to PO antibiotics and dismiss in am

## 2022-05-25 NOTE — CARE COORDINATION
Patient's daughter called Franck Trivedi to file an appeal of discharge and was provided with a Detailed Notice of Discharge. [id number]    [facility] [id number]     MBI : [id number]    Patient has been accepted to return to [facility] once daughter is agreeable or Franck Trivedi appeal decided.     Call report [phone], [phone]

## 2022-05-25 NOTE — CARE COORDINATION
Patient's daughter, Carline Pemberton  called Atiya Noss [phone],  [facility] [place] [address] to make arrangements for discharge tomorrow.

## 2022-05-25 NOTE — PROGRESS NOTES
Tried to call patient , mail box is full  No answer    will try to advise her in good mert , not to subject her father to frequent hospitalization  And readmission or even better - consider hospice care

## 2022-05-25 NOTE — PROGRESS NOTES
Physical Therapy    [facility] [address]   Phone: [phone]    Physical Therapy    [] Initial Evaluation            [x] Daily Treatment Note         [] Discharge Summary      Patient: Arabella Alonzo   : 10/31/1925   MRN: [id number]   Date of Service:  2022  Admitting Diagnosis: Sepsis due to pneumonia [facility])  Current Admission Summary: This is a 80 y.o. male who presents to the ED for altered mental status. Worsening over the past few days. He did have falls on  and Monday. Patient is able to tell me that he has no pain. Specifically no chest or abdominal pain. He has no numbness on his arms or legs. He does not know where he is. He cannot answer most open-ended questions. He feels weak. Per daughter at bedside, he is DNR comfort care. His currently receiving treatment for UTI. Past Medical History:  has a past medical history of A-fib ([address] Arthritis, Atrial fibrillation ([address] Blepharitis of both eyes, CAD (coronary artery disease), GERD (gastroesophageal reflux disease), Gout, Hyperlipidemia, Hypertension, Macular degeneration, NSTEMI (non-ST elevated myocardial infarction) ([address] PNA (pneumonia), Prostate enlargement, Psychiatric problem, and Tachycardia. Past Surgical History:  has a past surgical history that includes Coronary angioplasty with stent (); skin biopsy; Kidney stone surgery (); Coronary artery bypass graft; Cataract removal (); TURP (); Skin cancer destruction (); Colonoscopy; eye surgery; Cardiac surgery; and hip surgery (Left, 2020). Discharge Recommendations: Arabella Alonzo scored a 9/ on the AM-PAC short mobility form. Current research shows that an AM-PAC score of 17 or less is typically not associated with a discharge to the patient's home setting.  Based on the patient's AM-PAC score and their current functional mobility deficits, it is recommended that the patient have 3-5 sessions per week of Physical Therapy at d/c to increase the patient's independence. Please see assessment section for further patient specific details. If patient discharges prior to next session this note will serve as a discharge summary. Please see below for the latest assessment towards goals. DME Required For Discharge: no DME required at discharge  Precautions/Restrictions: high fall risk  Weight Bearing Restrictions: no restrictions  [] Right Upper Extremity  [] Left Upper Extremity [] Right Lower Extremity  [] Left Lower Extremity     Required Braces/Orthotics: no braces required   [] Right  [] Left  Positional Restrictions:no positional restrictions    Pre-Admission Information   Lives With: alone    Type of Home: long term care facility  Home Layout: one level  Home Access: elevator  Bathroom Layout: walker accessible, wheelchair accessible, walk in shower  Toilet Height: elevated height  Bathroom Equipment: grab bars around toilet  Home Equipment: standard walker, manual wheelchair  Transfer Assistance: requires assistance  Ambulation Assistance:modified independent with use of W/c mostly and sometimes a little with a standard walker  ADL Assistance: requires assistance with bathing, requires assistance with dressing, requires assistance with grooming, requires assistance with toileting  IADL Assistance: requires assistance with all homemaking tasks, requires assistance with meal prep, requires assistance with laundry, requires assistance with vacuuming, requires assistance with cleaning, requires assistance with gardening, requires assistance with yard work, requires assistance with driving/transportation, requires assistance with shopping, requires assistance with , requires assistance for medication management, requires assistance for financial management  Active :        [] Yes  [x] No  Hand Dominance: [] Left  [] Right  Current Employment: retired.   Occupation:  at [employer]:   Recent Falls: A couple times per daughter recently other than the two that brought him in      Subjective  General: Pt supine in bed with head elevated. Pt agreeable to PT/OT. Pain: 9/10. Location: Back  Pain Interventions: repositioned        Functional Mobility  Bed Mobility  Supine to Sit: dependent assistance, 2 person assistance with Mod A x 2   Rolling Right: dependent assistance, 2 person assistance with Mod A x 2   Scooting: maximum assistance, 2 person assistance with Max A x 1   Comments:  Transfers  Sit to stand transfer: dependent assistance, 2 person assistance with Mod A x 2   Stand to sit transfer: dependent assistance, 2 person assistance with Mod A x 2   Comments: all transfers with Haydee Goldberg. TC and VC for B hand and foot placement. Significantly increased time, very rigid  Ambulation  Ambulation not tested on this date. Distance:   Gait Mechanics:  Comments:  Unsafe at this time  Stair Mobility  Stair mobility not completed on this date. Comments:  Wheelchair Mobility:  No w/c mobility completed on this date. Comments:  Balance  Static Sitting Balance: poor (+): requires min (A) to maintain balance  Dynamic Sitting Balance: poor (+): requires min (A) to maintain balance   Comments: Pt sat EOB Leaning to the L heavily requiring Min to occasional Mod Assist to sit. Other Therapeutic Interventions  Set up for comfort in the BS chair with pillows to support and prevent Pt. From leaning to the L  Functional Outcomes  AM-PAC Inpatient Mobility Raw Score : 9              Cognition  WFL  Arousal/Alterness: delayed responses to stimuli  Orientation:      Command Following:   WFL Pt demonstrated delayed responses to commands or questions but was able to answer/respond appropriately.     Education  Barriers To Learning: none  Patient Education: patient educated on goals, PT role and benefits, plan of care, general safety, functional mobility training, family education, injury prevention, discharge recommendations  Learning Assessment:  patient verbalizes understanding, would benefit from continued reinforcement    Assessment  Activity Tolerance: Pt activity tolerance limited by decreased balance, strength, endurance and ROM. Impairments Requiring Therapeutic Intervention: decreased functional mobility, decreased ADL status, decreased ROM, decreased strength, decreased endurance, decreased balance  Prognosis: fair  Clinical Assessment: Pt. Exhibits poor motor planning and initiation requires increased assistance. Pt PLOF was requiring some assistance with transfers and ambulation. Pt could self-mobilize a wheelchair using feet and could occasionally walk with walker. Pt. Continues to require 2 person assist with functional mobility. Pt would benefit from skilled PT services to address deficits in strength, endurance, ROM balance to facilitate safe functional mobility and return to PLOF.    Safety Interventions: patient left in chair, chair alarm in place, call light within reach, gait belt, patient at risk for falls, telesitter in use, nurse notified and family/caregiver present    Plan  Frequency: 3-5 x/per week  Current Treatment Recommendations: strengthening, ROM, balance training, functional mobility training, gait training, endurance training, wheelchair mobility training and safety education    Goals  Patient Goals: Pt did not state   Short Term Goals:  Time Frame: By discharge  Patient will complete bed mobility at minimal assistance   Patient will complete transfers at minimal assistance   Patient will ambulate 25 ft with use of rolling walker at minimal assistance  *no goals met    Therapy Session Time      Individual Group Co-treatment   Time In 1300       Time Out 1324       Minutes 24         Timed Code Treatment Minutes:  24 Minutes   Total Treatment Minutes:  24       Electronically Signed By: Makenna Osborn, [id number]

## 2022-05-25 NOTE — PLAN OF CARE
Problem: Discharge Planning  Goal: Discharge to home or other facility with appropriate resources  Outcome: Progressing     Problem: Pain  Goal: Verbalizes/displays adequate comfort level or baseline comfort level  Outcome: Progressing     Problem: Safety - Adult  Goal: Free from fall injury  Outcome: Progressing     Problem: Skin/Tissue Integrity  Goal: Absence of new skin breakdown  Description: 1. Monitor for areas of redness and/or skin breakdown  2. Assess vascular access sites hourly  3. Every 4-6 hours minimum:  Change oxygen saturation probe site  4. Every 4-6 hours:  If on nasal continuous positive airway pressure, respiratory therapy assess nares and determine need for appliance change or resting period. Outcome: Progressing     Problem: Confusion  Goal: Confusion, delirium, dementia, or psychosis is improved or at baseline  Description: INTERVENTIONS:  1. Assess for possible contributors to thought disturbance, including medications, impaired vision or hearing, underlying metabolic abnormalities, dehydration, psychiatric diagnoses, and notify attending LIP  2. [place] high risk fall precautions, as indicated  3. Provide frequent short contacts to provide reality reorientation, refocusing and direction  4. Decrease environmental stimuli, including noise as appropriate  5. Monitor and intervene to maintain adequate nutrition, hydration, elimination, sleep and activity  6. If unable to ensure safety without constant attention obtain sitter and review sitter guidelines with assigned personnel  7.  Initiate Psychosocial CNS and Spiritual Care consult, as indicated  Outcome: Progressing     Problem: ABCDS Injury Assessment  Goal: Absence of physical injury  Outcome: Progressing     Problem: Nutrition Deficit:  Goal: Optimize nutritional status  Outcome: Progressing  Flowsheets (Taken 5/24/2022 1234 by Lili Abad, RD, LD)  Nutrient intake appropriate for improving, restoring, or maintaining nutritional needs: Monitor oral intake, labs, and treatment plans

## 2022-05-25 NOTE — PROGRESS NOTES
spoke with daughter finally , explained her  Futile effort at frequent hospitalization and readmission and poor yield for the patient , she says she will meet with hospice , she intends to appeal  Discharge ,

## 2022-05-25 NOTE — PROGRESS NOTES
Facility/Department: [address]  Speech Language Pathology   Dysphagia Treatment Note    Patient: Sarah Crow   : 10/31/1925   MRN: [id number]      Evaluation Date: 2022      Admitting Dx: NSTEMI (non-ST elevated myocardial infarction) ([facility] [I21.4]  JUAN R (acute kidney injury) ([facility] [N17.9]  Multi-organ failure with heart failure ([facility] [I50.9]  Septic shock ([facility] [A41.9, R65.21]  Sepsis ([facility] [A41.9]  Pneumonia due to infectious organism, unspecified laterality, unspecified part of lung [J18.9]  Treatment Diagnosis: Oropharyngeal Dysphagia   Pain: Denies                                              Diet and Treatment Recommendations 2022:  Diet Solids Recommendation:  Dysphagia I Puree  Liquid Consistency Recommendation:  Mildly (nectar) thick liquids  Recommended form of Meds: Meds in puree  or Crushed as able in puree      Compensatory strategies: Alternate solids/liquids , Upright as possible with all PO intake , Assist Feed , Small bites/sips , Swallow 2 times per bite , Eat/feed slowly, Remain upright 30-45 min , Cough/re-swallow     Assessment of Texture Tolerance:  Diet level prior to treatment: Dysphagia I Puree , Mildly (nectar) thick liquids   Tolerance of Current Diet Level:Poor oral intake     Impressions: Pt was positioned Upright in bed , required ongoing cueing to maintain alertness . Currently on 2L O2 via nasal cannula . Minimal trials of mildly (nectar) thick liquids  and puree  were provided to assess swallow function. Dry labial surface noted. Pt willing to accept mildly thick liquids x1 and puree x3. Lingual pumping noted for bolus transfer with delayed swallow initiation and reduced laryngeal elevation upon manual palpation. Intermittent use of 2-3 swallows noted. No overt change in vocal quality noted this date, however presentations were limited. Pt reported he felt \"sick\" and declined further PO trials.   Pt continues to present at risk for aspiration due to deconditioning, respiratory status and dysphagia history. Recommend to continue with dysphagia diet - puree and mildly thick liquids, aspiration precautions, and dysphagia intervention. Dysphagia Goals:   Pt will functionally tolerate recommended diet with no overt clinical s/s of aspiration (Ongoing 05/25/22)  Pt will advance to least restrictive diet as indicated (Ongoing 05/25/22)  Pt will participate in Fiberoptic Endoscopic Evaluation of Swallowing (FEES) to further assess pharyngeal phase of swallow, assist with diet recommendations and to further direct plan of care (GOAL MET 05/25/22)    Plan:   3-5 times per week during acute care stay. Patient/Family Education:  Provided education regarding role of SLP, recommendations and general speech pathology plan of care. [] Pt verbalized understanding and agreement   [x] Pt requires ongoing learning   [] No evidence of comprehension     Discharge Recommendations:    Recommend ongoing SLP for dysphagia therapy upon discharge from hospital     Timed Code Treatment:  0 minutes     Total Treatment Time: 12 minutes     If patient discharges prior to next session this note will serve as a discharge summary.      Ana Starks M.A., [address] [place] [address]  Speech-Language Pathologist

## 2022-05-25 NOTE — PLAN OF CARE
Problem: Pain  Goal: Verbalizes/displays adequate comfort level or baseline comfort level  5/25/2022 1005 by Annalee Baron RN  Outcome: Progressing  Note: Patient denies any pain at this time. Will continue to monitor. Problem: Safety - Adult  Goal: Free from fall injury  5/25/2022 1005 by Annalee Baron RN  Outcome: Progressing  Note: Patient remains absent from falls at this time. Remains in bed with eyes closed, call light and belongings in reach. Non-slip footwear on and 2/4 siderails raised. Bed remains in lowest/locked position at all times with alarm activated. Fall precautions in place. Camera in room. Will continue to monitor. Problem: Skin/Tissue Integrity  Goal: Absence of new skin breakdown  Description: 1. Monitor for areas of redness and/or skin breakdown  2. Assess vascular access sites hourly  3. Every 4-6 hours minimum:  Change oxygen saturation probe site  4. Every 4-6 hours:  If on nasal continuous positive airway pressure, respiratory therapy assess nares and determine need for appliance change or resting period. 5/25/2022 1005 by Annalee Baron RN  Outcome: Progressing  Note: Patient Nemesio score 14, assisted to turn and reposition every 2 hours and as needed with pillow support provided. External catheter remains in place and navjot care provided as needed. No new skin issues noted. Will continue to monitor.

## 2022-05-26 VITALS
TEMPERATURE: 98.1 F | WEIGHT: 125.22 LBS | HEART RATE: 92 BPM | RESPIRATION RATE: 18 BRPM | HEIGHT: 68 IN | DIASTOLIC BLOOD PRESSURE: 71 MMHG | SYSTOLIC BLOOD PRESSURE: 123 MMHG | OXYGEN SATURATION: 95 % | BODY MASS INDEX: 18.98 KG/M2

## 2022-05-26 PROCEDURE — 2580000003 HC RX 258: Performed by: EMERGENCY MEDICINE

## 2022-05-26 PROCEDURE — 2580000003 HC RX 258: Performed by: INTERNAL MEDICINE

## 2022-05-26 PROCEDURE — 6360000002 HC RX W HCPCS: Performed by: INTERNAL MEDICINE

## 2022-05-26 PROCEDURE — 6370000000 HC RX 637 (ALT 250 FOR IP): Performed by: INTERNAL MEDICINE

## 2022-05-26 PROCEDURE — 94761 N-INVAS EAR/PLS OXIMETRY MLT: CPT

## 2022-05-26 PROCEDURE — 94640 AIRWAY INHALATION TREATMENT: CPT

## 2022-05-26 PROCEDURE — 2700000000 HC OXYGEN THERAPY PER DAY

## 2022-05-26 RX ORDER — LORAZEPAM 0.5 MG/1
0.25 TABLET ORAL EVERY 6 HOURS PRN
Qty: 15 TABLET | Refills: 0 | OUTPATIENT
Start: 2022-05-26 | End: 2022-06-25

## 2022-05-26 RX ADMIN — FINASTERIDE 5 MG: 5 TABLET, FILM COATED ORAL at 10:08

## 2022-05-26 RX ADMIN — TAMSULOSIN HYDROCHLORIDE 0.4 MG: 0.4 CAPSULE ORAL at 10:08

## 2022-05-26 RX ADMIN — Medication 400 MG: at 10:13

## 2022-05-26 RX ADMIN — Medication 600 MG: at 10:50

## 2022-05-26 RX ADMIN — STANDARDIZED SENNA CONCENTRATE AND DOCUSATE SODIUM 1 TABLET: 8.6; 5 TABLET ORAL at 10:12

## 2022-05-26 RX ADMIN — TORSEMIDE 20 MG: 20 TABLET ORAL at 10:13

## 2022-05-26 RX ADMIN — LEVOTHYROXINE SODIUM 25 MCG: 0.03 TABLET ORAL at 10:14

## 2022-05-26 RX ADMIN — PANTOPRAZOLE SODIUM 40 MG: 40 TABLET, DELAYED RELEASE ORAL at 06:22

## 2022-05-26 RX ADMIN — LAMOTRIGINE 150 MG: 100 TABLET ORAL at 10:09

## 2022-05-26 RX ADMIN — ENOXAPARIN SODIUM 30 MG: 30 INJECTION SUBCUTANEOUS at 10:13

## 2022-05-26 RX ADMIN — FERROUS SULFATE TAB 325 MG (65 MG ELEMENTAL FE) 325 MG: 325 (65 FE) TAB at 10:27

## 2022-05-26 RX ADMIN — Medication 10 ML: at 10:17

## 2022-05-26 RX ADMIN — CIPROFLOXACIN HYDROCHLORIDE 250 MG: 500 TABLET, FILM COATED ORAL at 10:11

## 2022-05-26 RX ADMIN — IPRATROPIUM BROMIDE AND ALBUTEROL SULFATE 1 AMPULE: 2.5; .5 SOLUTION RESPIRATORY (INHALATION) at 07:34

## 2022-05-26 RX ADMIN — DILTIAZEM HYDROCHLORIDE 30 MG: 30 TABLET, FILM COATED ORAL at 10:09

## 2022-05-26 RX ADMIN — APIXABAN 2.5 MG: 5 TABLET, FILM COATED ORAL at 10:12

## 2022-05-26 ASSESSMENT — PAIN SCALES - WONG BAKER
WONGBAKER_NUMERICALRESPONSE: 0

## 2022-05-26 ASSESSMENT — ENCOUNTER SYMPTOMS
SHORTNESS OF BREATH: 1
ABDOMINAL DISTENTION: 1
TROUBLE SWALLOWING: 1
EYES NEGATIVE: 1
ALLERGIC/IMMUNOLOGIC NEGATIVE: 1

## 2022-05-26 ASSESSMENT — PAIN SCALES - GENERAL
PAINLEVEL_OUTOF10: 0

## 2022-05-26 NOTE — PLAN OF CARE
Sutures removed per order. Site cleaned and mepilex applied do to bleeding in small various area. Bleeding minimal. Pt tolerated well. Pt discharged from unit to be transported via EMS to [facility]. Pt daughters took all pts belongings.     Cici Nunez BSN, RN   [phone]

## 2022-05-26 NOTE — PROGRESS NOTES
Report gave to 5T nurse. Pt transferred via bed. VS WNL's. No complaints or concerns to note.  Electronically signed by Zack Davis RN on 5/26/2022 at 12:21 AM

## 2022-05-26 NOTE — CARE COORDINATION
Per Mary Lanza w/[facility], patient is to return to [facility] w/hospice services.  Transportation set for 3:15pm w/[facility].

## 2022-05-26 NOTE — PLAN OF CARE
Message sent to Provider Braden Fitzgerald) regarding sutures and plan for expected removal, here or when pt returns to [facility].      Sury Beltran BSN, RN   [phone]

## 2022-05-26 NOTE — PLAN OF CARE
Problem: Discharge Planning  Goal: Discharge to home or other facility with appropriate resources  5/26/2022 1007 by Anna Benjamin RN  Outcome: Progressing  5/26/2022 0242 by Kurtis Ross RN  Outcome: Progressing  5/25/2022 2009 by Charissa Dubose RN  Outcome: Progressing     Problem: Pain  Goal: Verbalizes/displays adequate comfort level or baseline comfort level  5/26/2022 1007 by Anna Benjamin RN  Outcome: Progressing  5/26/2022 0242 by Kurtis Ross RN  Outcome: Progressing  5/25/2022 2009 by Charissa Dubose RN  Outcome: Progressing     Problem: Safety - Adult  Goal: Free from fall injury  5/26/2022 1007 by Anna Benjamin RN  Outcome: Progressing  Flowsheets (Taken 5/26/2022 1006)  Free From Fall Injury: Instruct family/caregiver on patient safety  5/26/2022 0242 by Kurtis Ross RN  Outcome: Progressing  Flowsheets (Taken 5/25/2022 2010 by Charissa Dubose RN)  Free From Fall Injury: Instruct family/caregiver on patient safety  5/25/2022 2009 by Charissa Dubose RN  Outcome: Progressing     Problem: Skin/Tissue Integrity  Goal: Absence of new skin breakdown  Description: 1. Monitor for areas of redness and/or skin breakdown  2. Assess vascular access sites hourly  3. Every 4-6 hours minimum:  Change oxygen saturation probe site  4. Every 4-6 hours:  If on nasal continuous positive airway pressure, respiratory therapy assess nares and determine need for appliance change or resting period. 5/26/2022 1007 by Anna Benjamin RN  Outcome: Progressing  5/26/2022 0242 by Kurtis Ross RN  Outcome: Progressing  5/25/2022 2009 by Charissa Dubose RN  Outcome: Progressing     Problem: Confusion  Goal: Confusion, delirium, dementia, or psychosis is improved or at baseline  Description: INTERVENTIONS:  1. Assess for possible contributors to thought disturbance, including medications, impaired vision or hearing, underlying metabolic abnormalities, dehydration, psychiatric diagnoses, and notify attending LIP  2. [place] high risk fall precautions, as indicated  3. Provide frequent short contacts to provide reality reorientation, refocusing and direction  4. Decrease environmental stimuli, including noise as appropriate  5. Monitor and intervene to maintain adequate nutrition, hydration, elimination, sleep and activity  6. If unable to ensure safety without constant attention obtain sitter and review sitter guidelines with assigned personnel  7.  Initiate Psychosocial CNS and Spiritual Care consult, as indicated  5/26/2022 1007 by Harmeet Martinez RN  Outcome: Progressing  Flowsheets (Taken 5/26/2022 5948)  Effect of thought disturbance (confusion, delirium, dementia, or psychosis) are managed with adequate functional status: Assess for contributors to thought disturbance, including medications, impaired vision or hearing, underlying metabolic abnormalities, dehydration, psychiatric diagnoses, notify LIP  5/26/2022 0242 by Vandana Corona RN  Outcome: Progressing  5/25/2022 2009 by Fabiola Turk RN  Outcome: Progressing     Problem: ABCDS Injury Assessment  Goal: Absence of physical injury  5/26/2022 1007 by Harmeet Martinez RN  Outcome: Progressing  Flowsheets (Taken 5/26/2022 1006)  Absence of Physical Injury: Implement safety measures based on patient assessment  5/26/2022 0242 by Vandana Corona RN  Outcome: Progressing  Flowsheets (Taken 5/25/2022 2010 by Fabiola Turk RN)  Absence of Physical Injury: Implement safety measures based on patient assessment  5/25/2022 2009 by Fabiola Turk RN  Outcome: Progressing     Problem: Nutrition Deficit:  Goal: Optimize nutritional status  5/26/2022 1007 by Harmeet Martinez RN  Outcome: Progressing  5/26/2022 0242 by Vandana Corona RN  Outcome: Progressing  5/25/2022 2009 by Fabiola Turk RN  Outcome: Progressing

## 2022-05-26 NOTE — PLAN OF CARE
Problem: Discharge Planning  Goal: Discharge to home or other facility with appropriate resources  Outcome: Progressing     Problem: Pain  Goal: Verbalizes/displays adequate comfort level or baseline comfort level  5/25/2022 2009 by Fabiola Turk RN  Outcome: Progressing  5/25/2022 1005 by Charissa Moyer RN  Outcome: Progressing  Note: Patient denies any pain at this time. Will continue to monitor. Problem: Safety - Adult  Goal: Free from fall injury  5/25/2022 2009 by Fabiola Turk RN  Outcome: Progressing  5/25/2022 1005 by Charissa Moyer RN  Outcome: Progressing  Note: Patient remains absent from falls at this time. Remains in bed with eyes closed, call light and belongings in reach. Non-slip footwear on and 2/4 siderails raised. Bed remains in lowest/locked position at all times with alarm activated. Fall precautions in place. Camera in room. Will continue to monitor. Problem: Skin/Tissue Integrity  Goal: Absence of new skin breakdown  Description: 1. Monitor for areas of redness and/or skin breakdown  2. Assess vascular access sites hourly  3. Every 4-6 hours minimum:  Change oxygen saturation probe site  4. Every 4-6 hours:  If on nasal continuous positive airway pressure, respiratory therapy assess nares and determine need for appliance change or resting period. 5/25/2022 2009 by Fabiola Turk RN  Outcome: Progressing  5/25/2022 1005 by Charissa Moyer RN  Outcome: Progressing  Note: Patient Nemesio score 14, assisted to turn and reposition every 2 hours and as needed with pillow support provided. External catheter remains in place and navjot care provided as needed. No new skin issues noted. Problem: Confusion  Goal: Confusion, delirium, dementia, or psychosis is improved or at baseline  Description: INTERVENTIONS:  1.  Assess for possible contributors to thought disturbance, including medications, impaired vision or hearing, underlying metabolic abnormalities, dehydration, psychiatric diagnoses, and notify attending LIP  2. [place] high risk fall precautions, as indicated  3. Provide frequent short contacts to provide reality reorientation, refocusing and direction  4. Decrease environmental stimuli, including noise as appropriate  5. Monitor and intervene to maintain adequate nutrition, hydration, elimination, sleep and activity  6. If unable to ensure safety without constant attention obtain sitter and review sitter guidelines with assigned personnel  7.  Initiate Psychosocial CNS and Spiritual Care consult, as indicated  Outcome: Progressing     Problem: ABCDS Injury Assessment  Goal: Absence of physical injury  Outcome: Progressing     Problem: Nutrition Deficit:  Goal: Optimize nutritional status  Outcome: Progressing

## 2022-05-26 NOTE — PROGRESS NOTES
PALLIATIVE MEDICINE PROGRESS NOTE     Patient name:Bartolome Khan    PMB:[id number] :10/31/1925  Room/Bed:[facility]-5559/5559-01    LOS: 6 days        ASSESSMENT/RECOMMENDATIONS     80 y.o. male with lethargy and dysphagia        Symptom Management:  1. Lethargy- pt drowsy arouses for short period then dozes back off  2. Dysphagia- pt refusing food and coughing at times with fluids  3. Goals of Care-talked to pt and daughter Scarlet Pierce Plans to DC back to [facility] today with HOC. Daughter wants to make sure Ativan is continued at facility.        Patient/Family Goals of Care :      talked to pt and daughter Scarlet Pierce at bedside pt states that he is ready to go if its his time he is [facility] at baseline. He is c/o being thirsty. Talked to daughter about the likelihood that he is aspirating and that Hospice care with comfort feeding sounds like the patients goal. Daughter is upset that pt has taken a down trend she is hopeful that she will recover and have a couple more years. She is awaiting FEES and SLP input prior to making any decisions regarding Hospice care.   talked to pt and daughter Scarlet Pierce as well as pts two other children at bedside regarding Hospice care. They are all in agreement that they want pt to continue IV antibiotics and to improve as much as possible but that they understand that at his age aspiration will be a continued issue and they want HOC at DC with the goal of not bringing pt back to hospital and allowing him to eat for comfort regardless of risk. Referral to [facility] faxed.   Pt increased drowsiness today. Left message for daughter to discuss Hospice care more today.   talked to pt and daughter Glen Backer to DC back to [facility] today with HOC.  Daughter wants to make sure Ativan is continued at facility.       Disposition/Discharge Plan:   pending     Advance Directives:  · Surrogate Decision Maker: Oscar-daughter  · Code status:  DNR-CC     Case discussed with: patient, floor RN  Thank you for allowing us to participate in the care of this patient. SUBJECTIVE     Chief Complaint: Lethergy    Last 24 hours:   Pt drowsy today no family at bedside, called daughter. ROS:    Review of Systems   Constitutional: Positive for activity change and fatigue. HENT: Positive for trouble swallowing. Eyes: Negative. Respiratory: Positive for shortness of breath. Cardiovascular: Positive for leg swelling. Gastrointestinal: Positive for abdominal distention. Endocrine: Negative. Genitourinary: Negative. Musculoskeletal: Positive for arthralgias. Skin: Positive for pallor. Allergic/Immunologic: Negative. Neurological: Positive for weakness. Psychiatric/Behavioral: Positive for confusion. OBJECTIVE   /69   Pulse 93   Temp 98.8 °F (37.1 °C) (Oral)   Resp 16   Ht 5' 8\" (1.727 m)   Wt 125 lb 3.5 oz (56.8 kg)   SpO2 96%   BMI 19.04 kg/m²   I/O last 3 completed shifts:  In: -   Out: 2600 [Urine:2600]  I/O this shift:  In: 110 [P.O.:100; I.V.:10]  Out: -       Physical Exam  Constitutional:       Appearance: He is ill-appearing. Comments: drowsy   HENT:      Head: Normocephalic and atraumatic. Nose: Nose normal.      Mouth/Throat:      Mouth: Mucous membranes are dry. Eyes:      Pupils: Pupils are equal, round, and reactive to light. Cardiovascular:      Rate and Rhythm: Normal rate. Pulses: Normal pulses. Heart sounds: No murmur heard. No gallop. Pulmonary:      Effort: Pulmonary effort is normal.   Chest:      Chest wall: Tenderness present. Abdominal:      General: There is distension. Musculoskeletal:      Cervical back: Neck supple. Right lower leg: Edema present. Left lower leg: Edema present. Skin:     General: Skin is dry. Coloration: Skin is pale. Neurological:      Mental Status: Mental status is at baseline.       Comments: Drowsy and unable to assess orientation               Total time: 35 minutes  >50% of time spent counseling patient at bedside or POA/family member if applicable , reviewing information and discussing care, coordinating with care team       Signed By: Electronically signed by MARIS Leija CNP on 5/26/2022 at 10:38 AM   Palliative Medicine   [phone]    May 26, 2022

## 2022-05-26 NOTE — PROGRESS NOTES
Physician Progress Note      PATIENT:               Richi Dickinson  CSN #:                  [id number]  :                       10/31/1925  ADMIT DATE:       2022 10:17 AM  Young Hernandez DATE:        2022 3:20 PM  RESPONDING  PROVIDER #:        Hamilton Hurst MD          QUERY TEXT:    Pt admitted with Sepsis. Pt noted to have Pneumonia with dysphagia. If   possible, please document in the progress notes and discharge summary if you   are evaluating and/or treating any of the following: The medical record reflects the following:  Risk Factors: Dysphagia, GERD, AMS  Clinical Indicators: Patient to ED after falling at home, noted new oxygen   requirement, CXR with pneumonia, sepsis in the ED with JUAN R, elevated WBC, CRP,   tachypnea. Dysphagia with aspiration of thin liquids noted on SLP evaluation. Treatment: Imaging, Vancomycin, Maxipime, Lab work, SLP, Palliative care  Options provided:  -- Aspiration pneumonia  -- Bacterial pneumonia  -- Other - I will add my own diagnosis  -- Disagree - Not applicable / Not valid  -- Disagree - Clinically unable to determine / Unknown  -- Refer to Clinical Documentation Reviewer    PROVIDER RESPONSE TEXT:    This patient has aspiration pneumonia. Query created by: Whitley Amanda on 2022 10:12 AM      QUERY TEXT:    Patient admitted with Sepsis. Noted documentation of Moderate protein calorie   malnutrition in IM progress note on 22 and 22. In order to support   the diagnosis of Moderate protein calorie malnutrition, please include   additional clinical indicators in your documentation. Or please document if   the diagnosis of Moderate protein calorie malnutrition has been ruled out   after further study. The medical record reflects the following:  Risk Factors: Residential Care, Dysphagia, Poor appetite  Clinical Indicators: Noted documentation of Moderate protein calorie   malnutrition in IM note.  Patient evaluated by Dietitian on 22 related to   poor appetite as reported on admission, notes ?intake 1-25% of meals,   specialty diet, plan to offer Magic cups and monitor progress, malnutrition   status is at risk for malnutrition. ? Current BMI is 19. Albumin 2.6  Treatment: Dietitian evaluation. Dysphagia diet Pureed, Nectar thick liquids,   ONS. Options provided:  -- Moderate protein calorie malnutrition present as evidenced by, Please   document evidence.   -- Moderate protein calorie malnutrition was ruled out  -- Other - I will add my own diagnosis  -- Disagree - Not applicable / Not valid  -- Disagree - Clinically unable to determine / Unknown  -- Refer to Clinical Documentation Reviewer    PROVIDER RESPONSE TEXT:    Moderate protein calorie malnutrition is present as evidenced by low BMI of 19   failure to thrive sub optimal PO intake    Query created by: Johnathan Salcedo on 5/26/2022 8:54 AM      Electronically signed by:  Danita Mcmanus MD 5/26/2022 6:16 PM

## 2022-05-26 NOTE — PLAN OF CARE
Problem: Discharge Planning  Goal: Discharge to home or other facility with appropriate resources  5/26/2022 0242 by Tammy Durant RN  Outcome: Progressing  5/25/2022 2009 by Archie Dickinson RN  Outcome: Progressing     Problem: Pain  Goal: Verbalizes/displays adequate comfort level or baseline comfort level  5/26/2022 0242 by Tammy Durant RN  Outcome: Progressing  5/25/2022 2009 by Archie Dickinson RN  Outcome: Progressing     Problem: Safety - Adult  Goal: Free from fall injury  5/26/2022 0242 by Tammy Durant RN  Outcome: Progressing  Flowsheets (Taken 5/25/2022 2010 by Archie Dickinson RN)  Free From Fall Injury: Instruct family/caregiver on patient safety  5/25/2022 2009 by Archie Dickinson RN  Outcome: Progressing     Problem: Skin/Tissue Integrity  Goal: Absence of new skin breakdown  Description: 1. Monitor for areas of redness and/or skin breakdown  2. Assess vascular access sites hourly  3. Every 4-6 hours minimum:  Change oxygen saturation probe site  4. Every 4-6 hours:  If on nasal continuous positive airway pressure, respiratory therapy assess nares and determine need for appliance change or resting period. 5/26/2022 0242 by Tammy Durant RN  Outcome: Progressing  5/25/2022 2009 by Archie Dickinson RN  Outcome: Progressing     Problem: Confusion  Goal: Confusion, delirium, dementia, or psychosis is improved or at baseline  Description: INTERVENTIONS:  1. Assess for possible contributors to thought disturbance, including medications, impaired vision or hearing, underlying metabolic abnormalities, dehydration, psychiatric diagnoses, and notify attending LIP  2. [place] high risk fall precautions, as indicated  3. Provide frequent short contacts to provide reality reorientation, refocusing and direction  4. Decrease environmental stimuli, including noise as appropriate  5. Monitor and intervene to maintain adequate nutrition, hydration, elimination, sleep and activity  6.  If unable to ensure safety without constant attention obtain sitter and review sitter guidelines with assigned personnel  7. Initiate Psychosocial CNS and Spiritual Care consult, as indicated  5/26/2022 0242 by Armani Moore RN  Outcome: Progressing  5/25/2022 2009 by Bean Bolanos RN  Outcome: Progressing     Problem: ABCDS Injury Assessment  Goal: Absence of physical injury  5/26/2022 0242 by Armani Moore RN  Outcome: Progressing  Flowsheets (Taken 5/25/2022 2010 by Bean Bolanos RN)  Absence of Physical Injury: Implement safety measures based on patient assessment  5/25/2022 2009 by Bean Bolanos RN  Outcome: Progressing     Problem: Nutrition Deficit:  Goal: Optimize nutritional status  5/26/2022 0242 by Armani Moore RN  Outcome: Progressing  5/25/2022 2009 by Bean Bolanos RN  Outcome: Progressing    Patient drowsy and oriented to self & situation - follows commands. Patient unable to make position changes independently - assisting as needed. Patient remained free from falls - up with assist x 1 - 2. Room air. PRN medication for pain/anxiety. PO ABX. Patient from [facility] - anticipated discharge 05/26/22 with hospice.

## 2022-05-26 NOTE — RT PROTOCOL NOTE
RT Inhaler-Nebulizer Bronchodilator Protocol Note    There is a bronchodilator order in the chart from a provider indicating to follow the RT Bronchodilator Protocol and there is an Initiate RT Inhaler-Nebulizer Bronchodilator Protocol order as well (see protocol at bottom of note). CXR Findings:  No results found. The findings from the last RT Protocol Assessment were as follows:   History Pulmonary Disease: None or smoker <15 pack years  Respiratory Pattern: Dyspnea on exertion or RR 21-25 bpm  Breath Sounds: Clear breath sounds  Cough: Strong, spontaneous, non-productive  Indication for Bronchodilator Therapy:    Bronchodilator Assessment Score: 2    Aerosolized bronchodilator medication orders have been revised according to the RT Inhaler-Nebulizer Bronchodilator Protocol below. Respiratory Therapist to perform RT Therapy Protocol Assessment initially then follow the protocol. Repeat RT Therapy Protocol Assessment PRN for score 0-3 or on second treatment, BID, and PRN for scores above 3. No Indications - adjust the frequency to every 6 hours PRN wheezing or bronchospasm, if no treatments needed after 48 hours then discontinue using Per Protocol order mode. If indication present, adjust the RT bronchodilator orders based on the Bronchodilator Assessment Score as indicated below. Use Inhaler orders unless patient has one or more of the following: on home nebulizer, not able to hold breath for 10 seconds, is not alert and oriented, cannot activate and use MDI correctly, or respiratory rate 25 breaths per minute or more, then use the equivalent nebulizer order(s) with same Frequency and PRN reasons based on the score. If a patient is on this medication at home then do not decrease Frequency below that used at home.     0-3 - enter or revise RT bronchodilator order(s) to equivalent RT Bronchodilator order with Frequency of every 4 hours PRN for wheezing or increased work of breathing using Per Protocol order mode. 4-6 - enter or revise RT Bronchodilator order(s) to two equivalent RT bronchodilator orders with one order with BID Frequency and one order with Frequency of every 4 hours PRN wheezing or increased work of breathing using Per Protocol order mode. 7-10 - enter or revise RT Bronchodilator order(s) to two equivalent RT bronchodilator orders with one order with TID Frequency and one order with Frequency of every 4 hours PRN wheezing or increased work of breathing using Per Protocol order mode. 11-13 - enter or revise RT Bronchodilator order(s) to one equivalent RT bronchodilator order with QID Frequency and an Albuterol order with Frequency of every 4 hours PRN wheezing or increased work of breathing using Per Protocol order mode. Greater than 13 - enter or revise RT Bronchodilator order(s) to one equivalent RT bronchodilator order with every 4 hours Frequency and an Albuterol order with Frequency of every 2 hours PRN wheezing or increased work of breathing using Per Protocol order mode. RT to enter RT Home Evaluation for COPD & MDI Assessment order using Per Protocol order mode.     Electronically signed by Dorys Cobb RCP on 5/26/2022 at 11:25 AM

## 2022-05-26 NOTE — PLAN OF CARE
IV's removed bilaterally per order. Pt tolerated well. Gauze and coban applied to prevent bleeding at IV removal site.      Letty Rivas BSN, RN   [phone]

## 2022-05-26 NOTE — PROGRESS NOTES
to ambulate   Neurological ROS: no TIA or stroke symptoms  Dermatological ROS: negative    OBJECTIVE      Medications      Current Facility-Administered Medications: ciprofloxacin (CIPRO) tablet 250 mg, 250 mg, Oral, 2 times per day  LORazepam (ATIVAN) tablet 0.25 mg, 0.25 mg, Oral, Q6H PRN  torsemide (DEMADEX) tablet 20 mg, 20 mg, Oral, Daily  pantoprazole (PROTONIX) tablet 40 mg, 40 mg, Oral, QAM AC  sodium chloride flush 0.9 % injection 5-40 mL, 5-40 mL, IntraVENous, 2 times per day  sodium chloride flush 0.9 % injection 5-40 mL, 5-40 mL, IntraVENous, PRN  0.9 % sodium chloride infusion, , IntraVENous, PRN  magnesium oxide (MAG-OX) tablet 400 mg, 400 mg, Oral, Daily  melatonin tablet 4.5 mg, 4.5 mg, Oral, Daily PRN  nitroGLYCERIN (NITROSTAT) SL tablet 0.4 mg, 0.4 mg, SubLINGual, Q5 Min PRN  tamsulosin (FLOMAX) capsule 0.4 mg, 0.4 mg, Oral, Daily  levothyroxine (SYNTHROID) tablet 25 mcg, 25 mcg, Oral, Daily  lamoTRIgine (LAMICTAL) tablet 150 mg, 150 mg, Oral, Daily  finasteride (PROSCAR) tablet 5 mg, 5 mg, Oral, Daily  apixaban (ELIQUIS) tablet 2.5 mg, 2.5 mg, Oral, BID  sennosides-docusate sodium (SENOKOT-S) 8.6-50 MG tablet 1 tablet, 1 tablet, Oral, BID  ferrous sulfate (IRON 325) tablet 325 mg, 325 mg, Oral, Daily with breakfast  dilTIAZem (CARDIZEM) tablet 30 mg, 30 mg, Oral, TID  sodium chloride flush 0.9 % injection 5-40 mL, 5-40 mL, IntraVENous, 2 times per day  sodium chloride flush 0.9 % injection 10 mL, 10 mL, IntraVENous, PRN  0.9 % sodium chloride infusion, , IntraVENous, PRN  enoxaparin Sodium (LOVENOX) injection 30 mg, 30 mg, SubCUTAneous, Daily  ondansetron (ZOFRAN-ODT) disintegrating tablet 4 mg, 4 mg, Oral, Q8H PRN **OR** ondansetron (ZOFRAN) injection 4 mg, 4 mg, IntraVENous, Q6H PRN  magnesium hydroxide (MILK OF MAGNESIA) 400 MG/5ML suspension 30 mL, 30 mL, Oral, Daily PRN  acetaminophen (TYLENOL) tablet 650 mg, 650 mg, Oral, Q6H PRN **OR** acetaminophen (TYLENOL) suppository 650 mg, 650 mg, Rectal, Q6H PRN  hydrALAZINE (APRESOLINE) injection 10 mg, 10 mg, IntraVENous, Q6H PRN  0.9 % sodium chloride bolus, 500 mL, IntraVENous, PRN  ipratropium-albuterol (DUONEB) nebulizer solution 1 ampule, 1 ampule, Inhalation, BID  ipratropium-albuterol (DUONEB) nebulizer solution 1 ampule, 1 ampule, Inhalation, Q4H PRN  calcium carbonate tablet 600 mg, 600 mg, Oral, Daily    Physical      Vitals: /72   Pulse 98   Temp 97.4 °F (36.3 °C) (Temporal)   Resp 16   Ht 5' 8\" (1.727 m)   Wt 138 lb (62.6 kg)   SpO2 95%   BMI 20.98 kg/m²   Temp: Temp: 97.4 °F (36.3 °C)  Max: Temp  Av.5 °F (36.4 °C)  Min: 96.8 °F (36 °C)  Max: 98 °F (36.7 °C)  Respiration range:  Resp  Av.4  Min: 16  Max: 26  Pulse Range:  Pulse  Av.6  Min: 82  Max: 110  Blood pressure range:  Systolic (71EYJ), ESH:067 , Min:108 , FLW:899   , Diastolic (17AHD), VXD:57, Min:64, Max:83    SpO2  Av.4 %  Min: 92 %  Max: 97 %    Intake/Output Summary (Last 24 hours) at 2022 2335  Last data filed at 2022 1555  Gross per 24 hour   Intake --   Output 2200 ml   Net -2200 ml       Vent settings:  Pulse  Av.1  Min: 60  Max: 115  Resp  Av  Min: 14  Max: 32  SpO2  Av.7 %  Min: 90 %  Max: 100 %    CONSTITUTIONAL:  fatigued, alert, cooperative, mild distress, appears stated age and thin  EYES:  Unremarkable   NECK:  No JVD  and supple, symmetrical, trachea midline  BACK:  symmetric and no curvature  LUNGS:  No increased work of breathing, good air exchange, clear to auscultation bilaterally, no crackles or wheezing  CARDIOVASCULAR:  normal apical pulses, irregular rate and rhythm, normal S1 and S2 and no S3  ABDOMEN:  Soft scaphoid BS + non tender   MUSCULOSKELETAL:  Tenderness and restricted ROM left hip , trace edema kate Legs , left hip post op site looks ok no hemorrhage or infection   NEUROLOGIC:  Mental Status Exam:  Level of Alertness:   awake  Cranial Nerves:  cranial nerves II-XII are grossly intact  Motor Exam: Motor grade 3/5   Sensory:  Sensory intact  SKIN:  Warm and moist  and no bruising or bleeding    Data      Recent Results (from the past 96 hour(s))   Basic Metabolic Panel    Collection Time: 05/22/22  7:58 AM   Result Value Ref Range    Sodium 138 136 - 145 mmol/L    Potassium 4.1 3.5 - 5.1 mmol/L    Chloride 104 99 - 110 mmol/L    CO2 22 21 - 32 mmol/L    Anion Gap 12 3 - 16    Glucose 91 70 - 99 mg/dL    BUN 24 (H) 7 - 20 mg/dL    CREATININE 1.5 (H) 0.8 - 1.3 mg/dL    GFR Non- 43 (A) >60    GFR  52 (A) >60    Calcium 8.1 (L) 8.3 - 10.6 mg/dL   CBC with Auto Differential    Collection Time: 05/22/22  7:58 AM   Result Value Ref Range    WBC 5.6 4.0 - 11.0 K/uL    RBC 3.12 (L) 4.20 - 5.90 M/uL    Hemoglobin 9.5 (L) 13.5 - 17.5 g/dL    Hematocrit 28.2 (L) 40.5 - 52.5 %    MCV 90.6 80.0 - 100.0 fL    MCH 30.5 26.0 - 34.0 pg    MCHC 33.6 31.0 - 36.0 g/dL    RDW 14.2 12.4 - 15.4 %    Platelets 547 161 - 418 K/uL    MPV 6.8 5.0 - 10.5 fL    Neutrophils % 86.9 %    Lymphocytes % 5.7 %    Monocytes % 6.0 %    Eosinophils % 0.9 %    Basophils % 0.5 %    Neutrophils Absolute 4.9 1.7 - 7.7 K/uL    Lymphocytes Absolute 0.3 (L) 1.0 - 5.1 K/uL    Monocytes Absolute 0.3 0.0 - 1.3 K/uL    Eosinophils Absolute 0.0 0.0 - 0.6 K/uL    Basophils Absolute 0.0 0.0 - 0.2 K/uL   EKG 12 Lead    Collection Time: 05/22/22  9:23 AM   Result Value Ref Range    Ventricular Rate 94 BPM    Atrial Rate 94 BPM    QRS Duration 104 ms    Q-T Interval 348 ms    QTc Calculation (Bazett) 435 ms    R Axis 6 degrees    T Axis -63 degrees    Diagnosis       Atrial fibrillationNonspecific ST and T wave abnormalityAbnormal ECGWhen compared with ECG of 20-MAY-2022 10:38,No significant change was found   Basic Metabolic Panel    Collection Time: 05/23/22  6:55 AM   Result Value Ref Range    Sodium 141 136 - 145 mmol/L    Potassium 4.2 3.5 - 5.1 mmol/L    Chloride 107 99 - 110 mmol/L    CO2 22 21 - 32 mmol/L    Anion Gap 12 3 - Hyperlipidemia    Bilateral carotid artery disease (HCC)    PNA (pneumonia)    JUAN R (acute kidney injury) ([address]    UTI (urinary tract infection)    Sepsis (HCC)    Nonrheumatic mitral valve regurgitation    GERD (gastroesophageal reflux disease)    Aortic stenosis    Psychophysiological insomnia    Anxiety disorder    RLS (restless legs syndrome)    Chronic idiopathic constipation    Paroxysmal atrial fibrillation (HCC)    Moderate protein-calorie malnutrition (HCC)    Chronic diastolic heart failure (HCC)    Chronic atrial fibrillation ([facility] [address]  Resolved Problems:    * No resolved hospital problems. *      Decrease Lorazepam dose , dismissal order  In place , daughter is objecting and appealing    I explained to daughter when I am doing level of care that can be also provided at a SNF , I am obligated  To put a discharge order , she says her father may be ready  For discharge but she will not be mentally ready  For it  For another day or two . . I encouraged her to approach hospital administration with her expectations

## 2022-05-26 NOTE — PROGRESS NOTES
.4 Eyes Skin Assessment     The patient is being assess for   Transfer to New Unit    I agree that 2 RN's have performed a thorough Head to Toe Skin Assessment on the patient. ALL assessment sites listed below have been assessed. Areas assessed for pressure by both nurses:   [x]   Head, Face, and Ears   [x]   Shoulders, Back, and Chest, Abdomen  [x]   Arms, Elbows, and Hands   [x]   Coccyx, Sacrum, and Ischium  [x]   Legs, Feet, and Heels        Skin Assessed Under all Medical Devices by both nurses:  NA              All Mepilex Borders were peeled back and area peeked at by both nurses:  Yes  Please list where Mepilex Borders are located:  Coccyx             **SHARE this note so that the co-signing nurse is able to place an eSignature**    Co-signer eSignature: Electronically signed by Nita De La Torre RN on 5/26/22 at 2:33 AM EDT    Does the Patient have Skin Breakdown related to pressure?   Yes [facility] WOUND CARE was Initiated documentation include the Inna-wound, Wound Assessment, Measurements, Dressing Treatment, Drainage, and Color\",     (Insert Photo here NA)         Nemesio Prevention initiated:  Yes   Wound Care Orders initiated:  Yes      [id number] nurse consulted for Pressure Injury (Stage 3,4, Unstageable, DTI, NWPT, Complex wounds)and New or Established Ostomies:  No      Primary Nurse eSignature: Electronically signed by Cal Marie RN on 5/26/22 at 2:31 AM EDT

## 2022-05-26 NOTE — PROGRESS NOTES
05/26/22 1125   RT Protocol   History Pulmonary Disease 0   Respiratory pattern 2   Breath sounds 0   Cough 0   Bronchodilator Assessment Score 2     Electronically signed by Amanda Lin RCP on 5/26/2022 at 11:25 AM

## 2022-05-26 NOTE — PLAN OF CARE
Report called to [address] at [facility]. All questions/concerns answered.      Talat WETZELN, RN   [phone]

## 2022-05-27 LAB
EKG ATRIAL RATE: 94 BPM
EKG DIAGNOSIS: NORMAL
EKG Q-T INTERVAL: 348 MS
EKG QRS DURATION: 104 MS
EKG QTC CALCULATION (BAZETT): 435 MS
EKG R AXIS: 6 DEGREES
EKG T AXIS: -63 DEGREES
EKG VENTRICULAR RATE: 94 BPM

## 2022-05-31 ENCOUNTER — TELEPHONE (OUTPATIENT)
Dept: CARDIOLOGY CLINIC | Age: 87
End: 2022-05-31

## 2022-05-31 NOTE — TELEPHONE ENCOUNTER
Daughter states pt was in hosp with septic and pt was put in hospice. Daughter states pt is doing better and is asking to speak to [facility].  Please call to discuss

## 2022-05-31 NOTE — TELEPHONE ENCOUNTER
Patient's dtr states father was in hospital for aspiration pna and sepsis and was placed in hospice. He is now back at [facility] and is doing better. She thinks he will stay in hospice. She wants to keep appt for Oct.     [facility] aware.

## 2022-06-19 PROBLEM — N39.0 UTI (URINARY TRACT INFECTION): Status: RESOLVED | Noted: 2022-05-20 | Resolved: 2022-06-19

## 2022-06-30 NOTE — PROGRESS NOTES
Patient seen , discharge dictated scripts given , arrangements made , LUCÍA completed .  Discussed with nursing staff  And   If applicable ,  Discussed with  Patient's family , all questions answered and concerns addressed  When applicable

## 2022-07-01 NOTE — DISCHARGE SUMMARY
[address]                     [facility], [address] [place] [address] Drive                               DISCHARGE SUMMARY    PATIENT NAME: Charlie Carrasco                      :        10/31/1925  MED REC NO:   [id number]                          ROOM:       [id number]  ACCOUNT NO:   [de-identified]                           ADMIT DATE: 2022  PROVIDER:     Judson Lynn MD                  DISCHARGE DATE:  2022    FINAL DIAGNOSES:  1. Altered mental status. 2.  Senile debility. 3.  Sepsis due to pneumonia. 4.  Essential hypertension. 5.  Hyperlipidemia. 6.  Atrial fibrillation. 7.  Acute kidney injury. 8.  Urinary tract infection. 9.  GERD. DISCHARGE MEDICATIONS:  1.  DuoNeb one every 4 hours p.r.n.  2.  Zofran orally disintegrating tablet one every 8 hours p.r.n.  3.  Demadex 20 mg every 48 hours. 4.  Cipro 250 p.o. b.i.d.  5.  Avenova two sprays topically b.i.d.  6.  Gabapentin 200 mg at bedtime. 7.  Ceftin 250 p.o. b.i.d.  8.  Mylanta 30 mL every 6 hours p.r.n.  9.  Lorazepam 0.5 mg daily as needed for anxiety. 10.  Artificial Tears as directed. 11.  Geodon 20 mg nightly. 12.  Cardizem 30 mg three times a day. 13.  Multivitamin once a day. 14.  Oyster shell calcium 500 mg daily. 15.  Iron 325 daily. 16.  Senokot-S two tablets daily. 17.  Eliquis 2.5 mg p.o. b.i.d. 18.  Proscar 5 mg daily. 19.  Lamictal 150 mg daily. 20.  Protonix 40 mg daily. 21.  Levothyroxine 25 mcg daily. 22.  Flomax 0.4 mg nightly. 23.  Melatonin 5 mg q.h.s.  24. Magnesium 400 mg daily. HOSPITAL COURSE:  This 59-year-old white man from nursing home was  admitted with altered mental status. The patient was febrile. Blood  pressure was stable. The patient had pneumonia and urinary tract  infection. Sodium was 142, potassium 4.2, chloride 108, CO2 of 23, BUN  19, creatinine 1.3, blood glucose was 104.   White blood cell count 4.5,  hemoglobin/hematocrit 10.2 and 31.7,

## 2022-10-24 ENCOUNTER — HOSPITAL ENCOUNTER (OUTPATIENT)
Age: 87
Discharge: HOME | End: 2022-10-24
Payer: MEDICARE

## 2022-10-24 ENCOUNTER — OFFICE VISIT (OUTPATIENT)
Dept: CARDIOLOGY CLINIC | Age: 87
End: 2022-10-24
Payer: MEDICARE

## 2022-10-24 VITALS
HEIGHT: 68 IN | SYSTOLIC BLOOD PRESSURE: 106 MMHG | HEART RATE: 89 BPM | OXYGEN SATURATION: 96 % | DIASTOLIC BLOOD PRESSURE: 60 MMHG | BODY MASS INDEX: 19.4 KG/M2 | WEIGHT: 128 LBS

## 2022-10-24 DIAGNOSIS — I25.10 ATHEROSCLEROSIS OF NATIVE CORONARY ARTERY OF NATIVE HEART WITHOUT ANGINA PECTORIS: ICD-10-CM

## 2022-10-24 DIAGNOSIS — I48.0 PAROXYSMAL ATRIAL FIBRILLATION (HCC): ICD-10-CM

## 2022-10-24 DIAGNOSIS — I35.0 NONRHEUMATIC AORTIC VALVE STENOSIS: ICD-10-CM

## 2022-10-24 DIAGNOSIS — I25.10 ATHEROSCLEROSIS OF NATIVE CORONARY ARTERY OF NATIVE HEART WITHOUT ANGINA PECTORIS: Primary | ICD-10-CM

## 2022-10-24 DIAGNOSIS — I10 ESSENTIAL HYPERTENSION: Chronic | ICD-10-CM

## 2022-10-24 DIAGNOSIS — E03.9 HYPOTHYROIDISM, UNSPECIFIED TYPE: ICD-10-CM

## 2022-10-24 DIAGNOSIS — E78.2 MIXED HYPERLIPIDEMIA: Chronic | ICD-10-CM

## 2022-10-24 LAB
ALT SERPL-CCNC: 10 U/L (ref 10–40)
ANION GAP SERPL CALCULATED.3IONS-SCNC: 9 MMOL/L (ref 3–16)
AST SERPL-CCNC: 16 U/L (ref 15–37)
BUN BLDV-MCNC: 19 MG/DL (ref 7–20)
CALCIUM SERPL-MCNC: 9.2 MG/DL (ref 8.3–10.6)
CHLORIDE BLD-SCNC: 100 MMOL/L (ref 99–110)
CHOLESTEROL, FASTING: 145 MG/DL (ref 0–199)
CO2: 29 MMOL/L (ref 21–32)
CREAT SERPL-MCNC: 1.2 MG/DL (ref 0.8–1.3)
GFR SERPL CREATININE-BSD FRML MDRD: 55 ML/MIN/{1.73_M2}
GLUCOSE BLD-MCNC: 124 MG/DL (ref 70–99)
HCT VFR BLD CALC: 36 % (ref 40.5–52.5)
HDLC SERPL-MCNC: 37 MG/DL (ref 40–60)
HEMOGLOBIN: 12 G/DL (ref 13.5–17.5)
LDL CHOLESTEROL CALCULATED: 88 MG/DL
MCH RBC QN AUTO: 30.9 PG (ref 26–34)
MCHC RBC AUTO-ENTMCNC: 33.3 G/DL (ref 31–36)
MCV RBC AUTO: 92.7 FL (ref 80–100)
PDW BLD-RTO: 13.1 % (ref 12.4–15.4)
PLATELET # BLD: 199 K/UL (ref 135–450)
PMV BLD AUTO: 7.1 FL (ref 5–10.5)
POTASSIUM SERPL-SCNC: 4.8 MMOL/L (ref 3.5–5.1)
RBC # BLD: 3.88 M/UL (ref 4.2–5.9)
SODIUM BLD-SCNC: 138 MMOL/L (ref 136–145)
TRIGLYCERIDE, FASTING: 100 MG/DL (ref 0–150)
TSH REFLEX: 3.28 UIU/ML (ref 0.27–4.2)
VLDLC SERPL CALC-MCNC: 20 MG/DL
WBC # BLD: 4.2 K/UL (ref 4–11)

## 2022-10-24 PROCEDURE — 85027 COMPLETE CBC AUTOMATED: CPT

## 2022-10-24 PROCEDURE — G8420 CALC BMI NORM PARAMETERS: HCPCS | Performed by: INTERNAL MEDICINE

## 2022-10-24 PROCEDURE — 99214 OFFICE O/P EST MOD 30 MIN: CPT | Performed by: INTERNAL MEDICINE

## 2022-10-24 PROCEDURE — 1036F TOBACCO NON-USER: CPT | Performed by: INTERNAL MEDICINE

## 2022-10-24 PROCEDURE — 80061 LIPID PANEL: CPT

## 2022-10-24 PROCEDURE — 36415 COLL VENOUS BLD VENIPUNCTURE: CPT

## 2022-10-24 PROCEDURE — 1123F ACP DISCUSS/DSCN MKR DOCD: CPT | Performed by: INTERNAL MEDICINE

## 2022-10-24 PROCEDURE — G8427 DOCREV CUR MEDS BY ELIG CLIN: HCPCS | Performed by: INTERNAL MEDICINE

## 2022-10-24 PROCEDURE — 84460 ALANINE AMINO (ALT) (SGPT): CPT

## 2022-10-24 PROCEDURE — 84450 TRANSFERASE (AST) (SGOT): CPT

## 2022-10-24 PROCEDURE — 84443 ASSAY THYROID STIM HORMONE: CPT

## 2022-10-24 PROCEDURE — G8484 FLU IMMUNIZE NO ADMIN: HCPCS | Performed by: INTERNAL MEDICINE

## 2022-10-24 PROCEDURE — 80048 BASIC METABOLIC PNL TOTAL CA: CPT

## 2022-10-24 RX ORDER — ACETAMINOPHEN 325 MG/1
TABLET ORAL EVERY 6 HOURS PRN
COMMUNITY

## 2022-10-24 RX ORDER — LORAZEPAM 0.5 MG/1
0.5 TABLET ORAL EVERY 6 HOURS PRN
COMMUNITY

## 2022-10-24 NOTE — PROGRESS NOTES
[facility]  Cardiac Consult     Referring Provider:  Honey Swartz MD     Chief Complaint   Patient presents with    6 Month Follow-Up    Coronary Artery Disease    Hypertension    Hyperlipidemia        History of Present Illness:  80 y.o. male with bipolar formally followed by Dr. Elsa Price seen in f/u for CAD, HTN, hyperlipidemia and bradycardia. He underwent stenting of the LCX and RCA in 2005. Last stress myoview 12/2016 was normal. He has had bradycardia resulting in discontinuation of his beta blocker. He was admitted 4/19 with afib and chest pain. He underwent MOE cardioversion. He was admitted 6/2020 with a fall and hip fracture. He lives at [employer] and was trying to adjust the TV and \"Tripped\". He was back in afib that was controlled at that time. He was having more tachycardia and diltizem added. He was admitted 6/2021 with chest pain that was felt to be GI. Returned to ER a few days later as he fell. Admit again 5/2022 wit sepsis and possible aspiration. Placed in hospice. However, just taken out of hopice 2 weeks ago. He seems to be doing very well. No dyspnea or edema. No recent labs    Past Medical History:   has a past medical history of A-fib ([address] Arthritis, Atrial fibrillation ([address] Blepharitis of both eyes, CAD (coronary artery disease), GERD (gastroesophageal reflux disease), Gout, Hyperlipidemia, Hypertension, Macular degeneration, NSTEMI (non-ST elevated myocardial infarction) ([address] PNA (pneumonia), Prostate enlargement, Psychiatric problem, and Tachycardia. Surgical History:   has a past surgical history that includes Coronary angioplasty with stent (2005); skin biopsy; Kidney stone surgery (1980); Coronary artery bypass graft; Cataract removal (1993); TURP (2003); Skin cancer destruction (2006); Colonoscopy; eye surgery; Cardiac surgery; and hip surgery (Left, 6/2/2020). Social History:   reports that he has never smoked.  He has never used smokeless tobacco. He reports that he does not drink alcohol and does not use drugs. Family History:  family history includes Heart Disease in his brother and mother. Allergies:  Lasix [furosemide], Zyprexa [olanzapine], and Citalopram     Home Medications:  Outpatient Encounter Medications as of 10/24/2022   Medication Sig Dispense Refill    acetaminophen (TYLENOL) 325 MG tablet Take by mouth every 6 hours as needed for Pain      LORazepam (ATIVAN) 0.5 MG tablet Take 0.5 mg by mouth every 6 hours as needed for Anxiety.       Eyelid Cleansers (AVENOVA) 0.01 % SOLN Apply 2 sprays topically 2 times daily      aluminum & magnesium hydroxide-simethicone (MYLANTA) 400-400-40 MG/5ML SUSP Take 30 mLs by mouth every 6 hours as needed      Artificial Tear Ointment (DRY EYES OP) Apply 250 capsules to eye daily      dilTIAZem (CARDIZEM) 30 MG tablet Take 1 tablet by mouth 3 times daily Hold the tablet if heart rate below 60 or blood pressure below 040 systolic 907 tablet 1    sennosides-docusate sodium (SENOKOT-S) 8.6-50 MG tablet Take 1 tablet by mouth 2 times daily      apixaban (ELIQUIS) 2.5 MG TABS tablet Take 1 tablet by mouth 2 times daily 180 tablet 3    finasteride (PROSCAR) 5 MG tablet TAKE 1 TABLET EVERY NIGHT 90 tablet 3    lamoTRIgine (LAMICTAL) 150 MG tablet Take 1 tablet by mouth daily 90 tablet 1    levothyroxine (SYNTHROID) 25 MCG tablet TAKE 1 TABLET EVERY DAY 90 tablet 3    tamsulosin (FLOMAX) 0.4 MG capsule Take 0.4 mg by mouth daily      polyethylene glycol (GLYCOLAX) powder Take 17 g by mouth daily as needed      nitroGLYCERIN (NITROSTAT) 0.4 MG SL tablet Place 1 tablet under the tongue every 5 minutes as needed for Chest pain 25 tablet 3    melatonin 5 MG TABS tablet Take 5 mg by mouth daily as needed (Insomnia)      [DISCONTINUED] ipratropium-albuterol (DUONEB) 0.5-2.5 (3) MG/3ML SOLN nebulizer solution Inhale 3 mLs into the lungs every 4 hours as needed for Shortness of Breath 360 mL 0    [DISCONTINUED] ondansetron (ZOFRAN-ODT) 4 MG disintegrating tablet Take 1 tablet by mouth every 8 hours as needed for Nausea or Vomiting (Patient not taking: Reported on 10/24/2022) 20 tablet 0    [DISCONTINUED] torsemide (DEMADEX) 20 MG tablet Take 1 tablet by mouth every 48 hours 15 tablet 0    Multiple Vitamins-Minerals (MULTIVITAMIN ADULT PO) Take by mouth (Patient not taking: Reported on 10/24/2022)      calcium carbonate (OSCAL) 500 MG TABS tablet Take 500 mg by mouth daily (Patient not taking: Reported on 10/24/2022)      ferrous sulfate (IRON 325) 325 (65 Fe) MG tablet Take 325 mg by mouth daily (with breakfast) (Patient not taking: Reported on 10/24/2022)      [DISCONTINUED] pantoprazole (PROTONIX) 40 MG tablet TAKE 1 TABLET EVERY DAY (Patient not taking: Reported on 10/24/2022) 90 tablet 3    Magnesium 400 MG TABS Take 1 tablet by mouth daily  (Patient not taking: Reported on 10/24/2022)       No facility-administered encounter medications on file as of 10/24/2022. [x] Medications and dosages reviewed.     ROS:  [x]Full ROS obtained and negative except as mentioned in HPI      Physical Examination:    Vitals:    10/24/22 1029   BP: 106/60   Pulse: 89   SpO2: 96%      /60 (Site: Left Upper Arm, Position: Sitting, Cuff Size: Medium Adult)   Pulse 89   Ht 5' 8\" (1.727 m)   Wt 128 lb (58.1 kg)   SpO2 96%   BMI 19.46 kg/m²     GENERAL: Elderly male in wheel chair in NAD  NEUROLOGICAL: Alert and oriented  PSYCH: Calm affect  SKIN: Warm and dry, no rash  HEENT: Normocephalic, Sclera non-icteric, mucus membranes moist  NECK: supple, JVP normal  CAROTID: Normal upstroke, no bruits  CARDIAC: Normal PMI, regular rate and irregular rhythm normal S1S2, 2/6 mid peaking ejection murmur, rub, or gallop  RESPIRATORY: Normal respiratory effort, clear to auscultation bilaterally  EXTREMITIES: no LE edema  MUSCULOSKELETAL: No joint swelling or tenderness, no chest wall tenderness  GASTROINTESTINAL: normal bowel sounds, soft, non-tender, no bruit      ECHO 4/2022  Summary   -Normal left ventricle size and systolic function with an estimated ejection   fraction of 65%. No regional wall motion abnormalities are seen. -There is moderate concentric left ventricular hypertrophy. -E/e\"= 12. A-fib.   -Mild to moderate mitral regurgitation.   -The left atrium is dilated. -The aortic valve area is calculated at .8 cm2 with a maximum pressure   gradient of 58 mmHg and a mean pressure gradient of 34 mmHg. This is c/w   moderate-severe aortic stenosis. No evidence of aortic valve regurgitation.   -The right ventricle is mildly enlarged and mildly decreased in function.   -Moderate tricuspid regurgitation. RVSP 32mmHg.   -The right atrium is dilated. -Mild pulmonic regurgitation. -IVC not well visualized. Assessment:     CAD:   Remains stable. Continue medical therapy  S/p stenting of LCX and RCA 2005. Stress myoview 2016 normal.   Chest pain seems resolved. Follow    Hyperlipidemia:   No longer on pravastatin   Check lipids    HTN:   Controlled. Follow on current meds. No hypotension  /60 (Site: Left Upper Arm, Position: Sitting, Cuff Size: Medium Adult)   Pulse 89   Ht 5' 8\" (1.727 m)   Wt 128 lb (58.1 kg)   SpO2 96%   BMI 19.46 kg/m²     Afib: In afib and HR=80's  Stable  Continue Dilt 30 mg q 8. _    Chest Pain:  resolved    Aortic Stenosis:  Moderate-severe 4/2022. Mean gradient 34. Does not meet ctiteria for TAVR and asymptomatic  Getting very weak and frail. Discussed with daughter. I am not sure he will be a candidate for TAVR even if he progresses to severe AS  Recheck on f/u    Edema:  Resolved. Off of Demadex.       Plan:  Same meds as he is doing well  Labs today  F/u 3-4 months with ECHO      Thank you for allowing me to participate in the care of this individual.      Arlin Wall M.D., [facility] [place]

## 2022-10-25 ENCOUNTER — TELEPHONE (OUTPATIENT)
Dept: CARDIOLOGY CLINIC | Age: 87
End: 2022-10-25

## 2022-10-25 NOTE — TELEPHONE ENCOUNTER
----- Message from Tony Orona MD sent at 10/25/2022  1:48 PM EDT -----  Tell pt. their labs are good. F/u in clinic as planned.       [facility]

## 2023-02-16 ENCOUNTER — TELEPHONE (OUTPATIENT)
Dept: CARDIOLOGY CLINIC | Age: 88
End: 2023-02-16

## 2023-02-16 ENCOUNTER — HOSPITAL ENCOUNTER (OUTPATIENT)
Dept: NON INVASIVE DIAGNOSTICS | Age: 88
Discharge: HOME | End: 2023-02-16
Payer: MEDICARE

## 2023-02-16 ENCOUNTER — HOSPITAL ENCOUNTER (OUTPATIENT)
Dept: GENERAL RADIOLOGY | Age: 88
Discharge: HOME | End: 2023-02-16
Payer: MEDICARE

## 2023-02-16 ENCOUNTER — OFFICE VISIT (OUTPATIENT)
Dept: CARDIOLOGY CLINIC | Age: 88
End: 2023-02-16
Payer: MEDICARE

## 2023-02-16 ENCOUNTER — HOSPITAL ENCOUNTER (OUTPATIENT)
Age: 88
Discharge: HOME | End: 2023-02-16
Payer: MEDICARE

## 2023-02-16 VITALS
HEIGHT: 68 IN | BODY MASS INDEX: 20.61 KG/M2 | HEART RATE: 71 BPM | WEIGHT: 136 LBS | SYSTOLIC BLOOD PRESSURE: 114 MMHG | OXYGEN SATURATION: 96 % | DIASTOLIC BLOOD PRESSURE: 60 MMHG

## 2023-02-16 DIAGNOSIS — E78.2 MIXED HYPERLIPIDEMIA: Primary | ICD-10-CM

## 2023-02-16 DIAGNOSIS — I35.0 NONRHEUMATIC AORTIC VALVE STENOSIS: ICD-10-CM

## 2023-02-16 DIAGNOSIS — I10 ESSENTIAL HYPERTENSION: Chronic | ICD-10-CM

## 2023-02-16 DIAGNOSIS — J90 PLEURAL EFFUSION: ICD-10-CM

## 2023-02-16 DIAGNOSIS — I48.0 PAROXYSMAL ATRIAL FIBRILLATION (HCC): ICD-10-CM

## 2023-02-16 DIAGNOSIS — I25.10 ATHEROSCLEROSIS OF NATIVE CORONARY ARTERY OF NATIVE HEART WITHOUT ANGINA PECTORIS: Primary | ICD-10-CM

## 2023-02-16 DIAGNOSIS — E78.2 MIXED HYPERLIPIDEMIA: Chronic | ICD-10-CM

## 2023-02-16 LAB
LV EF: 58 %
LVEF MODALITY: NORMAL

## 2023-02-16 PROCEDURE — G8484 FLU IMMUNIZE NO ADMIN: HCPCS | Performed by: INTERNAL MEDICINE

## 2023-02-16 PROCEDURE — 93306 TTE W/DOPPLER COMPLETE: CPT

## 2023-02-16 PROCEDURE — 99214 OFFICE O/P EST MOD 30 MIN: CPT | Performed by: INTERNAL MEDICINE

## 2023-02-16 PROCEDURE — 1123F ACP DISCUSS/DSCN MKR DOCD: CPT | Performed by: INTERNAL MEDICINE

## 2023-02-16 PROCEDURE — 1036F TOBACCO NON-USER: CPT | Performed by: INTERNAL MEDICINE

## 2023-02-16 PROCEDURE — 71046 X-RAY EXAM CHEST 2 VIEWS: CPT

## 2023-02-16 PROCEDURE — G8420 CALC BMI NORM PARAMETERS: HCPCS | Performed by: INTERNAL MEDICINE

## 2023-02-16 PROCEDURE — G8427 DOCREV CUR MEDS BY ELIG CLIN: HCPCS | Performed by: INTERNAL MEDICINE

## 2023-02-16 RX ORDER — ATORVASTATIN CALCIUM 20 MG/1
20 TABLET, FILM COATED ORAL DAILY
Qty: 90 TABLET | Refills: 1
Start: 2023-02-16

## 2023-02-16 NOTE — PATIENT INSTRUCTIONS
No med changes  Chest x ray today  Refer to Dr Helen Call to discuss TAVR (transcatheter Aortic Valve Replacement)   Follow up with Dr Hardeep Álvarez in 6 months

## 2023-02-16 NOTE — PROGRESS NOTES
[address]  Cardiac Consult     Referring Provider:  Kiya Gomes MD     Chief Complaint   Patient presents with    Hypertension    Hyperlipidemia    3 Month Follow-Up     W/ echo        History of Present Illness:  80 y.o. male with bipolar formally followed by Dr. Lisa Mortensen seen in f/u for CAD, HTN, hyperlipidemia and bradycardia. He underwent stenting of the LCX and RCA in 2005. Last stress myoview 12/2016 was normal. He has had bradycardia resulting in discontinuation of his beta blocker. He was admitted 4/19 with afib and chest pain. He underwent MOE cardioversion. He was admitted 6/2020 with a fall and hip fracture. He lives at [facility] and was trying to adjust the TV and \"Tripped\". He was back in afib that was controlled at that time. He was having more tachycardia and diltizem added. He was admitted 6/2021 with chest pain that was felt to be GI. Returned to ER a few days later as he fell. Admit again 5/2022 wit sepsis and possible aspiration. Placed in hospice. However, he was later taken out of hopice . He is here with his daughter today. He is doing much better. He has therapy a couple days per week. Has some type of exercise pedals that he does sitting in a chair. Denies dyspnea or chest pain. ECHO today with severe AS and pleural effusion. Past Medical History:   has a past medical history of A-fib ([address] Arthritis, Atrial fibrillation ([address] Blepharitis of both eyes, CAD (coronary artery disease), GERD (gastroesophageal reflux disease), Gout, Hyperlipidemia, Hypertension, Macular degeneration, NSTEMI (non-ST elevated myocardial infarction) ([address] PNA (pneumonia), Prostate enlargement, Psychiatric problem, and Tachycardia. Surgical History:   has a past surgical history that includes Coronary angioplasty with stent (2005); skin biopsy; Kidney stone surgery (1980); Coronary artery bypass graft; Cataract removal (1993); TURP (2003);  Skin cancer destruction (2006); Colonoscopy; eye surgery; Cardiac surgery; and hip surgery (Left, 6/2/2020). Social History:   reports that he has never smoked. He has never used smokeless tobacco. He reports that he does not drink alcohol and does not use drugs. Family History:  family history includes Heart Disease in his brother and mother. Allergies:  Lasix [furosemide], Zyprexa [olanzapine], and Citalopram     Home Medications:  Outpatient Encounter Medications as of 2/16/2023   Medication Sig Dispense Refill    acetaminophen (TYLENOL) 325 MG tablet Take by mouth every 6 hours as needed for Pain      LORazepam (ATIVAN) 0.5 MG tablet Take 0.5 mg by mouth every 6 hours as needed for Anxiety.       Eyelid Cleansers (AVENOVA) 0.01 % SOLN Apply 2 sprays topically 2 times daily      aluminum & magnesium hydroxide-simethicone (MYLANTA) 400-400-40 MG/5ML SUSP Take 30 mLs by mouth every 6 hours as needed      Artificial Tear Ointment (DRY EYES OP) Apply 250 capsules to eye daily      dilTIAZem (CARDIZEM) 30 MG tablet Take 1 tablet by mouth 3 times daily Hold the tablet if heart rate below 60 or blood pressure below 187 systolic 261 tablet 1    sennosides-docusate sodium (SENOKOT-S) 8.6-50 MG tablet Take 1 tablet by mouth 2 times daily      apixaban (ELIQUIS) 2.5 MG TABS tablet Take 1 tablet by mouth 2 times daily 180 tablet 3    finasteride (PROSCAR) 5 MG tablet TAKE 1 TABLET EVERY NIGHT 90 tablet 3    lamoTRIgine (LAMICTAL) 150 MG tablet Take 1 tablet by mouth daily 90 tablet 1    levothyroxine (SYNTHROID) 25 MCG tablet TAKE 1 TABLET EVERY DAY 90 tablet 3    tamsulosin (FLOMAX) 0.4 MG capsule Take 0.4 mg by mouth daily      polyethylene glycol (GLYCOLAX) powder Take 17 g by mouth daily as needed      nitroGLYCERIN (NITROSTAT) 0.4 MG SL tablet Place 1 tablet under the tongue every 5 minutes as needed for Chest pain 25 tablet 3    melatonin 5 MG TABS tablet Take 5 mg by mouth daily as needed (Insomnia)       No facility-administered encounter medications on file as of 2/16/2023. [x] Medications and dosages reviewed. ROS:  [x]Full ROS obtained and negative except as mentioned in HPI      Physical Examination:    Vitals:    02/16/23 0937   BP: 114/60   Pulse: 71   SpO2: 96%      /60 (Site: Left Upper Arm, Position: Sitting, Cuff Size: Medium Adult)   Pulse 71   Ht 5' 8\" (1.727 m)   Wt 136 lb (61.7 kg)   SpO2 96%   BMI 20.68 kg/m²     GENERAL: Elderly male in wheel chair in NAD  NEUROLOGICAL: Alert and oriented  PSYCH: Calm affect  SKIN: Warm and dry, no rash  HEENT: Normocephalic, Sclera non-icteric, mucus membranes moist  NECK: supple, JVP normal  CAROTID: Normal upstroke, no bruits  CARDIAC: Normal PMI, regular rate and irregular rhythm normal S1S2, 2/6 mid-late peaking ejection murmur, rub, or gallop  RESPIRATORY: Normal respiratory effort, Decrease BS LLL  EXTREMITIES: no LE edema  MUSCULOSKELETAL: No joint swelling or tenderness, no chest wall tenderness  GASTROINTESTINAL: normal bowel sounds, soft, non-tender, no bruit      ECHO 4/2022  Summary   -Normal left ventricle size and systolic function with an estimated ejection   fraction of 65%. No regional wall motion abnormalities are seen. -There is moderate concentric left ventricular hypertrophy. -E/e\"= 12. A-fib.   -Mild to moderate mitral regurgitation.   -The left atrium is dilated. -The aortic valve area is calculated at .8 cm2 with a maximum pressure   gradient of 58 mmHg and a mean pressure gradient of 34 mmHg. This is c/w   moderate-severe aortic stenosis. No evidence of aortic valve regurgitation.   -The right ventricle is mildly enlarged and mildly decreased in function.   -Moderate tricuspid regurgitation. RVSP 32mmHg.   -The right atrium is dilated. -Mild pulmonic regurgitation. -IVC not well visualized. ECHO 2/2023   Summary   -Normal left ventricle size and systolic function with an estimated ejection   fraction of 55-60%.    -There is inferior hypokinesis. -There is left ventricular hypertrophy.   -Indeterminate diastolic function.   -Mitral annular calcification is present.   -Moderate mitral regurgitation is present.   -The left atrium is moderately to severely dilated. -Severe aortic stenosis with a peak velocity of 4.6m/s and a mean pressure   gradient of 47mmHg   -The right ventricle is borderline enlarged.   -Right ventricular systolic function is mildly reduced. -Moderate to severe tricuspid regurgitation.   -Moderate to severe tricuspid regurgitation.   -Systolic pulmonary artery pressure (SPAP) estimated at 52 mmHg (RA pressure   3 mmHg), consistent withpulmonary hypertension.   -The right atrium is severely dilated. -Mild pulmonic regurgitation present.   -There is a large pleural effusion      ASSESSMENT:    CAD:   Remains stable. Continue medical therapy  S/p stenting of LCX and RCA 2005. Stress myoview 2016 normal.   Chest pain seems resolved. Follow    Hyperlipidemia:   No longer on pravastatin   LDL=88. Recommend lipitor 20mg    HTN:   Controlled. Follow on current meds. No hypotension  /60 (Site: Left Upper Arm, Position: Sitting, Cuff Size: Medium Adult)   Pulse 71   Ht 5' 8\" (1.727 m)   Wt 136 lb (61.7 kg)   SpO2 96%   BMI 20.68 kg/m²     Afib: In afib and HR=80's  Stable  Continue Dilt 30 mg q 8. Eliquis 2.5 BID    Chest Pain:  resolved    Aortic Stenosis:  Now severe  Fairly frail, but improved. Discussed at length with pt and daughter. They are unsure if they want to consider TAVR  I will have him see Dr. Chan Phelan to discuss    Edema:  Resolved. Off of Demadex.       Plan:  CXR to evaluate pleural effusion  See Dr. Chan Phelan in TAVR clinic  Lipitor 20  F/u me 6 months      Thank you for allowing me to participate in the care of this individual.      Keira Hidalgo M.D., [facility] [place]

## 2023-02-16 NOTE — TELEPHONE ENCOUNTER
Patient was referred by Dr. Lois Blandon to the [facility] location with Dr. Alba Lamb. Patient has been scheduled for 04/13/23 at 10:00 (am/pm). Patient verbalized understanding of appointment instructions. Call complete.

## 2023-02-16 NOTE — TELEPHONE ENCOUNTER
Large pleural effusion on echo today. Chest x ray shows small to moderate left pleural effusion. Per Dr Shelley Rodriguez- we will continue to monitor for now. Spoke to patient's dtr Hamida Garcia. She verbalized understanding.

## 2023-02-17 ENCOUNTER — TELEPHONE (OUTPATIENT)
Dept: CARDIOLOGY CLINIC | Age: 88
End: 2023-02-17

## 2023-02-17 NOTE — TELEPHONE ENCOUNTER
Sam Goes called back. Message as below relayed to Sam Khan. Pancho Caldwell understanding and will continue with the Lipitor as ordered.

## 2023-02-17 NOTE — TELEPHONE ENCOUNTER
Daughter states pt was started on Lipitor and did not know if it mattered or not, but pt did not fast for last  labs. Please advise.

## 2023-02-17 NOTE — TELEPHONE ENCOUNTER
Called Oscar, however no answer, so left VM to call back. Need to inform Oscar that per MMK, he should continue on 20 mg Lipitor due to his history of CAD.

## 2023-08-25 ENCOUNTER — OFFICE VISIT (OUTPATIENT)
Dept: CARDIOLOGY CLINIC | Age: 88
End: 2023-08-25
Payer: MEDICARE

## 2023-08-25 VITALS
SYSTOLIC BLOOD PRESSURE: 110 MMHG | BODY MASS INDEX: 19.86 KG/M2 | HEIGHT: 70 IN | OXYGEN SATURATION: 96 % | WEIGHT: 138.7 LBS | DIASTOLIC BLOOD PRESSURE: 60 MMHG | HEART RATE: 72 BPM

## 2023-08-25 DIAGNOSIS — I10 ESSENTIAL HYPERTENSION: Chronic | ICD-10-CM

## 2023-08-25 DIAGNOSIS — I25.10 ATHEROSCLEROSIS OF NATIVE CORONARY ARTERY OF NATIVE HEART WITHOUT ANGINA PECTORIS: Primary | ICD-10-CM

## 2023-08-25 DIAGNOSIS — E78.2 MIXED HYPERLIPIDEMIA: Chronic | ICD-10-CM

## 2023-08-25 DIAGNOSIS — I35.0 NONRHEUMATIC AORTIC VALVE STENOSIS: ICD-10-CM

## 2023-08-25 DIAGNOSIS — I48.20 CHRONIC ATRIAL FIBRILLATION (HCC): ICD-10-CM

## 2023-08-25 PROCEDURE — 1036F TOBACCO NON-USER: CPT | Performed by: INTERNAL MEDICINE

## 2023-08-25 PROCEDURE — 99214 OFFICE O/P EST MOD 30 MIN: CPT | Performed by: INTERNAL MEDICINE

## 2023-08-25 PROCEDURE — G8420 CALC BMI NORM PARAMETERS: HCPCS | Performed by: INTERNAL MEDICINE

## 2023-08-25 PROCEDURE — G8427 DOCREV CUR MEDS BY ELIG CLIN: HCPCS | Performed by: INTERNAL MEDICINE

## 2023-08-25 PROCEDURE — 1123F ACP DISCUSS/DSCN MKR DOCD: CPT | Performed by: INTERNAL MEDICINE

## 2023-08-25 RX ORDER — ATORVASTATIN CALCIUM 20 MG/1
20 TABLET, FILM COATED ORAL DAILY
Qty: 90 TABLET | Refills: 4 | Status: SHIPPED | OUTPATIENT
Start: 2023-08-25

## 2024-03-12 ENCOUNTER — TELEPHONE (OUTPATIENT)
Dept: CARDIOLOGY CLINIC | Age: 89
End: 2024-03-12

## 2024-03-12 NOTE — TELEPHONE ENCOUNTER
The patient's daughter phoned stating that he had passed away on 03/01/24.  She wanted to thank [facility] and everyone for the care that he has received.
